# Patient Record
Sex: FEMALE | Race: WHITE | Employment: FULL TIME | URBAN - METROPOLITAN AREA
[De-identification: names, ages, dates, MRNs, and addresses within clinical notes are randomized per-mention and may not be internally consistent; named-entity substitution may affect disease eponyms.]

---

## 2017-02-13 ENCOUNTER — OFFICE VISIT (OUTPATIENT)
Dept: FAMILY MEDICINE | Facility: CLINIC | Age: 52
End: 2017-02-13
Payer: COMMERCIAL

## 2017-02-13 VITALS
RESPIRATION RATE: 16 BRPM | WEIGHT: 200.2 LBS | HEART RATE: 78 BPM | TEMPERATURE: 96.8 F | BODY MASS INDEX: 28.12 KG/M2 | DIASTOLIC BLOOD PRESSURE: 83 MMHG | OXYGEN SATURATION: 98 % | SYSTOLIC BLOOD PRESSURE: 123 MMHG

## 2017-02-13 DIAGNOSIS — B00.1 RECURRENT COLD SORES: ICD-10-CM

## 2017-02-13 DIAGNOSIS — H65.93 BILATERAL NON-SUPPURATIVE OTITIS MEDIA: Primary | ICD-10-CM

## 2017-02-13 PROCEDURE — 99213 OFFICE O/P EST LOW 20 MIN: CPT | Performed by: NURSE PRACTITIONER

## 2017-02-13 RX ORDER — ACYCLOVIR 50 MG/G
CREAM TOPICAL
Qty: 5 G | Refills: 1 | Status: SHIPPED | OUTPATIENT
Start: 2017-02-13 | End: 2017-02-17

## 2017-02-13 RX ORDER — VALACYCLOVIR HYDROCHLORIDE 1 G/1
1000 TABLET, FILM COATED ORAL DAILY
Qty: 20 TABLET | Refills: 6 | Status: SHIPPED | OUTPATIENT
Start: 2017-02-13 | End: 2018-03-01

## 2017-02-13 RX ORDER — AZITHROMYCIN 250 MG/1
TABLET, FILM COATED ORAL
Qty: 6 TABLET | Refills: 0 | Status: SHIPPED | OUTPATIENT
Start: 2017-02-13 | End: 2017-06-22

## 2017-02-13 NOTE — PATIENT INSTRUCTIONS
Increase rest and fluids. Tylenol and/or Ibuprofen for comfort. Cool mist vaporizer. If your symptoms worsen or do not resolve follow up with your primary care provider in 2 weeks and sooner if needed.        Indications for emergent return to emergency department discussed with patient, who verbalized good understanding and agreement.  Patient understands the limitations of today's evaluation.           Middle Ear Infection (Adult)  You have an infection of the middle ear (the space behind the eardrum). This is also called acute otitis media (AOM). Sometimes it is caused by the common cold. This is because congestion can block the internal passage (eustachian tube) that drains fluid from the middle ear. When the middle ear fills with fluid, bacteria can grow there and cause an infection. Oral antibiotics are used to treat this illness, not ear drops. Symptoms usually start to improve within 1 to 2 days of treatment.    Home care  The following are general care guidelines:    Finish all of the antibiotic medicine given, even though you may feel better after the first few days.    You may use acetaminophen or ibuprofen to control pain, unless something else was prescribed. [NOTE: If you have chronic liver or kidney disease or have ever had a stomach ulcer or GI bleeding, talk with your doctor before using these medicines.] Do not give aspirin to anyone under 18 years of age who has a fever. It may cause severe liver damage.  Follow-up care  Follow up with your doctor in 2 weeks if all symptoms have not gotten better, or if hearing doesn't go back to normal within 1 month.  When to seek medical care  Get prompt medical attention if any of the following occur:    Ear pain gets worse or does not improve after 3 days of treatment    Unusual drowsiness or confusion    Neck pain, stiff neck, or headache    Fluid or blood draining from the ear canal    Fever of 100.4 F (38 C) or higher after 3 days of antibiotics, or as  directed by your health care provider    Convulsion (seizure)    0887-4181 [employer]. [address] [place] [address], [place] [address]. All rights reserved. This information is not intended as a substitute for professional medical care. Always follow your healthcare professional's instructions.

## 2017-02-13 NOTE — NURSING NOTE
"Chief Complaint   Patient presents with     Sinus Problem     /83  Pulse 78  Temp 96.8  F (36  C) (Tympanic)  Resp 16  Wt 200 lb 3.2 oz (90.8 kg)  SpO2 98%  BMI 28.12 kg/m2 Estimated body mass index is 28.12 kg/(m^2) as calculated from the following:    Height as of 5/13/16: 5' 10.75\" (1.797 m).    Weight as of this encounter: 200 lb 3.2 oz (90.8 kg).  bp completed using cuff size: regular      Health Maintenance that is potentially due pending provider review:  Patient says she is not due for a mammogram         "

## 2017-02-13 NOTE — PROGRESS NOTES
SUBJECTIVE:                                                    Christina John is a 51 year old female who presents to clinic today for the following health issues:      Acute Illness   Acute illness concerns: sinus problem   Onset: 1 week      Fever: YES    Chills/Sweats: YES    Headache (location?): YES    Sinus Pressure:YES    Conjunctivitis:  no    Ear Pain: YES: right, left cant hear out of     Rhinorrhea: YES    Congestion: YES    Sore Throat: no     Cough: YES    Wheeze: YES- little bit     Decreased Appetite: YES    Nausea: no    Vomiting: no    Diarrhea:  no    Dysuria/Freq.: no    Fatigue/Achiness: no     Sick/Strep Exposure: YES- everyone in her house has been sick for 3 months. Her 2 kids have rsv      Therapies Tried and outcome: ibuprofen, tylenol, sudafed 12 hour, delsym, benedryl           Problem list and histories reviewed & adjusted, as indicated.  Additional history: as documented    Patient Active Problem List   Diagnosis     DCIS (ductal carcinoma in situ) of breast     GERD (gastroesophageal reflux disease)     PCOS (polycystic ovarian syndrome)     Seasonal allergic rhinitis     Allergic rhinitis due to animal dander     House dust mite allergy     Seasonal allergic conjunctivitis     HYPERLIPIDEMIA LDL GOAL <160     Mild major depression (H)     Diagnostic skin and sensitization tests     Desensitization to allergens     History of adenomatous polyp of colon     BRCA1 positive     Overweight (BMI 25.0-29.9)     Past Surgical History   Procedure Laterality Date     Hc biopsy of breast, open incisional  2008     sentinel lymph node     C appendectomy  1982     C/section, classical       Excis vaginal cyst/tumor       Endoscopy       10/2000 Xiao, repeat 10/2002, REPEAT 2/08 (REPEAT IN 2 YRS)     Pelvis laparoscopy,dx       x2     C removal gallbladder  6/2005     Hc colonoscopy thru stoma, diagnostic  12/04, 12/09     (REPEAT IN 5 YRS)     Hysterectomy, pap no longer indicated        Mastectomy bilateral, insert tissue expander bilateral, combined  8/2010       Social History   Substance Use Topics     Smoking status: Never Smoker     Smokeless tobacco: Never Used      Comment: Nonsmoking household     Alcohol use No     Family History   Problem Relation Age of Onset     Hypertension Mother      GASTROINTESTINAL DISEASE Mother      GAITAN'S ESOPHAGUS     Lipids Mother      Depression Mother      Other - See Comments Mother      Pagets Disease of the Vulva, Dx 06/2014     Allergies Father      HAYFEVER     HEART DISEASE Father      AFIB     Hypertension Father      Lipids Father      GASTROINTESTINAL DISEASE Other      GAITAN'S ESOPHAGUS     GASTROINTESTINAL DISEASE Other      GAITAN'S ESOPHAGUS     Breast Cancer Paternal Aunt      Breast Cancer Other      fathers 1st cousin         Current Outpatient Prescriptions   Medication Sig Dispense Refill     valACYclovir (VALTREX) 1000 mg tablet Take 1 tablet (1,000 mg) by mouth daily 20 tablet 6     acyclovir (ZOVIRAX) 5 % cream Apply topically 5 times daily for 4 days 5 g 1     azithromycin (ZITHROMAX Z-DARLIN) 250 MG tablet Take 2 tablets on day 1 and then take 1 tablet days 2-5 6 tablet 0     buPROPion (WELLBUTRIN XL) 300 MG 24 hr tablet Take 1 tablet (300 mg) by mouth every morning 90 tablet 1     escitalopram (LEXAPRO) 20 MG tablet Take 1 tablet (20 mg) by mouth daily 90 tablet 3     calcium carbonate-vitamin D (CALCIUM + D) 600-200 MG-UNIT TABS Take  by mouth 2 times daily.       Ascorbic Acid (VITAMIN C CR PO) Take  by mouth.       fexofenadine (ALLEGRA) 180 MG tablet Take 1 tablet by mouth daily. Needs to be seen for further refills 90 tablet 0     Acetaminophen (TYLENOL EXTRA STRENGTH PO) Take  by mouth. PRN         MULTIVITAMIN TABS   OR 1 TABLET DAILY       [DISCONTINUED] valACYclovir (VALTREX) 1000 mg tablet Take 1 tablet (1,000 mg) by mouth daily 20 tablet 6     Allergies   Allergen Reactions     Amoxicillin Swelling     Cefzil [Cefprozil]  Swelling     Adhesive Tape Rash     Biaxin [Clarithromycin] Hives     Erythromycin GI Disturbance     Latex Rash     Percocet [Acetaminophen] Hives     Sulfa Drugs Hives     Problem list, Medication list, Allergies, and Medical/Social/Surgical histories reviewed in [facility] and updated as appropriate.    ROS:  Constitutional, HEENT, cardiovascular, pulmonary, GI, , musculoskeletal, neuro, skin, endocrine and psych systems are negative, except as otherwise noted.    OBJECTIVE:                                                    /83  Pulse 78  Temp 96.8  F (36  C) (Tympanic)  Resp 16  Wt 200 lb 3.2 oz (90.8 kg)  SpO2 98%  BMI 28.12 kg/m2  Body mass index is 28.12 kg/(m^2).  GENERAL: healthy, alert and no distress, nontoxic in appearance  EYES: Eyes grossly normal to inspection, PERRL and conjunctivae and sclerae normal  HENT: ear canals and TM's intact bilaterally and red, left > right, nose and mouth without ulcers or lesions  NECK: no adenopathy, supple with full ROM  RESP: lungs clear to auscultation - no rales, rhonchi or wheezes  CV: regular rate and rhythm, normal S1 S2, no S3 or S4, no murmur, click or rub, no peripheral edema and   ABDOMEN: soft, nontender  MS: no gross musculoskeletal defects noted, no edema  No rash    Diagnostic Test Results:  No results found for this or any previous visit (from the past 24 hour(s)).     ASSESSMENT/PLAN:                                                    Requests refills as well for cold sore meds.  Problem List Items Addressed This Visit     None      Visit Diagnoses     Bilateral non-suppurative otitis media    -  Primary    Relevant Medications    valACYclovir (VALTREX) 1000 mg tablet    azithromycin (ZITHROMAX Z-DARLIN) 250 MG tablet    Recurrent cold sores        Relevant Medications    valACYclovir (VALTREX) 1000 mg tablet    acyclovir (ZOVIRAX) 5 % cream               Patient Instructions   Increase rest and fluids. Tylenol and/or Ibuprofen for comfort. Cool mist  vaporizer. If your symptoms worsen or do not resolve follow up with your primary care provider in 2 weeks and sooner if needed.        Indications for emergent return to emergency department discussed with patient, who verbalized good understanding and agreement.  Patient understands the limitations of today's evaluation.           Middle Ear Infection (Adult)  You have an infection of the middle ear (the space behind the eardrum). This is also called acute otitis media (AOM). Sometimes it is caused by the common cold. This is because congestion can block the internal passage (eustachian tube) that drains fluid from the middle ear. When the middle ear fills with fluid, bacteria can grow there and cause an infection. Oral antibiotics are used to treat this illness, not ear drops. Symptoms usually start to improve within 1 to 2 days of treatment.    Home care  The following are general care guidelines:    Finish all of the antibiotic medicine given, even though you may feel better after the first few days.    You may use acetaminophen or ibuprofen to control pain, unless something else was prescribed. [NOTE: If you have chronic liver or kidney disease or have ever had a stomach ulcer or GI bleeding, talk with your doctor before using these medicines.] Do not give aspirin to anyone under 18 years of age who has a fever. It may cause severe liver damage.  Follow-up care  Follow up with your doctor in 2 weeks if all symptoms have not gotten better, or if hearing doesn't go back to normal within 1 month.  When to seek medical care  Get prompt medical attention if any of the following occur:    Ear pain gets worse or does not improve after 3 days of treatment    Unusual drowsiness or confusion    Neck pain, stiff neck, or headache    Fluid or blood draining from the ear canal    Fever of 100.4 F (38 C) or higher after 3 days of antibiotics, or as directed by your health care provider    Convulsion (seizure)    3937-0972  [employer]. [address] [place] [address], [place] [address]. All rights reserved. This information is not intended as a substitute for professional medical care. Always follow your healthcare professional's instructions.            ELISA Ricci [facility]

## 2017-02-13 NOTE — MR AVS SNAPSHOT
After Visit Summary   2/13/2017    Christina John    MRN: [id number]           Patient Information     Date Of Birth          1965        Visit Information        Provider Department      2/13/2017 2:00 PM Ivett Rizzo APRN [facility]        Today's Diagnoses     Bilateral non-suppurative otitis media    -  1    Recurrent cold sores          Care Instructions    Increase rest and fluids. Tylenol and/or Ibuprofen for comfort. Cool mist vaporizer. If your symptoms worsen or do not resolve follow up with your primary care provider in 2 weeks and sooner if needed.        Indications for emergent return to emergency department discussed with patient, who verbalized good understanding and agreement.  Patient understands the limitations of today's evaluation.           Middle Ear Infection (Adult)  You have an infection of the middle ear (the space behind the eardrum). This is also called acute otitis media (AOM). Sometimes it is caused by the common cold. This is because congestion can block the internal passage (eustachian tube) that drains fluid from the middle ear. When the middle ear fills with fluid, bacteria can grow there and cause an infection. Oral antibiotics are used to treat this illness, not ear drops. Symptoms usually start to improve within 1 to 2 days of treatment.    Home care  The following are general care guidelines:    Finish all of the antibiotic medicine given, even though you may feel better after the first few days.    You may use acetaminophen or ibuprofen to control pain, unless something else was prescribed. [NOTE: If you have chronic liver or kidney disease or have ever had a stomach ulcer or GI bleeding, talk with your doctor before using these medicines.] Do not give aspirin to anyone under 18 years of age who has a fever. It may cause severe liver damage.  Follow-up care  Follow up with your doctor in 2 weeks if all symptoms have not  gotten better, or if hearing doesn't go back to normal within 1 month.  When to seek medical care  Get prompt medical attention if any of the following occur:    Ear pain gets worse or does not improve after 3 days of treatment    Unusual drowsiness or confusion    Neck pain, stiff neck, or headache    Fluid or blood draining from the ear canal    Fever of 100.4 F (38 C) or higher after 3 days of antibiotics, or as directed by your health care provider    Convulsion (seizure)    8079-2806 [employer]. [address] [place] [address]. All rights reserved. This information is not intended as a substitute for professional medical care. Always follow your healthcare professional's instructions.              Follow-ups after your visit        Follow-up notes from your care team     Return in about 2 weeks (around 2/27/2017).      Who to contact     If you have questions or need follow up information about today's clinic visit or your schedule please contact [facility] directly at [phone].  Normal or non-critical lab and imaging results will be communicated to you by [employer], letter or phone within 4 business days after the clinic has received the results. If you do not hear from us within 7 days, please contact the clinic through [employer] or phone. If you have a critical or abnormal lab result, we will notify you by phone as soon as possible.  Submit refill requests through [employer] or call your pharmacy and they will forward the refill request to us. Please allow 3 business days for your refill to be completed.          Additional Information About Your Visit        [employer] Information     [employer] gives you secure access to your electronic health record. If you see a primary care provider, you can also send messages to your care team and make appointments. If you have questions, please call your primary care clinic.  If you do not have a primary care provider, please call  [phone] and they will assist you.        Care EveryWhere ID     This is your Care EveryWhere ID. This could be used by other organizations to access your [facility] medical records  [id number]        Your Vitals Were     Pulse Temperature Respirations Pulse Oximetry BMI (Body Mass Index)       78 96.8  F (36  C) (Tympanic) 16 98% 28.12 kg/m2        Blood Pressure from Last 3 Encounters:   02/13/17 123/83   05/20/16 118/82   05/13/16 120/85    Weight from Last 3 Encounters:   02/13/17 200 lb 3.2 oz (90.8 kg)   05/20/16 204 lb 6.4 oz (92.7 kg)   05/13/16 203 lb (92.1 kg)              Today, you had the following     No orders found for display         Today's Medication Changes          These changes are accurate as of: 2/13/17  2:54 PM.  If you have any questions, ask your nurse or doctor.               Start taking these medicines.        Dose/Directions    acyclovir 5 % cream   Commonly known as:  ZOVIRAX   Used for:  Recurrent cold sores   Started by:  Ivett Rizzo APRN CNP        Apply topically 5 times daily for 4 days   Quantity:  5 g   Refills:  1       azithromycin 250 MG tablet   Commonly known as:  ZITHROMAX Z-DARLIN   Used for:  Bilateral non-suppurative otitis media   Started by:  Ivett Rizzo APRN CNP        Take 2 tablets on day 1 and then take 1 tablet days 2-5   Quantity:  6 tablet   Refills:  0            Where to get your medicines      These medications were sent to [facility] [place] [id number] [place] [id number]     Phone:  [phone]     acyclovir 5 % cream    azithromycin 250 MG tablet    valACYclovir 1000 mg tablet                Primary Care Provider Office Phone # Fax #    Lucía Aguillon [phone]       [facility] [address] [facility] [address]        Thank you!     Thank you for choosing [facility]  for your care. Our goal is always to provide you  with excellent care. Hearing back from our patients is one way we can continue to improve our services. Please take a few minutes to complete the written survey that you may receive in the mail after your visit with us. Thank you!             Your Updated Medication List - Protect others around you: Learn how to safely use, store and throw away your medicines at www.disposemymeds.org.          This list is accurate as of: 2/13/17  2:54 PM.  Always use your most recent med list.                   Brand Name Dispense Instructions for use    acyclovir 5 % cream    ZOVIRAX    5 g    Apply topically 5 times daily for 4 days       azithromycin 250 MG tablet    ZITHROMAX Z-DARLIN    6 tablet    Take 2 tablets on day 1 and then take 1 tablet days 2-5       buPROPion 300 MG 24 hr tablet    WELLBUTRIN XL    90 tablet    Take 1 tablet (300 mg) by mouth every morning       calcium + D 600-200 MG-UNIT Tabs   Generic drug:  calcium carbonate-vitamin D      Take  by mouth 2 times daily.       escitalopram 20 MG tablet    LEXAPRO    90 tablet    Take 1 tablet (20 mg) by mouth daily       fexofenadine 180 MG tablet    ALLEGRA    90 tablet    Take 1 tablet by mouth daily. Needs to be seen for further refills       MULTIVITAMIN TABS   OR      1 TABLET DAILY       TYLENOL EXTRA STRENGTH PO      Take  by mouth. PRN       valACYclovir 1000 mg tablet    VALTREX    20 tablet    Take 1 tablet (1,000 mg) by mouth daily       VITAMIN C CR PO      Take  by mouth.

## 2017-02-24 ENCOUNTER — OFFICE VISIT (OUTPATIENT)
Dept: FAMILY MEDICINE | Facility: CLINIC | Age: 52
End: 2017-02-24
Payer: COMMERCIAL

## 2017-02-24 VITALS
SYSTOLIC BLOOD PRESSURE: 128 MMHG | DIASTOLIC BLOOD PRESSURE: 85 MMHG | TEMPERATURE: 96.4 F | BODY MASS INDEX: 28.71 KG/M2 | WEIGHT: 204.4 LBS

## 2017-02-24 DIAGNOSIS — H66.92 RECURRENT OTITIS MEDIA OF LEFT EAR: Primary | ICD-10-CM

## 2017-02-24 PROCEDURE — 99213 OFFICE O/P EST LOW 20 MIN: CPT | Performed by: NURSE PRACTITIONER

## 2017-02-24 RX ORDER — AZITHROMYCIN 250 MG/1
TABLET, FILM COATED ORAL
Qty: 6 TABLET | Refills: 0 | Status: SHIPPED | OUTPATIENT
Start: 2017-02-24 | End: 2017-06-22

## 2017-02-24 NOTE — PROGRESS NOTES
SUBJECTIVE:                                                    Christina John is a 51 year old female who presents to clinic today for the following health issues:      Chief Complaint   Patient presents with     Ear Problem     recheck on ears both mainly left now both, was getting better but back now             Problem list and histories reviewed & adjusted, as indicated.  Additional history: states ever since she was in [place] at beginning of the month, she's had pain and pressure in her left ear.  Denies scuba diving or doing any swimming while in [place].  She was seen but not treated for this in [place]. Was given Z pack here on the 13th and states she felt somewhat better with that but now symptoms are back.  Denies any other specific URI complaint. Is otherwise feeling ok.  She states she's had issues with her ears on and off ever since she had chemo and then as a child she had a lot of ear infections.  Hearing is somewhat diminished in left ear. No dizziness.      Patient Active Problem List   Diagnosis     DCIS (ductal carcinoma in situ) of breast     GERD (gastroesophageal reflux disease)     PCOS (polycystic ovarian syndrome)     Seasonal allergic rhinitis     Allergic rhinitis due to animal dander     House dust mite allergy     Seasonal allergic conjunctivitis     HYPERLIPIDEMIA LDL GOAL <160     Mild major depression (H)     Diagnostic skin and sensitization tests     Desensitization to allergens     History of adenomatous polyp of colon     BRCA1 positive     Overweight (BMI 25.0-29.9)     Past Surgical History   Procedure Laterality Date     Hc biopsy of breast, open incisional  2008     sentinel lymph node     C appendectomy  1982     C/section, classical       Excis vaginal cyst/tumor       Endoscopy       10/2000 Xiao, repeat 10/2002, REPEAT 2/08 (REPEAT IN 2 YRS)     Pelvis laparoscopy,dx       x2     C removal gallbladder  6/2005     Hc colonoscopy thru stoma, diagnostic  12/04, 12/09      (REPEAT IN 5 YRS)     Hysterectomy, pap no longer indicated       Mastectomy bilateral, insert tissue expander bilateral, combined  8/2010       Social History   Substance Use Topics     Smoking status: Never Smoker     Smokeless tobacco: Never Used      Comment: Nonsmoking household     Alcohol use No     Family History   Problem Relation Age of Onset     Hypertension Mother      GASTROINTESTINAL DISEASE Mother      GAITAN'S ESOPHAGUS     Lipids Mother      Depression Mother      Other - See Comments Mother      Pagets Disease of the Vulva, Dx 06/2014     Allergies Father      HAYFEVER     HEART DISEASE Father      AFIB     Hypertension Father      Lipids Father      GASTROINTESTINAL DISEASE Other      GAITAN'S ESOPHAGUS     GASTROINTESTINAL DISEASE Other      GAITAN'S ESOPHAGUS     Breast Cancer Paternal Aunt      Breast Cancer Other      fathers 1st cousin         Current Outpatient Prescriptions   Medication Sig Dispense Refill     azithromycin (ZITHROMAX) 250 MG tablet Two tablets first day, then one tablet daily for four days. 6 tablet 0     valACYclovir (VALTREX) 1000 mg tablet Take 1 tablet (1,000 mg) by mouth daily 20 tablet 6     buPROPion (WELLBUTRIN XL) 300 MG 24 hr tablet Take 1 tablet (300 mg) by mouth every morning 90 tablet 1     escitalopram (LEXAPRO) 20 MG tablet Take 1 tablet (20 mg) by mouth daily 90 tablet 3     calcium carbonate-vitamin D (CALCIUM + D) 600-200 MG-UNIT TABS Take  by mouth 2 times daily.       Ascorbic Acid (VITAMIN C CR PO) Take  by mouth.       fexofenadine (ALLEGRA) 180 MG tablet Take 1 tablet by mouth daily. Needs to be seen for further refills 90 tablet 0     Acetaminophen (TYLENOL EXTRA STRENGTH PO) Take  by mouth. PRN         MULTIVITAMIN TABS   OR 1 TABLET DAILY       azithromycin (ZITHROMAX Z-DARLIN) 250 MG tablet Take 2 tablets on day 1 and then take 1 tablet days 2-5 (Patient not taking: Reported on 2/24/2017) 6 tablet 0     Allergies   Allergen Reactions      Amoxicillin Swelling     Cefzil [Cefprozil] Swelling     Adhesive Tape Rash     Biaxin [Clarithromycin] Hives     Erythromycin GI Disturbance     Latex Rash     Percocet [Acetaminophen] Hives     Sulfa Drugs Hives        ROS: 10 point ROS neg other than the symptoms noted above in the HPI.    OBJECTIVE:                                                    /85 (Cuff Size: Adult Regular)  Temp 96.4  F (35.8  C) (Tympanic)  Wt 204 lb 6.4 oz (92.7 kg)  BMI 28.71 kg/m2  Body mass index is 28.71 kg/(m^2).  GENERAL: healthy, alert and no distress  HENT: ear canals and TM's left is erythematic, right is pink/dull, pharynx without erythema  NECK: anterior cervical adenopathy, worse on left  RESP: lungs clear to auscultation - no rales, rhonchi or wheezes  CV: regular rate and rhythm, normal S1 S2, no S3 or S4, no murmur  MS: no gross musculoskeletal defects noted      Diagnostic Test Results:  none      ASSESSMENT/PLAN:                                                            1. Recurrent otitis media of left ear    - azithromycin (ZITHROMAX) 250 MG tablet; Two tablets first day, then one tablet daily for four days.  Dispense: 6 tablet; Refill: 0  - OTOLARYNGOLOGY REFERRAL  Discussed how to take the medication(s), expected outcomes, potential side effects.    See Patient Instructions  Patient Instructions   Repeat antibiotics.  Call to schedule with ENT.  Follow up if symptoms persist or worsen and as needed.        Thank you for choosing [facility].  You may be receiving a survey in the mail from [employer] regarding your visit today.  Please take a few minutes to complete and return the survey to let us know how we are doing.      Our Clinic hours are:  Mondays    7:20 am - 7 pm  Tues -  Fri  7:20 am - 5 pm    Clinic Phone: [phone]    The clinic lab opens at 7:30 am Mon - Fri and appointments are required.    [facility]. [phone]  Monday-Thursday 8 am - 7pm  Tues/Wed/Fri 8 am -  5:30 pm           ELISA Sanchez [facility]

## 2017-02-24 NOTE — MR AVS SNAPSHOT
After Visit Summary   2/24/2017    Christina John    MRN: [id number]           Patient Information     Date Of Birth          1965        Visit Information        Provider Department      2/24/2017 1:00 PM Alesia Alanis APRN [facility]        Today's Diagnoses     Recurrent otitis media of left ear    -  1      Care Instructions    Repeat antibiotics.  Call to schedule with ENT.  Follow up if symptoms persist or worsen and as needed.        Thank you for choosing [facility].  You may be receiving a survey in the mail from [employer] regarding your visit today.  Please take a few minutes to complete and return the survey to let us know how we are doing.      Our Clinic hours are:  Mondays    7:20 am - 7 pm  Tues -  Fri  7:20 am - 5 pm    Clinic Phone: [phone]    The clinic lab opens at 7:30 am Mon - Fri and appointments are required.    [facility]  Ph. [phone]  Monday-Thursday 8 am - 7pm  Tues/Wed/Fri 8 am - 5:30 pm             Follow-ups after your visit        Additional Services     OTOLARYNGOLOGY REFERRAL       Your provider has referred you to: [facility]: [facility] [phone]   [email or web address]    Please be aware that coverage of these services is subject to the terms and limitations of your health insurance plan.  Call member services at your health plan with any benefit or coverage questions.      Please bring the following with you to your appointment:    (1) Any X-Rays, CTs or MRIs which have been performed.  Contact the facility where they were done to arrange for  prior to your scheduled appointment.   (2) List of current medications  (3) This referral request   (4) Any documents/labs given to you for this referral                  Who to contact     If you have questions or need follow up information about today's clinic visit or your schedule please contact [place]  [facility] directly at [phone].  Normal or non-critical lab and imaging results will be communicated to you by MyChart, letter or phone within 4 business days after the clinic has received the results. If you do not hear from us within 7 days, please contact the clinic through [employer] or phone. If you have a critical or abnormal lab result, we will notify you by phone as soon as possible.  Submit refill requests through [employer] or call your pharmacy and they will forward the refill request to us. Please allow 3 business days for your refill to be completed.          Additional Information About Your Visit        [employer] Information     [employer] gives you secure access to your electronic health record. If you see a primary care provider, you can also send messages to your care team and make appointments. If you have questions, please call your primary care clinic.  If you do not have a primary care provider, please call [phone] and they will assist you.        Care EveryWhere ID     This is your Care EveryWhere ID. This could be used by other organizations to access your [place] medical records  [id number]        Your Vitals Were     Temperature BMI (Body Mass Index)                96.4  F (35.8  C) (Tympanic) 28.71 kg/m2           Blood Pressure from Last 3 Encounters:   02/24/17 128/85   02/13/17 123/83   05/20/16 118/82    Weight from Last 3 Encounters:   02/24/17 204 lb 6.4 oz (92.7 kg)   02/13/17 200 lb 3.2 oz (90.8 kg)   05/20/16 204 lb 6.4 oz (92.7 kg)              We Performed the Following     OTOLARYNGOLOGY REFERRAL          Today's Medication Changes          These changes are accurate as of: 2/24/17  1:05 PM.  If you have any questions, ask your nurse or doctor.               These medicines have changed or have updated prescriptions.        Dose/Directions    * azithromycin 250 MG tablet   Commonly known as:  ZITHROMAX Z-DARLIN   This may have changed:  Another medication with the same  name was added. Make sure you understand how and when to take each.   Used for:  Bilateral non-suppurative otitis media   Changed by:  Ivett Rizzo APRN CNP        Take 2 tablets on day 1 and then take 1 tablet days 2-5   Quantity:  6 tablet   Refills:  0       * azithromycin 250 MG tablet   Commonly known as:  ZITHROMAX   This may have changed:  You were already taking a medication with the same name, and this prescription was added. Make sure you understand how and when to take each.   Used for:  Recurrent otitis media of left ear   Changed by:  Alesia Alanis APRN CNP        Two tablets first day, then one tablet daily for four days.   Quantity:  6 tablet   Refills:  0       * Notice:  This list has 2 medication(s) that are the same as other medications prescribed for you. Read the directions carefully, and ask your doctor or other care provider to review them with you.         Where to get your medicines      These medications [place] [facility] [address] [facility]  [address] [facility] [address]     Phone:  [phone]     azithromycin 250 MG tablet                Primary Care Provider Office Phone # Fax #    Lucía Aguillon [phone]       [facility] [address] [address] [facility] [address]        Thank you!     Thank you for choosing [facility]  for your care. Our goal is always to provide you with excellent care. Hearing back from our patients is one way we can continue to improve our services. Please take a few minutes to complete the written survey that you may receive in the mail after your visit with us. Thank you!             Your Updated Medication List - Protect others around you: Learn how to safely use, store and throw away your medicines at www.disposemymeds.org.          This list is accurate as of: 2/24/17  1:05 PM.  Always use your most recent med list.                    Brand Name Dispense Instructions for use    * azithromycin 250 MG tablet    ZITHROMAX Z-DARLIN    6 tablet    Take 2 tablets on day 1 and then take 1 tablet days 2-5       * azithromycin 250 MG tablet    ZITHROMAX    6 tablet    Two tablets first day, then one tablet daily for four days.       buPROPion 300 MG 24 hr tablet    WELLBUTRIN XL    90 tablet    Take 1 tablet (300 mg) by mouth every morning       calcium + D 600-200 MG-UNIT Tabs   Generic drug:  calcium carbonate-vitamin D      Take  by mouth 2 times daily.       escitalopram 20 MG tablet    LEXAPRO    90 tablet    Take 1 tablet (20 mg) by mouth daily       fexofenadine 180 MG tablet    ALLEGRA    90 tablet    Take 1 tablet by mouth daily. Needs to be seen for further refills       MULTIVITAMIN TABS   OR      1 TABLET DAILY       TYLENOL EXTRA STRENGTH PO      Take  by mouth. PRN       valACYclovir 1000 mg tablet    VALTREX    20 tablet    Take 1 tablet (1,000 mg) by mouth daily       VITAMIN C CR PO      Take  by mouth.       * Notice:  This list has 2 medication(s) that are the same as other medications prescribed for you. Read the directions carefully, and ask your doctor or other care provider to review them with you.

## 2017-02-24 NOTE — PATIENT INSTRUCTIONS
Repeat antibiotics.  Call to schedule with ENT.  Follow up if symptoms persist or worsen and as needed.        Thank you for choosing [facility].  You may be receiving a survey in the mail from [employer] regarding your visit today.  Please take a few minutes to complete and return the survey to let us know how we are doing.      Our Clinic hours are:  Mondays    7:20 am - 7 pm  Tues -  Fri  7:20 am - 5 pm    Clinic Phone: [phone]    The clinic lab opens at 7:30 am Mon - Fri and appointments are required.    [facility]. [phone]  Monday-Thursday 8 am - 7pm  Tues/Wed/Fri 8 am - 5:30 pm

## 2017-02-24 NOTE — NURSING NOTE
"Chief Complaint   Patient presents with     Ear Problem     recheck on ears both mainly left now both, was getting better but back now       Initial /85 (Cuff Size: Adult Regular)  Temp 96.4  F (35.8  C) (Tympanic)  Wt 204 lb 6.4 oz (92.7 kg)  BMI 28.71 kg/m2 Estimated body mass index is 28.71 kg/(m^2) as calculated from the following:    Height as of 5/13/16: 5' 10.75\" (1.797 m).    Weight as of this encounter: 204 lb 6.4 oz (92.7 kg).  Medication Reconciliation: complete   Anna Card CMA      "

## 2017-03-16 ENCOUNTER — TELEPHONE (OUTPATIENT)
Dept: FAMILY MEDICINE | Facility: CLINIC | Age: 52
End: 2017-03-16

## 2017-03-16 ENCOUNTER — MYC MEDICAL ADVICE (OUTPATIENT)
Dept: FAMILY MEDICINE | Facility: CLINIC | Age: 52
End: 2017-03-16

## 2017-04-07 ENCOUNTER — TRANSFERRED RECORDS (OUTPATIENT)
Dept: HEALTH INFORMATION MANAGEMENT | Facility: CLINIC | Age: 52
End: 2017-04-07

## 2017-04-27 NOTE — TELEPHONE ENCOUNTER
Have her come in and see me to discuss. SHe is due for her annual physical next month.    Lucía Turner

## 2017-05-30 DIAGNOSIS — F32.0 MAJOR DEPRESSIVE DISORDER, SINGLE EPISODE, MILD (H): ICD-10-CM

## 2017-05-31 RX ORDER — BUPROPION HYDROCHLORIDE 300 MG/1
TABLET ORAL
Qty: 30 TABLET | Refills: 0 | Status: SHIPPED | OUTPATIENT
Start: 2017-05-31 | End: 2017-06-22

## 2017-05-31 NOTE — TELEPHONE ENCOUNTER
"PHQ-9 SCORE 5/13/2016 11/29/2016 5/31/2017   Total Score - - -   Total Score MyChart - - -   Total Score 13 14 15     Patient is due to be seen. Appointment has been made for 6/23/17. Patient declined PVL and will have them done at her appointment. She had bariatric surgery and is requesting the \"appropriate labs\"    Patient will need #30 tabs to make it to her appointment.  To provider to advise.  STEPHANIE SalN RN   "

## 2017-06-01 ASSESSMENT — PATIENT HEALTH QUESTIONNAIRE - PHQ9: SUM OF ALL RESPONSES TO PHQ QUESTIONS 1-9: 15

## 2017-06-22 ENCOUNTER — OFFICE VISIT (OUTPATIENT)
Dept: FAMILY MEDICINE | Facility: CLINIC | Age: 52
End: 2017-06-22
Payer: COMMERCIAL

## 2017-06-22 VITALS
WEIGHT: 209 LBS | BODY MASS INDEX: 29.26 KG/M2 | DIASTOLIC BLOOD PRESSURE: 84 MMHG | HEART RATE: 61 BPM | SYSTOLIC BLOOD PRESSURE: 125 MMHG | TEMPERATURE: 98.4 F | HEIGHT: 71 IN

## 2017-06-22 DIAGNOSIS — F32.0 MAJOR DEPRESSIVE DISORDER, SINGLE EPISODE, MILD (H): Primary | ICD-10-CM

## 2017-06-22 DIAGNOSIS — Z98.84 HISTORY OF GASTRIC BYPASS: ICD-10-CM

## 2017-06-22 LAB
ALBUMIN SERPL-MCNC: 3.7 G/DL (ref 3.4–5)
ALP SERPL-CCNC: 116 U/L (ref 40–150)
ALT SERPL W P-5'-P-CCNC: 31 U/L (ref 0–50)
ANION GAP SERPL CALCULATED.3IONS-SCNC: 7 MMOL/L (ref 3–14)
AST SERPL W P-5'-P-CCNC: 16 U/L (ref 0–45)
BILIRUB SERPL-MCNC: 0.6 MG/DL (ref 0.2–1.3)
BUN SERPL-MCNC: 19 MG/DL (ref 7–30)
CALCIUM SERPL-MCNC: 8.5 MG/DL (ref 8.5–10.1)
CHLORIDE SERPL-SCNC: 103 MMOL/L (ref 94–109)
CHOLEST SERPL-MCNC: 160 MG/DL
CO2 SERPL-SCNC: 29 MMOL/L (ref 20–32)
CREAT SERPL-MCNC: 1.07 MG/DL (ref 0.52–1.04)
DEPRECATED CALCIDIOL+CALCIFEROL SERPL-MC: 42 UG/L (ref 20–75)
ERYTHROCYTE [DISTWIDTH] IN BLOOD BY AUTOMATED COUNT: 13.8 % (ref 10–15)
FERRITIN SERPL-MCNC: 32 NG/ML (ref 8–252)
FOLATE SERPL-MCNC: 15.4 NG/ML
GFR SERPL CREATININE-BSD FRML MDRD: 54 ML/MIN/1.7M2
GLUCOSE SERPL-MCNC: 89 MG/DL (ref 70–99)
HBA1C MFR BLD: 5.7 % (ref 4.3–6)
HCT VFR BLD AUTO: 41.1 % (ref 35–47)
HDLC SERPL-MCNC: 56 MG/DL
HGB BLD-MCNC: 13.2 G/DL (ref 11.7–15.7)
IRON SATN MFR SERPL: 19 % (ref 15–46)
IRON SERPL-MCNC: 71 UG/DL (ref 35–180)
LDLC SERPL CALC-MCNC: 89 MG/DL
MCH RBC QN AUTO: 30.3 PG (ref 26.5–33)
MCHC RBC AUTO-ENTMCNC: 32.1 G/DL (ref 31.5–36.5)
MCV RBC AUTO: 94 FL (ref 78–100)
NONHDLC SERPL-MCNC: 104 MG/DL
PLATELET # BLD AUTO: 296 10E9/L (ref 150–450)
POTASSIUM SERPL-SCNC: 4.2 MMOL/L (ref 3.4–5.3)
PROT SERPL-MCNC: 6.9 G/DL (ref 6.8–8.8)
PTH-INTACT SERPL-MCNC: 105 PG/ML (ref 12–72)
RBC # BLD AUTO: 4.36 10E12/L (ref 3.8–5.2)
SODIUM SERPL-SCNC: 139 MMOL/L (ref 133–144)
TIBC SERPL-MCNC: 373 UG/DL (ref 240–430)
TRIGL SERPL-MCNC: 77 MG/DL
VIT B12 SERPL-MCNC: 621 PG/ML (ref 193–986)
WBC # BLD AUTO: 7.6 10E9/L (ref 4–11)

## 2017-06-22 PROCEDURE — 82746 ASSAY OF FOLIC ACID SERUM: CPT | Performed by: FAMILY MEDICINE

## 2017-06-22 PROCEDURE — 83550 IRON BINDING TEST: CPT | Performed by: FAMILY MEDICINE

## 2017-06-22 PROCEDURE — 83970 ASSAY OF PARATHORMONE: CPT | Performed by: FAMILY MEDICINE

## 2017-06-22 PROCEDURE — 82607 VITAMIN B-12: CPT | Performed by: FAMILY MEDICINE

## 2017-06-22 PROCEDURE — 99000 SPECIMEN HANDLING OFFICE-LAB: CPT | Performed by: FAMILY MEDICINE

## 2017-06-22 PROCEDURE — 83540 ASSAY OF IRON: CPT | Performed by: FAMILY MEDICINE

## 2017-06-22 PROCEDURE — 84590 ASSAY OF VITAMIN A: CPT | Mod: 90 | Performed by: FAMILY MEDICINE

## 2017-06-22 PROCEDURE — 99214 OFFICE O/P EST MOD 30 MIN: CPT | Performed by: FAMILY MEDICINE

## 2017-06-22 PROCEDURE — 84207 ASSAY OF VITAMIN B-6: CPT | Mod: 90 | Performed by: FAMILY MEDICINE

## 2017-06-22 PROCEDURE — 82728 ASSAY OF FERRITIN: CPT | Performed by: FAMILY MEDICINE

## 2017-06-22 PROCEDURE — 82306 VITAMIN D 25 HYDROXY: CPT | Performed by: FAMILY MEDICINE

## 2017-06-22 PROCEDURE — 83036 HEMOGLOBIN GLYCOSYLATED A1C: CPT | Performed by: FAMILY MEDICINE

## 2017-06-22 PROCEDURE — 84425 ASSAY OF VITAMIN B-1: CPT | Mod: 90 | Performed by: FAMILY MEDICINE

## 2017-06-22 PROCEDURE — 80061 LIPID PANEL: CPT | Performed by: FAMILY MEDICINE

## 2017-06-22 PROCEDURE — 82525 ASSAY OF COPPER: CPT | Mod: 90 | Performed by: FAMILY MEDICINE

## 2017-06-22 PROCEDURE — 80053 COMPREHEN METABOLIC PANEL: CPT | Performed by: FAMILY MEDICINE

## 2017-06-22 PROCEDURE — 36415 COLL VENOUS BLD VENIPUNCTURE: CPT | Performed by: FAMILY MEDICINE

## 2017-06-22 PROCEDURE — 85027 COMPLETE CBC AUTOMATED: CPT | Performed by: FAMILY MEDICINE

## 2017-06-22 PROCEDURE — 84630 ASSAY OF ZINC: CPT | Mod: 90 | Performed by: FAMILY MEDICINE

## 2017-06-22 RX ORDER — BUPROPION HYDROCHLORIDE 300 MG/1
300 TABLET ORAL EVERY MORNING
Qty: 90 TABLET | Refills: 3 | Status: SHIPPED | OUTPATIENT
Start: 2017-06-22 | End: 2018-07-21

## 2017-06-22 RX ORDER — ESCITALOPRAM OXALATE 20 MG/1
20 TABLET ORAL DAILY
Qty: 90 TABLET | Refills: 3 | Status: SHIPPED | OUTPATIENT
Start: 2017-06-22 | End: 2018-07-21

## 2017-06-22 NOTE — MR AVS SNAPSHOT
"              After Visit Summary   6/22/2017    Christina John    MRN: [id number]           Patient Information     Date Of Birth          1965        Visit Information        Provider Department      6/22/2017 7:20 AM Lucía Aguillon MD [facility]        Today's Diagnoses     Major depressive disorder, single episode, mild (H)    -  1    History of gastric bypass           Follow-ups after your visit        Who to contact     If you have questions or need follow up information about today's clinic visit or your schedule please contact [facility] directly at [phone].  Normal or non-critical lab and imaging results will be communicated to you by [email or web address], letter or phone within 4 business days after the clinic has received the results. If you do not hear from us within 7 days, please contact the clinic through [email or web address] or phone. If you have a critical or abnormal lab result, we will notify you by phone as soon as possible.  Submit refill requests through [email or web address] or call your pharmacy and they will forward the refill request to us. Please allow 3 business days for your refill to be completed.          Additional Information About Your Visit        MyChart Information     [email or web address] gives you secure access to your electronic health record. If you see a primary care provider, you can also send messages to your care team and make appointments. If you have questions, please call your primary care clinic.  If you do not have a primary care provider, please call [phone] and they will assist you.        Care EveryWhere ID     This is your Care EveryWhere ID. This could be used by other organizations to access your [place] medical records  [id number]        Your Vitals Were     Pulse Temperature Height Last Period BMI (Body Mass Index)       61 98.4  F (36.9  C) (Oral) 5' 10.75\" (1.797 m) 03/28/2010 29.36 kg/m2        Blood Pressure from Last 3 Encounters:   06/22/17 125/84 "   02/24/17 128/85   02/13/17 123/83    Weight from Last 3 Encounters:   06/22/17 209 lb (94.8 kg)   02/24/17 204 lb 6.4 oz (92.7 kg)   02/13/17 200 lb 3.2 oz (90.8 kg)              We Performed the Following     CBC with platelets     Comprehensive metabolic panel     Copper level     DEPRESSION ACTION PLAN (DAP)     Ferritin     Folate     Hemoglobin A1c     Iron and iron binding capacity     Lipid panel reflex to direct LDL     Parathyroid Hormone Intact     Vitamin A     Vitamin B1 whole blood     Vitamin B12     Vitamin B6     Vitamin D Deficiency     Zinc          Today's Medication Changes          These changes are accurate as of: 6/22/17 11:43 AM.  If you have any questions, ask your nurse or doctor.               These medicines have changed or have updated prescriptions.        Dose/Directions    buPROPion 300 MG 24 hr tablet   Commonly known as:  WELLBUTRIN XL   This may have changed:  See the new instructions.   Used for:  Major depressive disorder, single episode, mild (H)   Changed by:  Lucía Aguillon MD        Dose:  300 mg   Take 1 tablet (300 mg) by mouth every morning   Quantity:  90 tablet   Refills:  3            Where to get your medicines      These medications were sent to [facility] [address] [place] [address] [address] [facility]  [address] [facility] [address]     Phone:  [phone]     buPROPion 300 MG 24 hr tablet    escitalopram 20 MG tablet                Primary Care Provider Office Phone # Fax #    Lucía Aguillon [phone]       [facility] [address] [place] [address]        Equal Access to Services     JOSE KHAN AH: Hadii bony kim Sodai, waaxda luqadaha, qaybta kaalmada kourtneyyajunito, madisyn blandon. So [facility] [phone].    ATENCIÓN: Si habla español, tiene a fulton disposición servicios gratuitos de asistencia lingüística. Llame al [phone].    We comply with applicable federal civil rights  laws and [place] laws. We do not discriminate on the basis of race, color, national origin, age, disability sex, sexual orientation or gender identity.            Thank you!     Thank you for choosing [facility]  for your care. Our goal is always to provide you with excellent care. Hearing back from our patients is one way we can continue to improve our services. Please take a few minutes to complete the written survey that you may receive in the mail after your visit with us. Thank you!             Your Updated Medication List - Protect others around you: Learn how to safely use, store and throw away your medicines at www.disposemymeds.org.          This list is accurate as of: 6/22/17 11:43 AM.  Always use your most recent med list.                   Brand Name Dispense Instructions for use Diagnosis    buPROPion 300 MG 24 hr tablet    WELLBUTRIN XL    90 tablet    Take 1 tablet (300 mg) by mouth every morning    Major depressive disorder, single episode, mild (H)       calcium + D 600-200 MG-UNIT Tabs   Generic drug:  calcium carbonate-vitamin D      Take  by mouth 2 times daily.        escitalopram 20 MG tablet    LEXAPRO    90 tablet    Take 1 tablet (20 mg) by mouth daily    Major depressive disorder, single episode, mild (H)       MULTIVITAMIN TABS   OR      1 TABLET DAILY        TYLENOL EXTRA STRENGTH PO      Take  by mouth. PRN        valACYclovir 1000 mg tablet    VALTREX    20 tablet    Take 1 tablet (1,000 mg) by mouth daily    Recurrent cold sores       VITAMIN C CR PO      Take  by mouth.

## 2017-06-22 NOTE — PROGRESS NOTES
"  SUBJECTIVE:                                                    Christina John is a 51 year old female who presents to clinic today for the following health issues:          Depression and Anxiety Follow-Up    Status since last visit: No change    Other associated symptoms:None    Complicating factors:     Significant life event: No     Current substance abuse: None    PHQ-9 SCORE 5/13/2016 11/29/2016 5/31/2017   Total Score - - -   Total Score MyChart - - -   Total Score 13 14 15     DANA-7 SCORE 1/28/2015 5/13/2016   Total Score 16 -   Total Score - 8        PHQ-9  English      PHQ-9   Any Language     GAD7       Amount of exercise or physical activity: None    Problems taking medications regularly: No    Medication side effects: none    Diet: regular (no restrictions)      Pt with depression on lexapro 20 mg and wellbutrin 300 mg  She feels like it is stable  PHQ-9 score of 14  Does not want to increase wellbutrin as it makes her feel like a zombie  Is seeing therapist weekly and would just like to continue on current dose of medication    Pt with gastric bypass needing yearly labs    Problem list and histories reviewed & adjusted, as indicated.  Additional history: as documented    Labs reviewed in EPIC    Reviewed and updated as needed this visit by clinical staff  Tobacco  Allergies  Meds  Med Hx  Surg Hx  Fam Hx  Soc Hx      Reviewed and updated as needed this visit by Provider         ROS:  Constitutional, HEENT, cardiovascular, pulmonary, gi and gu systems are negative, except as otherwise noted.    OBJECTIVE:                                                    /84  Pulse 61  Temp 98.4  F (36.9  C) (Oral)  Ht 5' 10.75\" (1.797 m)  Wt 209 lb (94.8 kg)  LMP 03/28/2010  BMI 29.36 kg/m2  Body mass index is 29.36 kg/(m^2).  GENERAL: healthy, alert and no distress  RESP: lungs clear to auscultation - no rales, rhonchi or wheezes  CV: regular rate and rhythm, normal S1 S2, no S3 or S4, no murmur, " click or rub, no peripheral edema and peripheral pulses strong  MS: no gross musculoskeletal defects noted, no edema    Diagnostic Test Results:  none      ASSESSMENT/PLAN:                                                            1. Major depressive disorder, single episode, mild (H)  As above, stable and seeing therapist along with medication  - buPROPion (WELLBUTRIN XL) 300 MG 24 hr tablet; Take 1 tablet (300 mg) by mouth every morning  Dispense: 90 tablet; Refill: 3  - escitalopram (LEXAPRO) 20 MG tablet; Take 1 tablet (20 mg) by mouth daily  Dispense: 90 tablet; Refill: 3  - DEPRESSION ACTION PLAN (DAP)    2. History of gastric bypass    - Comprehensive metabolic panel  - CBC with platelets  - Lipid panel reflex to direct LDL  - Hemoglobin A1c  - Ferritin  - Folate  - Iron and iron binding capacity  - Vitamin D Deficiency  - Vitamin B12  - Vitamin B6  - Zinc  - Parathyroid Hormone Intact  - Vitamin B1 whole blood  - Copper level  - Vitamin A        Lucía Turner MD  [facility]

## 2017-06-22 NOTE — NURSING NOTE
"Chief Complaint   Patient presents with     Depression     Labs Only     annual labs for gastric by pass       Initial /84  Pulse 61  Temp 98.4  F (36.9  C) (Oral)  Ht 5' 10.75\" (1.797 m)  Wt 209 lb (94.8 kg)  LMP 03/28/2010  BMI 29.36 kg/m2 Estimated body mass index is 29.36 kg/(m^2) as calculated from the following:    Height as of this encounter: 5' 10.75\" (1.797 m).    Weight as of this encounter: 209 lb (94.8 kg).  Medication Reconciliation: complete     Marnie Raymundo MA      "

## 2017-06-23 ASSESSMENT — PATIENT HEALTH QUESTIONNAIRE - PHQ9: SUM OF ALL RESPONSES TO PHQ QUESTIONS 1-9: 14

## 2017-06-24 LAB
COPPER SERPL-MCNC: 131 UG/DL
ZINC SERPL-MCNC: 73 UG/ML

## 2017-06-25 LAB
ANNOTATION COMMENT IMP: NORMAL
RETINYL PALMITATE SERPL-MCNC: NORMAL UG/ML
VIT A SERPL-MCNC: 0.53 UG/ML
VIT B1 BLD-MCNC: 132 UG/DL
VIT B6 SERPL-MCNC: 65.5 NG/ML

## 2017-11-10 ENCOUNTER — TELEPHONE (OUTPATIENT)
Dept: FAMILY MEDICINE | Facility: CLINIC | Age: 52
End: 2017-11-10

## 2017-11-10 NOTE — TELEPHONE ENCOUNTER
PHQ-9 due now for patient ( 5-7 months from index date)  Index date:       6/22/17  Index PHQ9 :   14  FU start date:   11/22/17  FU End date :   1/22/18    RN- Contact patient for PHQ-9. Remission considered if follow up PHQ-9 less than 5.  If greater than 5 consider follow up appointment, e-visit for medication follow up and evaluation.

## 2017-11-28 NOTE — TELEPHONE ENCOUNTER
Left message for patient to call back with male at home #. Writers direct line given, Jean Paul [phone]. Jennifer Roland RN

## 2017-11-29 ENCOUNTER — TELEPHONE (OUTPATIENT)
Dept: FAMILY MEDICINE | Facility: CLINIC | Age: 52
End: 2017-11-29

## 2017-11-29 NOTE — TELEPHONE ENCOUNTER
Left message for patient to either 1. log into their [employer] account and read the message from 11/13/17, or 2. call writer, so she can read the message below to the patient. Writers direct line given, Jean Paul -[phone]. Jennifer Roland RN

## 2017-11-29 NOTE — TELEPHONE ENCOUNTER
Patient is returning a missed call from her care team, please call patient back on cell number listed above.

## 2017-11-30 ASSESSMENT — PATIENT HEALTH QUESTIONNAIRE - PHQ9: SUM OF ALL RESPONSES TO PHQ QUESTIONS 1-9: 8

## 2017-11-30 NOTE — TELEPHONE ENCOUNTER
PHQ-9 SCORE 5/31/2017 6/22/2017 11/30/2017   Total Score - - -   Total Score MyChart - - -   Total Score 15 14 8     Patient is happy with current dosing. She also got a new job which is good. She is still seeing a therapist weekly.   Patient has refills until 6/2018. Advised patient will need to be seen in June 2018.  Patient verbalized understanding  FYI only. Please close once read. Writer is sending this message due to PHQ9 being over 4.  Jennifer Roland, STEPHANIEN RN

## 2017-12-15 ENCOUNTER — OFFICE VISIT (OUTPATIENT)
Dept: FAMILY MEDICINE | Facility: CLINIC | Age: 52
End: 2017-12-15
Payer: COMMERCIAL

## 2017-12-15 ENCOUNTER — RADIANT APPOINTMENT (OUTPATIENT)
Dept: GENERAL RADIOLOGY | Facility: CLINIC | Age: 52
End: 2017-12-15
Attending: NURSE PRACTITIONER
Payer: COMMERCIAL

## 2017-12-15 VITALS
BODY MASS INDEX: 29.12 KG/M2 | SYSTOLIC BLOOD PRESSURE: 122 MMHG | HEIGHT: 71 IN | WEIGHT: 208 LBS | HEART RATE: 60 BPM | DIASTOLIC BLOOD PRESSURE: 70 MMHG | TEMPERATURE: 97.4 F

## 2017-12-15 DIAGNOSIS — M54.50 ACUTE MIDLINE LOW BACK PAIN WITHOUT SCIATICA: ICD-10-CM

## 2017-12-15 DIAGNOSIS — M54.50 ACUTE MIDLINE LOW BACK PAIN WITHOUT SCIATICA: Primary | ICD-10-CM

## 2017-12-15 PROCEDURE — 72100 X-RAY EXAM L-S SPINE 2/3 VWS: CPT

## 2017-12-15 PROCEDURE — 99213 OFFICE O/P EST LOW 20 MIN: CPT | Performed by: NURSE PRACTITIONER

## 2017-12-15 RX ORDER — PREDNISONE 20 MG/1
20 TABLET ORAL 2 TIMES DAILY
Qty: 10 TABLET | Refills: 0 | Status: SHIPPED | OUTPATIENT
Start: 2017-12-15 | End: 2017-12-20

## 2017-12-15 NOTE — MR AVS SNAPSHOT
After Visit Summary   12/15/2017    Christina John    MRN: [id number]           Patient Information     Date Of Birth          1965        Visit Information        Provider Department      12/15/2017 9:20 AM Jael Mg APRN [facility]        Today's Diagnoses     Acute midline low back pain without sciatica    -  1      Care Instructions    Prednisone sent to the pharmacy for symptoms  Follow up if symptoms do not improve or worsen.    Back Pain (Acute or Chronic)    Back pain is one of the most common problems. The good news is that most people feel better in 1 to 2 weeks, and most of the rest in 1 to 2 months. Most people can remain active.  People experience and describe pain differently; not everyone is the same.    The pain can be sharp, stabbing, shooting, aching, cramping or burning.    Movement, standing, bending, lifting, sitting, or walking may worsen pain.    It can be localized to one spot or area, or it can be more generalized.    It can spread or radiate upwards, to the front, or go down your arms or legs (sciatica).    It can cause muscle spasm.  Most of the time, mechanical problems with the muscles or spine cause the pain. Mechanical problems are usually caused by an injury to the muscles or ligaments. While illness can cause back pain, it is usually not caused by a serious illness. Mechanical problems include:     Physical activity such as sports, exercise, work, or normal activity    Overexertion, lifting, pushing, pulling incorrectly or too aggressively    Sudden twisting, bending, or stretching from an accident, or accidental movement    Poor posture    Stretching or moving wrong, without noticing pain at the time    Poor coordination, lack of regular exercise (check with your doctor about this)    Spinal disc disease or arthritis    Stress  Pain can also be related to pregnancy, or illness like appendicitis, bladder or kidney infections,  pelvic infections, and many other things.  Acute back pain usually gets better in 1 to 2 weeks. Back pain related to disk disease, arthritis in the spinal joints or spinal stenosis (narrowing of the spinal canal) can become chronic and last for months or years.  Unless you had a physical injury (for example, a car accident or fall) X-rays are usually not needed for the initial evaluation of back pain. If pain continues and does not respond to medical treatment, X-rays and other tests may be needed.  Home care  Try these home care recommendations:    When in bed, try to find a position of comfort. A firm mattress is best. Try lying flat on your back with pillows under your knees. You can also try lying on your side with your knees bent up towards your chest and a pillow between your knees.    At first, do not try to stretch out the sore spots. If there is a strain, it is not like the good soreness you get after exercising without an injury. In this case, stretching may make it worse.    Avoid prolong sitting, long car rides, or travel. This puts more stress on the lower back than standing or walking.    During the first 24 to 72 hours after an acute injury or flare up of chronic back pain, apply an ice pack to the painful area for 20 minutes and then remove it for 20 minutes. Do this over a period of 60 to 90 minutes or several times a day. This will reduce swelling and pain. Wrap the ice pack in a thin towel or plastic to protect your skin.    You can start with ice, then switch to heat. Heat (hot shower, hot bath, or heating pad) reduces pain and works well for muscle spasms. Heat can be applied to the painful area for 20 minutes then remove it for 20 minutes. Do this over a period of 60 to 90 minutes or several times a day. Do not sleep on a heating pad. It can lead to skin burns or tissue damage.    You can alternate ice and heat therapy. Talk with your doctor about the best treatment for your back  pain.    Therapeutic massage can help relax the back muscles without stretching them.    Be aware of safe lifting methods and do not lift anything without stretching first.  Medicines  Talk to your doctor before using medicine, especially if you have other medical problems or are taking other medicines.    You may use over-the-counter medicine as directed on the bottle to control pain, unless another pain medicine was prescribed. If you have chronic conditions like diabetes, liver or kidney disease, stomach ulcers, or gastrointestinal bleeding, or are taking blood thinners, talk to your doctor before taking any medicine.    Be careful if you are given a prescription medicines, narcotics, or medicine for muscle spasms. They can cause drowsiness, affect your coordination, reflexes, and judgement. Do not drive or operate heavy machinery.  Follow-up care  Follow up with your healthcare provider, or as advised.   A radiologist will review any X-rays that were taken. Your provide will notify you of any new findings that may affect your care.  Call 911  Call emergency services if any of the following occur:    Trouble breathing    Confusion    Very drowsy or trouble awakening    Fainting or loss of consciousness    Rapid or very slow heart rate    Loss of bowel or bladder control  When to seek medical advice  Call your healthcare provider right away if any of these occur:     Pain becomes worse or spreads to your legs    Weakness or numbness in one or both legs    Numbness in the groin or genital area  Date Last Reviewed: 7/1/2016 2000-2017 [employer]. [address] [place] [id number]. All rights reserved. This information is not intended as a substitute for professional medical care. Always follow your healthcare professional's instructions.                Follow-ups after your visit        Your next 10 appointments already scheduled     Dec 20, 2017  9:20 AM CST   Office Visit with Lucía  "MD Pal   [facility]    [address] [facility] [address] [phone]           Bring a current list of meds and any records pertaining to this visit. For Physicals, please bring immunization records and any forms needing to be filled out. Please arrive 10 minutes early to complete paperwork.              Who to contact     If you have questions or need follow up information about today's clinic visit or your schedule please contact [facility] directly at [phone].  Normal or non-critical lab and imaging results will be communicated to you by Wahand[employer]art, letter or phone within 4 business days after the clinic has received the results. If you do not hear from us within 7 days, please contact the clinic through [employer] or phone. If you have a critical or abnormal lab result, we will notify you by phone as soon as possible.  Submit refill requests through "[employer] or call your pharmacy and they will forward the refill request to us. Please allow 3 business days for your refill to be completed.          Additional Information About Your Visit        MyChart Information     "[employer] gives you secure access to your electronic health record. If you see a primary care provider, you can also send messages to your care team and make appointments. If you have questions, please call your primary care clinic.  If you do not have a primary care provider, please call [phone] and they will assist you.        Care EveryWhere ID     This is your Care EveryWhere ID. This could be used by other organizations to access your [facility] medical records  [id number]        Your Vitals Were     Pulse Temperature Height Last Period BMI (Body Mass Index)       60 97.4  F (36.3  C) (Tympanic) 5' 10.75\" (1.797 m) 03/28/2010 29.22 kg/m2        Blood Pressure from Last 3 Encounters:   12/15/17 122/70   06/22/17 125/84   02/24/17 128/85    Weight from Last 3 Encounters: "   12/15/17 208 lb (94.3 kg)   06/22/17 209 lb (94.8 kg)   02/24/17 204 lb 6.4 oz (92.7 kg)                 Today's Medication Changes          These changes are accurate as of: 12/15/17 10:01 AM.  If you have any questions, ask your nurse or doctor.               Start taking these medicines.        Dose/Directions    predniSONE 20 MG tablet   Commonly known as:  DELTASONE   Used for:  Acute midline low back pain without sciatica   Started by:  Jael Mg APRN CNP        Dose:  20 mg   Take 1 tablet (20 mg) by mouth 2 times daily   Quantity:  10 tablet   Refills:  0            Where to get your medicines      These medications were sent to [facility] [address] [address]     Phone:  [phone]     predniSONE 20 MG tablet                Primary Care Provider Office Phone # Fax #    Lucía Aguillon [phone] [address] [facility] [address]        Equal Access to Services     [facility]: Hadii aad ku hadasho Soomaali, waaxda luqadaha, qaybta kaalmada adeegyada, waxay pat hayannalise rowe . [facility] [phone].    ATENCIÓN: Si habla español, tiene a fulton disposición servicios gratuitos de asistencia lingüística. Llame al [phone].    We comply with applicable federal civil rights laws and [place] laws. We do not discriminate on the basis of race, color, national origin, age, disability, sex, sexual orientation, or gender identity.            Thank you!     Thank you for choosing [facility]  for your care. Our goal is always to provide you with excellent care. Hearing back from our patients is one way we can continue to improve our services. Please take a few minutes to complete the written survey that you may receive in the mail after your visit with us. Thank you!             Your Updated Medication List - Protect others around you: Learn how to safely  use, store and throw away your medicines at www.disposemymeds.org.          This list is accurate as of: 12/15/17 10:01 AM.  Always use your most recent med list.                   Brand Name Dispense Instructions for use Diagnosis    buPROPion 300 MG 24 hr tablet    WELLBUTRIN XL    90 tablet    Take 1 tablet (300 mg) by mouth every morning    Major depressive disorder, single episode, mild (H)       calcium + D 600-200 MG-UNIT Tabs   Generic drug:  calcium carbonate-vitamin D      Take  by mouth 2 times daily.        escitalopram 20 MG tablet    LEXAPRO    90 tablet    Take 1 tablet (20 mg) by mouth daily    Major depressive disorder, single episode, mild (H)       MULTIVITAMIN TABS   OR      1 TABLET DAILY        predniSONE 20 MG tablet    DELTASONE    10 tablet    Take 1 tablet (20 mg) by mouth 2 times daily    Acute midline low back pain without sciatica       TYLENOL EXTRA STRENGTH PO      Take  by mouth. PRN        valACYclovir 1000 mg tablet    VALTREX    20 tablet    Take 1 tablet (1,000 mg) by mouth daily    Recurrent cold sores       VITAMIN C CR PO      Take  by mouth.

## 2017-12-15 NOTE — PROGRESS NOTES
SUBJECTIVE:   Christina John is a 52 year old female who presents to clinic today for the following health issues:      Back Pain       Duration: Yesterday morning         Specific cause: bending     Description:   Location of pain: low back - bilateral   Character of pain: sharp and stabbing  Pain radiation:radiates into the right foot and radiates into the left foot - states feet were tingling yesterday   New numbness or weakness in legs, not attributed to pain:  no     Intensity: moderate    History:   Pain interferes with job: YES  History of back problems: no prior back problems  Any previous MRI or X-rays: None  Sees a specialist for back pain:  No  Therapies tried without relief: chiropractor, [place]     Alleviating factors:   Improved by: brace, Ketoralac(helps with moving)         Precipitating factors:  Worsened by: Bending, moving     Functional and Psychosocial Screen (Gita STarT Back):      Not performed today      Accompanying Signs & Symptoms:  Risk of Fracture:  None  Risk of Cauda Equina:  None  Risk of Infection:  None  Risk of Cancer:  None  Risk of Ankylosing Spondylitis:  Onset at age <35, male, AND morning back stiffness. no     Problem list and histories reviewed & adjusted, as indicated.  Additional history: as documented    Patient Active Problem List   Diagnosis     DCIS (ductal carcinoma in situ) of breast     GERD (gastroesophageal reflux disease)     PCOS (polycystic ovarian syndrome)     Seasonal allergic rhinitis     Allergic rhinitis due to animal dander     House dust mite allergy     Seasonal allergic conjunctivitis     HYPERLIPIDEMIA LDL GOAL <160     Mild major depression (H)     Diagnostic skin and sensitization tests     Desensitization to allergens     History of adenomatous polyp of colon     BRCA1 positive     Overweight (BMI 25.0-29.9)     History of gastric bypass     Past Surgical History:   Procedure Laterality Date     C APPENDECTOMY  1982     C/SECTION, CLASSICAL        ENDOSCOPY      10/2000 [place], repeat 10/2002, REPEAT 2/08 (REPEAT IN 2 YRS)     EXCIS VAGINAL CYST/TUMOR       GLAUCOMA SURGERY       HC BIOPSY OF BREAST, OPEN INCISIONAL  2008    sentinel lymph node     HC COLONOSCOPY THRU STOMA, DIAGNOSTIC  12/04, 12/09    (REPEAT IN 5 YRS)     HC REMOVAL GALLBLADDER  6/2005     HYSTERECTOMY, PAP NO LONGER INDICATED       MASTECTOMY BILATERAL, INSERT TISSUE EXPANDER BILATERAL, COMBINED  8/2010     MASTECTOMY, BILATERAL       PELVIS LAPAROSCOPY,DX      x2       Social History   Substance Use Topics     Smoking status: Never Smoker     Smokeless tobacco: Never Used      Comment: Nonsmoking household     Alcohol use No     Family History   Problem Relation Age of Onset     Hypertension Mother      GASTROINTESTINAL DISEASE Mother      GAITAN'S ESOPHAGUS     Lipids Mother      Depression Mother      Other - See Comments Mother      Pagets Disease of the Vulva, Dx 06/2014     Allergies Father      HAYFEVER     HEART DISEASE Father      AFIB     Hypertension Father      Lipids Father      GASTROINTESTINAL DISEASE Other      GAITAN'S ESOPHAGUS     GASTROINTESTINAL DISEASE Other      GAITAN'S ESOPHAGUS     Breast Cancer Paternal Aunt      Breast Cancer Other      fathers 1st cousin         Current Outpatient Prescriptions   Medication Sig Dispense Refill     buPROPion (WELLBUTRIN XL) 300 MG 24 hr tablet Take 1 tablet (300 mg) by mouth every morning 90 tablet 3     escitalopram (LEXAPRO) 20 MG tablet Take 1 tablet (20 mg) by mouth daily 90 tablet 3     valACYclovir (VALTREX) 1000 mg tablet Take 1 tablet (1,000 mg) by mouth daily 20 tablet 6     calcium carbonate-vitamin D (CALCIUM + D) 600-200 MG-UNIT TABS Take  by mouth 2 times daily.       Ascorbic Acid (VITAMIN C CR PO) Take  by mouth.       Acetaminophen (TYLENOL EXTRA STRENGTH PO) Take  by mouth. PRN         MULTIVITAMIN TABS   OR 1 TABLET DAILY       Allergies   Allergen Reactions     Amoxicillin Swelling     Cefzil [Cefprozil]  "Swelling     Adhesive Tape Rash     Biaxin [Clarithromycin] Hives     Erythromycin GI Disturbance     Latex Rash     Percocet [Acetaminophen] Hives     Sulfa Drugs Hives     Labs reviewed in EPIC      Reviewed and updated as needed this visit by clinical staff       Reviewed and updated as needed this visit by Provider       ROS:  Constitutional, HEENT, cardiovascular, pulmonary, gi and gu systems are negative, except as otherwise noted.      OBJECTIVE:   /70 (Cuff Size: Adult Large)  Pulse 60  Temp 97.4  F (36.3  C) (Tympanic)  Ht 5' 10.75\" (1.797 m)  Wt 208 lb (94.3 kg)  LMP 03/28/2010  BMI 29.22 kg/m2  Body mass index is 29.22 kg/(m^2).  GENERAL: healthy, alert and no distress  MS: tenderness to palpation midline lumbar and left SI, significant limited range of motion, negative straight leg raise  NEURO: Normal strength and tone, mentation intact and speech normal  PSYCH: mentation appears normal, affect normal/bright    Diagnostic Test Results:  Xray -   Francie Whitehead MD 12/15/2017            Narrative             LUMBAR SPINE TWO - THREE VIEWS  12/15/2017 10:01 AM     HISTORY: Acute midline low back pain without sciatica.    COMPARISON: None.             Impression             IMPRESSION: Lumbar vertebrae are normally aligned. No compression  deformity or acute fracture. Surgical clips in the right upper  quadrant.    FRANCIE WHITEHEAD MD             ASSESSMENT/PLAN:     1. Acute midline low back pain without sciatica  Prednisone sent to the pharmacy for symptoms, has tolerated in the past.  Advised against NSAID use with history of gastric bypass.  - XR Lumbar Spine 2/3 Views; Future  - predniSONE (DELTASONE) 20 MG tablet; Take 1 tablet (20 mg) by mouth 2 times daily  Dispense: 10 tablet; Refill: 0    Prednisone sent to the pharmacy for symptoms, has tolerated in the past.  Advised against NSAID use with history of gastric bypass.    Home care instructions were reviewed with the patient. " The risks, benefits and treatment options of prescribed medications or other treatments have been discussed with the patient. The patient verbalized their understanding and should call or follow up if no improvement or if they develop further problems.    Patient Instructions   Prednisone sent to the pharmacy for symptoms  Follow up if symptoms do not improve or worsen.    Back Pain (Acute or Chronic)    Back pain is one of the most common problems. The good news is that most people feel better in 1 to 2 weeks, and most of the rest in 1 to 2 months. Most people can remain active.  People experience and describe pain differently; not everyone is the same.    The pain can be sharp, stabbing, shooting, aching, cramping or burning.    Movement, standing, bending, lifting, sitting, or walking may worsen pain.    It can be localized to one spot or area, or it can be more generalized.    It can spread or radiate upwards, to the front, or go down your arms or legs (sciatica).    It can cause muscle spasm.  Most of the time, mechanical problems with the muscles or spine cause the pain. Mechanical problems are usually caused by an injury to the muscles or ligaments. While illness can cause back pain, it is usually not caused by a serious illness. Mechanical problems include:     Physical activity such as sports, exercise, work, or normal activity    Overexertion, lifting, pushing, pulling incorrectly or too aggressively    Sudden twisting, bending, or stretching from an accident, or accidental movement    Poor posture    Stretching or moving wrong, without noticing pain at the time    Poor coordination, lack of regular exercise (check with your doctor about this)    Spinal disc disease or arthritis    Stress  Pain can also be related to pregnancy, or illness like appendicitis, bladder or kidney infections, pelvic infections, and many other things.  Acute back pain usually gets better in 1 to 2 weeks. Back pain related to disk  disease, arthritis in the spinal joints or spinal stenosis (narrowing of the spinal canal) can become chronic and last for months or years.  Unless you had a physical injury (for example, a car accident or fall) X-rays are usually not needed for the initial evaluation of back pain. If pain continues and does not respond to medical treatment, X-rays and other tests may be needed.  Home care  Try these home care recommendations:    When in bed, try to find a position of comfort. A firm mattress is best. Try lying flat on your back with pillows under your knees. You can also try lying on your side with your knees bent up towards your chest and a pillow between your knees.    At first, do not try to stretch out the sore spots. If there is a strain, it is not like the good soreness you get after exercising without an injury. In this case, stretching may make it worse.    Avoid prolong sitting, long car rides, or travel. This puts more stress on the lower back than standing or walking.    During the first 24 to 72 hours after an acute injury or flare up of chronic back pain, apply an ice pack to the painful area for 20 minutes and then remove it for 20 minutes. Do this over a period of 60 to 90 minutes or several times a day. This will reduce swelling and pain. Wrap the ice pack in a thin towel or plastic to protect your skin.    You can start with ice, then switch to heat. Heat (hot shower, hot bath, or heating pad) reduces pain and works well for muscle spasms. Heat can be applied to the painful area for 20 minutes then remove it for 20 minutes. Do this over a period of 60 to 90 minutes or several times a day. Do not sleep on a heating pad. It can lead to skin burns or tissue damage.    You can alternate ice and heat therapy. Talk with your doctor about the best treatment for your back pain.    Therapeutic massage can help relax the back muscles without stretching them.    Be aware of safe lifting methods and do not  lift anything without stretching first.  Medicines  Talk to your doctor before using medicine, especially if you have other medical problems or are taking other medicines.    You may use over-the-counter medicine as directed on the bottle to control pain, unless another pain medicine was prescribed. If you have chronic conditions like diabetes, liver or kidney disease, stomach ulcers, or gastrointestinal bleeding, or are taking blood thinners, talk to your doctor before taking any medicine.    Be careful if you are given a prescription medicines, narcotics, or medicine for muscle spasms. They can cause drowsiness, affect your coordination, reflexes, and judgement. Do not drive or operate heavy machinery.  Follow-up care  Follow up with your healthcare provider, or as advised.   A radiologist will review any X-rays that were taken. Your provide will notify you of any new findings that may affect your care.  Call 911  Call emergency services if any of the following occur:    Trouble breathing    Confusion    Very drowsy or trouble awakening    Fainting or loss of consciousness    Rapid or very slow heart rate    Loss of bowel or bladder control  When to seek medical advice  Call your healthcare provider right away if any of these occur:     Pain becomes worse or spreads to your legs    Weakness or numbness in one or both legs    Numbness in the groin or genital area  Date Last Reviewed: 7/1/2016 2000-2017 [employer]. [address] [place] [address], [place] [address]. All rights reserved. This information is not intended as a substitute for professional medical care. Always follow your healthcare professional's instructions.            ELISA Matthews [facility]

## 2017-12-15 NOTE — PATIENT INSTRUCTIONS
Prednisone sent to the pharmacy for symptoms  Follow up if symptoms do not improve or worsen.    Back Pain (Acute or Chronic)    Back pain is one of the most common problems. The good news is that most people feel better in 1 to 2 weeks, and most of the rest in 1 to 2 months. Most people can remain active.  People experience and describe pain differently; not everyone is the same.    The pain can be sharp, stabbing, shooting, aching, cramping or burning.    Movement, standing, bending, lifting, sitting, or walking may worsen pain.    It can be localized to one spot or area, or it can be more generalized.    It can spread or radiate upwards, to the front, or go down your arms or legs (sciatica).    It can cause muscle spasm.  Most of the time, mechanical problems with the muscles or spine cause the pain. Mechanical problems are usually caused by an injury to the muscles or ligaments. While illness can cause back pain, it is usually not caused by a serious illness. Mechanical problems include:     Physical activity such as sports, exercise, work, or normal activity    Overexertion, lifting, pushing, pulling incorrectly or too aggressively    Sudden twisting, bending, or stretching from an accident, or accidental movement    Poor posture    Stretching or moving wrong, without noticing pain at the time    Poor coordination, lack of regular exercise (check with your doctor about this)    Spinal disc disease or arthritis    Stress  Pain can also be related to pregnancy, or illness like appendicitis, bladder or kidney infections, pelvic infections, and many other things.  Acute back pain usually gets better in 1 to 2 weeks. Back pain related to disk disease, arthritis in the spinal joints or spinal stenosis (narrowing of the spinal canal) can become chronic and last for months or years.  Unless you had a physical injury (for example, a car accident or fall) X-rays are usually not needed for the initial evaluation of back  pain. If pain continues and does not respond to medical treatment, X-rays and other tests may be needed.  Home care  Try these home care recommendations:    When in bed, try to find a position of comfort. A firm mattress is best. Try lying flat on your back with pillows under your knees. You can also try lying on your side with your knees bent up towards your chest and a pillow between your knees.    At first, do not try to stretch out the sore spots. If there is a strain, it is not like the good soreness you get after exercising without an injury. In this case, stretching may make it worse.    Avoid prolong sitting, long car rides, or travel. This puts more stress on the lower back than standing or walking.    During the first 24 to 72 hours after an acute injury or flare up of chronic back pain, apply an ice pack to the painful area for 20 minutes and then remove it for 20 minutes. Do this over a period of 60 to 90 minutes or several times a day. This will reduce swelling and pain. Wrap the ice pack in a thin towel or plastic to protect your skin.    You can start with ice, then switch to heat. Heat (hot shower, hot bath, or heating pad) reduces pain and works well for muscle spasms. Heat can be applied to the painful area for 20 minutes then remove it for 20 minutes. Do this over a period of 60 to 90 minutes or several times a day. Do not sleep on a heating pad. It can lead to skin burns or tissue damage.    You can alternate ice and heat therapy. Talk with your doctor about the best treatment for your back pain.    Therapeutic massage can help relax the back muscles without stretching them.    Be aware of safe lifting methods and do not lift anything without stretching first.  Medicines  Talk to your doctor before using medicine, especially if you have other medical problems or are taking other medicines.    You may use over-the-counter medicine as directed on the bottle to control pain, unless another pain  medicine was prescribed. If you have chronic conditions like diabetes, liver or kidney disease, stomach ulcers, or gastrointestinal bleeding, or are taking blood thinners, talk to your doctor before taking any medicine.    Be careful if you are given a prescription medicines, narcotics, or medicine for muscle spasms. They can cause drowsiness, affect your coordination, reflexes, and judgement. Do not drive or operate heavy machinery.  Follow-up care  Follow up with your healthcare provider, or as advised.   A radiologist will review any X-rays that were taken. Your provide will notify you of any new findings that may affect your care.  Call 911  Call emergency services if any of the following occur:    Trouble breathing    Confusion    Very drowsy or trouble awakening    Fainting or loss of consciousness    Rapid or very slow heart rate    Loss of bowel or bladder control  When to seek medical advice  Call your healthcare provider right away if any of these occur:     Pain becomes worse or spreads to your legs    Weakness or numbness in one or both legs    Numbness in the groin or genital area  Date Last Reviewed: 7/1/2016 2000-2017 [employer]. [address] [place] [address], [place] [address]. All rights reserved. This information is not intended as a substitute for professional medical care. Always follow your healthcare professional's instructions.

## 2017-12-15 NOTE — NURSING NOTE
"Chief Complaint   Patient presents with     Back Pain       Initial /70 (Cuff Size: Adult Large)  Pulse 60  Temp 97.4  F (36.3  C) (Tympanic)  Ht 5' 10.75\" (1.797 m)  Wt 208 lb (94.3 kg)  LMP 03/28/2010  BMI 29.22 kg/m2 Estimated body mass index is 29.22 kg/(m^2) as calculated from the following:    Height as of this encounter: 5' 10.75\" (1.797 m).    Weight as of this encounter: 208 lb (94.3 kg).  Medication Reconciliation: complete    Health Maintenance that is potentially due pending provider review:  NONE    n/a    Viv Batres, CMA        "

## 2017-12-20 ENCOUNTER — OFFICE VISIT (OUTPATIENT)
Dept: FAMILY MEDICINE | Facility: CLINIC | Age: 52
End: 2017-12-20
Payer: COMMERCIAL

## 2017-12-20 VITALS
DIASTOLIC BLOOD PRESSURE: 71 MMHG | WEIGHT: 211 LBS | BODY MASS INDEX: 29.64 KG/M2 | OXYGEN SATURATION: 100 % | TEMPERATURE: 97.3 F | SYSTOLIC BLOOD PRESSURE: 110 MMHG | HEART RATE: 63 BPM

## 2017-12-20 DIAGNOSIS — N63.0 BREAST LUMP OR MASS: Primary | ICD-10-CM

## 2017-12-20 PROCEDURE — 99213 OFFICE O/P EST LOW 20 MIN: CPT | Performed by: FAMILY MEDICINE

## 2017-12-20 NOTE — PROGRESS NOTES
SUBJECTIVE:   Christina John is a 52 year old female who presents to clinic today for the following health issues:      Bilateral breast masses-patient states she declines to do mammogram, patient states she has noticed changes in the past 6 months    Pt with h/o BRCA1 and has had bilateral mastectomy with implants.   She has noticed an intermittent cluster of masses in right lateral breast, intermittently tender for past few months  Also noted a ridge of tissue at 6:00 on left breast. This is nontender. Has had something similar in the past in same area and was scar tissue    She is concerned as a friend with previous h/o breast cancer had it recur at the 10 year divine and she is nearing her 10 year cancer free divine    Problem list and histories reviewed & adjusted, as indicated.  Additional history: as documented    Labs reviewed in EPIC    Reviewed and updated as needed this visit by clinical staff       Reviewed and updated as needed this visit by Provider         ROS:  Constitutional, HEENT, cardiovascular, pulmonary, gi and gu systems are negative, except as otherwise noted.      OBJECTIVE:   /71 (Cuff Size: Adult Regular)  Pulse 63  Temp 97.3  F (36.3  C) (Oral)  Wt 211 lb (95.7 kg)  LMP 03/28/2010  SpO2 100%  BMI 29.64 kg/m2  Body mass index is 29.64 kg/(m^2).  GENERAL: healthy, alert and no distress  BREAST: both breasts with surgical scars and implants. Palpable mass on right breast at about 10 which feels like lymphnode,normal size.  Left breast with non tender ridge of tissue about 7:00    Diagnostic Test Results:  none     ASSESSMENT/PLAN:     1. Breast lump or mass  FU with US and further recommendations per radiology  - US Breast Bilateral Complete 4 Quadrants; Future        Lucía Turner MD  [facility]

## 2017-12-20 NOTE — NURSING NOTE
"Chief Complaint   Patient presents with     Mass     bilateral breasts       Initial /71 (Cuff Size: Adult Regular)  Pulse 63  Temp 97.3  F (36.3  C) (Oral)  Wt 211 lb (95.7 kg)  LMP 03/28/2010  SpO2 100%  BMI 29.64 kg/m2 Estimated body mass index is 29.64 kg/(m^2) as calculated from the following:    Height as of 12/15/17: 5' 10.75\" (1.797 m).    Weight as of this encounter: 211 lb (95.7 kg).  Medication Reconciliation: complete    Luann Juarez LPN    "

## 2017-12-20 NOTE — MR AVS SNAPSHOT
After Visit Summary   12/20/2017    Christina John    MRN: [id number]           Patient Information     Date Of Birth          1965        Visit Information        Provider Department      12/20/2017 9:20 AM Lucía Aguillon MD [facility]        Today's Diagnoses     Breast lump or mass    -  1       Follow-ups after your visit        Future tests that were ordered for you today     Open Future Orders        Priority Expected Expires Ordered    US Breast Bilateral Complete 4 Quadrants Routine  12/20/2018 12/20/2017            Who to contact     If you have questions or need follow up information about today's clinic visit or your schedule please contact [facility] directly at [phone].  Normal or non-critical lab and imaging results will be communicated to you by MyChart, letter or phone within 4 business days after the clinic has received the results. If you do not hear from us within 7 days, please contact the clinic through Ecoviatehart or phone. If you have a critical or abnormal lab result, we will notify you by phone as soon as possible.  Submit refill requests through Sweepery or call your pharmacy and they will forward the refill request to us. Please allow 3 business days for your refill to be completed.          Additional Information About Your Visit        MyChart Information     Sweepery gives you secure access to your electronic health record. If you see a primary care provider, you can also send messages to your care team and make appointments. If you have questions, please call your primary care clinic.  If you do not have a primary care provider, please call [phone] and they will assist you.        Care EveryWhere ID     This is your Care EveryWhere ID. This could be used by other organizations to access your [facility] medical records  [id number]        Your Vitals Were     Pulse Temperature Last Period Pulse Oximetry BMI (Body Mass Index)       63  97.3  F (36.3  C) (Oral) 03/28/2010 100% 29.64 kg/m2        Blood Pressure from Last 3 Encounters:   12/20/17 110/71   12/15/17 122/70   06/22/17 125/84    Weight from Last 3 Encounters:   12/20/17 211 lb (95.7 kg)   12/15/17 208 lb (94.3 kg)   06/22/17 209 lb (94.8 kg)               Primary Care Provider Office Phone # Fax #    Lucía Aguillon [phone] [address] [facility] [address]        Equal Access to Services     [facility] campusTRISHA : Hadii bony gutierrez hadtammy Sodai, waaxda ludeedeeadaha, qaybta kaalmada conrad, madisyn blandon. So [facility] [phone].    ATENCIÓN: Si habla español, tiene a fulton disposición servicios gratuitos de asistencia lingüística. [facility] [phone].    We comply with applicable federal civil rights laws and [place] laws. We do not discriminate on the basis of race, color, national origin, age, disability, sex, sexual orientation, or gender identity.            Thank you!     Thank you for choosing [facility]  for your care. Our goal is always to provide you with excellent care. Hearing back from our patients is one way we can continue to improve our services. Please take a few minutes to complete the written survey that you may receive in the mail after your visit with us. Thank you!             Your Updated Medication List - Protect others around you: Learn how to safely use, store and throw away your medicines at www.disposemymeds.org.          This list is accurate as of: 12/20/17  1:07 PM.  Always use your most recent med list.                   Brand Name Dispense Instructions for use Diagnosis    buPROPion 300 MG 24 hr tablet    WELLBUTRIN XL    90 tablet    Take 1 tablet (300 mg) by mouth every morning    Major depressive disorder, single episode, mild (H)       calcium + D 600-200 MG-UNIT Tabs   Generic drug:  calcium carbonate-vitamin D      Take  by mouth 2 times daily.        escitalopram 20 MG tablet    LEXAPRO     90 tablet    Take 1 tablet (20 mg) by mouth daily    Major depressive disorder, single episode, mild (H)       MULTIVITAMIN TABS   OR      1 TABLET DAILY        TYLENOL EXTRA STRENGTH PO      Take  by mouth. PRN        valACYclovir 1000 mg tablet    VALTREX    20 tablet    Take 1 tablet (1,000 mg) by mouth daily    Recurrent cold sores       VITAMIN C CR PO      Take  by mouth.

## 2017-12-21 ENCOUNTER — TELEPHONE (OUTPATIENT)
Dept: FAMILY MEDICINE | Facility: CLINIC | Age: 52
End: 2017-12-21

## 2017-12-21 NOTE — TELEPHONE ENCOUNTER
I faxed the 12/20/17 office visit notes to [facility] @ [phone].  Rica Medrano,

## 2017-12-22 ENCOUNTER — TELEPHONE (OUTPATIENT)
Dept: FAMILY MEDICINE | Facility: CLINIC | Age: 52
End: 2017-12-22

## 2017-12-22 ENCOUNTER — TRANSFERRED RECORDS (OUTPATIENT)
Dept: HEALTH INFORMATION MANAGEMENT | Facility: CLINIC | Age: 52
End: 2017-12-22

## 2017-12-22 DIAGNOSIS — N63.0 LUMP OR MASS IN BREAST: Primary | ICD-10-CM

## 2017-12-22 NOTE — TELEPHONE ENCOUNTER
Patient has bilateral lumps of breast area.  Diagnostic ultrasound inconclusive.  Recommend breast MRI.  Requesting order for MRI breast with/without contrast be faxed to [phone].  She is scheduled for 12/27.

## 2017-12-27 ENCOUNTER — TRANSFERRED RECORDS (OUTPATIENT)
Dept: HEALTH INFORMATION MANAGEMENT | Facility: CLINIC | Age: 52
End: 2017-12-27

## 2018-03-01 DIAGNOSIS — B00.1 RECURRENT COLD SORES: ICD-10-CM

## 2018-03-01 RX ORDER — VALACYCLOVIR HYDROCHLORIDE 1 G/1
TABLET, FILM COATED ORAL
Qty: 20 TABLET | Refills: 6 | Status: SHIPPED | OUTPATIENT
Start: 2018-03-01 | End: 2019-05-08

## 2018-03-01 NOTE — TELEPHONE ENCOUNTER
"Requested Prescriptions   Pending Prescriptions Disp Refills     valACYclovir (VALTREX) 1000 mg tablet [Pharmacy Med Name: VALACYCLOVIR HCL 1GM TABS] 20 tablet 6     Sig: TAKE ONE TABLET BY MOUTH EVERY DAY    Antivirals for Herpes Protocol Failed    3/1/2018 12:19 PM       Failed - Normal serum creatinine on file in past 12 months    Recent Labs   Lab Test  06/22/17   0759   CR  1.07*            Passed - Patient is age 12 or older       Passed - Recent or future visit with authorizing provider's specialty    Patient had office visit in the last year or has a visit in the next 30 days with authorizing provider.  See \"Patient Info\" tab in inbasket, or \"Choose Columns\" in Meds & Orders section of the refill encounter.             Last Written Prescription Date:  2/13/17  Last Fill Quantity: 20,  # refills: 0   Last office visit: 12/20/2017 with prescribing provider:     Future Office Visit:      " negative detailed exam

## 2018-06-12 ENCOUNTER — OFFICE VISIT (OUTPATIENT)
Dept: FAMILY MEDICINE | Facility: CLINIC | Age: 53
End: 2018-06-12
Payer: COMMERCIAL

## 2018-06-12 VITALS
DIASTOLIC BLOOD PRESSURE: 86 MMHG | WEIGHT: 198.2 LBS | HEART RATE: 63 BPM | RESPIRATION RATE: 16 BRPM | TEMPERATURE: 97.1 F | BODY MASS INDEX: 27.75 KG/M2 | HEIGHT: 71 IN | SYSTOLIC BLOOD PRESSURE: 122 MMHG

## 2018-06-12 DIAGNOSIS — R07.0 THROAT PAIN: Primary | ICD-10-CM

## 2018-06-12 LAB
DEPRECATED S PYO AG THROAT QL EIA: NORMAL
SPECIMEN SOURCE: NORMAL

## 2018-06-12 PROCEDURE — 87880 STREP A ASSAY W/OPTIC: CPT | Performed by: FAMILY MEDICINE

## 2018-06-12 PROCEDURE — 87081 CULTURE SCREEN ONLY: CPT | Performed by: FAMILY MEDICINE

## 2018-06-12 PROCEDURE — 99213 OFFICE O/P EST LOW 20 MIN: CPT | Performed by: FAMILY MEDICINE

## 2018-06-12 ASSESSMENT — PAIN SCALES - GENERAL: PAINLEVEL: SEVERE PAIN (6)

## 2018-06-12 NOTE — NURSING NOTE
Chief Complaint   Patient presents with     Pharyngitis     started yesterday sore throat and it seems to be getting worse.      Nini ESPARZA CMA

## 2018-06-12 NOTE — LETTER
June 13, 2018      Christina John  [address] [place] [address]        Dear Christina,       The results of your recent throat culture were negative.  If you have any further questions or concerns please contact the clinic.      Sincerely,        Robert Spence MD

## 2018-06-12 NOTE — PATIENT INSTRUCTIONS
Gargle with warm tap water every two hours    Use some over the counter throat lozenges for pain relief.    Ibuprofen every 6 hours for pain will help.    Call if not better in 4-5 days.     If a culture was done we will call you if it is positive for strep

## 2018-06-12 NOTE — PROGRESS NOTES
SUBJECTIVE:   Christina John is a 52 year old female who presents to clinic today for the following health issues:      ENT Symptoms             Symptoms: cc Present Absent Comment   Fever/Chills   x    Fatigue  x     Muscle Aches   x    Eye Irritation   x    Sneezing   x    Nasal Dennis/Drg   x    Sinus Pressure/Pain   x    Loss of smell   x    Dental pain   x    Sore Throat x      Swollen Glands   x    Ear Pain/Fullness  x     Cough   x    Wheeze   x    Chest Pain   x    Shortness of breath   x    Rash   x    Other         Symptom duration:  Started yesterday 6/11/18   Symptom severity:  Moderate   Treatments tried:  Tylenol, naproxen, Ibuprofen   Contacts:  None             Problem list and histories reviewed & adjusted, as indicated.  Additional history: as documented    Patient Active Problem List   Diagnosis     DCIS (ductal carcinoma in situ) of breast     GERD (gastroesophageal reflux disease)     PCOS (polycystic ovarian syndrome)     Seasonal allergic rhinitis     Allergic rhinitis due to animal dander     House dust mite allergy     Seasonal allergic conjunctivitis     HYPERLIPIDEMIA LDL GOAL <160     Mild major depression (H)     Diagnostic skin and sensitization tests     Desensitization to allergens     History of adenomatous polyp of colon     BRCA1 positive     Overweight (BMI 25.0-29.9)     History of gastric bypass     Past Surgical History:   Procedure Laterality Date     C APPENDECTOMY  1982     C/SECTION, CLASSICAL       ENDOSCOPY      10/2000 Xiao, repeat 10/2002, REPEAT 2/08 (REPEAT IN 2 YRS)     EXCIS VAGINAL CYST/TUMOR       GLAUCOMA SURGERY       HC BIOPSY OF BREAST, OPEN INCISIONAL  2008    sentinel lymph node     HC COLONOSCOPY THRU STOMA, DIAGNOSTIC  12/04, 12/09    (REPEAT IN 5 YRS)     HC REMOVAL GALLBLADDER  6/2005     HYSTERECTOMY, PAP NO LONGER INDICATED       MASTECTOMY BILATERAL, INSERT TISSUE EXPANDER BILATERAL, COMBINED  8/2010     MASTECTOMY, BILATERAL       PELVIS  "LAPAROSCOPY,DX      x2       Social History   Substance Use Topics     Smoking status: Never Smoker     Smokeless tobacco: Never Used      Comment: Nonsmoking household     Alcohol use No     Family History   Problem Relation Age of Onset     Hypertension Mother      GASTROINTESTINAL DISEASE Mother      GAITAN'S ESOPHAGUS     Lipids Mother      Depression Mother      Other - See Comments Mother      Pagets Disease of the Vulva, Dx 06/2014     Allergies Father      HAYFEVER     HEART DISEASE Father      AFIB     Hypertension Father      Lipids Father      GASTROINTESTINAL DISEASE Other      GAITAN'S ESOPHAGUS     GASTROINTESTINAL DISEASE Other      GAITAN'S ESOPHAGUS     Breast Cancer Paternal Aunt      Breast Cancer Other      fathers 1st cousin         Current Outpatient Prescriptions   Medication Sig Dispense Refill     Acetaminophen (TYLENOL EXTRA STRENGTH PO) Take  by mouth. PRN         Ascorbic Acid (VITAMIN C CR PO) Take  by mouth.       buPROPion (WELLBUTRIN XL) 300 MG 24 hr tablet Take 1 tablet (300 mg) by mouth every morning 90 tablet 3     calcium carbonate-vitamin D (CALCIUM + D) 600-200 MG-UNIT TABS Take  by mouth 2 times daily.       escitalopram (LEXAPRO) 20 MG tablet Take 1 tablet (20 mg) by mouth daily 90 tablet 3     MULTIVITAMIN TABS   OR 1 TABLET DAILY       valACYclovir (VALTREX) 1000 mg tablet TAKE ONE TABLET BY MOUTH EVERY DAY 20 tablet 6     Allergies   Allergen Reactions     Amoxicillin Swelling     Cefzil [Cefprozil] Swelling     Flu Virus Vaccine      \"throat felt funny\"     Adhesive Tape Rash     Biaxin [Clarithromycin] Hives     Erythromycin GI Disturbance     Latex Rash     Percocet [Acetaminophen] Hives     Sulfa Drugs Hives       Reviewed and updated as needed this visit by clinical staff  Tobacco  Allergies  Meds  Med Hx  Surg Hx  Fam Hx  Soc Hx      Reviewed and updated as needed this visit by Provider         ROS:  CONSTITUTIONAL: NEGATIVE for fever, chills, change in " "weight  ENT/MOUTH: NEGATIVE for ear, mouth and throat problems  RESP: NEGATIVE for significant cough or SOB  CV: NEGATIVE for chest pain, palpitations or peripheral edema    OBJECTIVE:     /86  Pulse 63  Temp 97.1  F (36.2  C) (Tympanic)  Resp 16  Ht 5' 10.75\" (1.797 m)  Wt 198 lb 3.2 oz (89.9 kg)  LMP 03/28/2010  BMI 27.84 kg/m2  Body mass index is 27.84 kg/(m^2).  GENERAL: healthy, alert and no distress  NECK: no adenopathy, no asymmetry, masses, or scars and thyroid normal to palpation  RESP: lungs clear to auscultation - no rales, rhonchi or wheezes  CV: regular rate and rhythm, normal S1 S2, no S3 or S4, no murmur, click or rub, no peripheral edema and peripheral pulses strong  ABDOMEN: soft, nontender, no hepatosplenomegaly, no masses and bowel sounds normal  MS: no gross musculoskeletal defects noted, no edema        ASSESSMENT/PLAN:             1. Throat pain  NEGATIVE   - Strep, Rapid Screen  - Beta strep group A culture    ASSESSMENT/PLAN:      ICD-10-CM    1. Throat pain R07.0 Strep, Rapid Screen     Beta strep group A culture       Patient Instructions   Gargle with warm tap water every two hours    Use some over the counter throat lozenges for pain relief.    Ibuprofen every 6 hours for pain will help.    Call if not better in 4-5 days.     If a culture was done we will call you if it is positive for strep          Robert Spence MD  [facility]    "

## 2018-06-13 LAB
BACTERIA SPEC CULT: NORMAL
SPECIMEN SOURCE: NORMAL

## 2018-07-21 DIAGNOSIS — F32.0 MAJOR DEPRESSIVE DISORDER, SINGLE EPISODE, MILD (H): ICD-10-CM

## 2018-07-21 NOTE — LETTER
July 24, 2018    Christina John  [address] [facility] [address]    Dear Christina,       We recently received a refill request for Bupropion and escitalopram.  We have refilled this for a one time 30 day supply only because you are due for a:    Depression office visit      Please call at your earliest convenience so that there will not be a delay with your future refills.          Thank you,   Your [facility]  [phone]

## 2018-07-23 RX ORDER — BUPROPION HYDROCHLORIDE 300 MG/1
TABLET ORAL
Qty: 30 TABLET | Refills: 0 | Status: SHIPPED | OUTPATIENT
Start: 2018-07-23 | End: 2018-08-24

## 2018-07-23 RX ORDER — ESCITALOPRAM OXALATE 20 MG/1
TABLET ORAL
Qty: 30 TABLET | Refills: 0 | Status: SHIPPED | OUTPATIENT
Start: 2018-07-23 | End: 2018-08-24

## 2018-07-23 NOTE — TELEPHONE ENCOUNTER
Bupropion and escitalopram refill requests  Last OV Dr. Lucía Loweod: 12/2017  PHQ9 last done 11/30/2017; score: 8    :;  30 day refill given.  Needs to be seen in clinic for follow up on depression before next refill.    Health Maintenance Due   Topic Date Due     HIV SCREEN (SYSTEM ASSIGNED)  12/02/1984     PHQ-9 Q6 MONTHS  05/10/2018     DEPRESSION ACTION PLAN Q1 YR  06/22/2018

## 2018-07-27 ENCOUNTER — OFFICE VISIT (OUTPATIENT)
Dept: FAMILY MEDICINE | Facility: CLINIC | Age: 53
End: 2018-07-27
Payer: COMMERCIAL

## 2018-07-27 VITALS
WEIGHT: 190 LBS | DIASTOLIC BLOOD PRESSURE: 68 MMHG | BODY MASS INDEX: 26.69 KG/M2 | SYSTOLIC BLOOD PRESSURE: 120 MMHG | HEART RATE: 64 BPM | RESPIRATION RATE: 20 BRPM | TEMPERATURE: 97.1 F

## 2018-07-27 DIAGNOSIS — M62.838 NECK MUSCLE SPASM: ICD-10-CM

## 2018-07-27 DIAGNOSIS — G44.209 TENSION HEADACHE: Primary | ICD-10-CM

## 2018-07-27 PROCEDURE — 99214 OFFICE O/P EST MOD 30 MIN: CPT | Performed by: PHYSICIAN ASSISTANT

## 2018-07-27 RX ORDER — METHOCARBAMOL 750 MG/1
750 TABLET, FILM COATED ORAL 4 TIMES DAILY PRN
Qty: 60 TABLET | Refills: 0 | Status: SHIPPED | OUTPATIENT
Start: 2018-07-27 | End: 2019-02-06

## 2018-07-27 ASSESSMENT — ENCOUNTER SYMPTOMS
BRUISES/BLEEDS EASILY: 0
PHOTOPHOBIA: 0
DIZZINESS: 0
LIGHT-HEADEDNESS: 0
NECK STIFFNESS: 0
PALPITATIONS: 0
NUMBNESS: 0
SHORTNESS OF BREATH: 0
MYALGIAS: 0
COUGH: 0
DIARRHEA: 0
SLEEP DISTURBANCE: 0
VOMITING: 0
AGITATION: 0
RESPIRATORY NEGATIVE: 1
ARTHRALGIAS: 0
FEVER: 0
EYE PAIN: 0
SORE THROAT: 0
PSYCHIATRIC NEGATIVE: 1
TREMORS: 0
MUSCULOSKELETAL NEGATIVE: 1
HEMATOLOGIC/LYMPHATIC NEGATIVE: 1
CONFUSION: 0
FACIAL ASYMMETRY: 0
EYES NEGATIVE: 1
CARDIOVASCULAR NEGATIVE: 1
NAUSEA: 0
ALLERGIC/IMMUNOLOGIC NEGATIVE: 1
BACK PAIN: 0
CHILLS: 0
WEAKNESS: 0
RHINORRHEA: 0
HEADACHES: 1
NECK PAIN: 0
WOUND: 0
SEIZURES: 0
ENDOCRINE NEGATIVE: 1
JOINT SWELLING: 0

## 2018-07-27 ASSESSMENT — PAIN SCALES - GENERAL: PAINLEVEL: MILD PAIN (3)

## 2018-07-27 NOTE — MR AVS SNAPSHOT
After Visit Summary   7/27/2018    Christina John    MRN: [id number]           Patient Information     Date Of Birth          1965        Visit Information        Provider Department      7/27/2018 11:00 AM Addy Judd PA-C [facility]        Today's Diagnoses     Tension headache    -  1    Neck muscle spasm           Follow-ups after your visit        Your next 10 appointments already scheduled     Aug 02, 2018 11:15 AM CDT   [facility] Visit - [facility]    [address] [facility] [id number] [phone]            Aug 10, 2018 10:20 AM CDT   Long Office Call with Lucía [facility]illon [facility]   [facility]    [address] [facility] [address] [phone]              Who to contact     If you have questions or need follow up information about today's clinic visit or your schedule please contact [facility] directly at [phone].  Normal or non-critical lab and imaging results will be communicated to you by Matrix-Biohart, letter or phone within 4 business days after the clinic has received the results. If you do not hear from us within 7 days, please contact the clinic through Fundlyt or phone. If you have a critical or abnormal lab result, we will notify you by phone as soon as possible.  Submit refill requests through TerraLUX or call your pharmacy and they will forward the refill request to us. Please allow 3 business days for your refill to be completed.          Additional Information About Your Visit        Matrix-Biohart Information     TerraLUX gives you secure access to your electronic health record. If you see a primary care provider, you can also send messages to your care team and make appointments. If you have questions, please call your primary care clinic.  If you do not have a primary care provider, please call  [phone] and they will assist you.        Care EveryWhere ID     This is your Care EveryWhere ID. This could be used by other organizations to access your [facility] medical records  [id number]        Your Vitals Were     Pulse Temperature Respirations Last Period BMI (Body Mass Index)       64 97.1  F (36.2  C) (Tympanic) 20 03/28/2010 26.69 kg/m2        Blood Pressure from Last 3 Encounters:   07/27/18 120/68   06/12/18 122/86   12/20/17 110/71    Weight from Last 3 Encounters:   07/27/18 190 lb (86.2 kg)   06/12/18 198 lb 3.2 oz (89.9 kg)   12/20/17 211 lb (95.7 kg)              Today, you had the following     No orders found for display         Today's Medication Changes          These changes are accurate as of 7/27/18 11:36 AM.  If you have any questions, ask your nurse or doctor.               Start taking these medicines.        Dose/Directions    methocarbamol 750 MG tablet   Commonly known as:  ROBAXIN   Used for:  Tension headache, Neck muscle spasm   Started by:  Addy Judd PA-C        Dose:  750 mg   Take 1 tablet (750 mg) by mouth 4 times daily as needed for muscle spasms   Quantity:  60 tablet   Refills:  0            Where to get your medicines      These medications were sent to [facility] [address] [address]     Phone:  [phone]     methocarbamol 750 MG tablet                Primary Care Provider Office Phone # Fax #    Lucía Aguillon [phone] [address] [address] [facility] [address]        Equal Access to Services     DILLON KHAN AH: Hadii bony Ramirez, waaxda luqadaha, qaybta kaalmamadisyn aragon. So [facility] [phone].    ATENCIÓN: Si habla español, tiene a fulton disposición servicios gratuitos de asistencia lingüística. Llame al [phone].    We comply with applicable federal civil rights laws and [place] laws. We do not  discriminate on the basis of race, color, national origin, age, disability, sex, sexual orientation, or gender identity.            Thank you!     Thank you for choosing [facility]  for your care. Our goal is always to provide you with excellent care. Hearing back from our patients is one way we can continue to improve our services. Please take a few minutes to complete the written survey that you may receive in the mail after your visit with us. Thank you!             Your Updated Medication List - Protect others around you: Learn how to safely use, store and throw away your medicines at www.disposemymeds.org.          This list is accurate as of 7/27/18 11:36 AM.  Always use your most recent med list.                   Brand Name Dispense Instructions for use Diagnosis    buPROPion 300 MG 24 hr tablet    WELLBUTRIN XL    30 tablet    TAKE ONE TABLET (300 MG)BY MOUTH ONCE DAILY IN THE MORNING    Major depressive disorder, single episode, mild (H)       calcium + D 600-200 MG-UNIT Tabs   Generic drug:  calcium carbonate-vitamin D      Take  by mouth 2 times daily.        escitalopram 20 MG tablet    LEXAPRO    30 tablet    TAKE ONE TABLET (20 MG) BY MOUTH ONCE DAILY    Major depressive disorder, single episode, mild (H)       methocarbamol 750 MG tablet    ROBAXIN    60 tablet    Take 1 tablet (750 mg) by mouth 4 times daily as needed for muscle spasms    Tension headache, Neck muscle spasm       MULTIVITAMIN TABS   OR      1 TABLET DAILY        TYLENOL EXTRA STRENGTH PO      Take  by mouth. PRN        valACYclovir 1000 mg tablet    VALTREX    20 tablet    TAKE ONE TABLET BY MOUTH EVERY DAY    Recurrent cold sores       VITAMIN C CR PO      Take  by mouth.

## 2018-07-27 NOTE — NURSING NOTE
"Chief Complaint   Patient presents with     Headache     Started on 7/18. Daily.  Back of head. Varies on how they feel. some dizziness. Tried Tylenol      Nausea       Initial /68 (BP Location: Right arm, Cuff Size: Adult Regular)  Pulse 64  Temp 97.1  F (36.2  C) (Tympanic)  Resp 20  Wt 190 lb (86.2 kg)  LMP 03/28/2010  BMI 26.69 kg/m2 Estimated body mass index is 26.69 kg/(m^2) as calculated from the following:    Height as of 6/12/18: 5' 10.75\" (1.797 m).    Weight as of this encounter: 190 lb (86.2 kg).      Health Maintenance that is potentially due pending provider review:  NONE    n/a    Is there anyone who you would like to be able to receive your results? Not Applicable  If yes have patient fill out TRE Bailey M.A.      "

## 2018-07-27 NOTE — PROGRESS NOTES
Chief Complaint:    Chief Complaint   Patient presents with     Headache     Started on 7/18. Daily.  Back of head. Varies on how they feel. some dizziness. Tried Tylenol      Nausea       HPI: Christina John is an 52 year old female who presents for evaluation and treatment of headaches.  Patient states that these started 9 days ago.  The headaches are every day.  The headaches come and go.  The pain is squeezing and at times sharp at the base of the skull.  Nothing makes the headaches better or worse.  OTC medication has not helped much.  Ice and heat do work to some degree.  She has also had neck pain and tightness.  The headaches worsen with worsening neck pain.  She has had several episodes of dizziness and nausea, but that seems to have resolved.  She is going to a chiropractor and this has been helping.  She denies worse headache of her life.  She denies any fall or head and neck injury.  She has had these types of headaches in the past but not for this long.        ROS:      Review of Systems   Constitutional: Negative for chills and fever.   HENT: Negative for congestion, ear pain, rhinorrhea and sore throat.    Eyes: Negative.  Negative for photophobia, pain and visual disturbance.   Respiratory: Negative.  Negative for cough and shortness of breath.    Cardiovascular: Negative.  Negative for chest pain and palpitations.   Gastrointestinal: Negative for diarrhea, nausea and vomiting.   Endocrine: Negative.  Negative for cold intolerance and heat intolerance.   Genitourinary: Negative.    Musculoskeletal: Negative.  Negative for arthralgias, back pain, joint swelling, myalgias, neck pain and neck stiffness.   Skin: Negative.  Negative for rash and wound.   Allergic/Immunologic: Negative.  Negative for immunocompromised state.   Neurological: Positive for headaches. Negative for dizziness, tremors, seizures, facial asymmetry, weakness, light-headedness and numbness.   Hematological: Negative.  Does not  bruise/bleed easily.   Psychiatric/Behavioral: Negative.  Negative for agitation, confusion and sleep disturbance.        Family History   Family History   Problem Relation Age of Onset     Hypertension Mother      GASTROINTESTINAL DISEASE Mother      GAITAN'S ESOPHAGUS     Lipids Mother      Depression Mother      Other - See Comments Mother      Pagets Disease of the Vulva, Dx 06/2014     Allergies Father      HAYFEVER     HEART DISEASE Father      AFIB     Hypertension Father      Lipids Father      GASTROINTESTINAL DISEASE Other      GAITAN'S ESOPHAGUS     GASTROINTESTINAL DISEASE Other      GAITAN'S ESOPHAGUS     Breast Cancer Paternal Aunt      Breast Cancer Other      fathers 1st cousin       Social History  Social History     Social History     Marital status:      Spouse name: N/A     Number of children: N/A     Years of education: N/A     Occupational History     Not on file.     Social History Main Topics     Smoking status: Never Smoker     Smokeless tobacco: Never Used      Comment: Nonsmoking household     Alcohol use No     Drug use: No     Sexual activity: No     Other Topics Concern      Service No     Blood Transfusions No     Caffeine Concern No     Occupational Exposure No     Hobby Hazards No     Sleep Concern No     Stress Concern No     Weight Concern Yes     Special Diet No     Back Care No     Exercise No     Bike Helmet No     Seat Belt Yes     Self-Exams Yes     Social History Narrative        Surgical History:  Past Surgical History:   Procedure Laterality Date     C APPENDECTOMY  1982     C/SECTION, CLASSICAL       ENDOSCOPY      10/2000 Xiao, repeat 10/2002, REPEAT 2/08 (REPEAT IN 2 YRS)     EXCIS VAGINAL CYST/TUMOR       GLAUCOMA SURGERY       HC BIOPSY OF BREAST, OPEN INCISIONAL  2008    sentinel lymph node     HC COLONOSCOPY THRU STOMA, DIAGNOSTIC  12/04, 12/09    (REPEAT IN 5 YRS)     HC REMOVAL GALLBLADDER  6/2005     HYSTERECTOMY, PAP NO LONGER INDICATED        "MASTECTOMY BILATERAL, INSERT TISSUE EXPANDER BILATERAL, COMBINED  8/2010     MASTECTOMY, BILATERAL       PELVIS LAPAROSCOPY,DX      x2        Problem List:  Patient Active Problem List   Diagnosis     DCIS (ductal carcinoma in situ) of breast     GERD (gastroesophageal reflux disease)     PCOS (polycystic ovarian syndrome)     Seasonal allergic rhinitis     Allergic rhinitis due to animal dander     House dust mite allergy     Seasonal allergic conjunctivitis     HYPERLIPIDEMIA LDL GOAL <160     Mild major depression (H)     Diagnostic skin and sensitization tests     Desensitization to allergens     History of adenomatous polyp of colon     BRCA1 positive     Overweight (BMI 25.0-29.9)     History of gastric bypass        Allergies:  Allergies   Allergen Reactions     Amoxicillin Swelling     Cefzil [Cefprozil] Swelling     Flu Virus Vaccine      \"throat felt funny\"     Adhesive Tape Rash     Biaxin [Clarithromycin] Hives     Erythromycin GI Disturbance     Latex Rash     Percocet [Acetaminophen] Hives     Sulfa Drugs Hives        Current Meds:    Current Outpatient Prescriptions:      Acetaminophen (TYLENOL EXTRA STRENGTH PO), Take  by mouth. PRN , Disp: , Rfl:      Ascorbic Acid (VITAMIN C CR PO), Take  by mouth., Disp: , Rfl:      buPROPion (WELLBUTRIN XL) 300 MG 24 hr tablet, TAKE ONE TABLET (300 MG)BY MOUTH ONCE DAILY IN THE MORNING, Disp: 30 tablet, Rfl: 0     calcium carbonate-vitamin D (CALCIUM + D) 600-200 MG-UNIT TABS, Take  by mouth 2 times daily., Disp: , Rfl:      escitalopram (LEXAPRO) 20 MG tablet, TAKE ONE TABLET (20 MG) BY MOUTH ONCE DAILY, Disp: 30 tablet, Rfl: 0     methocarbamol (ROBAXIN) 750 MG tablet, Take 1 tablet (750 mg) by mouth 4 times daily as needed for muscle spasms, Disp: 60 tablet, Rfl: 0     MULTIVITAMIN TABS   OR, 1 TABLET DAILY, Disp: , Rfl:      valACYclovir (VALTREX) 1000 mg tablet, TAKE ONE TABLET BY MOUTH EVERY DAY, Disp: 20 tablet, Rfl: 6     PHYSICAL EXAM:     Vital signs " noted and reviewed by Addy Judd  /68 (BP Location: Right arm, Cuff Size: Adult Regular)  Pulse 64  Temp 97.1  F (36.2  C) (Tympanic)  Resp 20  Wt 190 lb (86.2 kg)  LMP 03/28/2010  BMI 26.69 kg/m2     PEFR:    Physical Exam   Constitutional: She is oriented to person, place, and time. She appears well-developed and well-nourished. No distress.   HENT:   Head: Normocephalic and atraumatic.   Right Ear: Tympanic membrane and external ear normal.   Left Ear: Tympanic membrane and external ear normal.   Mouth/Throat: Oropharynx is clear and moist.   Eyes: EOM are normal. Pupils are equal, round, and reactive to light.   Neck: Normal range of motion. Neck supple.   Cardiovascular: Normal rate, regular rhythm, normal heart sounds and intact distal pulses.  Exam reveals no gallop and no friction rub.    No murmur heard.  Pulmonary/Chest: Effort normal and breath sounds normal. No respiratory distress.   Abdominal: Soft. Bowel sounds are normal. She exhibits no distension and no mass. There is no tenderness. There is no guarding.   Musculoskeletal:        Cervical back: She exhibits tenderness and spasm. She exhibits normal range of motion, no bony tenderness, no swelling and no edema.   L side of upper trap, and paraspinal muscles very tight to palpation with some discomfort also.   Lymphadenopathy:     She has no cervical adenopathy.   Neurological: She is alert and oriented to person, place, and time. She has normal reflexes. She displays normal reflexes. No cranial nerve deficit. She exhibits normal muscle tone. Coordination normal.   Skin: Skin is warm and dry. She is not diaphoretic.   Psychiatric: She has a normal mood and affect. Her behavior is normal. Judgment and thought content normal.   Nursing note and vitals reviewed.       Labs:       Medical Decision Making:    Differential Diagnosis:  Headache:  Tension headache, Subarachnoid hemorrhage, Subdural hemorrhage, Intracerebral hemorrhage,  Intracranial tumor      ASSESSMENT:     1. Tension headache    2. Neck muscle spasm           PLAN:     Patient presents for 9 days of headaches.  She has associated neck muscle spasms as well.  Her Neurological exam was benign today.  L side of the trap muscle and paraspinal muscles very tight.  Rx for Robaxin today.  Patient instructed to continue with chiropractic.  Massage may help.  Ice, and or heat to the area.  Gentle neck stretches.  Worrisome symptoms discussed with instructions to go to the ED.  Patient has an appointment with her PCP for next week and will follow up then if symptoms have not resolved.  Patient verbalized understanding and agreed with this plan.     Addy Judd  7/27/2018, 11:05 AM

## 2018-08-02 ENCOUNTER — HOSPITAL ENCOUNTER (OUTPATIENT)
Dept: MRI IMAGING | Facility: CLINIC | Age: 53
Discharge: HOME | End: 2018-08-02
Attending: FAMILY MEDICINE | Admitting: FAMILY MEDICINE
Payer: COMMERCIAL

## 2018-08-02 ENCOUNTER — OFFICE VISIT (OUTPATIENT)
Dept: FAMILY MEDICINE | Facility: CLINIC | Age: 53
End: 2018-08-02
Payer: COMMERCIAL

## 2018-08-02 VITALS
SYSTOLIC BLOOD PRESSURE: 117 MMHG | DIASTOLIC BLOOD PRESSURE: 82 MMHG | TEMPERATURE: 97.6 F | RESPIRATION RATE: 20 BRPM | HEART RATE: 66 BPM | BODY MASS INDEX: 26.13 KG/M2 | WEIGHT: 186 LBS | OXYGEN SATURATION: 98 %

## 2018-08-02 DIAGNOSIS — C50.911 MALIGNANT NEOPLASM OF RIGHT FEMALE BREAST, UNSPECIFIED ESTROGEN RECEPTOR STATUS, UNSPECIFIED SITE OF BREAST (H): ICD-10-CM

## 2018-08-02 DIAGNOSIS — Z15.01 BRCA1 POSITIVE: ICD-10-CM

## 2018-08-02 DIAGNOSIS — Z15.09 BRCA1 POSITIVE: ICD-10-CM

## 2018-08-02 DIAGNOSIS — R51.9 NEW ONSET HEADACHE: Primary | ICD-10-CM

## 2018-08-02 DIAGNOSIS — R51.9 NEW ONSET HEADACHE: ICD-10-CM

## 2018-08-02 PROCEDURE — A9585 GADOBUTROL INJECTION: HCPCS | Performed by: RADIOLOGY

## 2018-08-02 PROCEDURE — 70553 MRI BRAIN STEM W/O & W/DYE: CPT

## 2018-08-02 PROCEDURE — 99214 OFFICE O/P EST MOD 30 MIN: CPT | Performed by: FAMILY MEDICINE

## 2018-08-02 PROCEDURE — 25000128 H RX IP 250 OP 636: Performed by: RADIOLOGY

## 2018-08-02 RX ORDER — GADOBUTROL 604.72 MG/ML
8 INJECTION INTRAVENOUS ONCE
Status: COMPLETED | OUTPATIENT
Start: 2018-08-02 | End: 2018-08-02

## 2018-08-02 RX ADMIN — GADOBUTROL 8 ML: 604.72 INJECTION INTRAVENOUS at 19:34

## 2018-08-02 ASSESSMENT — PAIN SCALES - GENERAL: PAINLEVEL: MILD PAIN (3)

## 2018-08-02 NOTE — PROGRESS NOTES
SUBJECTIVE:  52 year old.The patient has a history of headache.  This started 2 weeks ago. No trauma.  Location posterior  quality tight Associated symptoms are blurred vision.  Brought on by unknown .  Better with nothing. ROS sensitive to sound and light nausea no vomit. Has history of breast cancer      Reviewed health maintenance  Patient Active Problem List   Diagnosis     DCIS (ductal carcinoma in situ) of breast     GERD (gastroesophageal reflux disease)     PCOS (polycystic ovarian syndrome)     Seasonal allergic rhinitis     Allergic rhinitis due to animal dander     House dust mite allergy     Seasonal allergic conjunctivitis     HYPERLIPIDEMIA LDL GOAL <160     Mild major depression (H)     Diagnostic skin and sensitization tests     Desensitization to allergens     History of adenomatous polyp of colon     BRCA1 positive     Overweight (BMI 25.0-29.9)     History of gastric bypass     Past Medical History:   Diagnosis Date     Allergic rhinitis due to animal dander did IT x 4 yrs. 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 ended 9/06 per self     Diagnostic skin and sensitization tests 4/03 skin tests pos. for: cat/dog/DM/T/G/RW     Elevated cholesterol      Endometriosis      GERD (gastroesophageal reflux disease)      House dust mite allergy      Hypertension goal BP (blood pressure) < 140/90      Infertility     ON CLOMID     Mild major depression (H)      PCOS (polycystic ovarian syndrome)      Pelvic relaxation      Seasonal allergic conjunctivitis      Seasonal allergic rhinitis     4/03 skin tests pos. for: cat/dog/DM/T/G/RW--per Dr. Talavera.     Ulcers 1992    DX BY ENDOSCOPY       OBJECTIVE:  no apparent distress  /82  Pulse 66  Temp 97.6  F (36.4  C) (Oral)  Resp 20  Wt 186 lb (84.4 kg)   LMP 03/28/2010  SpO2 98%  BMI 26.13 kg/m2  Posterior neck with increased tension  LUNGS:  CTA B/L, no wheezing or crackles.   Cardiovascular: negative, PMI normal. No lifts, heaves, or thrills. RRR. No murmurs, clicks gallops or rub   Gastrointestinal: Abdomen soft, non-tender. BS normal. No masses, organomegaly       ICD-10-CM    1. New onset headache R51 MR Brain w/o & w Contrast   2. BRCA1 positive Z15.01     Z15.02     PLAN: await MRI  If normal and chiropractor does not help consider imitex

## 2018-08-02 NOTE — NURSING NOTE
"Chief Complaint   Patient presents with     Headache     x 2 weeks, was seen Friday.     Health Maintenance     order pended, DAP, PHQ-9       Initial /82  Pulse 66  Temp 97.6  F (36.4  C) (Oral)  Resp 20  Wt 186 lb (84.4 kg)  LMP 03/28/2010  SpO2 98%  BMI 26.13 kg/m2 Estimated body mass index is 26.13 kg/(m^2) as calculated from the following:    Height as of 6/12/18: 5' 10.75\" (1.797 m).    Weight as of this encounter: 186 lb (84.4 kg).  Medication Reconciliation: complete  Christina Dela Cruz, KACEY  "

## 2018-08-02 NOTE — MR AVS SNAPSHOT
After Visit Summary   8/2/2018    Christina John    MRN: [id number]           Patient Information     Date Of Birth          1965        Visit Information        Provider Department      8/2/2018 11:15 AM Eliud Mercedes MD [facility]        Today's Diagnoses     New onset headache    -  1    BRCA1 positive        Malignant neoplasm of right female breast, unspecified estrogen receptor status, unspecified site of breast (H)           Follow-ups after your visit        Your next 10 appointments already scheduled     Aug 02, 2018  7:00 PM CDT   MR BRAIN W/O & W CONTRAST with [address] [facility] ([facility])    [address] [facility] [address]   [phone]           Take your medicines as usual, unless your doctor tells you not to. Bring a list of your current medicines to your exam (including vitamins, minerals and over-the-counter drugs).  You may or may not receive intravenous (IV) contrast for this exam pending the discretion of the Radiologist.  You do not need to do anything special to prepare.  The MRI machine uses a strong magnet. Please wear clothes without metal (snaps, zippers). A sweatsuit works well, or we may give you a hospital gown.  Please remove any body piercings and hair extensions before you arrive. You will also remove watches, jewelry, hairpins, wallets, dentures, partial dental plates and hearing aids. You may wear contact lenses, and you may be able to wear your rings. We have a safe place to keep your personal items, but it is safer to leave them at home.  **IMPORTANT** THE INSTRUCTIONS BELOW ARE ONLY FOR THOSE PATIENTS WHO HAVE BEEN PRESCRIBED SEDATION OR GENERAL ANESTHESIA DURING THEIR MRI PROCEDURE:  IF YOUR DOCTOR PRESCRIBED ORAL SEDATION (take medicine to help you relax during your exam):   You must get the medicine from your doctor (oral medication) before you arrive. Bring the medicine to the exam. Do not  take it at home. You ll be told when to take it upon arriving for your exam.   Arrive one hour early. Bring someone who can take you home after the test. Your medicine will make you sleepy. After the exam, you may not drive, take a bus or take a taxi by yourself.  IF YOUR DOCTOR PRESCRIBED IV SEDATION:   Arrive one hour early. Bring someone who can take you home after the test. Your medicine will make you sleepy. After the exam, you may not drive, take a bus or take a taxi by yourself.   No eating 6 hours before your exam. You may have clear liquids up until 4 hours before your exam. (Clear liquids include water, clear tea, black coffee and fruit juice without pulp.)  IF YOUR DOCTOR PRESCRIBED ANESTHESIA (be asleep for your exam):   Arrive 1 1/2 hours early. Bring someone who can take you home after the test. You may not drive, take a bus or take a taxi by yourself.   No eating 8 hours before your exam. You may have clear liquids up until 4 hours before your exam. (Clear liquids include water, clear tea, black coffee and fruit juice without pulp.)   You will spend four to five hours in the recovery room.  Please call the Imaging Department at your exam site with any questions.            Aug 10, 2018 10:20 AM CDT   [facility] Call with Lucía [facility]illon [facility]   [facility]    [address] [address] [phone]              Future tests that were ordered for you today     Open Future Orders        Priority Expected Expires Ordered    MR Brain w/o & w Contrast Routine  8/2/2019 8/2/2018            Who to contact     If you have questions or need follow up information about today's clinic visit or your schedule please contact [facility] directly at [phone].  Normal or non-critical lab and imaging results will be communicated to you by MyChart, letter or phone within 4 business days after the clinic has received the results. If you do not hear  from us within 7 days, please contact the clinic through [employer] or phone. If you have a critical or abnormal lab result, we will notify you by phone as soon as possible.  Submit refill requests through [employer] or call your pharmacy and they will forward the refill request to us. Please allow 3 business days for your refill to be completed.          Additional Information About Your Visit        [employer] Information     [employer] gives you secure access to your electronic health record. If you see a primary care provider, you can also send messages to your care team and make appointments. If you have questions, please call your primary care clinic.  If you do not have a primary care provider, please call [phone] and they will assist you.        Care EveryWhere ID     This is your Care EveryWhere ID. This could be used by other organizations to access your [place] medical records  [id number]        Your Vitals Were     Pulse Temperature Respirations Last Period Pulse Oximetry BMI (Body Mass Index)    66 97.6  F (36.4  C) (Oral) 20 03/28/2010 98% 26.13 kg/m2       Blood Pressure from Last 3 Encounters:   08/02/18 117/82   07/27/18 120/68   06/12/18 122/86    Weight from Last 3 Encounters:   08/02/18 186 lb (84.4 kg)   07/27/18 190 lb (86.2 kg)   06/12/18 198 lb 3.2 oz (89.9 kg)               Primary Care Provider Office Phone # Fax #    Lucía Aguillon [phone] [address] [facility] [address]        Equal Access to Services     [facility] BILL : Hadii aad ku hadasho Soomaali, waaxda luqadaha, qaybta kaalmada adeegyada, madisyn rowe . So [facility] [phone].    ATENCIÓN: Si habla español, tiene a fulton disposición servicios gratuitos de asistencia lingüística. Llame al [phone].    We comply with applicable federal civil rights laws and [place] laws. We do not discriminate on the basis of race, color, national origin, age, disability, sex, sexual  orientation, or gender identity.            Thank you!     Thank you for choosing [facility] ([facility]  for your [facility]. Our goal is always to provide you with excellent care. Hearing back from our patients is one way we can continue to improve our services. Please take a few minutes to complete the written survey that you may receive in the mail after your visit with us. Thank you!             Your Updated Medication List - Protect others around you: Learn how to safely use, store and throw away your medicines at www.disposemymeds.org.          This list is accurate as of 8/2/18 12:51 PM.  Always use your most recent med list.                   Brand Name Dispense Instructions for use Diagnosis    buPROPion 300 MG 24 hr tablet    WELLBUTRIN XL    30 tablet    TAKE ONE TABLET (300 MG)BY MOUTH ONCE DAILY IN THE MORNING    Major depressive disorder, single episode, mild (H)       calcium + D 600-200 MG-UNIT Tabs   Generic drug:  calcium carbonate-vitamin D      Take  by mouth 2 times daily.        escitalopram 20 MG tablet    LEXAPRO    30 tablet    TAKE ONE TABLET (20 MG) BY MOUTH ONCE DAILY    Major depressive disorder, single episode, mild (H)       methocarbamol 750 MG tablet    ROBAXIN    60 tablet    Take 1 tablet (750 mg) by mouth 4 times daily as needed for muscle spasms    Tension headache, Neck muscle spasm       MULTIVITAMIN TABS   OR      1 TABLET DAILY        TYLENOL EXTRA STRENGTH PO      Take  by mouth. PRN        valACYclovir 1000 mg tablet    VALTREX    20 tablet    TAKE ONE TABLET BY MOUTH EVERY DAY    Recurrent cold sores       VITAMIN C CR PO      Take  by mouth.

## 2018-08-03 ASSESSMENT — PATIENT HEALTH QUESTIONNAIRE - PHQ9: SUM OF ALL RESPONSES TO PHQ QUESTIONS 1-9: 14

## 2018-08-06 DIAGNOSIS — G43.809 OTHER MIGRAINE WITHOUT STATUS MIGRAINOSUS, NOT INTRACTABLE: Primary | ICD-10-CM

## 2018-08-06 RX ORDER — SUMATRIPTAN 25 MG/1
25-50 TABLET, FILM COATED ORAL
Qty: 9 TABLET | Refills: 1 | Status: SHIPPED | OUTPATIENT
Start: 2018-08-06 | End: 2019-02-06

## 2018-08-21 NOTE — PROGRESS NOTES
SUBJECTIVE:   Christina John is a 52 year old female who presents to clinic today for the following health issues:        Depression and Anxiety Follow-Up    Status since last visit: Worsened     Other associated symptoms:sleep more, lack of interest, unable to focus    Complicating factors:     Significant life event: No     Current substance abuse: None    PHQ-9 6/22/2017 11/30/2017 8/2/2018   Total Score 14 8 14   Q9: Suicide Ideation Not at all Not at all Not at all     DANA-7 SCORE 1/28/2015 5/13/2016   Total Score 16 -   Total Score - 8       PHQ-9  English  PHQ-9   Any Language  DANA-7  Suicide Assessment Five-step Evaluation and Treatment (SAFE-T)    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 Does not feel like meds are working  She has also been dealing with headache for over a month which may be contributing to her symptoms  Has had mRI. Shows fluid in mastoids. Pain is located over posterior lower head  Is seeing chiropractor to help with tension headaches  When headache first stared she had a lot of dizziness and trouble with balance  Will treat with abx in case infections and pt to follow-up with ENT for further evaluation of her headache/dizziness/balance issues and abnormal MRI of mastoids    Will address her depression when the headaches have resolved as not sure how much that is contributing.         Problem list and histories reviewed & adjusted, as indicated.  Additional history: as documented    Labs reviewed in EPIC    Reviewed and updated as needed this visit by clinical staff       Reviewed and updated as needed this visit by Provider         ROS:  Constitutional, HEENT, cardiovascular, pulmonary, gi and gu systems are negative, except as otherwise noted.    OBJECTIVE:     /86  Pulse 57  Temp 98.7  F (37.1  C) (Oral)  " Resp 20  Ht 5' 10.75\" (1.797 m)  Wt 191 lb (86.6 kg)  LMP 03/28/2010  SpO2 100%  BMI 26.83 kg/m2  Body mass index is 26.83 kg/(m^2).  GENERAL: healthy, alert and no distress  NECK: no adenopathy, no asymmetry, masses, or scars and thyroid normal to palpation  RESP: lungs clear to auscultation - no rales, rhonchi or wheezes  CV: regular rate and rhythm, normal S1 S2, no S3 or S4, no murmur, click or rub, no peripheral edema and peripheral pulses strong  MS: no gross musculoskeletal defects noted, no edema    Diagnostic Test Results:  none     ASSESSMENT/PLAN:     1. Major depressive disorder, single episode, mild (H)  As above  - buPROPion (WELLBUTRIN XL) 300 MG 24 hr tablet; TAKE ONE TABLET (300 MG)BY MOUTH ONCE DAILY IN THE MORNING  Dispense: 90 tablet; Refill: 3  - escitalopram (LEXAPRO) 20 MG tablet; TAKE ONE TABLET (20 MG) BY MOUTH ONCE DAILY  Dispense: 90 tablet; Refill: 3    2. Chronic nonintractable headache, unspecified headache type  As above  - levofloxacin (LEVAQUIN) 500 MG tablet; Take 1 tablet (500 mg) by mouth daily  Dispense: 10 tablet; Refill: 0  - OTOLARYNGOLOGY REFERRAL    3. Parathyroid abnormality (H)  yearly recheck  - Parathyroid Hormone Intact    4. Disorder of mastoid, bilateral  As above  - OTOLARYNGOLOGY REFERRAL    5. History of gastric bypass  Yearly recheck  - Comprehensive metabolic panel  - CBC with platelets  - Iron and iron binding capacity        Lucía Turner MD  [facility]    "

## 2018-08-24 ENCOUNTER — OFFICE VISIT (OUTPATIENT)
Dept: FAMILY MEDICINE | Facility: CLINIC | Age: 53
End: 2018-08-24
Payer: COMMERCIAL

## 2018-08-24 VITALS
BODY MASS INDEX: 26.74 KG/M2 | HEIGHT: 71 IN | SYSTOLIC BLOOD PRESSURE: 122 MMHG | RESPIRATION RATE: 20 BRPM | OXYGEN SATURATION: 100 % | TEMPERATURE: 98.7 F | WEIGHT: 191 LBS | DIASTOLIC BLOOD PRESSURE: 86 MMHG | HEART RATE: 57 BPM

## 2018-08-24 DIAGNOSIS — G89.29 CHRONIC NONINTRACTABLE HEADACHE, UNSPECIFIED HEADACHE TYPE: ICD-10-CM

## 2018-08-24 DIAGNOSIS — F32.0 MAJOR DEPRESSIVE DISORDER, SINGLE EPISODE, MILD (H): Primary | ICD-10-CM

## 2018-08-24 DIAGNOSIS — Z98.84 HISTORY OF GASTRIC BYPASS: ICD-10-CM

## 2018-08-24 DIAGNOSIS — H74.93: ICD-10-CM

## 2018-08-24 DIAGNOSIS — E21.5 PARATHYROID ABNORMALITY (H): ICD-10-CM

## 2018-08-24 DIAGNOSIS — R51.9 CHRONIC NONINTRACTABLE HEADACHE, UNSPECIFIED HEADACHE TYPE: ICD-10-CM

## 2018-08-24 LAB
ALBUMIN SERPL-MCNC: 3.8 G/DL (ref 3.4–5)
ALP SERPL-CCNC: 131 U/L (ref 40–150)
ALT SERPL W P-5'-P-CCNC: 35 U/L (ref 0–50)
ANION GAP SERPL CALCULATED.3IONS-SCNC: 7 MMOL/L (ref 3–14)
AST SERPL W P-5'-P-CCNC: 24 U/L (ref 0–45)
BILIRUB SERPL-MCNC: 1 MG/DL (ref 0.2–1.3)
BUN SERPL-MCNC: 19 MG/DL (ref 7–30)
CALCIUM SERPL-MCNC: 9.4 MG/DL (ref 8.5–10.1)
CHLORIDE SERPL-SCNC: 102 MMOL/L (ref 94–109)
CO2 SERPL-SCNC: 28 MMOL/L (ref 20–32)
CREAT SERPL-MCNC: 0.94 MG/DL (ref 0.52–1.04)
ERYTHROCYTE [DISTWIDTH] IN BLOOD BY AUTOMATED COUNT: 13.9 % (ref 10–15)
GFR SERPL CREATININE-BSD FRML MDRD: 62 ML/MIN/1.7M2
GLUCOSE SERPL-MCNC: 94 MG/DL (ref 70–99)
HCT VFR BLD AUTO: 41.3 % (ref 35–47)
HGB BLD-MCNC: 13.7 G/DL (ref 11.7–15.7)
IRON SATN MFR SERPL: 35 % (ref 15–46)
IRON SERPL-MCNC: 143 UG/DL (ref 35–180)
MCH RBC QN AUTO: 30.6 PG (ref 26.5–33)
MCHC RBC AUTO-ENTMCNC: 33.2 G/DL (ref 31.5–36.5)
MCV RBC AUTO: 92 FL (ref 78–100)
PLATELET # BLD AUTO: 253 10E9/L (ref 150–450)
POTASSIUM SERPL-SCNC: 4.5 MMOL/L (ref 3.4–5.3)
PROT SERPL-MCNC: 7.3 G/DL (ref 6.8–8.8)
PTH-INTACT SERPL-MCNC: 72 PG/ML (ref 18–80)
RBC # BLD AUTO: 4.48 10E12/L (ref 3.8–5.2)
SODIUM SERPL-SCNC: 137 MMOL/L (ref 133–144)
TIBC SERPL-MCNC: 412 UG/DL (ref 240–430)
WBC # BLD AUTO: 7.9 10E9/L (ref 4–11)

## 2018-08-24 PROCEDURE — 80053 COMPREHEN METABOLIC PANEL: CPT | Performed by: FAMILY MEDICINE

## 2018-08-24 PROCEDURE — 83540 ASSAY OF IRON: CPT | Performed by: FAMILY MEDICINE

## 2018-08-24 PROCEDURE — 85027 COMPLETE CBC AUTOMATED: CPT | Performed by: FAMILY MEDICINE

## 2018-08-24 PROCEDURE — 36415 COLL VENOUS BLD VENIPUNCTURE: CPT | Performed by: FAMILY MEDICINE

## 2018-08-24 PROCEDURE — 83550 IRON BINDING TEST: CPT | Performed by: FAMILY MEDICINE

## 2018-08-24 PROCEDURE — 99214 OFFICE O/P EST MOD 30 MIN: CPT | Performed by: FAMILY MEDICINE

## 2018-08-24 PROCEDURE — 83970 ASSAY OF PARATHORMONE: CPT | Performed by: FAMILY MEDICINE

## 2018-08-24 RX ORDER — ESCITALOPRAM OXALATE 20 MG/1
TABLET ORAL
Qty: 90 TABLET | Refills: 3 | Status: SHIPPED | OUTPATIENT
Start: 2018-08-24 | End: 2019-09-30

## 2018-08-24 RX ORDER — BUPROPION HYDROCHLORIDE 300 MG/1
TABLET ORAL
Qty: 90 TABLET | Refills: 3 | Status: SHIPPED | OUTPATIENT
Start: 2018-08-24 | End: 2019-09-06

## 2018-08-24 RX ORDER — LEVOFLOXACIN 500 MG/1
500 TABLET, FILM COATED ORAL DAILY
Qty: 10 TABLET | Refills: 0 | Status: SHIPPED | OUTPATIENT
Start: 2018-08-24 | End: 2019-02-06

## 2018-08-24 NOTE — MR AVS SNAPSHOT
After Visit Summary   8/24/2018    Christina John    MRN: [id number]           Patient Information     Date Of Birth          1965        Visit Information        Provider Department      8/24/2018 2:20 PM Lucía Aguillon MD [facility]        Today's Diagnoses     Major depressive disorder, single episode, mild (H)    -  1    Chronic nonintractable headache, unspecified headache type        Parathyroid abnormality (H)        Disorder of mastoid, bilateral        History of gastric bypass           Follow-ups after your visit        Additional Services     OTOLARYNGOLOGY REFERRAL       Your provider has referred you to: [facility]: [facility] [phone]  [email or web address]    Please be aware that coverage of these services is subject to the terms and limitations of your health insurance plan.  Call member services at your health plan with any benefit or coverage questions.      Please bring the following with you to your appointment:    (1) Any X-Rays, CTs or MRIs which have been performed.  Contact the facility where they were done to arrange for  prior to your scheduled appointment.   (2) List of current medications  (3) This referral request   (4) Any documents/labs given to you for this referral                  Who to contact     If you have questions or need follow up information about today's clinic visit or your schedule please contact [facility] directly at [phone].  Normal or non-critical lab and imaging results will be communicated to you by MyChart, letter or phone within 4 business days after the clinic has received the results. If you do not hear from us within 7 days, please contact the clinic through MyChart or phone. If you have a critical or abnormal lab result, we will notify you by phone as soon as possible.  Submit refill requests through [employer] or call your pharmacy and they will forward the  "refill request to us. Please allow 3 business days for your refill to be completed.          Additional Information About Your Visit        Insprohart Information     [employer] gives you secure access to your electronic health record. If you see a primary care provider, you can also send messages to your care team and make appointments. If you have questions, please call your primary care clinic.  If you do not have a primary care provider, please call [phone] and they will assist you.        Care EveryWhere ID     This is your Care EveryWhere ID. This could be used by other organizations to access your [facility] medical records  [id number]        Your Vitals Were     Pulse Temperature Respirations Height Last Period Pulse Oximetry    57 98.7  F (37.1  C) (Oral) 20 5' 10.75\" (1.797 m) 03/28/2010 100%    BMI (Body Mass Index)                   26.83 kg/m2            Blood Pressure from Last 3 Encounters:   08/24/18 122/86   08/02/18 117/82   07/27/18 120/68    Weight from Last 3 Encounters:   08/24/18 191 lb (86.6 kg)   08/02/18 186 lb (84.4 kg)   07/27/18 190 lb (86.2 kg)              We Performed the Following     CBC with platelets     Comprehensive metabolic panel     Iron and iron binding capacity     OTOLARYNGOLOGY REFERRAL     Parathyroid Hormone Intact          Today's Medication Changes          These changes are accurate as of 8/24/18  3:08 PM.  If you have any questions, ask your nurse or doctor.               Start taking these medicines.        Dose/Directions    levofloxacin 500 MG tablet   Commonly known as:  LEVAQUIN   Used for:  Chronic nonintractable headache, unspecified headache type   Started by:  Lucía Aguillon MD        Dose:  500 mg   Take 1 tablet (500 mg) by mouth daily   Quantity:  10 tablet   Refills:  0            Where to get your medicines      These medications were sent to [facility] [address] [place], [place] - [address] [id number] [facility] [address]     " Phone:  [phone]     buPROPion 300 MG 24 hr tablet    escitalopram 20 MG tablet    levofloxacin 500 MG tablet                Primary Care Provider Office Phone # Fax #    Lucía Aguillon [phone] [address] [facility] [address]        Equal Access to Services     [facility] Hospital[facility] : Hadii aad ku hadasho Soomaali, waaxda luqadaha, qaybta kaalmada adeegyada, madisyn stewart emileen adelondon almamahi rowe . So [facility] [phone].    ATENCIÓN: Si habla español, tiene a fulton disposición servicios gratuitos de asistencia lingüística. [facility] [phone].    We comply with applicable federal civil rights laws and [place] laws. We do not discriminate on the basis of race, color, national origin, age, disability, sex, sexual orientation, or gender identity.            Thank you!     Thank you for choosing [facility]  for your care. Our goal is always to provide you with excellent care. Hearing back from our patients is one way we can continue to improve our services. Please take a few minutes to complete the written survey that you may receive in the mail after your visit with us. Thank you!             Your Updated Medication List - Protect others around you: Learn how to safely use, store and throw away your medicines at www.disposemymeds.org.          This list is accurate as of 8/24/18  3:08 PM.  Always use your most recent med list.                   Brand Name Dispense Instructions for use Diagnosis    buPROPion 300 MG 24 hr tablet    WELLBUTRIN XL    90 tablet    TAKE ONE TABLET (300 MG)BY MOUTH ONCE DAILY IN THE MORNING    Major depressive disorder, single episode, mild (H)       calcium + D 600-200 MG-UNIT Tabs   Generic drug:  calcium carbonate-vitamin D      Take  by mouth 2 times daily.        escitalopram 20 MG tablet    LEXAPRO    90 tablet    TAKE ONE TABLET (20 MG) BY MOUTH ONCE DAILY    Major depressive disorder, single episode, mild (H)       levofloxacin 500 MG  tablet    LEVAQUIN    10 tablet    Take 1 tablet (500 mg) by mouth daily    Chronic nonintractable headache, unspecified headache type       methocarbamol 750 MG tablet    ROBAXIN    60 tablet    Take 1 tablet (750 mg) by mouth 4 times daily as needed for muscle spasms    Tension headache, Neck muscle spasm       MULTIVITAMIN TABS   OR      1 TABLET DAILY        SUMAtriptan 25 MG tablet    IMITREX    9 tablet    Take 1-2 tablets (25-50 mg) by mouth at onset of headache for migraine May repeat in 2 hours. Max 8 tablets/24 hours.    Other migraine without status migrainosus, not intractable       TYLENOL EXTRA STRENGTH PO      Take  by mouth. PRN        valACYclovir 1000 mg tablet    VALTREX    20 tablet    TAKE ONE TABLET BY MOUTH EVERY DAY    Recurrent cold sores       VITAMIN C CR PO      Take  by mouth.

## 2018-08-25 ASSESSMENT — PATIENT HEALTH QUESTIONNAIRE - PHQ9: SUM OF ALL RESPONSES TO PHQ QUESTIONS 1-9: 17

## 2018-08-28 ENCOUNTER — OFFICE VISIT (OUTPATIENT)
Dept: AUDIOLOGY | Facility: CLINIC | Age: 53
End: 2018-08-28
Payer: COMMERCIAL

## 2018-08-28 ENCOUNTER — OFFICE VISIT (OUTPATIENT)
Dept: OTOLARYNGOLOGY | Facility: CLINIC | Age: 53
End: 2018-08-28
Payer: COMMERCIAL

## 2018-08-28 VITALS
HEART RATE: 71 BPM | DIASTOLIC BLOOD PRESSURE: 87 MMHG | SYSTOLIC BLOOD PRESSURE: 119 MMHG | RESPIRATION RATE: 16 BRPM | BODY MASS INDEX: 27.41 KG/M2 | HEIGHT: 71 IN | OXYGEN SATURATION: 100 % | TEMPERATURE: 97.8 F | WEIGHT: 195.8 LBS

## 2018-08-28 DIAGNOSIS — H69.93 DISORDER OF BOTH EUSTACHIAN TUBES: Primary | ICD-10-CM

## 2018-08-28 DIAGNOSIS — R42 DIZZINESS: ICD-10-CM

## 2018-08-28 DIAGNOSIS — H74.93: Primary | ICD-10-CM

## 2018-08-28 PROCEDURE — 99207 ZZC NO CHARGE LOS: CPT | Performed by: AUDIOLOGIST

## 2018-08-28 PROCEDURE — 99243 OFF/OP CNSLTJ NEW/EST LOW 30: CPT | Performed by: OTOLARYNGOLOGY

## 2018-08-28 PROCEDURE — 92567 TYMPANOMETRY: CPT | Performed by: AUDIOLOGIST

## 2018-08-28 PROCEDURE — 92557 COMPREHENSIVE HEARING TEST: CPT | Performed by: AUDIOLOGIST

## 2018-08-28 ASSESSMENT — PAIN SCALES - GENERAL: PAINLEVEL: NO PAIN (0)

## 2018-08-28 NOTE — PROGRESS NOTES
AUDIOLOGY REPORT: HEARING EXAM    SUBJECTIVE:  Christina John is a 52 year old female referred to audiology from ENT by Dr. Del Toro for a hearing examination. Patient was referred to ENT for fluid in her mastoids, and reports aural fullness in both ears.    OBJECTIVE:    Otoscopy:   RIGHT: clear ear canal   LEFT:  clear ear canal    Tympanometry:   RIGHT:  normal eardrum mobility   LEFT:   normal eardrum mobility    Thresholds:   Pure Tone Thresholds assessed using standard techniques with good  reliability from 250-8000 Hz bilaterally using circumaural headphones.   RIGHT:  normal hearing sensitivity for all frequencies tested   LEFT:   normal hearing sensitivity for all frequencies tested    Speech Reception Threshold:   RIGHT:  10 dB HL   LEFT:    20 dB HL    Word Recognition Score:    RIGHT:  100% at 50 dB HL using NU-6 recorded word list   LEFT:    100% at 60 dB HL using NU-6 recorded word list    ASSESSMENT:  Disorder of both eustachian tubes    Discussed results with the patient.     PLAN:  Patient was returned to ENT for follow up.     Chet Maxwell.  Licensed Audiologist, [place] #[id number]  [facility]  8/28/2018

## 2018-08-28 NOTE — PATIENT INSTRUCTIONS
General Scheduling Information  To schedule your CT/MRI scan, please contact [place] [facility] at [phone]   [address] [place] [address]    To schedule your Surgery, please contact our Specialty Schedulers at [phone]    ENT Clinic Locations Clinic Hours Telephone Number     [facility]  [address] [place] [address]   Tuesday:       8:00am -- 4:00pm    Wednesday:  8:00am - 4:00pm   To schedule an appointment with   Dr. Del Toro,   please contact our   Specialty Scheduling Department at:     [phone]       [facility]  [address] [place] [address]   Friday:          8:00am - 4:00pm         Urgent Care Locations Clinic Hours Telephone Numbers     [facility]  [address] [place] [address]     Monday-Friday:     11:00pm - 9:00pm    Saturday-Sunday:  9:00am - 5:00pm   [phone]     [facility]  [address] [place] [address]     Monday-Friday:      5:00pm - 9:00pm     Saturday-Sunday:  9:00am - 5:00pm   [phone]

## 2018-08-28 NOTE — LETTER
8/28/2018         RE: Christina John  [address] [facility] [address]        Dear Colleague,    Thank you for referring your patient, Christina John, to the [facility]. Please see a copy of my visit note below.    I am seeing this patient in consultation for headache and mastoid effusions at the request of the provider Dr. Lucía Aguillon.      Chief Complaint - headache and mastoid effusions    History of Present Illness - Christina John is a 52 year old female who presents to me today with headache. She underwent an MRI on 8/24/2018 that showed bilateral mastoid effusions. Her headache is in base of skull, but on the sides, near the mastoids. Her headache was severe, constant. Has had phonophobia with it. She does report dizziness with it. Feels motion. She has had dizziness and balance issues for many years, and even saw Dr. Turcios for this in 2011. CT temporal bones and head were normal then. She lifts heaving things. Tried a muscle relaxant, and this didn't help. She was given levaquin for possibility this was an infection. She has taken 5 doses. She hasn't had headache like this before. About 2 months ago she was sick with sore throat. Strep was negative. In went away in 72 hours.     Occasionally feels ears are plugged, but that is long-standing. Some hearing loss, she feels more at work. However, is in a noisy environment. There is a history of chronic ear disease as a child, but not so much as an adult. She has a history of breast cancer and underwent chemotherapy for this many years ago.       Past Medical History -   Patient Active Problem List   Diagnosis     DCIS (ductal carcinoma in situ) of breast     GERD (gastroesophageal reflux disease)     PCOS (polycystic ovarian syndrome)     Seasonal allergic rhinitis     Allergic rhinitis due to animal dander     House dust mite allergy     Seasonal allergic conjunctivitis     HYPERLIPIDEMIA LDL GOAL <160     Mild major  "depression (H)     Diagnostic skin and sensitization tests     Desensitization to allergens     History of adenomatous polyp of colon     BRCA1 positive     Overweight (BMI 25.0-29.9)     History of gastric bypass     Malignant neoplasm of right female breast, unspecified estrogen receptor status, unspecified site of breast (H)       Current Medications -   Current Outpatient Prescriptions:      Acetaminophen (TYLENOL EXTRA STRENGTH PO), Take  by mouth. PRN , Disp: , Rfl:      Ascorbic Acid (VITAMIN C CR PO), Take  by mouth., Disp: , Rfl:      buPROPion (WELLBUTRIN XL) 300 MG 24 hr tablet, TAKE ONE TABLET (300 MG)BY MOUTH ONCE DAILY IN THE MORNING, Disp: 90 tablet, Rfl: 3     calcium carbonate-vitamin D (CALCIUM + D) 600-200 MG-UNIT TABS, Take  by mouth 2 times daily., Disp: , Rfl:      escitalopram (LEXAPRO) 20 MG tablet, TAKE ONE TABLET (20 MG) BY MOUTH ONCE DAILY, Disp: 90 tablet, Rfl: 3     levofloxacin (LEVAQUIN) 500 MG tablet, Take 1 tablet (500 mg) by mouth daily, Disp: 10 tablet, Rfl: 0     methocarbamol (ROBAXIN) 750 MG tablet, Take 1 tablet (750 mg) by mouth 4 times daily as needed for muscle spasms, Disp: 60 tablet, Rfl: 0     MULTIVITAMIN TABS   OR, 1 TABLET DAILY, Disp: , Rfl:      SUMAtriptan (IMITREX) 25 MG tablet, Take 1-2 tablets (25-50 mg) by mouth at onset of headache for migraine May repeat in 2 hours. Max 8 tablets/24 hours., Disp: 9 tablet, Rfl: 1     valACYclovir (VALTREX) 1000 mg tablet, TAKE ONE TABLET BY MOUTH EVERY DAY, Disp: 20 tablet, Rfl: 6    Allergies -   Allergies   Allergen Reactions     Amoxicillin Swelling     Cefzil [Cefprozil] Swelling     Flu Virus Vaccine      \"throat felt funny\"     Adhesive Tape Rash     Biaxin [Clarithromycin] Hives     Erythromycin GI Disturbance     Latex Rash     Percocet [Acetaminophen] Hives     Sulfa Drugs Hives       Social History -   Social History     Social History     Marital status:      Spouse name: N/A     Number of children: N/A     " Years of education: N/A     Social History Main Topics     Smoking status: Never Smoker     Smokeless tobacco: Never Used      Comment: Nonsmoking household     Alcohol use No     Drug use: No     Sexual activity: No     Other Topics Concern      Service No     Blood Transfusions No     Caffeine Concern No     Occupational Exposure No     Hobby Hazards No     Sleep Concern No     Stress Concern No     Weight Concern Yes     Special Diet No     Back Care No     Exercise No     Bike Helmet No     Seat Belt Yes     Self-Exams Yes     Social History Narrative       Family History -   Family History   Problem Relation Age of Onset     Hypertension Mother      GASTROINTESTINAL DISEASE Mother      GAITAN'S ESOPHAGUS     Lipids Mother      Depression Mother      Other - See Comments Mother      Pagets Disease of the Vulva, Dx 06/2014     Osteoperosis Mother      Obesity Mother      Allergies Father      HAYFEVER     HEART DISEASE Father      AFIB     Hypertension Father      Lipids Father      Hyperlipidemia Father      GASTROINTESTINAL DISEASE Other      GAITAN'S ESOPHAGUS     Breast Cancer Other      Colon Cancer Other      Other Cancer Maternal Grandmother      Other Cancer Maternal Grandfather      Colon Cancer Paternal Grandfather      GASTROINTESTINAL DISEASE Other      GAITAN'S ESOPHAGUS     Breast Cancer Other      Breast Cancer Paternal Aunt      Breast Cancer Other      fathers 1st cousin     Hyperlipidemia Brother        Review of Systems - As per HPI and PMHx, + depression, otherwise 7 system review of the head and neck negative.    Physical Exam  /87 (Cuff Size: Adult Regular)  LMP 03/28/2010  SpO2 100%  General - The patient is in no distress.  Alert and oriented to person and place, answers questions and cooperates with examination appropriately.   Voice and Breathing - The patient was breathing comfortably without the use of accessory muscles. There was no wheezing, stridor, or stertor.   The patients voice was clear and strong.  Ears - The auricles are normal. The tympanic membranes are normal in appearance, bony landmarks are intact.  No retraction, perforation, or masses.  No fluid or purulence was seen in the external canal or the middle ear. No evidence of infection of the middle ear or external canal, cerumen was normal in appearance. Mastoid bone normal. No erythema or tenderness.   Eyes - Extraocular movements intact.  Sclera were not icteric or injected.  Mouth - Examination of the oral cavity showed pink, healthy mucosa. No lesions or ulcerations noted.  The tongue was mobile and midline.  Throat - The walls of the oropharynx were smooth, symmetric, and had no lesions or ulcerations. The uvula was midline on elevation.    Neck - Palpation of the occipital, submental, submandibular, internal jugular chain, and supraclavicular nodes did not demonstrate any abnormal lymph nodes or masses. Parotid glands had no masses. Palpation of the thyroid was soft and smooth, with no nodules or goiter appreciated.  The trachea was mobile and midline.  Neurological - Cranial nerves 2 through 12 were grossly intact. House-Brackmann grade 1 out of 6 bilaterally.       Audiologic Studies - An audiogram and tympanogram were performed today as part of the evaluation and personally reviewed. The tympanogram shows a normal Type A curve, with normal canal volume and middle ear pressure.  There is no sign of eustachian tube dysfunction or middle ear effusion.  The audiogram showed boarderline to normal hearing. No conductive loss.     Assessment and Plan - Christina John is a 52 year old female who presents to me today with headache, pain over the sides and base of head. She has mastoid effusions, but no ear fluid or infection.  Her hearing has not changed during this either.  She noted no skin changes erythema or edema of the mastoid.  Therefore think it is unlikely she had a true mastoiditis.  It is possible this  started with a viral illness that caused mastoid effusions and may be a mild encephalitis causing the severe headache and dizziness.  The other possibility is vestibular migraine.  She has improved on Levaquin but she may have also improved just with time if this is over a viral etiology.  I recommend she continue the Levaquin in my chart me or call me next week to give me an update on her progress.  If this has not completely resolved we could consider vestibular migraine treatment, or possibly more Levaquin and/or prednisone.    Flaco Del Toro MD  Otolaryngology  [facility]        Again, thank you for allowing me to participate in the care of your patient.        Sincerely,        Flaco Del Toro MD

## 2018-08-28 NOTE — PROGRESS NOTES
I am seeing this patient in consultation for headache and mastoid effusions at the request of the provider Dr. Lucía Aguillon.      Chief Complaint - headache and mastoid effusions    History of Present Illness - Christina John is a 52 year old female who presents to me today with headache. She underwent an MRI on 8/24/2018 that showed bilateral mastoid effusions. Her headache is in base of skull, but on the sides, near the mastoids. Her headache was severe, constant. Has had phonophobia with it. She does report dizziness with it. Feels motion. She has had dizziness and balance issues for many years, and even saw Dr. Turcios for this in 2011. CT temporal bones and head were normal then. She lifts heaving things. Tried a muscle relaxant, and this didn't help. She was given levaquin for possibility this was an infection. She has taken 5 doses. She hasn't had headache like this before. About 2 months ago she was sick with sore throat. Strep was negative. In went away in 72 hours.     Occasionally feels ears are plugged, but that is long-standing. Some hearing loss, she feels more at work. However, is in a noisy environment. There is a history of chronic ear disease as a child, but not so much as an adult. She has a history of breast cancer and underwent chemotherapy for this many years ago.       Past Medical History -   Patient Active Problem List   Diagnosis     DCIS (ductal carcinoma in situ) of breast     GERD (gastroesophageal reflux disease)     PCOS (polycystic ovarian syndrome)     Seasonal allergic rhinitis     Allergic rhinitis due to animal dander     House dust mite allergy     Seasonal allergic conjunctivitis     HYPERLIPIDEMIA LDL GOAL <160     Mild major depression (H)     Diagnostic skin and sensitization tests     Desensitization to allergens     History of adenomatous polyp of colon     BRCA1 positive     Overweight (BMI 25.0-29.9)     History of gastric bypass     Malignant neoplasm of right female  "breast, unspecified estrogen receptor status, unspecified site of breast (H)       Current Medications -   Current Outpatient Prescriptions:      Acetaminophen (TYLENOL EXTRA STRENGTH PO), Take  by mouth. PRN , Disp: , Rfl:      Ascorbic Acid (VITAMIN C CR PO), Take  by mouth., Disp: , Rfl:      buPROPion (WELLBUTRIN XL) 300 MG 24 hr tablet, TAKE ONE TABLET (300 MG)BY MOUTH ONCE DAILY IN THE MORNING, Disp: 90 tablet, Rfl: 3     calcium carbonate-vitamin D (CALCIUM + D) 600-200 MG-UNIT TABS, Take  by mouth 2 times daily., Disp: , Rfl:      escitalopram (LEXAPRO) 20 MG tablet, TAKE ONE TABLET (20 MG) BY MOUTH ONCE DAILY, Disp: 90 tablet, Rfl: 3     levofloxacin (LEVAQUIN) 500 MG tablet, Take 1 tablet (500 mg) by mouth daily, Disp: 10 tablet, Rfl: 0     methocarbamol (ROBAXIN) 750 MG tablet, Take 1 tablet (750 mg) by mouth 4 times daily as needed for muscle spasms, Disp: 60 tablet, Rfl: 0     MULTIVITAMIN TABS   OR, 1 TABLET DAILY, Disp: , Rfl:      SUMAtriptan (IMITREX) 25 MG tablet, Take 1-2 tablets (25-50 mg) by mouth at onset of headache for migraine May repeat in 2 hours. Max 8 tablets/24 hours., Disp: 9 tablet, Rfl: 1     valACYclovir (VALTREX) 1000 mg tablet, TAKE ONE TABLET BY MOUTH EVERY DAY, Disp: 20 tablet, Rfl: 6    Allergies -   Allergies   Allergen Reactions     Amoxicillin Swelling     Cefzil [Cefprozil] Swelling     Flu Virus Vaccine      \"throat felt funny\"     Adhesive Tape Rash     Biaxin [Clarithromycin] Hives     Erythromycin GI Disturbance     Latex Rash     Percocet [Acetaminophen] Hives     Sulfa Drugs Hives       Social History -   Social History     Social History     Marital status:      Spouse name: N/A     Number of children: N/A     Years of education: N/A     Social History Main Topics     Smoking status: Never Smoker     Smokeless tobacco: Never Used      Comment: Nonsmoking household     Alcohol use No     Drug use: No     Sexual activity: No     Other Topics Concern      " Service No     Blood Transfusions No     Caffeine Concern No     Occupational Exposure No     Hobby Hazards No     Sleep Concern No     Stress Concern No     Weight Concern Yes     Special Diet No     Back Care No     Exercise No     Bike Helmet No     Seat Belt Yes     Self-Exams Yes     Social History Narrative       Family History -   Family History   Problem Relation Age of Onset     Hypertension Mother      GASTROINTESTINAL DISEASE Mother      GAITAN'S ESOPHAGUS     Lipids Mother      Depression Mother      Other - See Comments Mother      Pagets Disease of the Vulva, Dx 06/2014     Osteoperosis Mother      Obesity Mother      Allergies Father      HAYFEVER     HEART DISEASE Father      AFIB     Hypertension Father      Lipids Father      Hyperlipidemia Father      GASTROINTESTINAL DISEASE Other      GAITAN'S ESOPHAGUS     Breast Cancer Other      Colon Cancer Other      Other Cancer Maternal Grandmother      Other Cancer Maternal Grandfather      Colon Cancer Paternal Grandfather      GASTROINTESTINAL DISEASE Other      GAITAN'S ESOPHAGUS     Breast Cancer Other      Breast Cancer Paternal Aunt      Breast Cancer Other      fathers 1st cousin     Hyperlipidemia Brother        Review of Systems - As per HPI and PMHx, + depression, otherwise 7 system review of the head and neck negative.    Physical Exam  /87 (Cuff Size: Adult Regular)  LMP 03/28/2010  SpO2 100%  General - The patient is in no distress.  Alert and oriented to person and place, answers questions and cooperates with examination appropriately.   Voice and Breathing - The patient was breathing comfortably without the use of accessory muscles. There was no wheezing, stridor, or stertor.  The patients voice was clear and strong.  Ears - The auricles are normal. The tympanic membranes are normal in appearance, bony landmarks are intact.  No retraction, perforation, or masses.  No fluid or purulence was seen in the external canal or the  middle ear. No evidence of infection of the middle ear or external canal, cerumen was normal in appearance. Mastoid bone normal. No erythema or tenderness.   Eyes - Extraocular movements intact.  Sclera were not icteric or injected.  Mouth - Examination of the oral cavity showed pink, healthy mucosa. No lesions or ulcerations noted.  The tongue was mobile and midline.  Throat - The walls of the oropharynx were smooth, symmetric, and had no lesions or ulcerations. The uvula was midline on elevation.    Neck - Palpation of the occipital, submental, submandibular, internal jugular chain, and supraclavicular nodes did not demonstrate any abnormal lymph nodes or masses. Parotid glands had no masses. Palpation of the thyroid was soft and smooth, with no nodules or goiter appreciated.  The trachea was mobile and midline.  Neurological - Cranial nerves 2 through 12 were grossly intact. House-Brackmann grade 1 out of 6 bilaterally.       Audiologic Studies - An audiogram and tympanogram were performed today as part of the evaluation and personally reviewed. The tympanogram shows a normal Type A curve, with normal canal volume and middle ear pressure.  There is no sign of eustachian tube dysfunction or middle ear effusion.  The audiogram showed boarderline to normal hearing. No conductive loss.     Assessment and Plan - Christina John is a 52 year old female who presents to me today with headache, pain over the sides and base of head. She has mastoid effusions, but no ear fluid or infection.  Her hearing has not changed during this either.  She noted no skin changes erythema or edema of the mastoid.  Therefore think it is unlikely she had a true mastoiditis.  It is possible this started with a viral illness that caused mastoid effusions and may be a mild encephalitis causing the severe headache and dizziness.  The other possibility is vestibular migraine.  She has improved on Levaquin but she may have also improved just with  time if this is over a viral etiology.  I recommend she continue the Levaquin in my chart me or call me next week to give me an update on her progress.  If this has not completely resolved we could consider vestibular migraine treatment, or possibly more Levaquin and/or prednisone.    Flaco Del Toro MD  Otolaryngology  [facility]

## 2018-08-28 NOTE — MR AVS SNAPSHOT
After Visit Summary   8/28/2018    Christina John    MRN: [id number]           Patient Information     Date Of Birth          1965        Visit Information        Provider Department      8/28/2018 10:45 AM Genaro Shukla [facility]        Today's Diagnoses     Disorder of both eustachian tubes    -  1       Follow-ups after your visit        Who to contact     If you have questions or need follow up information about today's clinic visit or your schedule please contact [facility] directly at [phone].  Normal or non-critical lab and imaging results will be communicated to you by AuthorBeehart, letter or phone within 4 business days after the clinic has received the results. If you do not hear from us within 7 days, please contact the clinic through NanoRackst or phone. If you have a critical or abnormal lab result, we will notify you by phone as soon as possible.  Submit refill requests through Talicious or call your pharmacy and they will forward the refill request to us. Please allow 3 business days for your refill to be completed.          Additional Information About Your Visit        MyChart Information     Talicious gives you secure access to your electronic health record. If you see a primary care provider, you can also send messages to your care team and make appointments. If you have questions, please call your primary care clinic.  If you do not have a primary care provider, please call [phone] and they will assist you.        Care EveryWhere ID     This is your Care EveryWhere ID. This could be used by other organizations to access your [place] medical records  [id number]        Your Vitals Were     Last Period                   03/28/2010            Blood Pressure from Last 3 Encounters:   08/28/18 119/87   08/24/18 122/86   08/02/18 117/82    Weight from Last 3 Encounters:   08/28/18 195 lb 12.8 oz (88.8 kg)   08/24/18 191 lb (86.6 kg)   08/02/18 186  lb (84.4 kg)              We Performed the Following     AUDIOGRAM/TYMPANOGRAM - INTERFACE     COMPREHENSIVE HEARING TEST     TYMPANOMETRY        Primary Care Provider Office Phone # Fax #    Lucía Aguillon [phone] [address] [address] [facility] [address]        Equal Access to Services     [facility] : Hadii aad ku hadasho Soomaali, waaxda luqadaha, qaybta kaalmada adeegyada, waxay idiin hayaan adeeg khkeri laromeln barber. So [facility] [phone].    ATENCIÓN: Si habla español, tiene a fulton disposición servicios gratuitos de asistencia lingüística. Llame al [phone].    We comply with applicable federal civil rights laws and [place] laws. We do not discriminate on the basis of race, color, national origin, age, disability, sex, sexual orientation, or gender identity.            Thank you!     Thank you for choosing [facility]  for your care. Our goal is always to provide you with excellent care. Hearing back from our patients is one way we can continue to improve our services. Please take a few minutes to complete the written survey that you may receive in the mail after your visit with us. Thank you!             Your Updated Medication List - Protect others around you: Learn how to safely use, store and throw away your medicines at www.disposemymeds.org.          This list is accurate as of 8/28/18 11:42 AM.  Always use your most recent med list.                   Brand Name Dispense Instructions for use Diagnosis    buPROPion 300 MG 24 hr tablet    WELLBUTRIN XL    90 tablet    TAKE ONE TABLET (300 MG)BY MOUTH ONCE DAILY IN THE MORNING    Major depressive disorder, single episode, mild (H)       calcium + D 600-200 MG-UNIT Tabs   Generic drug:  calcium carbonate-vitamin D      Take  by mouth 2 times daily.        escitalopram 20 MG tablet    LEXAPRO    90 tablet    TAKE ONE TABLET (20 MG) BY MOUTH ONCE DAILY    Major depressive disorder, single episode, mild (H)        levofloxacin 500 MG tablet    LEVAQUIN    10 tablet    Take 1 tablet (500 mg) by mouth daily    Chronic nonintractable headache, unspecified headache type       methocarbamol 750 MG tablet    ROBAXIN    60 tablet    Take 1 tablet (750 mg) by mouth 4 times daily as needed for muscle spasms    Tension headache, Neck muscle spasm       MULTIVITAMIN TABS   OR      1 TABLET DAILY        SUMAtriptan 25 MG tablet    IMITREX    9 tablet    Take 1-2 tablets (25-50 mg) by mouth at onset of headache for migraine May repeat in 2 hours. Max 8 tablets/24 hours.    Other migraine without status migrainosus, not intractable       TYLENOL EXTRA STRENGTH PO      Take  by mouth. PRN        valACYclovir 1000 mg tablet    VALTREX    20 tablet    TAKE ONE TABLET BY MOUTH EVERY DAY    Recurrent cold sores       VITAMIN C CR PO      Take  by mouth.

## 2018-08-28 NOTE — MR AVS SNAPSHOT
After Visit Summary   8/28/2018    Christina John    MRN: [id number]           Patient Information     Date Of Birth          1965        Visit Information        Provider Department      8/28/2018 11:15 AM Flaco Del Toro MD [facility]        Today's Diagnoses     Disorder of mastoid, bilateral    -  1    Dizziness          Care Instructions    General Scheduling Information  To schedule your CT/MRI scan, please contact [place] [facility] at [phone] [address] [place] [address]    To schedule your Surgery, please contact our Specialty Schedulers at [phone]    ENT Clinic Locations Clinic Hours Telephone Number     [facility]  [address]  [place] [address]   Tuesday:       8:00am -- 4:00pm    Wednesday:  8:00am - 4:00pm   To schedule an appointment with   Dr. Del Toro,   please contact our   Specialty Scheduling Department at:     [phone]       [facility]  [address] [place] [address]   Friday:          8:00am - 4:00pm         Urgent Care Locations Clinic Hours Telephone Numbers     [facility]  [address] [place] [address]     Monday-Friday:     11:00pm - 9:00pm    Saturday-Sunday:  9:00am - 5:00pm   [phone]     [facility]  [address] [place] [address]     Monday-Friday:      5:00pm - 9:00pm     Saturday-Sunday:  9:00am - 5:00pm   [phone]                   Follow-ups after your visit        Additional Services     AUDIOLOGY ADULT REFERRAL       Your provider has referred you to: [facility]: [facility] [phone]   [email or web address]    Treatment:  Evaluation & Treatment  Specialty Testing:  Audiogram w/Tymps and Reflexes    Please be aware that coverage of these services is subject to the terms and limitations of your health insurance plan.  Call member services at your health plan with any benefit or coverage questions.   "    Please bring the following to your appointment:  >>   Any x-rays, CTs or MRIs which have been performed.  Contact the facility where they were done to arrange for  prior to your scheduled appointment.   >>   List of current medications  >>   This referral request   >>   Any documents/labs given to you for this referral                  Who to contact     If you have questions or need follow up information about today's clinic visit or your schedule please contact [facility] directly at [phone].  Normal or non-critical lab and imaging results will be communicated to you by MyChart, letter or phone within 4 business days after the clinic has received the results. If you do not hear from us within 7 days, please contact the clinic through Kakoonahart or phone. If you have a critical or abnormal lab result, we will notify you by phone as soon as possible.  Submit refill requests through Eyeonplay or call your pharmacy and they will forward the refill request to us. Please allow 3 business days for your refill to be completed.          Additional Information About Your Visit        Eyeonplay Information     Eyeonplay gives you secure access to your electronic health record. If you see a primary care provider, you can also send messages to your care team and make appointments. If you have questions, please call your primary care clinic.  If you do not have a primary care provider, please call [phone] and they will assist you.        Care EveryWhere ID     This is your Care EveryWhere ID. This could be used by other organizations to access your [place] medical records  [id number]        Your Vitals Were     Pulse Temperature Respirations Height Last Period Pulse Oximetry    71 97.8  F (36.6  C) (Oral) 16 1.797 m (5' 10.75\") 03/28/2010 100%    BMI (Body Mass Index)                   27.5 kg/m2            Blood Pressure from Last 3 Encounters:   08/28/18 119/87   08/24/18 122/86   08/02/18 117/82 "    Weight from Last 3 Encounters:   08/28/18 88.8 kg (195 lb 12.8 oz)   08/24/18 86.6 kg (191 lb)   08/02/18 84.4 kg (186 lb)              We Performed the Following     AUDIOLOGY ADULT REFERRAL        Primary Care Provider Office Phone # Fax #    Lucía Aguillon [phone] [address] [facility] [address]        Equal Access to Services     [facility] : Hadii aad ku hadasho Soomaali, waaxda luqadaha, qaybta kaalmada adeegyada, waxay idiin hayaan adeeg kharamahi lanannette . So [facility] [phone].    ATENCIÓN: Si valentinala senait, tiene a fulton disposición servicios gratuitos de asistencia lingüística. [facility] [phone].    We comply with applicable federal civil rights laws and [place] laws. We do not discriminate on the basis of race, color, national origin, age, disability, sex, sexual orientation, or gender identity.            Thank you!     Thank you for choosing [facility]  for your care. Our goal is always to provide you with excellent care. Hearing back from our patients is one way we can continue to improve our services. Please take a few minutes to complete the written survey that you may receive in the mail after your visit with us. Thank you!             Your Updated Medication List - Protect others around you: Learn how to safely use, store and throw away your medicines at www.disposemymeds.org.          This list is accurate as of 8/28/18  2:18 PM.  Always use your most recent med list.                   Brand Name Dispense Instructions for use Diagnosis    buPROPion 300 MG 24 hr tablet    WELLBUTRIN XL    90 tablet    TAKE ONE TABLET (300 MG)BY MOUTH ONCE DAILY IN THE MORNING    Major depressive disorder, single episode, mild (H)       calcium + D 600-200 MG-UNIT Tabs   Generic drug:  calcium carbonate-vitamin D      Take  by mouth 2 times daily.        escitalopram 20 MG tablet    LEXAPRO    90 tablet    TAKE ONE TABLET (20 MG) BY MOUTH ONCE DAILY    Major  depressive disorder, single episode, mild (H)       levofloxacin 500 MG tablet    LEVAQUIN    10 tablet    Take 1 tablet (500 mg) by mouth daily    Chronic nonintractable headache, unspecified headache type       methocarbamol 750 MG tablet    ROBAXIN    60 tablet    Take 1 tablet (750 mg) by mouth 4 times daily as needed for muscle spasms    Tension headache, Neck muscle spasm       MULTIVITAMIN TABS   OR      1 TABLET DAILY        SUMAtriptan 25 MG tablet    IMITREX    9 tablet    Take 1-2 tablets (25-50 mg) by mouth at onset of headache for migraine May repeat in 2 hours. Max 8 tablets/24 hours.    Other migraine without status migrainosus, not intractable       TYLENOL EXTRA STRENGTH PO      Take  by mouth. PRN        valACYclovir 1000 mg tablet    VALTREX    20 tablet    TAKE ONE TABLET BY MOUTH EVERY DAY    Recurrent cold sores       VITAMIN C CR PO      Take  by mouth.

## 2018-09-13 ENCOUNTER — MYC MEDICAL ADVICE (OUTPATIENT)
Dept: OTOLARYNGOLOGY | Facility: CLINIC | Age: 53
End: 2018-09-13

## 2018-09-13 DIAGNOSIS — H70.90 MASTOIDITIS, UNSPECIFIED LATERALITY: Primary | ICD-10-CM

## 2018-09-13 RX ORDER — METHYLPREDNISOLONE 4 MG
TABLET, DOSE PACK ORAL
Qty: 21 TABLET | Refills: 0 | Status: SHIPPED | OUTPATIENT
Start: 2018-09-13 | End: 2019-02-06

## 2018-09-13 RX ORDER — LEVOFLOXACIN 500 MG/1
500 TABLET, FILM COATED ORAL DAILY
Qty: 10 TABLET | Refills: 0 | Status: SHIPPED | OUTPATIENT
Start: 2018-09-13 | End: 2019-02-06

## 2018-10-21 ENCOUNTER — OFFICE VISIT (OUTPATIENT)
Dept: URGENT CARE | Facility: URGENT CARE | Age: 53
End: 2018-10-21
Payer: COMMERCIAL

## 2018-10-21 VITALS
RESPIRATION RATE: 16 BRPM | BODY MASS INDEX: 27.39 KG/M2 | SYSTOLIC BLOOD PRESSURE: 118 MMHG | WEIGHT: 195 LBS | HEART RATE: 75 BPM | OXYGEN SATURATION: 98 % | DIASTOLIC BLOOD PRESSURE: 74 MMHG | TEMPERATURE: 97.8 F

## 2018-10-21 DIAGNOSIS — J01.90 ACUTE SINUSITIS WITH SYMPTOMS > 10 DAYS: Primary | ICD-10-CM

## 2018-10-21 PROCEDURE — 99213 OFFICE O/P EST LOW 20 MIN: CPT | Performed by: PHYSICIAN ASSISTANT

## 2018-10-21 RX ORDER — DOXYCYCLINE 100 MG/1
100 CAPSULE ORAL 2 TIMES DAILY
Qty: 20 CAPSULE | Refills: 0 | Status: SHIPPED | OUTPATIENT
Start: 2018-10-21 | End: 2019-02-06

## 2018-10-21 ASSESSMENT — ENCOUNTER SYMPTOMS
FEVER: 0
COUGH: 1
EYE REDNESS: 0
PALPITATIONS: 0
EYE PAIN: 0
VOICE CHANGE: 1
ARTHRALGIAS: 0
TROUBLE SWALLOWING: 0
MYALGIAS: 0
SORE THROAT: 0
NAUSEA: 0
ABDOMINAL PAIN: 0
SINUS PAIN: 1
RHINORRHEA: 1
SINUS PRESSURE: 1
CHEST TIGHTNESS: 0
VOMITING: 0
CHILLS: 0
FATIGUE: 0
UNEXPECTED WEIGHT CHANGE: 0
BACK PAIN: 0
DIARRHEA: 0
SHORTNESS OF BREATH: 0
WHEEZING: 0

## 2018-10-21 NOTE — NURSING NOTE
"Chief Complaint   Patient presents with     Pharyngitis     Started Thursday with scratchy throat, friday on fire with sweats, Saturday very tlittle voice no pain, slight cough, Sunday No voice, cough, mastoid area painful     Otalgia     Started today. right ear.  taking Ibuprofen and halls cough drops.        Initial /74 (BP Location: Right arm, Cuff Size: Adult Large)  Pulse 75  Temp 97.8  F (36.6  C) (Tympanic)  Resp 16  Wt 195 lb (88.5 kg)  LMP 03/28/2010  SpO2 98%  BMI 27.39 kg/m2 Estimated body mass index is 27.39 kg/(m^2) as calculated from the following:    Height as of 8/28/18: 5' 10.75\" (1.797 m).    Weight as of this encounter: 195 lb (88.5 kg).    Patient presents to the clinic using No DME    Health Maintenance that is potentially due pending provider review:  NONE    n/a    Is there anyone who you would like to be able to receive your results? Not Applicable  If yes have patient fill out TRE  Rangel Bailey M.A.        "

## 2018-10-21 NOTE — MR AVS SNAPSHOT
After Visit Summary   10/21/2018    Christina John    MRN: [id number]           Patient Information     Date Of Birth          1965        Visit Information        Provider Department      10/21/2018 2:30 PM Teresa Rivera PA-C [facility]        Today's Diagnoses     Acute sinusitis with symptoms > 10 days    -  1       Follow-ups after your visit        Follow-up notes from your care team     Return if symptoms worsen or fail to improve.      Who to contact     If you have questions or need follow up information about today's clinic visit or your schedule please contact [facility] directly at [phone].  Normal or non-critical lab and imaging results will be communicated to you by MyChart, letter or phone within 4 business days after the clinic has received the results. If you do not hear from us within 7 days, please contact the clinic through [employer] or phone. If you have a critical or abnormal lab result, we will notify you by phone as soon as possible.  Submit refill requests through [employer] or call your pharmacy and they will forward the refill request to us. Please allow 3 business days for your refill to be completed.          Additional Information About Your Visit        MyChart Information     [employer] gives you secure access to your electronic health record. If you see a primary care provider, you can also send messages to your care team and make appointments. If you have questions, please call your primary care clinic.  If you do not have a primary care provider, please call [phone] and they will assist you.        Care EveryWhere ID     This is your Care EveryWhere ID. This could be used by other organizations to access your [facility] medical records  [id number]        Your Vitals Were     Pulse Temperature Respirations Last Period Pulse Oximetry BMI (Body Mass Index)    75 97.8  F (36.6  C) (Tympanic) 16  03/28/2010 98% 27.39 kg/m2       Blood Pressure from Last 3 Encounters:   10/21/18 118/74   08/28/18 119/87   08/24/18 122/86    Weight from Last 3 Encounters:   10/21/18 195 lb (88.5 kg)   08/28/18 195 lb 12.8 oz (88.8 kg)   08/24/18 191 lb (86.6 kg)              Today, you had the following     No orders found for display         Today's Medication Changes          These changes are accurate as of 10/21/18  3:51 PM.  If you have any questions, ask your nurse or doctor.               Start taking these medicines.        Dose/Directions    doxycycline monohydrate 100 MG capsule   Used for:  Acute sinusitis with symptoms > 10 days   Started by:  Teresa Rivera PA-C        Dose:  100 mg   Take 1 capsule (100 mg) by mouth 2 times daily for 10 days   Quantity:  20 capsule   Refills:  0            Where to get your medicines      These medications were sent to [facility] [address] [place] [address] [address] [facility]  [address] [facility] [address]     Phone:  [phone]     doxycycline monohydrate 100 MG capsule                Primary Care Provider Office Phone # Fax #    Lucía Aguillon [phone] [address] [address] [facility] [address]        Equal Access to Services     JOSE KHAN AH: Hadii bony ku hadasho Soomaali, waaxda luqadaha, qaybta kaalmada adeegyada, madisyn stewart hayannalise blandon. So [facility] [phone].    ATENCIÓN: Si habla español, tiene a fulton disposición servicios gratuitos de asistencia lingüística. Emekaame al [phone].    We comply with applicable federal civil rights laws and [place] laws. We do not discriminate on the basis of race, color, national origin, age, disability, sex, sexual orientation, or gender identity.            Thank you!     Thank you for choosing [facility]  for your care. Our goal is always to provide you with excellent care. Hearing back from our patients is one way we can continue to improve  our services. Please take a few minutes to complete the written survey that you may receive in the mail after your visit with us. Thank you!             Your Updated Medication List - Protect others around you: Learn how to safely use, store and throw away your medicines at www.disposemymeds.org.          This list is accurate as of 10/21/18  3:51 PM.  Always use your most recent med list.                   Brand Name Dispense Instructions for use Diagnosis    buPROPion 300 MG 24 hr tablet    WELLBUTRIN XL    90 tablet    TAKE ONE TABLET (300 MG)BY MOUTH ONCE DAILY IN THE MORNING    Major depressive disorder, single episode, mild (H)       calcium + D 600-200 MG-UNIT Tabs   Generic drug:  calcium carbonate-vitamin D      Take  by mouth 2 times daily.        doxycycline monohydrate 100 MG capsule     20 capsule    Take 1 capsule (100 mg) by mouth 2 times daily for 10 days    Acute sinusitis with symptoms > 10 days       escitalopram 20 MG tablet    LEXAPRO    90 tablet    TAKE ONE TABLET (20 MG) BY MOUTH ONCE DAILY    Major depressive disorder, single episode, mild (H)       * levofloxacin 500 MG tablet    LEVAQUIN    10 tablet    Take 1 tablet (500 mg) by mouth daily    Chronic nonintractable headache, unspecified headache type       * levofloxacin 500 MG tablet    LEVAQUIN    10 tablet    Take 1 tablet (500 mg) by mouth daily    Mastoiditis, unspecified laterality       methocarbamol 750 MG tablet    ROBAXIN    60 tablet    Take 1 tablet (750 mg) by mouth 4 times daily as needed for muscle spasms    Tension headache, Neck muscle spasm       methylPREDNISolone 4 MG tablet    MEDROL DOSEPAK    21 tablet    Follow package instructions    Mastoiditis, unspecified laterality       MULTIVITAMIN TABS   OR      1 TABLET DAILY        SUMAtriptan 25 MG tablet    IMITREX    9 tablet    Take 1-2 tablets (25-50 mg) by mouth at onset of headache for migraine May repeat in 2 hours. Max 8 tablets/24 hours.    Other migraine  without status migrainosus, not intractable       TYLENOL EXTRA STRENGTH PO      Take  by mouth. PRN        valACYclovir 1000 mg tablet    VALTREX    20 tablet    TAKE ONE TABLET BY MOUTH EVERY DAY    Recurrent cold sores       VITAMIN C CR PO      Take  by mouth.        * Notice:  This list has 2 medication(s) that are the same as other medications prescribed for you. Read the directions carefully, and ask your doctor or other care provider to review them with you.

## 2018-10-21 NOTE — PROGRESS NOTES
SUBJECTIVE:   Christina John is a 52 year old female presenting with a chief complaint of   Chief Complaint   Patient presents with     Pharyngitis     Started Thursday with scratchy throat, friday on fire with sweats, Saturday very tlittle voice no pain, slight cough, Sunday No voice, cough, mastoid area painful     Otalgia     Started today. right ear.  taking Ibuprofen and halls cough drops.      Sinus Problem     3-4 weeks ongoinng, has allergies, getting worse, face and teeth starting to hurt       She is an established patient of [facility].    URI Adult    Onset of symptoms was 3-4 weeks for sinus pressure/pain and 4 days for other URI symptoms   Course of illness is same.    Severity moderate  Current and Associated symptoms: sinus pain/pressure, cough - non-productive, sore throat and hoarse voice  Treatment measures tried include Tylenol/Ibuprofen.  Predisposing factors include None.      Review of Systems   Constitutional: Negative for chills, fatigue, fever and unexpected weight change.   HENT: Positive for congestion, rhinorrhea, sinus pain, sinus pressure and voice change. Negative for ear pain, postnasal drip, sore throat and trouble swallowing.    Eyes: Negative for pain, redness and visual disturbance.   Respiratory: Positive for cough. Negative for chest tightness, shortness of breath and wheezing.    Cardiovascular: Negative for chest pain and palpitations.   Gastrointestinal: Negative for abdominal pain, diarrhea, nausea and vomiting.   Musculoskeletal: Negative for arthralgias, back pain and myalgias.   Skin: Negative for rash.       Past Medical History:   Diagnosis Date     Allergic rhinitis due to animal dander did IT x 4 yrs. 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 ended 9/06 per self      Diagnostic skin and sensitization tests 4/03 skin tests pos. for: cat/dog/DM/T/G/RW     Elevated cholesterol      Endometriosis      GERD (gastroesophageal reflux disease)      House dust mite allergy      Hypertension goal BP (blood pressure) < 140/90      Infertility     ON CLOMID     Mild major depression (H)      PCOS (polycystic ovarian syndrome)      Pelvic relaxation      Seasonal allergic conjunctivitis      Seasonal allergic rhinitis     4/03 skin tests pos. for: cat/dog/DM/T/G/RW--per Dr. Talavera.     Ulcers 1992    DX BY ENDOSCOPY     Family History   Problem Relation Age of Onset     Hypertension Mother      GASTROINTESTINAL DISEASE Mother      GAITAN'S ESOPHAGUS     Lipids Mother      Depression Mother      Other - See Comments Mother      Pagets Disease of the Vulva, Dx 06/2014     Osteoporosis Mother      Obesity Mother      Allergies Father      HAYFEVER     HEART DISEASE Father      AFIB     Hypertension Father      Lipids Father      Hyperlipidemia Father      GASTROINTESTINAL DISEASE Other      GAITAN'S ESOPHAGUS     Breast Cancer Other      Colon Cancer Other      Other Cancer Maternal Grandmother      Other Cancer Maternal Grandfather      Colon Cancer Paternal Grandfather      GASTROINTESTINAL DISEASE Other      GAITAN'S ESOPHAGUS     Breast Cancer Other      Breast Cancer Paternal Aunt      Breast Cancer Other      fathers 1st cousin     Hyperlipidemia Brother      Current Outpatient Prescriptions   Medication Sig Dispense Refill     Acetaminophen (TYLENOL EXTRA STRENGTH PO) Take  by mouth. PRN         Ascorbic Acid (VITAMIN C CR PO) Take  by mouth.       buPROPion (WELLBUTRIN XL) 300 MG 24 hr tablet TAKE ONE TABLET (300 MG)BY MOUTH ONCE DAILY IN THE MORNING 90 tablet 3     calcium carbonate-vitamin D (CALCIUM + D) 600-200 MG-UNIT TABS Take  by mouth 2 times daily.       doxycycline monohydrate 100 MG capsule Take 1 capsule (100 mg) by mouth 2 times daily for 10 days 20 capsule 0      escitalopram (LEXAPRO) 20 MG tablet TAKE ONE TABLET (20 MG) BY MOUTH ONCE DAILY 90 tablet 3     methocarbamol (ROBAXIN) 750 MG tablet Take 1 tablet (750 mg) by mouth 4 times daily as needed for muscle spasms 60 tablet 0     MULTIVITAMIN TABS   OR 1 TABLET DAILY       SUMAtriptan (IMITREX) 25 MG tablet Take 1-2 tablets (25-50 mg) by mouth at onset of headache for migraine May repeat in 2 hours. Max 8 tablets/24 hours. 9 tablet 1     valACYclovir (VALTREX) 1000 mg tablet TAKE ONE TABLET BY MOUTH EVERY DAY 20 tablet 6     levofloxacin (LEVAQUIN) 500 MG tablet Take 1 tablet (500 mg) by mouth daily (Patient not taking: Reported on 10/21/2018) 10 tablet 0     levofloxacin (LEVAQUIN) 500 MG tablet Take 1 tablet (500 mg) by mouth daily (Patient not taking: Reported on 10/21/2018) 10 tablet 0     methylPREDNISolone (MEDROL DOSEPAK) 4 MG tablet Follow package instructions (Patient not taking: Reported on 10/21/2018) 21 tablet 0     Social History   Substance Use Topics     Smoking status: Never Smoker     Smokeless tobacco: Never Used      Comment: Nonsmoking household     Alcohol use No       OBJECTIVE  /74 (BP Location: Right arm, Cuff Size: Adult Large)  Pulse 75  Temp 97.8  F (36.6  C) (Tympanic)  Resp 16  Wt 195 lb (88.5 kg)  LMP 03/28/2010  SpO2 98%  BMI 27.39 kg/m2    Physical Exam   Constitutional: She appears well-developed and well-nourished.   HENT:   Head: Normocephalic.   Right Ear: Tympanic membrane and ear canal normal.   Left Ear: Tympanic membrane and ear canal normal.   Nose: Mucosal edema and rhinorrhea present.   Mouth/Throat: Posterior oropharyngeal erythema present. No oropharyngeal exudate.   Eyes: Conjunctivae are normal. Pupils are equal, round, and reactive to light.   Cardiovascular: Normal rate and normal heart sounds.    Pulmonary/Chest: Effort normal and breath sounds normal.   Skin: Skin is warm and dry. No rash noted.   Psychiatric: She has a normal mood and affect. Her behavior is  normal.       Labs:  No results found for this or any previous visit (from the past 24 hour(s)).    X-Ray was not done.    ASSESSMENT:      ICD-10-CM    1. Acute sinusitis with symptoms > 10 days J01.90 doxycycline monohydrate 100 MG capsule        Medical Decision Making:    Differential Diagnosis:  URI Adult/Peds:  Allergic rhinitis, Bronchitis-viral, Laryngitis, Sinusitis, Viral syndrome and Viral upper respiratory illness    Serious Comorbid Conditions:  Adult:  None    PLAN:    URI Adult:  Tylenol, Ibuprofen, Fluids, Rest, OTC cough suppressant/expectorant, OTC decongestant/antihistamine and RX sinusitis  Doxycycline 100 mg bid x 10 days    Followup:    If not improving or if condition worsens, follow up with your Primary Care Provider    There are no Patient Instructions on file for this visit.

## 2019-01-09 ENCOUNTER — TELEPHONE (OUTPATIENT)
Dept: FAMILY MEDICINE | Facility: CLINIC | Age: 54
End: 2019-01-09

## 2019-01-09 NOTE — TELEPHONE ENCOUNTER
PHQ-9 due now for patient ( 4-8 months from index date)  Index date:       8/24/18  Index PHQ9 :   17  FU start date:   12/24/18  FU End date :   4/24/19    RN- Contact patient for PHQ-9. Remission considered if follow up PHQ-9 less than 5.  If greater than 5 consider follow up appointment, e-visit for medication follow up and evaluation.

## 2019-01-10 NOTE — TELEPHONE ENCOUNTER
[employer] message sent to patient with PHQ-9.   See message for details.    STEPHANIE BlueN RN

## 2019-02-06 ENCOUNTER — OFFICE VISIT (OUTPATIENT)
Dept: FAMILY MEDICINE | Facility: CLINIC | Age: 54
End: 2019-02-06
Payer: COMMERCIAL

## 2019-02-06 VITALS
HEIGHT: 71 IN | WEIGHT: 193 LBS | HEART RATE: 86 BPM | DIASTOLIC BLOOD PRESSURE: 64 MMHG | TEMPERATURE: 98.1 F | OXYGEN SATURATION: 99 % | SYSTOLIC BLOOD PRESSURE: 112 MMHG | RESPIRATION RATE: 18 BRPM | BODY MASS INDEX: 27.02 KG/M2

## 2019-02-06 DIAGNOSIS — J01.01 ACUTE RECURRENT MAXILLARY SINUSITIS: Primary | ICD-10-CM

## 2019-02-06 DIAGNOSIS — R05.9 COUGH: ICD-10-CM

## 2019-02-06 PROCEDURE — 99214 OFFICE O/P EST MOD 30 MIN: CPT | Performed by: NURSE PRACTITIONER

## 2019-02-06 RX ORDER — BENZONATATE 200 MG/1
200 CAPSULE ORAL 3 TIMES DAILY PRN
Qty: 30 CAPSULE | Refills: 0 | Status: SHIPPED | OUTPATIENT
Start: 2019-02-06 | End: 2020-04-26

## 2019-02-06 RX ORDER — DOXYCYCLINE 100 MG/1
100 TABLET ORAL DAILY
Qty: 10 TABLET | Refills: 0 | Status: SHIPPED | OUTPATIENT
Start: 2019-02-06 | End: 2019-02-06

## 2019-02-06 RX ORDER — DOXYCYCLINE 100 MG/1
100 TABLET ORAL 2 TIMES DAILY
Qty: 20 TABLET | Refills: 0 | Status: SHIPPED | OUTPATIENT
Start: 2019-02-06 | End: 2019-05-08

## 2019-02-06 ASSESSMENT — MIFFLIN-ST. JEOR: SCORE: 1573.63

## 2019-02-06 NOTE — PROGRESS NOTES
SUBJECTIVE:   Chritsina John is a 53 year old female who presents to clinic today for the following health issues:      ENT Symptoms             Symptoms: cc Present Absent Comment   Fever/Chills  ?     Fatigue  x     Muscle Aches  x     Eye Irritation  x     Sneezing   x    Nasal Dennis/Drg  x     Sinus Pressure/Pain x x     Loss of smell  x     Dental pain  x     Sore Throat   x Not sore but swollen   Swollen Glands  x     Ear Pain/Fullness  x  rt   Cough  x     Wheeze   x    Chest Pain  x     Shortness of breath   x    Rash   x    Other         Symptom duration:  48 hours   Symptom severity:  Mod to severe   Treatments tried:  Sudafed, Ibuprofen and Tylenol    Contacts:               Problem list and histories reviewed & adjusted, as indicated.  Additional history: as documented    Patient Active Problem List   Diagnosis     DCIS (ductal carcinoma in situ) of breast     GERD (gastroesophageal reflux disease)     PCOS (polycystic ovarian syndrome)     Seasonal allergic rhinitis     Allergic rhinitis due to animal dander     House dust mite allergy     Seasonal allergic conjunctivitis     HYPERLIPIDEMIA LDL GOAL <160     Mild major depression (H)     Diagnostic skin and sensitization tests     Desensitization to allergens     History of adenomatous polyp of colon     BRCA1 positive     Overweight (BMI 25.0-29.9)     History of gastric bypass     Malignant neoplasm of right female breast, unspecified estrogen receptor status, unspecified site of breast (H)     Past Surgical History:   Procedure Laterality Date     C APPENDECTOMY  1982     C/SECTION, CLASSICAL       COSMETIC SURGERY  10/13, 09/15     ENDOSCOPY      10/2000 Xiao, repeat 10/2002, REPEAT 2/08 (REPEAT IN 2 YRS)     ENT SURGERY  1973     EXCIS VAGINAL CYST/TUMOR       GI SURGERY  04/11     GLAUCOMA SURGERY       HC BIOPSY OF BREAST, OPEN INCISIONAL  2008    sentinel lymph node     HC COLONOSCOPY THRU STOMA, DIAGNOSTIC  12/04, 12/09    (REPEAT  IN 5 YRS)     HC REMOVAL GALLBLADDER  6/2005     HYSTERECTOMY, PAP NO LONGER INDICATED       MASTECTOMY BILATERAL, INSERT TISSUE EXPANDER BILATERAL, COMBINED  8/2010     MASTECTOMY, BILATERAL       PELVIS LAPAROSCOPY,DX      x2       Social History     Tobacco Use     Smoking status: Never Smoker     Smokeless tobacco: Never Used     Tobacco comment: Nonsmoking household   Substance Use Topics     Alcohol use: No     Family History   Problem Relation Age of Onset     Hypertension Mother      Gastrointestinal Disease Mother         GAITAN'S ESOPHAGUS     Lipids Mother      Depression Mother      Other - See Comments Mother         Pagets Disease of the Vulva, Dx 06/2014     Osteoporosis Mother      Obesity Mother      Allergies Father         HAYFEVER     Heart Disease Father         AFIB     Hypertension Father      Lipids Father      Hyperlipidemia Father      Gastrointestinal Disease Other         GAITAN'S ESOPHAGUS     Breast Cancer Other      Colon Cancer Other      Other Cancer Maternal Grandmother      Other Cancer Maternal Grandfather      Colon Cancer Paternal Grandfather      Gastrointestinal Disease Other         GAITAN'S ESOPHAGUS     Breast Cancer Other      Breast Cancer Paternal Aunt      Breast Cancer Other         fathers 1st cousin     Hyperlipidemia Brother          Current Outpatient Medications   Medication Sig Dispense Refill     benzonatate (TESSALON) 200 MG capsule Take 1 capsule (200 mg) by mouth 3 times daily as needed for cough 30 capsule 0     doxycycline monohydrate (ADOXA) 100 MG tablet Take 1 tablet (100 mg) by mouth 2 times daily for 10 days 20 tablet 0     Acetaminophen (TYLENOL EXTRA STRENGTH PO) Take  by mouth. PRN         Ascorbic Acid (VITAMIN C CR PO) Take  by mouth.       buPROPion (WELLBUTRIN XL) 300 MG 24 hr tablet TAKE ONE TABLET (300 MG)BY MOUTH ONCE DAILY IN THE MORNING 90 tablet 3     calcium carbonate-vitamin D (CALCIUM + D) 600-200 MG-UNIT TABS Take  by mouth 2 times  "daily.       escitalopram (LEXAPRO) 20 MG tablet TAKE ONE TABLET (20 MG) BY MOUTH ONCE DAILY 90 tablet 3     MULTIVITAMIN TABS   OR 1 TABLET DAILY       valACYclovir (VALTREX) 1000 mg tablet TAKE ONE TABLET BY MOUTH EVERY DAY 20 tablet 6     Allergies   Allergen Reactions     Amoxicillin Swelling     Cefzil [Cefprozil] Swelling     Flu Virus Vaccine      \"throat felt funny\"     Adhesive Tape Rash     Biaxin [Clarithromycin] Hives     Erythromycin GI Disturbance     Latex Rash     Percocet [Acetaminophen] Hives     Sulfa Drugs Hives     Labs reviewed in EPIC    Reviewed and updated as needed this visit by clinical staff  Tobacco  Allergies  Meds  Problems  Med Hx  Surg Hx  Fam Hx       Reviewed and updated as needed this visit by Provider  Tobacco  Allergies  Meds  Problems  Med Hx  Surg Hx  Fam Hx         ROS:  Constitutional, HEENT, cardiovascular, pulmonary, GI, , musculoskeletal, neuro, skin, endocrine and psych systems are negative, except as otherwise noted.    OBJECTIVE:     /64   Pulse 86   Temp 98.1  F (36.7  C) (Tympanic)   Resp 18   Ht 1.791 m (5' 10.5\")   Wt 87.5 kg (193 lb)   LMP 03/28/2010   SpO2 99%   BMI 27.30 kg/m    Body mass index is 27.3 kg/m .   GENERAL: healthy, alert and no distress, nontoxic in appearance  EYES: Eyes grossly normal to inspection, PERRL and conjunctivae and sclerae normal  HENT: ear canals and TM's normal, nose and mouth without ulcers or lesions  NECK: no adenopathy, supple with full ROM  RESP: lungs clear to auscultation - no rales, rhonchi or wheezes  CV: regular rate and rhythm, normal S1 S2, no S3 or S4, no murmur, click or rub, no peripheral edema   ABDOMEN: soft, nontender, no hepatosplenomegaly, no masses and bowel sounds normal  MS: no gross musculoskeletal defects noted, no edema  No rash    Diagnostic Test Results:  No results found for this or any previous visit (from the past 24 hour(s)).    ASSESSMENT/PLAN:     Problem List Items " Addressed This Visit     None      Visit Diagnoses     Acute recurrent maxillary sinusitis    -  Primary    Relevant Medications    benzonatate (TESSALON) 200 MG capsule    doxycycline monohydrate (ADOXA) 100 MG tablet    Cough        Relevant Medications    benzonatate (TESSALON) 200 MG capsule               Patient Instructions   Increase rest and fluids. Tylenol and/or Ibuprofen for comfort. Cool mist vaporizer. If your symptoms worsen or do not resolve follow up with your primary care provider in 1 week and sooner if needed.      Mucinex 600 mg 12 hour formula for ear, head and chest congestion.  It can also thin post nasal drip which can cause a cough and sore throat.    Indications for emergent return to emergency department discussed with patient, who verbalized good understanding and agreement.  Patient understands the limitations of today's evaluation.           Patient Education     Sinusitis (Antibiotic Treatment)    The sinuses are air-filled spaces within the bones of the face. They connect to the inside of the nose. Sinusitis is an inflammation of the tissue that lines the sinuses. Sinusitis can occur during a cold. It can also happen due to allergies to pollens and other particles in the air. Sinusitis can cause symptoms of sinus congestion and a feeling of fullness. A sinus infection causes fever, headache, and facial pain. There is often green or yellow fluid draining from the nose or into the back of the throat (post-nasal drip). You have been given antibiotics to treat this condition.  Home care    Take the full course of antibiotics as instructed. Do not stop taking them, even when you feel better.    Drink plenty of water, hot tea, and other liquids. This may help thin nasal mucus. It also may help your sinuses drain fluids.    Heat may help soothe painful areas of your face. Use a towel soaked in hot water. Or,  the shower and direct the warm spray onto your face. Using a vaporizer  along with a menthol rub at night may also help soothe symptoms.     An expectorant with guaifenesin may help thin nasal mucus and help your sinuses drain fluids.    You can use an over-the-counter decongestant, unless a similar medicine was prescribed to you. Nasal sprays work the fastest. Use one that contains phenylephrine or oxymetazoline. First blow your nose gently. Then use the spray. Do not use these medicines more often than directed on the label. If you do, your symptoms may get worse. You may also take pills that contain pseudoephedrine. Don t use products that combine multiple medicines. This is because side effects may be increased. Read labels. You can also ask the pharmacist for help. (People with high blood pressure should not use decongestants. They can raise blood pressure.)    Over-the-counter antihistamines may help if allergies contributed to your sinusitis.      Do not use nasal rinses or irrigation during an acute sinus infection, unless your healthcare provider tells you to. Rinsing may spread the infection to other areas in your sinuses.    Use acetaminophen or ibuprofen to control pain, unless another pain medicine was prescribed to you. If you have chronic liver or kidney disease or ever had a stomach ulcer, talk with your healthcare provider before using these medicines. (Aspirin should never be taken by anyone under age 18 who is ill with a fever. It may cause severe liver damage.)    Don't smoke. This can make symptoms worse.  Follow-up care  Follow up with your healthcare provider or our staff if you are better in 1 week.  When to seek medical advice  Call your healthcare provider if any of these occur:    Facial pain or headache that gets worse    Stiff neck    Unusual drowsiness or confusion    Swelling of your forehead or eyelids    Vision problems, such as blurred or double vision    Fever of 100.4 F (38 C) or higher, or as directed by your healthcare  provider    Seizure    Breathing problems    Symptoms don't go away in 10 days  Prevention  Here are steps you can take to help prevent an infection:    Keep good hand washing habits.    Don t have close contact with people who have sore throats, colds, or other upper respiratory infections.    Don t smoke, and stay away from secondhand smoke.    Stay up to date with of your vaccines.  Date Last Reviewed: 11/1/2017 2000-2018 [employer]. [address] [place] [address], [place] [address]. All rights reserved. This information is not intended as a substitute for professional medical care. Always follow your healthcare professional's instructions.               ELISA Ricci [facility]

## 2019-02-06 NOTE — PATIENT INSTRUCTIONS
Increase rest and fluids. Tylenol and/or Ibuprofen for comfort. Cool mist vaporizer. If your symptoms worsen or do not resolve follow up with your primary care provider in 1 week and sooner if needed.      Mucinex 600 mg 12 hour formula for ear, head and chest congestion.  It can also thin post nasal drip which can cause a cough and sore throat.    Indications for emergent return to emergency department discussed with patient, who verbalized good understanding and agreement.  Patient understands the limitations of today's evaluation.           Patient Education     Sinusitis (Antibiotic Treatment)    The sinuses are air-filled spaces within the bones of the face. They connect to the inside of the nose. Sinusitis is an inflammation of the tissue that lines the sinuses. Sinusitis can occur during a cold. It can also happen due to allergies to pollens and other particles in the air. Sinusitis can cause symptoms of sinus congestion and a feeling of fullness. A sinus infection causes fever, headache, and facial pain. There is often green or yellow fluid draining from the nose or into the back of the throat (post-nasal drip). You have been given antibiotics to treat this condition.  Home care    Take the full course of antibiotics as instructed. Do not stop taking them, even when you feel better.    Drink plenty of water, hot tea, and other liquids. This may help thin nasal mucus. It also may help your sinuses drain fluids.    Heat may help soothe painful areas of your face. Use a towel soaked in hot water. Or,  the shower and direct the warm spray onto your face. Using a vaporizer along with a menthol rub at night may also help soothe symptoms.     An expectorant with guaifenesin may help thin nasal mucus and help your sinuses drain fluids.    You can use an over-the-counter decongestant, unless a similar medicine was prescribed to you. Nasal sprays work the fastest. Use one that contains phenylephrine or  oxymetazoline. First blow your nose gently. Then use the spray. Do not use these medicines more often than directed on the label. If you do, your symptoms may get worse. You may also take pills that contain pseudoephedrine. Don t use products that combine multiple medicines. This is because side effects may be increased. Read labels. You can also ask the pharmacist for help. (People with high blood pressure should not use decongestants. They can raise blood pressure.)    Over-the-counter antihistamines may help if allergies contributed to your sinusitis.      Do not use nasal rinses or irrigation during an acute sinus infection, unless your healthcare provider tells you to. Rinsing may spread the infection to other areas in your sinuses.    Use acetaminophen or ibuprofen to control pain, unless another pain medicine was prescribed to you. If you have chronic liver or kidney disease or ever had a stomach ulcer, talk with your healthcare provider before using these medicines. (Aspirin should never be taken by anyone under age 18 who is ill with a fever. It may cause severe liver damage.)    Don't smoke. This can make symptoms worse.  Follow-up care  Follow up with your healthcare provider or our staff if you are better in 1 week.  When to seek medical advice  Call your healthcare provider if any of these occur:    Facial pain or headache that gets worse    Stiff neck    Unusual drowsiness or confusion    Swelling of your forehead or eyelids    Vision problems, such as blurred or double vision    Fever of 100.4 F (38 C) or higher, or as directed by your healthcare provider    Seizure    Breathing problems    Symptoms don't go away in 10 days  Prevention  Here are steps you can take to help prevent an infection:    Keep good hand washing habits.    Don t have close contact with people who have sore throats, colds, or other upper respiratory infections.    Don t smoke, and stay away from secondhand smoke.    Stay up to  date with of your vaccines.  Date Last Reviewed: 11/1/2017 2000-2018 [email or web address] [address] [place] [address], [place] [address]. All rights reserved. This information is not intended as a substitute for professional medical care. Always follow your healthcare professional's instructions.

## 2019-02-06 NOTE — NURSING NOTE
"Chief Complaint   Patient presents with     Sinus Problem       Initial /64   Pulse 86   Temp 98.1  F (36.7  C) (Tympanic)   Resp 18   Ht 1.791 m (5' 10.5\")   Wt 87.5 kg (193 lb)   LMP 03/28/2010   SpO2 99%   BMI 27.30 kg/m   Estimated body mass index is 27.3 kg/m  as calculated from the following:    Height as of this encounter: 1.791 m (5' 10.5\").    Weight as of this encounter: 87.5 kg (193 lb).    Patient presents to the clinic using     Health Maintenance that is potentially due pending provider review:          Is there anyone who you would like to be able to receive your results?   If yes have patient fill out TRE    "

## 2019-05-08 ENCOUNTER — OFFICE VISIT (OUTPATIENT)
Dept: FAMILY MEDICINE | Facility: CLINIC | Age: 54
End: 2019-05-08
Payer: COMMERCIAL

## 2019-05-08 VITALS
HEIGHT: 71 IN | SYSTOLIC BLOOD PRESSURE: 110 MMHG | RESPIRATION RATE: 16 BRPM | TEMPERATURE: 98.4 F | DIASTOLIC BLOOD PRESSURE: 68 MMHG | BODY MASS INDEX: 27.02 KG/M2 | HEART RATE: 64 BPM | WEIGHT: 193 LBS | OXYGEN SATURATION: 98 %

## 2019-05-08 DIAGNOSIS — B00.1 RECURRENT COLD SORES: ICD-10-CM

## 2019-05-08 DIAGNOSIS — J01.90 ACUTE SINUSITIS WITH SYMPTOMS > 10 DAYS: Primary | ICD-10-CM

## 2019-05-08 PROCEDURE — 99213 OFFICE O/P EST LOW 20 MIN: CPT | Performed by: NURSE PRACTITIONER

## 2019-05-08 RX ORDER — VALACYCLOVIR HYDROCHLORIDE 1 G/1
2000 TABLET, FILM COATED ORAL 2 TIMES DAILY
Qty: 4 TABLET | Refills: 0 | Status: SHIPPED | OUTPATIENT
Start: 2019-05-08 | End: 2019-11-04

## 2019-05-08 RX ORDER — CLINDAMYCIN HCL 150 MG
150 CAPSULE ORAL 3 TIMES DAILY
Qty: 30 CAPSULE | Refills: 0 | Status: SHIPPED | OUTPATIENT
Start: 2019-05-08 | End: 2019-09-29

## 2019-05-08 ASSESSMENT — MIFFLIN-ST. JEOR: SCORE: 1573.63

## 2019-05-08 NOTE — NURSING NOTE
"Chief Complaint   Patient presents with     Sinus Problem       Initial /68   Pulse 64   Temp 98.4  F (36.9  C) (Tympanic)   Resp 16   Ht 1.791 m (5' 10.5\")   Wt 87.5 kg (193 lb)   LMP 03/28/2010   SpO2 98%   BMI 27.30 kg/m   Estimated body mass index is 27.3 kg/m  as calculated from the following:    Height as of this encounter: 1.791 m (5' 10.5\").    Weight as of this encounter: 87.5 kg (193 lb).    Patient presents to the clinic using No DME    Health Maintenance that is potentially due pending provider review:          Is there anyone who you would like to be able to receive your results?   If yes have patient fill out TRE    "

## 2019-05-08 NOTE — PATIENT INSTRUCTIONS
1. Recurrent cold sores  Chronic, stable  - valACYclovir (VALTREX) 1000 mg tablet; Take 2 tablets (2,000 mg) by mouth 2 times daily for 1 day  Dispense: 4 tablet; Refill: 0    2. Acute sinusitis with symptoms > 10 days  Acute, stable  - clindamycin (CLEOCIN) 150 MG capsule; Take 1 capsule (150 mg) by mouth 3 times daily  Dispense: 30 capsule; Refill: 0      Recheck with Lucía Aguillon for your depression

## 2019-06-19 ENCOUNTER — TRANSFERRED RECORDS (OUTPATIENT)
Dept: HEALTH INFORMATION MANAGEMENT | Facility: CLINIC | Age: 54
End: 2019-06-19

## 2019-09-06 DIAGNOSIS — F32.0 MAJOR DEPRESSIVE DISORDER, SINGLE EPISODE, MILD (H): ICD-10-CM

## 2019-09-06 NOTE — LETTER
September 10, 2019    Christina John  [id number] [facility] [id number]    Dear Christina,       We recently received a refill request for escitalopram (LEXAPRO) 10 MG tablet and buPROPion (WELLBUTRIN XL) 300 MG 24 hr tablet.  We have refilled this for a one time 30 day supply only because you are due for a:    depression office visit      Please call at your earliest convenience so that there will not be a delay with your future refills.          Thank you,   Your [facility] Team/torsten  [phone]

## 2019-09-10 RX ORDER — BUPROPION HYDROCHLORIDE 300 MG/1
TABLET ORAL
Qty: 30 TABLET | Refills: 0 | Status: SHIPPED | OUTPATIENT
Start: 2019-09-10 | End: 2020-04-26 | Stop reason: DRUGHIGH

## 2019-09-10 RX ORDER — ESCITALOPRAM OXALATE 10 MG/1
10 TABLET ORAL DAILY
Qty: 30 TABLET | Refills: 0 | Status: SHIPPED | OUTPATIENT
Start: 2019-09-10 | End: 2019-09-30

## 2019-09-10 NOTE — TELEPHONE ENCOUNTER
A 30-day supply only is refilled as patient is overdue for appointment    TC, patient due for:  OV with PCP for depression medication refills   Health Maintenance Due   Topic Date Due     HIV SCREENING  12/02/1981     ZOSTER IMMUNIZATION (1 of 2) 12/02/2016     PREVENTIVE CARE VISIT  05/13/2017     PHQ-9  02/24/2019     COLONOSCOPY  06/18/2019     Nedra Ellsworth BSN, RN

## 2019-09-30 ENCOUNTER — OFFICE VISIT (OUTPATIENT)
Dept: FAMILY MEDICINE | Facility: CLINIC | Age: 54
End: 2019-09-30
Payer: COMMERCIAL

## 2019-09-30 VITALS
HEIGHT: 71 IN | BODY MASS INDEX: 28.28 KG/M2 | HEART RATE: 69 BPM | TEMPERATURE: 98.5 F | DIASTOLIC BLOOD PRESSURE: 72 MMHG | SYSTOLIC BLOOD PRESSURE: 107 MMHG | WEIGHT: 202 LBS

## 2019-09-30 DIAGNOSIS — F32.0 MAJOR DEPRESSIVE DISORDER, SINGLE EPISODE, MILD (H): Primary | ICD-10-CM

## 2019-09-30 DIAGNOSIS — R63.1 INCREASED THIRST: ICD-10-CM

## 2019-09-30 DIAGNOSIS — G47.00 INSOMNIA, UNSPECIFIED TYPE: ICD-10-CM

## 2019-09-30 DIAGNOSIS — Z86.0101 HISTORY OF ADENOMATOUS POLYP OF COLON: ICD-10-CM

## 2019-09-30 DIAGNOSIS — C50.911 MALIGNANT NEOPLASM OF RIGHT FEMALE BREAST, UNSPECIFIED ESTROGEN RECEPTOR STATUS, UNSPECIFIED SITE OF BREAST (H): ICD-10-CM

## 2019-09-30 LAB
ALBUMIN SERPL-MCNC: 3.8 G/DL (ref 3.4–5)
ALP SERPL-CCNC: 110 U/L (ref 40–150)
ALT SERPL W P-5'-P-CCNC: 44 U/L (ref 0–50)
ANION GAP SERPL CALCULATED.3IONS-SCNC: 5 MMOL/L (ref 3–14)
AST SERPL W P-5'-P-CCNC: 26 U/L (ref 0–45)
BILIRUB SERPL-MCNC: 0.4 MG/DL (ref 0.2–1.3)
BUN SERPL-MCNC: 24 MG/DL (ref 7–30)
CALCIUM SERPL-MCNC: 9.2 MG/DL (ref 8.5–10.1)
CHLORIDE SERPL-SCNC: 104 MMOL/L (ref 94–109)
CO2 SERPL-SCNC: 30 MMOL/L (ref 20–32)
CREAT SERPL-MCNC: 1.06 MG/DL (ref 0.52–1.04)
GFR SERPL CREATININE-BSD FRML MDRD: 60 ML/MIN/{1.73_M2}
GLUCOSE SERPL-MCNC: 90 MG/DL (ref 70–99)
HBA1C MFR BLD: 5.8 % (ref 0–5.6)
POTASSIUM SERPL-SCNC: 4 MMOL/L (ref 3.4–5.3)
PROT SERPL-MCNC: 7.2 G/DL (ref 6.8–8.8)
SODIUM SERPL-SCNC: 139 MMOL/L (ref 133–144)

## 2019-09-30 PROCEDURE — 80053 COMPREHEN METABOLIC PANEL: CPT | Performed by: FAMILY MEDICINE

## 2019-09-30 PROCEDURE — 99214 OFFICE O/P EST MOD 30 MIN: CPT | Performed by: FAMILY MEDICINE

## 2019-09-30 PROCEDURE — 83036 HEMOGLOBIN GLYCOSYLATED A1C: CPT | Performed by: FAMILY MEDICINE

## 2019-09-30 PROCEDURE — 36415 COLL VENOUS BLD VENIPUNCTURE: CPT | Performed by: FAMILY MEDICINE

## 2019-09-30 RX ORDER — ESCITALOPRAM OXALATE 10 MG/1
10 TABLET ORAL DAILY
Qty: 30 TABLET | Refills: 0 | Status: CANCELLED | OUTPATIENT
Start: 2019-09-30

## 2019-09-30 RX ORDER — TRAZODONE HYDROCHLORIDE 50 MG/1
50 TABLET, FILM COATED ORAL AT BEDTIME
Qty: 90 TABLET | Refills: 1 | Status: SHIPPED | OUTPATIENT
Start: 2019-09-30 | End: 2022-04-14

## 2019-09-30 RX ORDER — BUPROPION HYDROCHLORIDE 300 MG/1
TABLET ORAL
Qty: 30 TABLET | Refills: 0 | Status: CANCELLED | OUTPATIENT
Start: 2019-09-30

## 2019-09-30 RX ORDER — BUPROPION HYDROCHLORIDE 150 MG/1
450 TABLET ORAL EVERY MORNING
Qty: 270 TABLET | Refills: 1 | Status: SHIPPED | OUTPATIENT
Start: 2019-09-30 | End: 2020-09-14

## 2019-09-30 RX ORDER — ESCITALOPRAM OXALATE 20 MG/1
TABLET ORAL
Qty: 90 TABLET | Refills: 3 | Status: SHIPPED | OUTPATIENT
Start: 2019-09-30 | End: 2020-09-14

## 2019-09-30 ASSESSMENT — MIFFLIN-ST. JEOR: SCORE: 1614.46

## 2019-09-30 ASSESSMENT — PATIENT HEALTH QUESTIONNAIRE - PHQ9: SUM OF ALL RESPONSES TO PHQ QUESTIONS 1-9: 21

## 2019-09-30 NOTE — PROGRESS NOTES
Subjective     Christina John is a 53 year old female who presents to clinic today for the following health issues:    HPI   Depression and Anxiety Follow-Up    How are you doing with your depression since your last visit? Worsened     How are you doing with your anxiety since your last visit?  Varies     Are you having other symptoms that might be associated with depression or anxiety? Yes:  sleeping more often , tired, over eating     Have you had a significant life event? OTHER: daughter just got      Do you have any concerns with your use of alcohol or other drugs? No    Social History     Tobacco Use     Smoking status: Never Smoker     Smokeless tobacco: Never Used     Tobacco comment: Nonsmoking household   Substance Use Topics     Alcohol use: No     Drug use: No     PHQ 11/30/2017 8/2/2018 8/24/2018   PHQ-9 Total Score 8 14 17   Q9: Thoughts of better off dead/self-harm past 2 weeks Not at all Not at all Not at all     DANA-7 SCORE 1/28/2015 5/13/2016   Total Score 16 -   Total Score - 8     No flowsheet data found.  No flowsheet data found.      Suicide Assessment Five-step Evaluation and Treatment (SAFE-T)    Pt with depression. Does not feel like it is as well controlled as in the past  Already maxed out on  Lexapro. Can increase wellbutrin to 450  She has periodically taken up to 600 mg of wellbutrin in a day. recommmended 450 max  If this does not help could consider changing lexapro to SNRI    Pt with insomnia. Will add trazodone starting at 50 mg. May increase to max or 150 mg as needed    Pt with increased thirst for past 2 months. Would like to be screened for diabetes.     Pt with h/o colon polyp. Has had recent colonoscopy. Will [facility] get [facility]    Pt with h/o breast cancer. Is s/p bilateral mastectomy          Reviewed and updated as needed this visit by Provider         Review of Systems   ROS COMP: Constitutional, HEENT, cardiovascular, pulmonary, gi and gu systems are  "negative, except as otherwise noted.      Objective    /72   Pulse 69   Temp 98.5  F (36.9  C) (Oral)   Ht 1.791 m (5' 10.5\")   Wt 91.6 kg (202 lb)   LMP 03/28/2010   BMI 28.57 kg/m    Body mass index is 28.57 kg/m .  Physical Exam   GENERAL: healthy, alert and no distress  RESP: lungs clear to auscultation - no rales, rhonchi or wheezes  CV: regular rate and rhythm, normal S1 S2, no S3 or S4, no murmur, click or rub, no peripheral edema and peripheral pulses strong  ABDOMEN: soft, nontender, no hepatosplenomegaly, no masses and bowel sounds normal  MS: no gross musculoskeletal defects noted, no edema    Diagnostic Test Results:  Labs reviewed in Epic        Assessment & Plan     1. Major depressive disorder, single episode, mild (H)  Increase wellbutrin to 450 mg,follow-up in one month  - escitalopram (LEXAPRO) 20 MG tablet; TAKE ONE TABLET (20 MG) BY MOUTH ONCE DAILY  Dispense: 90 tablet; Refill: 3  - buPROPion (WELLBUTRIN XL) 150 MG 24 hr tablet; Take 3 tablets (450 mg) by mouth every morning  Dispense: 270 tablet; Refill: 1    2. Insomnia, unspecified type  Trial of traxodone  - traZODone (DESYREL) 50 MG tablet; Take 1 tablet (50 mg) by mouth At Bedtime May increase to max or 150 mg as needed for insomnia  Dispense: 90 tablet; Refill: 1    3. Increased thirst  Check labs  - Comprehensive metabolic panel  - Hemoglobin A1c    4. History of adenomatous polyp of colon  Will get info from [facility]  *    6. Malignant neoplasm of right female breast, unspecified estrogen receptor status, unspecified site of breast (H)  S/p bilateral mastectomy       BMI:   Estimated body mass index is 28.57 kg/m  as calculated from the following:    Height as of this encounter: 1.791 m (5' 10.5\").    Weight as of this encounter: 91.6 kg (202 lb).   Weight management plan: Discussed healthy diet and exercise guidelines            No follow-ups on file.    Lucía Turner MD  [facility]        "

## 2019-11-03 ENCOUNTER — HEALTH MAINTENANCE LETTER (OUTPATIENT)
Age: 54
End: 2019-11-03

## 2019-11-04 DIAGNOSIS — B00.1 RECURRENT COLD SORES: ICD-10-CM

## 2019-11-04 RX ORDER — VALACYCLOVIR HYDROCHLORIDE 1 G/1
2000 TABLET, FILM COATED ORAL 2 TIMES DAILY
Qty: 4 TABLET | Refills: 0 | Status: SHIPPED | OUTPATIENT
Start: 2019-11-04 | End: 2020-04-24

## 2019-11-04 RX ORDER — VALACYCLOVIR HYDROCHLORIDE 1 G/1
TABLET, FILM COATED ORAL
Qty: 4 TABLET | Refills: 0 | OUTPATIENT
Start: 2019-11-04

## 2019-11-04 RX ORDER — VALACYCLOVIR HYDROCHLORIDE 1 G/1
2000 TABLET, FILM COATED ORAL 2 TIMES DAILY
Qty: 4 TABLET | Refills: 0 | Status: CANCELLED | OUTPATIENT
Start: 2019-11-04

## 2019-11-04 NOTE — TELEPHONE ENCOUNTER
"Requested Prescriptions   Pending Prescriptions Disp Refills     valACYclovir (VALTREX) 1000 mg tablet [Pharmacy Med Name: VALACYCLOVIR HCL 1GM TABS] 4 tablet 0     Sig: TAKE 2 TABLETS BY MOUTH TWICE DAILY FOR 1 DAY       Antivirals for Herpes Protocol Failed - 11/4/2019  3:46 AM        Failed - Normal serum creatinine on file in past 12 months     Recent Labs   Lab Test 09/30/19  1504   CR 1.06*             Passed - Patient is age 12 or older        Passed - Recent (12 mo) or future (30 days) visit within the authorizing provider's specialty     Patient has had an office visit with the authorizing provider or a provider within the authorizing providers department within the previous 12 mos or has a future within next 30 days. See \"Patient Info\" tab in inbasket, or \"Choose Columns\" in Meds & Orders section of the refill encounter.              Passed - Medication is active on med list        Last Written Prescription Date:  5/8/19  Last Fill Quantity: 4,  # refills: 0   Last office visit: 9/30/2019 with prescribing provider:     Future Office Visit:      "

## 2020-01-04 ENCOUNTER — OFFICE VISIT (OUTPATIENT)
Dept: URGENT CARE | Facility: URGENT CARE | Age: 55
End: 2020-01-04
Payer: COMMERCIAL

## 2020-01-04 VITALS
HEART RATE: 65 BPM | SYSTOLIC BLOOD PRESSURE: 107 MMHG | BODY MASS INDEX: 28.4 KG/M2 | TEMPERATURE: 96.9 F | OXYGEN SATURATION: 99 % | DIASTOLIC BLOOD PRESSURE: 72 MMHG | WEIGHT: 200.8 LBS

## 2020-01-04 DIAGNOSIS — B00.1 HERPES LABIALIS: Primary | ICD-10-CM

## 2020-01-04 PROCEDURE — 99213 OFFICE O/P EST LOW 20 MIN: CPT | Performed by: PHYSICIAN ASSISTANT

## 2020-01-04 RX ORDER — VALACYCLOVIR HYDROCHLORIDE 1 G/1
2000 TABLET, FILM COATED ORAL 2 TIMES DAILY
Qty: 4 TABLET | Refills: 0 | Status: SHIPPED | OUTPATIENT
Start: 2020-01-04 | End: 2020-04-26

## 2020-01-04 NOTE — PATIENT INSTRUCTIONS
Patient Education     Understanding Cold Sores  Cold sores, which are also called fever blisters, are small blisters or sores on the lip or sometimes inside the mouth. Many people get them from time to time. Cold sores usually are not serious, and they usually heal in a few days, sometimes longer. They are caused by 2 related viruses, herpes simplex type 1 and 2. These viruses spread very easily. Many people have one or both of these viruses in their body. More than 4 in every 5 people are infected with herpes simplex type 1 and this is the most common cause of cold sores. Once you have the virus that causes cold sores, it stays in your body for the rest of your life. But it can be inactive for long periods.  What causes a cold sore?  Cold sores are usually caused by herpes simplex virus type 1. Less often, they are caused by herpes simplex virus type 2. Herpes simplex virus type 2 is the more common cause of genital sores. The herpes viruses can enter the body through a break in the skin such as a scrape. Or they may enter through mucous membranes such as the lips or mouth. Some ways to get the viruses include:    Kissing someone who has a cold sore    Sharing a drinking glass, eating utensils, or lip balm with someone who has a cold sore    Having sex with someone who has a cold sore  A  baby can also get the infection at birth.  If you have a herpes virus, you can to pass it along even when you don t have a sore.  Cold sores flare up occasionally. Things that can cause an outbreak include:    Sun exposure    Fever    Stress or exhaustion    Menstruation    Skin irritation    Another unrelated Illness such as pneumonia, urinary infection, or cancer  What are the symptoms of a cold sore?  Symptoms can include:    A blister-like sore or cluster of sores. These often occur at the edge of the lips but may appear inside the mouth.    Skin redness around the sores.    Pain or itching in the area of the  outbreak. Often the pain or itching develops 12 to 24 hours before the sore become visible.    Flu-like symptoms, including swollen glands, headache, body ache, or fever. These typically occur only at the time of the first infection.  Cold sores may also occur on fingers. They may rarely infect the eyes, a serious possible complication.  Some people have symptoms a day or two before an outbreak. They may feel tenderness, burning, itching, or tingling before a cold sore appears. Cold sores tend to come back in the same area that they first appeared.  How are cold sores treated?  Treatment for cold sores focuses on relieving and shortening symptoms. For people with frequent outbreaks, treatment works to decrease how often and how symptomatic future episodes will be.  Treatments may include:    Prescription or over-the-counter pain medicines. These can help with discomfort, especially if sores are inside the mouth.    Antiviral medicines. These may be pills that are taken by mouth or a cream to apply to sores. They may help shorten an outbreak and reduce the severity of symptoms.  They may be used to help prevent future outbreaks if you have bothersome recurrent infections.    Self-care such as extra rest and drinking more fluids. These may help relieve the flu-like symptoms of a first outbreak.  How are cold sores diagnosed?  Your healthcare provider makes the diagnosis mainly by looking at the sores and doing a clinical exam.  This may be confirmed by swab tests or blood tests.  How can I prevent cold sores?  You can help reduce the spread of the herpes viruses that cause cold sores. This can help both you and others avoid getting cold sores. Follow these tips:    Do not kiss others if you have a cold sore. Also avoid kissing someone with a cold sore.    Do not share eating utensils, lip balm, razors, or towels with someone who has a cold sore.    Wash your hands after touching the area of a cold sore. The herpes  virus can be carried from your face to your hands when you touch the area of a cold sore. When this happens, wash your hands thoroughly, for at least 20 seconds. When you can t wash with soap and water, use an alcohol-based hand .    Disinfect things you touch often, such as phones and keyboards.      If you feel a cold sore coming on, do the same things you would do when a cold sore is present to avoid spreading the virus.    Use condoms to help prevent passing on the viruses through sex.  What are the possible complications of a cold sore?  Cold sores usually go away by themselves within a few days, occasionally 1-2 weeks or longer. For most people cold sores are not serious. The viruses that cause cold sores can cause more serious illness, though. People who have a weak immune system may get more serious infections from herpes viruses. These include people being treated for cancer or who have HIV disease. Babies may also become very ill from a herpes infection.   When should I call my healthcare provider?  Call your healthcare provider right away if you have any of these:    Fever of 100.4 F (38 C) or higher, or as directed    Pain that gets worse    You cannot eat or drink because of painful sores    Symptoms don t get better within 5 to 7 days    Blisters spread beyond the mouth or lip to areas on the chest, arms, face (especially the eyes), or legs  Date Last Reviewed: 3/28/2016    [id number] [employer]. [address] [place] [address], [place] [address]. All rights reserved. This information is not intended as a substitute for professional medical care. Always follow your healthcare professional's instructions.

## 2020-01-04 NOTE — PROGRESS NOTES
SUBJECTIVE:   Christina John is a 54 year old female presenting with a chief complaint of   Chief Complaint   Patient presents with     Mouth Lesions     would like valtrex Rx for cold sores, ran out of her medication, otc meds not working        She is an established patient of [facility].    URI Adult    Onset of symptoms was 2 day(s) ago.  Course of illness is same.    Severity moderate  Current and Associated symptoms: blisters on lips  Treatment measures tried include otc  .  Predisposing factors include None.        Review of Systems   HENT: Positive for mouth sores.    All other systems reviewed and are negative.      Past Medical History:   Diagnosis Date     Allergic rhinitis due to animal dander did IT x 4 yrs. 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 ended 9/06 per self     Diagnostic skin and sensitization tests 4/03 skin tests pos. for: cat/dog/DM/T/G/RW     Elevated cholesterol      Endometriosis      GERD (gastroesophageal reflux disease)      House dust mite allergy      Hypertension goal BP (blood pressure) < 140/90      Infertility     ON CLOMID     Mild major depression (H)      PCOS (polycystic ovarian syndrome)      Pelvic relaxation      Seasonal allergic conjunctivitis      Seasonal allergic rhinitis     4/03 skin tests pos. for: cat/dog/DM/T/G/RW--per Dr. Talavera.     Ulcers 1992    DX BY ENDOSCOPY     Family History   Problem Relation Age of Onset     Hypertension Mother      Gastrointestinal Disease Mother         GAITAN'S ESOPHAGUS     Lipids Mother      Depression Mother      Other - See Comments Mother         Pagets Disease of the Vulva, Dx 06/2014     Osteoporosis Mother      Obesity Mother      Allergies Father         HAYFEVER     Heart Disease Father         AFIB     Hypertension Father       Lipids Father      Hyperlipidemia Father      Gastrointestinal Disease Other         GAITAN'S ESOPHAGUS     Breast Cancer Other      Colon Cancer Other      Other Cancer Maternal Grandmother      Other Cancer Maternal Grandfather      Colon Cancer Paternal Grandfather      Gastrointestinal Disease Other         GAITAN'S ESOPHAGUS     Breast Cancer Other      Breast Cancer Paternal Aunt      Breast Cancer Other         fathers 1st cousin     Hyperlipidemia Brother      Current Outpatient Medications   Medication Sig Dispense Refill     Acetaminophen (TYLENOL EXTRA STRENGTH PO) Take  by mouth. PRN         Ascorbic Acid (VITAMIN C CR PO) Take  by mouth.       calcium carbonate-vitamin D (CALCIUM + D) 600-200 MG-UNIT TABS Take  by mouth 2 times daily.       escitalopram (LEXAPRO) 20 MG tablet TAKE ONE TABLET (20 MG) BY MOUTH ONCE DAILY 90 tablet 3     MULTIVITAMIN TABS   OR 1 TABLET DAILY       valACYclovir (VALTREX) 1000 mg tablet Take 2 tablets (2,000 mg) by mouth 2 times daily for 1 day 4 tablet 0     benzonatate (TESSALON) 200 MG capsule Take 1 capsule (200 mg) by mouth 3 times daily as needed for cough (Patient not taking: Reported on 9/30/2019) 30 capsule 0     buPROPion (WELLBUTRIN XL) 150 MG 24 hr tablet Take 3 tablets (450 mg) by mouth every morning (Patient not taking: Reported on 1/4/2020) 270 tablet 1     buPROPion (WELLBUTRIN XL) 300 MG 24 hr tablet TAKE 1 TABLET BY MOUTH ONCE DAILY IN THE MORNING (Patient not taking: Reported on 1/4/2020) 30 tablet 0     traZODone (DESYREL) 50 MG tablet Take 1 tablet (50 mg) by mouth At Bedtime May increase to max or 150 mg as needed for insomnia (Patient not taking: Reported on 1/4/2020) 90 tablet 1     valACYclovir (VALTREX) 1000 mg tablet Take 2 tablets (2,000 mg) by mouth 2 times daily (Patient not taking: Reported on 1/4/2020) 4 tablet 0     Social History     Tobacco Use     Smoking status: Never Smoker     Smokeless tobacco: Never Used     Tobacco comment:  Nonsmoking household   Substance Use Topics     Alcohol use: No       OBJECTIVE  /72   Pulse 65   Temp 96.9  F (36.1  C) (Tympanic)   Wt 91.1 kg (200 lb 12.8 oz)   LMP 03/28/2010   SpO2 99%   BMI 28.40 kg/m      Physical Exam  Vitals signs and nursing note reviewed.   Constitutional:       Appearance: Normal appearance. She is normal weight.   HENT:      Head: Normocephalic and atraumatic.      Right Ear: Tympanic membrane, ear canal and external ear normal.      Left Ear: Tympanic membrane, ear canal and external ear normal.      Nose: Nose normal.      Mouth/Throat:      Mouth: Mucous membranes are moist.      Pharynx: Oropharynx is clear.      Comments: Lips with groups of vesicles, mostly upper  Eyes:      Extraocular Movements: Extraocular movements intact.      Conjunctiva/sclera: Conjunctivae normal.   Neck:      Musculoskeletal: Normal range of motion.   Cardiovascular:      Rate and Rhythm: Normal rate and regular rhythm.      Pulses: Normal pulses.      Heart sounds: Normal heart sounds.   Pulmonary:      Effort: Pulmonary effort is normal.      Breath sounds: Normal breath sounds.   Skin:     General: Skin is warm and dry.      Capillary Refill: Capillary refill takes less than 2 seconds.      Findings: No rash.   Neurological:      General: No focal deficit present.      Mental Status: She is alert and oriented to person, place, and time.   Psychiatric:         Mood and Affect: Mood normal.         Behavior: Behavior normal.         Labs:  No results found for this or any previous visit (from the past 24 hour(s)).    X-Ray was not done.    ASSESSMENT:      ICD-10-CM    1. Herpes labialis B00.1 valACYclovir (VALTREX) 1000 mg tablet        Medical Decision Making:    Differential Diagnosis:  URI Adult/Peds:  Herpes labialis    Serious Comorbid Conditions:  Adult:  Malignancy    PLAN:    URI Adult:  valtrex    Followup:    If not improving or if condition worsens, follow up with your Primary  Care Provider    Patient Instructions     Patient Education     Understanding Cold Sores  Cold sores, which are also called fever blisters, are small blisters or sores on the lip or sometimes inside the mouth. Many people get them from time to time. Cold sores usually are not serious, and they usually heal in a few days, sometimes longer. They are caused by 2 related viruses, herpes simplex type 1 and 2. These viruses spread very easily. Many people have one or both of these viruses in their body. More than 4 in every 5 people are infected with herpes simplex type 1 and this is the most common cause of cold sores. Once you have the virus that causes cold sores, it stays in your body for the rest of your life. But it can be inactive for long periods.  What causes a cold sore?  Cold sores are usually caused by herpes simplex virus type 1. Less often, they are caused by herpes simplex virus type 2. Herpes simplex virus type 2 is the more common cause of genital sores. The herpes viruses can enter the body through a break in the skin such as a scrape. Or they may enter through mucous membranes such as the lips or mouth. Some ways to get the viruses include:    Kissing someone who has a cold sore    Sharing a drinking glass, eating utensils, or lip balm with someone who has a cold sore    Having sex with someone who has a cold sore  A  baby can also get the infection at birth.  If you have a herpes virus, you can to pass it along even when you don t have a sore.  Cold sores flare up occasionally. Things that can cause an outbreak include:    Sun exposure    Fever    Stress or exhaustion    Menstruation    Skin irritation    Another unrelated Illness such as pneumonia, urinary infection, or cancer  What are the symptoms of a cold sore?  Symptoms can include:    A blister-like sore or cluster of sores. These often occur at the edge of the lips but may appear inside the mouth.    Skin redness around the  sores.    Pain or itching in the area of the outbreak. Often the pain or itching develops 12 to 24 hours before the sore become visible.    Flu-like symptoms, including swollen glands, headache, body ache, or fever. These typically occur only at the time of the first infection.  Cold sores may also occur on fingers. They may rarely infect the eyes, a serious possible complication.  Some people have symptoms a day or two before an outbreak. They may feel tenderness, burning, itching, or tingling before a cold sore appears. Cold sores tend to come back in the same area that they first appeared.  How are cold sores treated?  Treatment for cold sores focuses on relieving and shortening symptoms. For people with frequent outbreaks, treatment works to decrease how often and how symptomatic future episodes will be.  Treatments may include:    Prescription or over-the-counter pain medicines. These can help with discomfort, especially if sores are inside the mouth.    Antiviral medicines. These may be pills that are taken by mouth or a cream to apply to sores. They may help shorten an outbreak and reduce the severity of symptoms.  They may be used to help prevent future outbreaks if you have bothersome recurrent infections.    Self-care such as extra rest and drinking more fluids. These may help relieve the flu-like symptoms of a first outbreak.  How are cold sores diagnosed?  Your healthcare provider makes the diagnosis mainly by looking at the sores and doing a clinical exam.  This may be confirmed by swab tests or blood tests.  How can I prevent cold sores?  You can help reduce the spread of the herpes viruses that cause cold sores. This can help both you and others avoid getting cold sores. Follow these tips:    Do not kiss others if you have a cold sore. Also avoid kissing someone with a cold sore.    Do not share eating utensils, lip balm, razors, or towels with someone who has a cold sore.    Wash your hands after  touching the area of a cold sore. The herpes virus can be carried from your face to your hands when you touch the area of a cold sore. When this happens, wash your hands thoroughly, for at least 20 seconds. When you can t wash with soap and water, use an alcohol-based hand .    Disinfect things you touch often, such as phones and keyboards.      If you feel a cold sore coming on, do the same things you would do when a cold sore is present to avoid spreading the virus.    Use condoms to help prevent passing on the viruses through sex.  What are the possible complications of a cold sore?  Cold sores usually go away by themselves within a few days, occasionally 1-2 weeks or longer. For most people cold sores are not serious. The viruses that cause cold sores can cause more serious illness, though. People who have a weak immune system may get more serious infections from herpes viruses. These include people being treated for cancer or who have HIV disease. Babies may also become very ill from a herpes infection.   When should I call my healthcare provider?  Call your healthcare provider right away if you have any of these:    Fever of 100.4 F (38 C) or higher, or as directed    Pain that gets worse    You cannot eat or drink because of painful sores    Symptoms don t get better within 5 to 7 days    Blisters spread beyond the mouth or lip to areas on the chest, arms, face (especially the eyes), or legs  Date Last Reviewed: 3/28/2016    [id number] [employer]. [address] [place] [address], [place] [address]. All rights reserved. This information is not intended as a substitute for professional medical care. Always follow your healthcare professional's instructions.

## 2020-04-24 ENCOUNTER — MYC REFILL (OUTPATIENT)
Dept: FAMILY MEDICINE | Facility: CLINIC | Age: 55
End: 2020-04-24

## 2020-04-24 DIAGNOSIS — B00.1 RECURRENT COLD SORES: ICD-10-CM

## 2020-04-24 RX ORDER — VALACYCLOVIR HYDROCHLORIDE 1 G/1
2000 TABLET, FILM COATED ORAL 2 TIMES DAILY
Qty: 4 TABLET | Refills: 4 | Status: SHIPPED | OUTPATIENT
Start: 2020-04-24 | End: 2020-09-14

## 2020-04-26 ENCOUNTER — HOSPITAL ENCOUNTER (EMERGENCY)
Facility: CLINIC | Age: 55
Discharge: HOME | End: 2020-04-26
Attending: FAMILY MEDICINE | Admitting: FAMILY MEDICINE
Payer: COMMERCIAL

## 2020-04-26 ENCOUNTER — APPOINTMENT (OUTPATIENT)
Dept: GENERAL RADIOLOGY | Facility: CLINIC | Age: 55
End: 2020-04-26
Attending: FAMILY MEDICINE
Payer: COMMERCIAL

## 2020-04-26 VITALS
HEART RATE: 64 BPM | BODY MASS INDEX: 28.29 KG/M2 | WEIGHT: 200 LBS | DIASTOLIC BLOOD PRESSURE: 99 MMHG | RESPIRATION RATE: 12 BRPM | OXYGEN SATURATION: 98 % | SYSTOLIC BLOOD PRESSURE: 115 MMHG

## 2020-04-26 DIAGNOSIS — R42 DIZZINESS: ICD-10-CM

## 2020-04-26 DIAGNOSIS — R07.89 ATYPICAL CHEST PAIN: ICD-10-CM

## 2020-04-26 LAB
ANION GAP SERPL CALCULATED.3IONS-SCNC: 8 MMOL/L (ref 3–14)
BASOPHILS # BLD AUTO: 0.1 10E9/L (ref 0–0.2)
BASOPHILS NFR BLD AUTO: 1.4 %
BUN SERPL-MCNC: 26 MG/DL (ref 7–30)
CALCIUM SERPL-MCNC: 8.6 MG/DL (ref 8.5–10.1)
CHLORIDE SERPL-SCNC: 113 MMOL/L (ref 94–109)
CO2 SERPL-SCNC: 22 MMOL/L (ref 20–32)
CREAT SERPL-MCNC: 1.12 MG/DL (ref 0.52–1.04)
D DIMER PPP FEU-MCNC: 0.3 UG/ML FEU (ref 0–0.5)
DIFFERENTIAL METHOD BLD: ABNORMAL
EOSINOPHIL # BLD AUTO: 0.9 10E9/L (ref 0–0.7)
EOSINOPHIL NFR BLD AUTO: 10.8 %
ERYTHROCYTE [DISTWIDTH] IN BLOOD BY AUTOMATED COUNT: 14.4 % (ref 10–15)
GFR SERPL CREATININE-BSD FRML MDRD: 56 ML/MIN/{1.73_M2}
GLUCOSE BLDC GLUCOMTR-MCNC: 125 MG/DL (ref 70–99)
GLUCOSE SERPL-MCNC: 59 MG/DL (ref 70–99)
HCT VFR BLD AUTO: 44.3 % (ref 35–47)
HGB BLD-MCNC: 14.4 G/DL (ref 11.7–15.7)
IMM GRANULOCYTES # BLD: 0 10E9/L (ref 0–0.4)
IMM GRANULOCYTES NFR BLD: 0.1 %
LYMPHOCYTES # BLD AUTO: 3.1 10E9/L (ref 0.8–5.3)
LYMPHOCYTES NFR BLD AUTO: 38.4 %
MCH RBC QN AUTO: 30.4 PG (ref 26.5–33)
MCHC RBC AUTO-ENTMCNC: 32.5 G/DL (ref 31.5–36.5)
MCV RBC AUTO: 94 FL (ref 78–100)
MONOCYTES # BLD AUTO: 0.7 10E9/L (ref 0–1.3)
MONOCYTES NFR BLD AUTO: 8.9 %
NEUTROPHILS # BLD AUTO: 3.3 10E9/L (ref 1.6–8.3)
NEUTROPHILS NFR BLD AUTO: 40.4 %
NRBC # BLD AUTO: 0 10*3/UL
NRBC BLD AUTO-RTO: 0 /100
PLATELET # BLD AUTO: 297 10E9/L (ref 150–450)
POTASSIUM SERPL-SCNC: 4.1 MMOL/L (ref 3.4–5.3)
RBC # BLD AUTO: 4.73 10E12/L (ref 3.8–5.2)
SODIUM SERPL-SCNC: 143 MMOL/L (ref 133–144)
TROPONIN I SERPL-MCNC: <0.015 UG/L (ref 0–0.04)
TROPONIN I SERPL-MCNC: <0.015 UG/L (ref 0–0.04)
WBC # BLD AUTO: 8.1 10E9/L (ref 4–11)

## 2020-04-26 PROCEDURE — 80048 BASIC METABOLIC PNL TOTAL CA: CPT | Performed by: FAMILY MEDICINE

## 2020-04-26 PROCEDURE — 93010 ELECTROCARDIOGRAM REPORT: CPT | Mod: Z6 | Performed by: FAMILY MEDICINE

## 2020-04-26 PROCEDURE — 84484 ASSAY OF TROPONIN QUANT: CPT | Performed by: FAMILY MEDICINE

## 2020-04-26 PROCEDURE — 71046 X-RAY EXAM CHEST 2 VIEWS: CPT

## 2020-04-26 PROCEDURE — 85025 COMPLETE CBC W/AUTO DIFF WBC: CPT | Performed by: FAMILY MEDICINE

## 2020-04-26 PROCEDURE — 00000146 ZZHCL STATISTIC GLUCOSE BY METER IP

## 2020-04-26 PROCEDURE — 25800030 ZZH RX IP 258 OP 636: Performed by: FAMILY MEDICINE

## 2020-04-26 PROCEDURE — 99285 EMERGENCY DEPT VISIT HI MDM: CPT | Mod: 25 | Performed by: FAMILY MEDICINE

## 2020-04-26 PROCEDURE — 93005 ELECTROCARDIOGRAM TRACING: CPT | Performed by: FAMILY MEDICINE

## 2020-04-26 PROCEDURE — 85379 FIBRIN DEGRADATION QUANT: CPT | Performed by: FAMILY MEDICINE

## 2020-04-26 PROCEDURE — 96360 HYDRATION IV INFUSION INIT: CPT | Performed by: FAMILY MEDICINE

## 2020-04-26 PROCEDURE — 93005 ELECTROCARDIOGRAM TRACING: CPT | Mod: 76 | Performed by: FAMILY MEDICINE

## 2020-04-26 RX ADMIN — SODIUM CHLORIDE, POTASSIUM CHLORIDE, SODIUM LACTATE AND CALCIUM CHLORIDE 1000 ML: 600; 310; 30; 20 INJECTION, SOLUTION INTRAVENOUS at 10:19

## 2020-04-26 NOTE — DISCHARGE INSTRUCTIONS
Push fluids, rest.  Make sure at all times you are well-hydrated and well-nourished.  Return to the emergency department if worse or changes.

## 2020-04-26 NOTE — ED NOTES
"Onset of dizziness today while bending over moving empty bags from a car when stood up, along with felt like heart was racing, started having chest pressure \"like an elephant\" on chest along with SOA, states also pain between shoulder blades but woke up with this pain today. States has episodes of similar dizziness along with heart racing over the last weeks but today was worse than has had in past. No hx of heart disease but states both parents have arrhythmias. States HR at home was 110 per a pulse ox. Feeling better at rest at this time either than getting a headache which has happened when has these episodes  "

## 2020-04-26 NOTE — ED NOTES
"Call light answered, C/O not feeling well, had recurrence of feeling of \"elephant on chest\" and light headed. BG checked and was 125, EKG ordered, MD notified. Was just up to BR ambulatory prior to feeling this way  "

## 2020-04-26 NOTE — ED PROVIDER NOTES
"  History     Chief Complaint   Patient presents with     Chest Pain     HPI  Christina John is a 54 year old female, past medical history is significant for malignant neoplasm of the right breast, gastric bypass, obesity, depression, hyperlipidemia, seasonal allergic rhinitis, GERD, polycystic ovarian syndrome, presents to the emergency department with concerns of chest pain.  History is obtained from the patient who states that this morning around 830 when she was transferring some reusable bags from her vehicle to her daughters she became slightly dizzy/lightheaded and then, feeling unwell, began to feel a sensation of chest tightness/an elephant standing on my chest.  Her daughter saw her and commented that she did not appear well.  She sat down but the sensation continued, slightly improved but still present at the time of ER presentation.  No associated nausea or vomiting however she did feel some shortness of breath.  She notes that over the last month she has had about 5 episodes like this this is the most severe and why she came in.  She also notes that dizziness is not an unusual thing for her since she has been told she has had some damage to her inner ear related to chemotherapy in 2008/2009.  She has had evaluation by ENT for this.  She has been eating and drinking normally recently does not believe herself to be dehydrated.  No unusual exertion and as she notes herself the exertion described this morning was really quite minimal lifting reusable bags.  Nothing heavy.  She is status post gastric bypass and reports no heartburn or reflux.  No recent URI type symptoms, no cough runny nose sore throat fever chills or sweats.    Allergies:  Allergies   Allergen Reactions     Amoxicillin Swelling     Cefzil [Cefprozil] Swelling     Flu Virus Vaccine      \"throat felt funny\"     Adhesive Tape Rash     Biaxin [Clarithromycin] Hives     Erythromycin GI Disturbance     Latex Rash     Percocet [Acetaminophen] " Hives     Sulfa Drugs Hives       Problem List:    Patient Active Problem List    Diagnosis Date Noted     Malignant neoplasm of right female breast, unspecified estrogen receptor status, unspecified site of breast (H) 08/02/2018     Priority: Medium     History of gastric bypass 06/22/2017     Priority: Medium     BRCA1 positive 05/13/2016     Priority: Medium     Overweight (BMI 25.0-29.9) 05/13/2016     Priority: Medium     History of adenomatous polyp of colon 04/16/2014     Priority: Medium     Diagnostic skin and sensitization tests      Priority: Medium     Desensitization to allergens      Priority: Medium     did IT x 4 yrs. ended 9/06 per self       Mild major depression (H)      Priority: Medium     HYPERLIPIDEMIA LDL GOAL <160 10/31/2010     Priority: Medium     Seasonal allergic rhinitis      Priority: Medium     Allergic rhinitis due to animal dander      Priority: Medium     House dust mite allergy      Priority: Medium     Seasonal allergic conjunctivitis      Priority: Medium     DCIS (ductal carcinoma in situ) of breast      Priority: Medium     RT BREAST - STAGE 2A - GRADE III  Left breast ER+  BRCA1 +       GERD (gastroesophageal reflux disease)      Priority: Medium     PCOS (polycystic ovarian syndrome)      Priority: Medium        Past Medical History:    Past Medical History:   Diagnosis Date     Allergic rhinitis due to animal dander did IT x 4 yrs. ended 9/06 per self     BRCA1 positive      DCIS (ductal carcinoma in situ) of breast 8/08 and 8/10     Depressive disorder      Desensitization to allergens      Diagnostic skin and sensitization tests 4/03 skin tests pos. for: cat/dog/DM/T/G/RW     Elevated cholesterol      Endometriosis      GERD (gastroesophageal reflux disease)      House dust mite allergy      Hypertension goal BP (blood pressure) < 140/90      Infertility      Mild major depression (H)      PCOS (polycystic ovarian syndrome)      Pelvic relaxation      Seasonal allergic  conjunctivitis      Seasonal allergic rhinitis      Ulcers 1992       Past Surgical History:    Past Surgical History:   Procedure Laterality Date     C APPENDECTOMY  1982     C/SECTION, CLASSICAL       COSMETIC SURGERY  10/13, 09/15     ENDOSCOPY      10/2000 [place], repeat 10/2002, REPEAT 2/08 (REPEAT IN 2 YRS)     ENT SURGERY  1973     EXCIS VAGINAL CYST/TUMOR       GI SURGERY  04/11     GLAUCOMA SURGERY       HC BIOPSY OF BREAST, OPEN INCISIONAL  2008    sentinel lymph node     HC COLONOSCOPY THRU STOMA, DIAGNOSTIC  12/04, 12/09    (REPEAT IN 5 YRS)     HC REMOVAL GALLBLADDER  6/2005     HYSTERECTOMY, PAP NO LONGER INDICATED       MASTECTOMY BILATERAL, INSERT TISSUE EXPANDER BILATERAL, COMBINED  8/2010     MASTECTOMY, BILATERAL       PELVIS LAPAROSCOPY,DX      x2       Family History:    Family History   Problem Relation Age of Onset     Hypertension Mother      Gastrointestinal Disease Mother         GAITAN'S ESOPHAGUS     Lipids Mother      Depression Mother      Other - See Comments Mother         Pagets Disease of the Vulva, Dx 06/2014     Osteoporosis Mother      Obesity Mother      Allergies Father         HAYFEVER     Heart Disease Father         AFIB     Hypertension Father      Lipids Father      Hyperlipidemia Father      Gastrointestinal Disease Other         GAITAN'S ESOPHAGUS     Breast Cancer Other      Colon Cancer Other      Other Cancer Maternal Grandmother      Other Cancer Maternal Grandfather      Colon Cancer Paternal Grandfather      Gastrointestinal Disease Other         GAITAN'S ESOPHAGUS     Breast Cancer Other      Breast Cancer Paternal Aunt      Breast Cancer Other         fathers 1st cousin     Hyperlipidemia Brother        Social History:  Marital Status:   [2]  Social History     Tobacco Use     Smoking status: Never Smoker     Smokeless tobacco: Never Used     Tobacco comment: Nonsmoking household   Substance Use Topics     Alcohol use: No     Drug use: No         Medications:    acetaminophen (TYLENOL) 500 MG tablet  buPROPion (WELLBUTRIN XL) 150 MG 24 hr tablet  calcium carbonate-vitamin D (CALCIUM + D) 600-200 MG-UNIT TABS  escitalopram (LEXAPRO) 20 MG tablet  MULTIVITAMIN TABS   OR  traZODone (DESYREL) 50 MG tablet  valACYclovir (VALTREX) 1000 mg tablet          Review of Systems   All other systems reviewed and are negative.      Physical Exam   BP: (!) 137/95  Pulse: 87  Heart Rate: 84  Resp: 18  Weight: 90.7 kg (200 lb)  SpO2: 98 %      Physical Exam  Vitals signs and nursing note reviewed.   Constitutional:       Appearance: She is well-developed and normal weight.   HENT:      Head: Normocephalic and atraumatic.   Eyes:      Extraocular Movements: Extraocular movements intact.      Pupils: Pupils are equal, round, and reactive to light.   Neck:      Musculoskeletal: Normal range of motion and neck supple.   Cardiovascular:      Rate and Rhythm: Normal rate and regular rhythm.      Heart sounds: Normal heart sounds.   Pulmonary:      Effort: Pulmonary effort is normal.      Breath sounds: Normal breath sounds.   Abdominal:      General: Bowel sounds are normal.      Palpations: Abdomen is soft.   Musculoskeletal: Normal range of motion.   Skin:     General: Skin is warm and dry.      Capillary Refill: Capillary refill takes less than 2 seconds.   Neurological:      General: No focal deficit present.      Mental Status: She is alert.   Psychiatric:         Mood and Affect: Mood normal.         Behavior: Behavior normal.         ED Course        Procedures               EKG Interpretation:      Interpreted by Sg Lewis MD  Time reviewed: Time obtained 929 time interpreted 937.  70 bpm sinus rhythm, normal axis, normal intervals.  No acute ST-T wave changes.  Impression normal EKG.    1:21 PM  Lab diagnostics resulted and reviewed I note that the patient is hypoglycemic.  She was given juice and a sandwich.  She had almost returned to normal.  She got up  to use the restroom and upon returning back to her room notified her nurse that she was experience the same kind of chest tightness/heaviness and dizziness that she had before.  Repeat EKG is reviewed as above not demonstrating any significant changes either from the first EKG or for ischemia or infarct acutely.  Check blood sugar which is mildly elevated.  Her symptoms resolved relatively quickly.  I am planning on doing a repeat cardiac troponin at this point and I discussed with the patient why.      Critical Care time:  none               Results for orders placed or performed during the hospital encounter of 04/26/20 (from the past 24 hour(s))   CBC with platelets differential   Result Value Ref Range    WBC 8.1 4.0 - 11.0 10e9/L    RBC Count 4.73 3.8 - 5.2 10e12/L    Hemoglobin 14.4 11.7 - 15.7 g/dL    Hematocrit 44.3 35.0 - 47.0 %    MCV 94 78 - 100 fl    MCH 30.4 26.5 - 33.0 pg    MCHC 32.5 31.5 - 36.5 g/dL    RDW 14.4 10.0 - 15.0 %    Platelet Count 297 150 - 450 10e9/L    Diff Method Automated Method     % Neutrophils 40.4 %    % Lymphocytes 38.4 %    % Monocytes 8.9 %    % Eosinophils 10.8 %    % Basophils 1.4 %    % Immature Granulocytes 0.1 %    Nucleated RBCs 0 0 /100    Absolute Neutrophil 3.3 1.6 - 8.3 10e9/L    Absolute Lymphocytes 3.1 0.8 - 5.3 10e9/L    Absolute Monocytes 0.7 0.0 - 1.3 10e9/L    Absolute Eosinophils 0.9 (H) 0.0 - 0.7 10e9/L    Absolute Basophils 0.1 0.0 - 0.2 10e9/L    Abs Immature Granulocytes 0.0 0 - 0.4 10e9/L    Absolute Nucleated RBC 0.0    Basic metabolic panel   Result Value Ref Range    Sodium 143 133 - 144 mmol/L    Potassium 4.1 3.4 - 5.3 mmol/L    Chloride 113 (H) 94 - 109 mmol/L    Carbon Dioxide 22 20 - 32 mmol/L    Anion Gap 8 3 - 14 mmol/L    Glucose 59 (L) 70 - 99 mg/dL    Urea Nitrogen 26 7 - 30 mg/dL    Creatinine 1.12 (H) 0.52 - 1.04 mg/dL    GFR Estimate 56 (L) >60 mL/min/[1.73_m2]    GFR Estimate If Black 65 >60 mL/min/[1.73_m2]    Calcium 8.6 8.5 - 10.1 mg/dL    Troponin I   Result Value Ref Range    Troponin I ES <0.015 0.000 - 0.045 ug/L   D dimer quantitative   Result Value Ref Range    D Dimer 0.3 0.0 - 0.50 ug/ml FEU   Chest XR,  PA & LAT    Narrative    XR CHEST 2 VW   4/26/2020 11:01 AM     HISTORY: Chest pain, shortness of air    COMPARISON: None.      Impression    IMPRESSION: No acute cardiopulmonary disease.    ELVIA BARRIOS MD   Glucose by meter   Result Value Ref Range    Glucose 125 (H) 70 - 99 mg/dL   Troponin I   Result Value Ref Range    Troponin I ES <0.015 0.000 - 0.045 ug/L   2:34 PM  Reviewed results of second cardiac troponin and discussed second EKG as well as the complete work-up with the patient once more.  She is currently asymptomatic.  We reviewed her differential diagnostic considerations with her presentation including ACS, pleuritis, pericarditis, atypical pneumonia, pneumothorax, thoracic dissection, pulmonary embolism and how we have excluded these by way of work-up today.  She feels comfortable with plan for disposition to home.  I do not suspect at this point is serious underlying etiology for her entrance complaint.  I suspect that this relates to a combination of some dehydration as well as stress of which the patient has considerable.      Medications   lactated ringers BOLUS 1,000 mL (0 mLs Intravenous Stopped 4/26/20 1107)       Assessments & Plan (with Medical Decision Making)   Assessments and plan with medical decision making at the time stamp above.  Disposition is to home.    Disclaimer: This note consists of symbols derived from keyboarding, dictation and/or voice recognition software. As a result, there may be errors in the script that have gone undetected. Please consider this when interpreting information found in this chart.      I have reviewed the nursing notes.    I have reviewed the findings, diagnosis, plan and need for follow up with the patient.       New Prescriptions    No medications on file       Final  diagnoses:   Atypical chest pain   Dizziness       4/26/2020   [facility] EMERGENCY DEPARTMENT     Sg Lewis MD  04/26/20 5832

## 2020-09-14 ENCOUNTER — OFFICE VISIT (OUTPATIENT)
Dept: FAMILY MEDICINE | Facility: CLINIC | Age: 55
End: 2020-09-14
Payer: COMMERCIAL

## 2020-09-14 VITALS
HEART RATE: 59 BPM | BODY MASS INDEX: 29.68 KG/M2 | HEIGHT: 71 IN | WEIGHT: 212 LBS | SYSTOLIC BLOOD PRESSURE: 111 MMHG | DIASTOLIC BLOOD PRESSURE: 75 MMHG | TEMPERATURE: 98.2 F

## 2020-09-14 DIAGNOSIS — F32.0 MAJOR DEPRESSIVE DISORDER, SINGLE EPISODE, MILD (H): Primary | ICD-10-CM

## 2020-09-14 DIAGNOSIS — G62.9 PERIPHERAL POLYNEUROPATHY: ICD-10-CM

## 2020-09-14 DIAGNOSIS — M72.2 PLANTAR FASCIITIS: ICD-10-CM

## 2020-09-14 DIAGNOSIS — B00.1 RECURRENT COLD SORES: ICD-10-CM

## 2020-09-14 LAB
ALBUMIN SERPL-MCNC: 3.8 G/DL (ref 3.4–5)
ALP SERPL-CCNC: 103 U/L (ref 40–150)
ALT SERPL W P-5'-P-CCNC: 31 U/L (ref 0–50)
ANION GAP SERPL CALCULATED.3IONS-SCNC: 6 MMOL/L (ref 3–14)
AST SERPL W P-5'-P-CCNC: 22 U/L (ref 0–45)
BILIRUB SERPL-MCNC: 0.4 MG/DL (ref 0.2–1.3)
BUN SERPL-MCNC: 24 MG/DL (ref 7–30)
CALCIUM SERPL-MCNC: 8.9 MG/DL (ref 8.5–10.1)
CHLORIDE SERPL-SCNC: 110 MMOL/L (ref 94–109)
CO2 SERPL-SCNC: 26 MMOL/L (ref 20–32)
CREAT SERPL-MCNC: 0.93 MG/DL (ref 0.52–1.04)
ERYTHROCYTE [DISTWIDTH] IN BLOOD BY AUTOMATED COUNT: 14.1 % (ref 10–15)
FOLATE SERPL-MCNC: 14.8 NG/ML
GFR SERPL CREATININE-BSD FRML MDRD: 70 ML/MIN/{1.73_M2}
GLUCOSE SERPL-MCNC: 88 MG/DL (ref 70–99)
HCT VFR BLD AUTO: 38.5 % (ref 35–47)
HGB BLD-MCNC: 12.3 G/DL (ref 11.7–15.7)
MCH RBC QN AUTO: 30 PG (ref 26.5–33)
MCHC RBC AUTO-ENTMCNC: 31.9 G/DL (ref 31.5–36.5)
MCV RBC AUTO: 94 FL (ref 78–100)
PLATELET # BLD AUTO: 277 10E9/L (ref 150–450)
POTASSIUM SERPL-SCNC: 4.4 MMOL/L (ref 3.4–5.3)
PROT SERPL-MCNC: 7.1 G/DL (ref 6.8–8.8)
RBC # BLD AUTO: 4.1 10E12/L (ref 3.8–5.2)
SODIUM SERPL-SCNC: 142 MMOL/L (ref 133–144)
TSH SERPL DL<=0.005 MIU/L-ACNC: 0.96 MU/L (ref 0.4–4)
VIT B12 SERPL-MCNC: 497 PG/ML (ref 193–986)
WBC # BLD AUTO: 8.1 10E9/L (ref 4–11)

## 2020-09-14 PROCEDURE — 84443 ASSAY THYROID STIM HORMONE: CPT | Performed by: FAMILY MEDICINE

## 2020-09-14 PROCEDURE — 85027 COMPLETE CBC AUTOMATED: CPT | Performed by: FAMILY MEDICINE

## 2020-09-14 PROCEDURE — 82607 VITAMIN B-12: CPT | Performed by: FAMILY MEDICINE

## 2020-09-14 PROCEDURE — 82746 ASSAY OF FOLIC ACID SERUM: CPT | Performed by: FAMILY MEDICINE

## 2020-09-14 PROCEDURE — 80053 COMPREHEN METABOLIC PANEL: CPT | Performed by: FAMILY MEDICINE

## 2020-09-14 PROCEDURE — 99214 OFFICE O/P EST MOD 30 MIN: CPT | Performed by: FAMILY MEDICINE

## 2020-09-14 PROCEDURE — 36415 COLL VENOUS BLD VENIPUNCTURE: CPT | Performed by: FAMILY MEDICINE

## 2020-09-14 RX ORDER — VALACYCLOVIR HYDROCHLORIDE 1 G/1
2000 TABLET, FILM COATED ORAL 2 TIMES DAILY
Qty: 20 TABLET | Refills: 4 | Status: SHIPPED | OUTPATIENT
Start: 2020-09-14 | End: 2020-09-14

## 2020-09-14 RX ORDER — VALACYCLOVIR HYDROCHLORIDE 1 G/1
1000 TABLET, FILM COATED ORAL DAILY
Qty: 90 TABLET | Refills: 3 | Status: SHIPPED | OUTPATIENT
Start: 2020-09-14 | End: 2021-09-09

## 2020-09-14 RX ORDER — ESCITALOPRAM OXALATE 20 MG/1
TABLET ORAL
Qty: 90 TABLET | Refills: 3 | Status: SHIPPED | OUTPATIENT
Start: 2020-09-14 | End: 2021-09-09

## 2020-09-14 ASSESSMENT — PATIENT HEALTH QUESTIONNAIRE - PHQ9
SUM OF ALL RESPONSES TO PHQ QUESTIONS 1-9: 13
5. POOR APPETITE OR OVEREATING: SEVERAL DAYS

## 2020-09-14 ASSESSMENT — ANXIETY QUESTIONNAIRES
5. BEING SO RESTLESS THAT IT IS HARD TO SIT STILL: SEVERAL DAYS
1. FEELING NERVOUS, ANXIOUS, OR ON EDGE: SEVERAL DAYS
2. NOT BEING ABLE TO STOP OR CONTROL WORRYING: NEARLY EVERY DAY
3. WORRYING TOO MUCH ABOUT DIFFERENT THINGS: MORE THAN HALF THE DAYS
6. BECOMING EASILY ANNOYED OR IRRITABLE: SEVERAL DAYS
7. FEELING AFRAID AS IF SOMETHING AWFUL MIGHT HAPPEN: NOT AT ALL
GAD7 TOTAL SCORE: 9

## 2020-09-14 ASSESSMENT — MIFFLIN-ST. JEOR: SCORE: 1654.82

## 2020-09-14 NOTE — PROGRESS NOTES
Subjective     Christina John is a 54 year old female who presents to clinic today for the following health issues:    HPI       Depression and Anxiety Follow-Up    How are you doing with your depression since your last visit? Improved     How are you doing with your anxiety since your last visit?  No change    Are you having other symptoms that might be associated with depression or anxiety? Yes:  eating more, sleeping more, fatigue     Have you had a significant life event? OTHER: dealing with covid and working more     Do you have any concerns with your use of alcohol or other drugs? No    Social History     Tobacco Use     Smoking status: Never Smoker     Smokeless tobacco: Never Used     Tobacco comment: Nonsmoking household   Substance Use Topics     Alcohol use: No     Drug use: No     PHQ 8/2/2018 8/24/2018 9/30/2019   PHQ-9 Total Score 14 17 21   Q9: Thoughts of better off dead/self-harm past 2 weeks Not at all Not at all Not at all     DANA-7 SCORE 1/28/2015 5/13/2016   Total Score 16 -   Total Score - 8     Last PHQ-9 9/14/2020   1.  Little interest or pleasure in doing things 2   2.  Feeling down, depressed, or hopeless 1   3.  Trouble falling or staying asleep, or sleeping too much 3   4.  Feeling tired or having little energy 2   5.  Poor appetite or overeating 3   6.  Feeling bad about yourself 1   7.  Trouble concentrating 1   8.  Moving slowly or restless 0   Q9: Thoughts of better off dead/self-harm past 2 weeks 0   PHQ-9 Total Score 13   Difficulty at work, home, or with people Somewhat difficult     DANA-7  9/14/2020   1. Feeling nervous, anxious, or on edge 1   2. Not being able to stop or control worrying 3   3. Worrying too much about different things 2   4. Trouble relaxing 1   5. Being so restless that it is hard to sit still 1   6. Becoming easily annoyed or irritable 1   7. Feeling afraid, as if something awful might happen 0   DANA-7 Total Score 9   If you checked any problems, how  "difficult have they made it for you to do your work, take care of things at home, or get along with other people? -       Suicide Assessment Five-step Evaluation and Treatment (SAFE-T)      How many servings of fruits and vegetables do you eat daily?      On average, how many sweetened beverages do you drink each day (Examples: soda, juice, sweet tea, etc.  Do NOT count diet or artificially sweetened beverages)?       How many days per week do you exercise enough to make your heart beat faster?     How many minutes a day do you exercise enough to make your heart beat faster?     How many days per week do you miss taking your medication?     Pt with recurrent cold sores. Valtrex does not seem to help as it used to . Gets vesicular lesion on lip and sounds like tongue as well.   Will try daily valtrex to clarify if these are all truly cold sores or more of a reaction on her lips to an irritant  If daily valtrex not helpful, would recommend follow-up with ent    Pt with fingertips with decreased sensation.    Notes clumsiness and dropping things  Right more than left.   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   Will check labs but then refer to neuro as suspect she needs EMG for further evaluation as unusual for peripheral neuropathy to start on fingers    Pt with depression well controlled on lexapro, she needs refill of this medication.    Pt with known plantar fasciitis. She is doing what she needs to do including custom made orthotics, still painful in right foot  Would like second opinion from another podiatrist. Referral given. Pt to call her insurance to see who is covered    Review of Systems   Constitutional, HEENT, cardiovascular, pulmonary, gi and gu systems are negative, except as otherwise noted.      Objective    /75   Pulse 59   Temp 98.2  F (36.8  C) (Oral)   Ht 1.791 m (5' 10.5\")   Wt 96.2 kg (212 lb)   LMP 03/28/2010   BMI 29.99 kg/m  "   Body mass index is 29.99 kg/m .     Refill cold sore medicaiton  Feet pain, more on left.    Tingling in fingers  Mouth burns with eating or drinking    Physical Exam   GENERAL: healthy, alert and no distress  NECK: no adenopathy, no asymmetry, masses, or scars and thyroid normal to palpation  RESP: lungs clear to auscultation - no rales, rhonchi or wheezes  CV: regular rate and rhythm, normal S1 S2, no S3 or S4, no murmur, click or rub, no peripheral edema and peripheral pulses strong  ABDOMEN: soft, nontender, no hepatosplenomegaly, no masses and bowel sounds normal  MS: no gross musculoskeletal defects noted, no edema, negative phalens and tinels test on right hand    Results for orders placed or performed in visit on 09/14/20 (from the past 24 hour(s))   Comprehensive metabolic panel   Result Value Ref Range    Sodium 142 133 - 144 mmol/L    Potassium 4.4 3.4 - 5.3 mmol/L    Chloride 110 (H) 94 - 109 mmol/L    Carbon Dioxide 26 20 - 32 mmol/L    Anion Gap 6 3 - 14 mmol/L    Glucose 88 70 - 99 mg/dL    Urea Nitrogen 24 7 - 30 mg/dL    Creatinine 0.93 0.52 - 1.04 mg/dL    GFR Estimate 70 >60 mL/min/[1.73_m2]    GFR Estimate If Black 81 >60 mL/min/[1.73_m2]    Calcium 8.9 8.5 - 10.1 mg/dL    Bilirubin Total 0.4 0.2 - 1.3 mg/dL    Albumin 3.8 3.4 - 5.0 g/dL    Protein Total 7.1 6.8 - 8.8 g/dL    Alkaline Phosphatase 103 40 - 150 U/L    ALT 31 0 - 50 U/L    AST 22 0 - 45 U/L   TSH with free T4 reflex   Result Value Ref Range    TSH 0.96 0.40 - 4.00 mU/L   CBC with platelets   Result Value Ref Range    WBC 8.1 4.0 - 11.0 10e9/L    RBC Count 4.10 3.8 - 5.2 10e12/L    Hemoglobin 12.3 11.7 - 15.7 g/dL    Hematocrit 38.5 35.0 - 47.0 %    MCV 94 78 - 100 fl    MCH 30.0 26.5 - 33.0 pg    MCHC 31.9 31.5 - 36.5 g/dL    RDW 14.1 10.0 - 15.0 %    Platelet Count 277 150 - 450 10e9/L   Folate   Result Value Ref Range    Folate 14.8 >5.4 ng/mL   Vitamin B12   Result Value Ref Range    Vitamin B12 497 193 - 986 pg/mL        "    Assessment & Plan     Major depressive disorder, single episode, mild (H)  Stable on lexapro, will refill  - escitalopram (LEXAPRO) 20 MG tablet; TAKE ONE TABLET (20 MG) BY MOUTH ONCE DAILY    Recurrent cold sores  Trial of valtrex, if keeps recurring may not be cold sores and pt to follow-up with ENT  - valACYclovir (VALTREX) 1000 mg tablet; Take 1 tablet (1,000 mg) by mouth daily    Plantar fasciitis  Second opinion from podiatry  - [facility]  Referral; Future    Peripheral polyneuropathy  Refer to neuro as has normal labs  - Comprehensive metabolic panel  - TSH with free T4 reflex  - CBC with platelets  - Folate  - Vitamin B12  - NEUROLOGY ADULT REFERRAL     BMI:   Estimated body mass index is 29.99 kg/m  as calculated from the following:    Height as of this encounter: 1.791 m (5' 10.5\").    Weight as of this encounter: 96.2 kg (212 lb).   Weight management plan: Discussed healthy diet and exercise guidelines            No follow-ups on file.    Lucía Galvan MD  [facility]    "

## 2020-09-15 ASSESSMENT — ANXIETY QUESTIONNAIRES: GAD7 TOTAL SCORE: 9

## 2020-09-16 ENCOUNTER — OFFICE VISIT (OUTPATIENT)
Dept: PODIATRY | Facility: CLINIC | Age: 55
End: 2020-09-16
Payer: COMMERCIAL

## 2020-09-16 VITALS
HEART RATE: 73 BPM | BODY MASS INDEX: 28 KG/M2 | HEIGHT: 71 IN | WEIGHT: 200 LBS | DIASTOLIC BLOOD PRESSURE: 77 MMHG | SYSTOLIC BLOOD PRESSURE: 119 MMHG

## 2020-09-16 DIAGNOSIS — M72.2 PLANTAR FASCIITIS: ICD-10-CM

## 2020-09-16 PROCEDURE — 99203 OFFICE O/P NEW LOW 30 MIN: CPT | Performed by: PODIATRIST

## 2020-09-16 RX ORDER — MELOXICAM 7.5 MG/1
7.5 TABLET ORAL DAILY
Qty: 30 TABLET | Refills: 1 | Status: SHIPPED | OUTPATIENT
Start: 2020-09-16 | End: 2021-01-04

## 2020-09-16 ASSESSMENT — MIFFLIN-ST. JEOR: SCORE: 1604.35

## 2020-09-16 NOTE — NURSING NOTE
"Chief Complaint   Patient presents with     Consult     BL plantafasciitis, mainly right foot, X2 months       Initial /77   Pulse 73   Ht 1.797 m (5' 10.75\")   Wt 90.7 kg (200 lb)   LMP 03/28/2010   BMI 28.09 kg/m   Estimated body mass index is 28.09 kg/m  as calculated from the following:    Height as of this encounter: 1.797 m (5' 10.75\").    Weight as of this encounter: 90.7 kg (200 lb).  Medications and allergies reviewed.      Hedy WATTS MA    "

## 2020-09-16 NOTE — LETTER
9/16/2020         RE: Christina John  [address] [facility] [address]        Dear Colleague,    Thank you for referring your patient, Christina John, to the [facility]. Please see a copy of my visit note below.    PATIENT HISTORY:  Christina John is a 54 year old female who presents to clinic for a painful right and left foot .  The patient describes the pain as sharp stabbing.  The patient relates the pain level is moderate.  The patient relates pain is located on the bottom of the right and left foot right greater than left.  The patient relates the pain has been present for the past several weeks.  The patient relates pain with ambulation.  The patient has tried different shoes and inserts with little relief.      REVIEW OF SYSTEMS:  Constitutional, HEENT, cardiovascular, pulmonary, GI, , musculoskeletal, neuro, skin, endocrine and psych systems are negative, except as otherwise noted.     PAST MEDICAL HISTORY:   Past Medical History:   Diagnosis Date     Allergic rhinitis due to animal dander did IT x 4 yrs. 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 ended 9/06 per self     Diagnostic skin and sensitization tests 4/03 skin tests pos. for: cat/dog/DM/T/G/RW     Elevated cholesterol      Endometriosis      GERD (gastroesophageal reflux disease)      House dust mite allergy      Hypertension goal BP (blood pressure) < 140/90      Infertility     ON CLOMID     Mild major depression (H)      PCOS (polycystic ovarian syndrome)      Pelvic relaxation      Seasonal allergic conjunctivitis      Seasonal allergic rhinitis     4/03 skin tests pos. for: cat/dog/DM/T/G/RW--per Dr. Talavera.     Ulcers 1992    DX BY ENDOSCOPY        PAST SURGICAL HISTORY:   Past Surgical History:   Procedure  "Laterality Date     C APPENDECTOMY  1982     C/SECTION, CLASSICAL       COSMETIC SURGERY  10/13, 09/15     ENDOSCOPY      10/2000 [place], repeat 10/2002, REPEAT 2/08 (REPEAT IN 2 YRS)     ENT SURGERY  1973     EXCIS VAGINAL CYST/TUMOR       GI SURGERY  04/11     GLAUCOMA SURGERY       HC BIOPSY OF BREAST, OPEN INCISIONAL  2008    sentinel lymph node     HC COLONOSCOPY THRU STOMA, DIAGNOSTIC  12/04, 12/09    (REPEAT IN 5 YRS)     HC REMOVAL GALLBLADDER  6/2005     HYSTERECTOMY, PAP NO LONGER INDICATED       MASTECTOMY BILATERAL, INSERT TISSUE EXPANDER BILATERAL, COMBINED  8/2010     MASTECTOMY, BILATERAL       PELVIS LAPAROSCOPY,DX      x2        MEDICATIONS:   Current Outpatient Medications:      acetaminophen (TYLENOL) 500 MG tablet, Take 2 tablets by mouth daily as needed , Disp: , Rfl:      calcium carbonate-vitamin D (CALCIUM + D) 600-200 MG-UNIT TABS, Take 1 tablet by mouth every evening , Disp: , Rfl:      escitalopram (LEXAPRO) 20 MG tablet, TAKE ONE TABLET (20 MG) BY MOUTH ONCE DAILY, Disp: 90 tablet, Rfl: 3     MULTIVITAMIN TABS   OR, Take 1 tablet by mouth daily , Disp: , Rfl:      traZODone (DESYREL) 50 MG tablet, Take 1 tablet (50 mg) by mouth At Bedtime May increase to max or 150 mg as needed for insomnia (Patient taking differently: Take 25 mg by mouth nightly as needed May increase to max or 150 mg as needed for insomnia), Disp: 90 tablet, Rfl: 1     valACYclovir (VALTREX) 1000 mg tablet, Take 1 tablet (1,000 mg) by mouth daily, Disp: 90 tablet, Rfl: 3     ALLERGIES:    Allergies   Allergen Reactions     Amoxicillin Swelling     Cefzil [Cefprozil] Swelling     Flu Virus Vaccine      \"throat felt funny\"     Adhesive Tape Rash     Biaxin [Clarithromycin] Hives     Erythromycin GI Disturbance     Latex Rash     Percocet [Acetaminophen] Hives     Sulfa Drugs Hives        SOCIAL HISTORY:   Social History     Socioeconomic History     Marital status:      Spouse name: Not on file     Number of " children: Not on file     Years of education: Not on file     Highest education level: Not on file   Occupational History     Not on file   Social Needs     Financial resource strain: Not on file     Food insecurity     Worry: Not on file     Inability: Not on file     Transportation needs     Medical: Not on file     Non-medical: Not on file   Tobacco Use     Smoking status: Never Smoker     Smokeless tobacco: Never Used     Tobacco comment: Nonsmoking household   Substance and Sexual Activity     Alcohol use: No     Drug use: No     Sexual activity: Never     Partners: Male   Lifestyle     Physical activity     Days per week: Not on file     Minutes per session: Not on file     Stress: Not on file   Relationships     Social connections     Talks on phone: Not on file     Gets together: Not on file     Attends Mandaeism service: Not on file     Active member of club or organization: Not on file     Attends meetings of clubs or organizations: Not on file     Relationship status: Not on file     Intimate partner violence     Fear of current or ex partner: Not on file     Emotionally abused: Not on file     Physically abused: Not on file     Forced sexual activity: Not on file   Other Topics Concern     Parent/sibling w/ CABG, MI or angioplasty before 65F 55M? Not Asked      Service No     Blood Transfusions No     Caffeine Concern No     Occupational Exposure No     Hobby Hazards No     Sleep Concern No     Stress Concern No     Weight Concern Yes     Special Diet No     Back Care No     Exercise No     Bike Helmet No     Seat Belt Yes     Self-Exams Yes   Social History Narrative     Not on file        FAMILY HISTORY:   Family History   Problem Relation Age of Onset     Hypertension Mother      Gastrointestinal Disease Mother         GAITAN'S ESOPHAGUS     Lipids Mother      Depression Mother      Other - See Comments Mother         Pagets Disease of the Vulva, Dx 06/2014     Osteoporosis Mother       "Obesity Mother      Allergies Father         HAYFEVER     Heart Disease Father         AFIB     Hypertension Father      Lipids Father      Hyperlipidemia Father      Gastrointestinal Disease Other         GAITAN'S ESOPHAGUS     Breast Cancer Other      Colon Cancer Other      Other Cancer Maternal Grandmother      Other Cancer Maternal Grandfather      Colon Cancer Paternal Grandfather      Gastrointestinal Disease Other         GAITAN'S ESOPHAGUS     Breast Cancer Other      Breast Cancer Paternal Aunt      Breast Cancer Other         fathers 1st cousin     Hyperlipidemia Brother         EXAM:Vitals: /77   Pulse 73   Ht 1.797 m (5' 10.75\")   Wt 90.7 kg (200 lb)   LMP 03/28/2010   BMI 28.09 kg/m    BMI= Body mass index is 28.09 kg/m .    Weight management plan: Patient was referred to their PCP to discuss a diet and exercise plan.    General appearance: Patient is alert and fully cooperative with history & exam.  No sign of distress is noted during the visit.     Psychiatric: Affect is pleasant & appropriate.  Patient appears motivated to improve health.     Respiratory: Breathing is regular & unlabored while sitting.     HEENT: Hearing is intact to spoken word.  Speech is clear.  No gross evidence of visual impairment that would impact ambulation.     Dermatologic: Skin is intact to right and left lower extremities without significant lesions, rash or abrasion.  No paronychia or evidence of soft tissue infection is noted.     Vascular: DP & PT pulses are intact & regular on the right and left.  No significant edema or varicosities noted.  CFT and skin temperature is normal to the right and left lower extremities.     Neurologic: Lower extremity sensation is intact to light touch.  No evidence of weakness or contracture in the right and left lower extremities.  No evidence of neuropathy.     Musculoskeletal: Patient is ambulatory without assistive device or brace.  No gross ankle deformity noted.  No " foot or ankle joint effusion is noted.  Noted pain on palpation on plantar aspect of the right left heel at the insertion point of the plantar fascia.  No surrounding erythema noted.  One tight gastroc complex bilaterally.       ASSESSMENT / PLAN:     ICD-10-CM    1. Plantar fasciitis  M72.2 Orthopedic & Spine  Referral       I have explained to Christina  about the conditions.  We discussed the nature of the condition as well as the treatment plan and expected length of recovery.  At this time, the patient was instructed on icing, stretching, tissue massage and support. To reduce the amount of current inflammation, the patient was prescribed Mobic 7.5 mg to be taken daily with food and instructed to stop taking if any stomach irritation or swelling in extremities are noted.  The patient will return in four weeks for reevaluation if the symptoms do not resolve.      Christina verbalized agreement with and understanding of the rational for the diagnosis and treatment plan.  All questions were answered to best of my ability and the patient's satisfaction. The patient was advised to contact the clinic with any questions that may arise after the clinic visit.      Disclaimer: This note consists of symbols derived from keyboarding, dictation and/or voice recognition software. As a result, there may be errors in the script that have gone undetected. Please consider this when interpreting information found in this chart.       STEFANIE [email or web address]., F.A.C.F.A.S.        Again, thank you for allowing me to participate in the care of your patient.        Sincerely,        Reilly Liao DPM

## 2020-09-16 NOTE — PROGRESS NOTES
PATIENT HISTORY:  Christina John is a 54 year old female who presents to clinic for a painful right and left foot .  The patient describes the pain as sharp stabbing.  The patient relates the pain level is moderate.  The patient relates pain is located on the bottom of the right and left foot right greater than left.  The patient relates the pain has been present for the past several weeks.  The patient relates pain with ambulation.  The patient has tried different shoes and inserts with little relief.      REVIEW OF SYSTEMS:  Constitutional, HEENT, cardiovascular, pulmonary, GI, , musculoskeletal, neuro, skin, endocrine and psych systems are negative, except as otherwise noted.     PAST MEDICAL HISTORY:   Past Medical History:   Diagnosis Date     Allergic rhinitis due to animal dander did IT x 4 yrs. 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 ended 9/06 per self     Diagnostic skin and sensitization tests 4/03 skin tests pos. for: cat/dog/DM/T/G/RW     Elevated cholesterol      Endometriosis      GERD (gastroesophageal reflux disease)      House dust mite allergy      Hypertension goal BP (blood pressure) < 140/90      Infertility     ON CLOMID     Mild major depression (H)      PCOS (polycystic ovarian syndrome)      Pelvic relaxation      Seasonal allergic conjunctivitis      Seasonal allergic rhinitis     4/03 skin tests pos. for: cat/dog/DM/T/G/RW--per Dr. Talavera.     Ulcers 1992    DX BY ENDOSCOPY        PAST SURGICAL HISTORY:   Past Surgical History:   Procedure Laterality Date     C APPENDECTOMY  1982     C/SECTION, CLASSICAL       COSMETIC SURGERY  10/13, 09/15     ENDOSCOPY      10/2000 [place], repeat 10/2002, REPEAT 2/08 (REPEAT IN 2 YRS)     ENT SURGERY  1973     EXCIS VAGINAL CYST/TUMOR       GI SURGERY  " 04/11     GLAUCOMA SURGERY       HC BIOPSY OF BREAST, OPEN INCISIONAL  2008    sentinel lymph node     HC COLONOSCOPY THRU STOMA, DIAGNOSTIC  12/04, 12/09    (REPEAT IN 5 YRS)     HC REMOVAL GALLBLADDER  6/2005     HYSTERECTOMY, PAP NO LONGER INDICATED       MASTECTOMY BILATERAL, INSERT TISSUE EXPANDER BILATERAL, COMBINED  8/2010     MASTECTOMY, BILATERAL       PELVIS LAPAROSCOPY,DX      x2        MEDICATIONS:   Current Outpatient Medications:      acetaminophen (TYLENOL) 500 MG tablet, Take 2 tablets by mouth daily as needed , Disp: , Rfl:      calcium carbonate-vitamin D (CALCIUM + D) 600-200 MG-UNIT TABS, Take 1 tablet by mouth every evening , Disp: , Rfl:      escitalopram (LEXAPRO) 20 MG tablet, TAKE ONE TABLET (20 MG) BY MOUTH ONCE DAILY, Disp: 90 tablet, Rfl: 3     MULTIVITAMIN TABS   OR, Take 1 tablet by mouth daily , Disp: , Rfl:      traZODone (DESYREL) 50 MG tablet, Take 1 tablet (50 mg) by mouth At Bedtime May increase to max or 150 mg as needed for insomnia (Patient taking differently: Take 25 mg by mouth nightly as needed May increase to max or 150 mg as needed for insomnia), Disp: 90 tablet, Rfl: 1     valACYclovir (VALTREX) 1000 mg tablet, Take 1 tablet (1,000 mg) by mouth daily, Disp: 90 tablet, Rfl: 3     ALLERGIES:    Allergies   Allergen Reactions     Amoxicillin Swelling     Cefzil [Cefprozil] Swelling     Flu Virus Vaccine      \"throat felt funny\"     Adhesive Tape Rash     Biaxin [Clarithromycin] Hives     Erythromycin GI Disturbance     Latex Rash     Percocet [Acetaminophen] Hives     Sulfa Drugs Hives        SOCIAL HISTORY:   Social History     Socioeconomic History     Marital status:      Spouse name: Not on file     Number of children: Not on file     Years of education: Not on file     Highest education level: Not on file   Occupational History     Not on file   Social Needs     Financial resource strain: Not on file     Food insecurity     Worry: Not on file     Inability: Not " on file     Transportation needs     Medical: Not on file     Non-medical: Not on file   Tobacco Use     Smoking status: Never Smoker     Smokeless tobacco: Never Used     Tobacco comment: Nonsmoking household   Substance and Sexual Activity     Alcohol use: No     Drug use: No     Sexual activity: Never     Partners: Male   Lifestyle     Physical activity     Days per week: Not on file     Minutes per session: Not on file     Stress: Not on file   Relationships     Social connections     Talks on phone: Not on file     Gets together: Not on file     Attends Baptism service: Not on file     Active member of club or organization: Not on file     Attends meetings of clubs or organizations: Not on file     Relationship status: Not on file     Intimate partner violence     Fear of current or ex partner: Not on file     Emotionally abused: Not on file     Physically abused: Not on file     Forced sexual activity: Not on file   Other Topics Concern     Parent/sibling w/ CABG, MI or angioplasty before 65F 55M? Not Asked      Service No     Blood Transfusions No     Caffeine Concern No     Occupational Exposure No     Hobby Hazards No     Sleep Concern No     Stress Concern No     Weight Concern Yes     Special Diet No     Back Care No     Exercise No     Bike Helmet No     Seat Belt Yes     Self-Exams Yes   Social History Narrative     Not on file        FAMILY HISTORY:   Family History   Problem Relation Age of Onset     Hypertension Mother      Gastrointestinal Disease Mother         GAITAN'S ESOPHAGUS     Lipids Mother      Depression Mother      Other - See Comments Mother         Pagets Disease of the Vulva, Dx 06/2014     Osteoporosis Mother      Obesity Mother      Allergies Father         HAYFEVER     Heart Disease Father         AFIB     Hypertension Father      Lipids Father      Hyperlipidemia Father      Gastrointestinal Disease Other         GAITAN'S ESOPHAGUS     Breast Cancer Other      Colon  "Cancer Other      Other Cancer Maternal Grandmother      Other Cancer Maternal Grandfather      Colon Cancer Paternal Grandfather      Gastrointestinal Disease Other         GAITAN'S ESOPHAGUS     Breast Cancer Other      Breast Cancer Paternal Aunt      Breast Cancer Other         fathers 1st cousin     Hyperlipidemia Brother         EXAM:Vitals: /77   Pulse 73   Ht 1.797 m (5' 10.75\")   Wt 90.7 kg (200 lb)   LMP 03/28/2010   BMI 28.09 kg/m    BMI= Body mass index is 28.09 kg/m .    Weight management plan: Patient was referred to their PCP to discuss a diet and exercise plan.    General appearance: Patient is alert and fully cooperative with history & exam.  No sign of distress is noted during the visit.     Psychiatric: Affect is pleasant & appropriate.  Patient appears motivated to improve health.     Respiratory: Breathing is regular & unlabored while sitting.     HEENT: Hearing is intact to spoken word.  Speech is clear.  No gross evidence of visual impairment that would impact ambulation.     Dermatologic: Skin is intact to right and left lower extremities without significant lesions, rash or abrasion.  No paronychia or evidence of soft tissue infection is noted.     Vascular: DP & PT pulses are intact & regular on the right and left.  No significant edema or varicosities noted.  CFT and skin temperature is normal to the right and left lower extremities.     Neurologic: Lower extremity sensation is intact to light touch.  No evidence of weakness or contracture in the right and left lower extremities.  No evidence of neuropathy.     Musculoskeletal: Patient is ambulatory without assistive device or brace.  No gross ankle deformity noted.  No foot or ankle joint effusion is noted.  Noted pain on palpation on plantar aspect of the right left heel at the insertion point of the plantar fascia.  No surrounding erythema noted.  One tight gastroc complex bilaterally.       ASSESSMENT / PLAN:     ICD-10-CM  "   1. Plantar fasciitis  M72.2 Orthopedic & Spine  Referral       I have explained to Christina  about the conditions.  We discussed the nature of the condition as well as the treatment plan and expected length of recovery.  At this time, the patient was instructed on icing, stretching, tissue massage and support. To reduce the amount of current inflammation, the patient was prescribed Mobic 7.5 mg to be taken daily with food and instructed to stop taking if any stomach irritation or swelling in extremities are noted.  The patient will return in four weeks for reevaluation if the symptoms do not resolve.      Christina verbalized agreement with and understanding of the rational for the diagnosis and treatment plan.  All questions were answered to best of my ability and the patient's satisfaction. The patient was advised to contact the clinic with any questions that may arise after the clinic visit.      Disclaimer: This note consists of symbols derived from keyboarding, dictation and/or voice recognition software. As a result, there may be errors in the script that have gone undetected. Please consider this when interpreting information found in this chart.       GONZALES Liao D.P.M., [email or web address].C.[email or web address].S.

## 2020-09-23 ENCOUNTER — TRANSFERRED RECORDS (OUTPATIENT)
Dept: HEALTH INFORMATION MANAGEMENT | Facility: CLINIC | Age: 55
End: 2020-09-23

## 2020-09-25 ENCOUNTER — TRANSFERRED RECORDS (OUTPATIENT)
Dept: HEALTH INFORMATION MANAGEMENT | Facility: CLINIC | Age: 55
End: 2020-09-25

## 2020-10-06 ENCOUNTER — OFFICE VISIT (OUTPATIENT)
Dept: FAMILY MEDICINE | Facility: CLINIC | Age: 55
End: 2020-10-06
Payer: COMMERCIAL

## 2020-10-06 VITALS
SYSTOLIC BLOOD PRESSURE: 110 MMHG | WEIGHT: 211.8 LBS | TEMPERATURE: 97.5 F | RESPIRATION RATE: 18 BRPM | BODY MASS INDEX: 29.75 KG/M2 | DIASTOLIC BLOOD PRESSURE: 78 MMHG | HEART RATE: 60 BPM

## 2020-10-06 DIAGNOSIS — S76.312A STRAIN OF LEFT HAMSTRING MUSCLE, INITIAL ENCOUNTER: Primary | ICD-10-CM

## 2020-10-06 PROCEDURE — 99213 OFFICE O/P EST LOW 20 MIN: CPT | Performed by: PHYSICIAN ASSISTANT

## 2020-10-06 RX ORDER — TRAMADOL HYDROCHLORIDE 50 MG/1
50 TABLET ORAL EVERY 6 HOURS PRN
Qty: 10 TABLET | Refills: 0 | Status: SHIPPED | OUTPATIENT
Start: 2020-10-06 | End: 2020-10-09

## 2020-10-06 RX ORDER — GABAPENTIN 100 MG/1
100 CAPSULE ORAL DAILY
COMMUNITY
Start: 2020-10-01 | End: 2022-11-01

## 2020-10-06 NOTE — PATIENT INSTRUCTIONS
Continue Meloxicam. Start Tizanidine for spasm. Use Tramadol sparingly. If your pain persists at this level, then we should get an MRI.     Patient Education     Self-Care for Strains and Sprains  Most minor strains and sprains can be treated with self-care. Recovering from a strain or sprain may take 6 to 8 weeks. Your self-care goal is to reduce pain and immobilize the injury to speed healing.     A sprain injures ligaments (tissue that connects bones to bones).      A strain injures muscles or tendons (tissue that connects muscles to bones).   Support the injured area  Wrapping the injured area provides support for short, necessary activities. Be careful not to wrap the area too tightly. This could cut off the blood supply.    Support a wrist, elbow, or shoulder with a sling.    Wrap an ankle or knee with an elastic bandage.    Tape a finger or toe to the one next to it.  Use cold and heat  Cold reduces swelling. Both cold and heat reduce pain. Heat should not be used in the initial treatment of the injury. When using cold or heat, always place a thin towel between the pack and your skin.    Apply ice or a cold pack 10 to 15 minutes every hour you re awake for the first 2 days.    After the swelling goes down, use cold or heat to control pain. Don t use heat late in the day, since it can cause swelling when you re not active.  Rest and elevate  Rest and elevation help your injury heal faster.    Raise the injured area above your heart level.    Keep the injured area from moving.    Limit the use of the joint or limb.  Use medicine    Aspirin reduces pain and swelling. (Note: Don t give aspirin to a child 18 or younger unless prescribed by the doctor.)    Non-steroidal anti-inflammatory medicines, such as ibuprofen, may reduce pain and swelling, as well. Ask your healthcare provider for advice.  When to call your healthcare provider  Call your healthcare provider if:    The injured joint won t move, or bones make  a grating sound when they move    You can t put weight on the injured area, even after 24 hours    The injured body part is cold, blue, tingling, or numb    The joint or limb appears bent or crooked.    Pain increases or doesn t improve in 4 days    When pressing along the injured area, you notice a spot that is especially painful  Date Last Reviewed: 5/1/2018 2000-2019 [employer]. [address] [place] [address], [place] [address]. All rights reserved. This information is not intended as a substitute for professional medical care. Always follow your healthcare professional's instructions.

## 2020-10-06 NOTE — PROGRESS NOTES
Subjective     Christina John is a 54 year old female who presents to clinic today for the following health issues:    HPI   Musculoskeletal problem/pain  Onset/Duration: 6 days   Description  Location: leg - left  Joint Swelling: no  Redness: no  Pain: YES - Goes from the gluteal muscles down the hamstring and stops at the knee.   Warmth: no  Intensity:  moderate, severe  Progression of Symptoms:  worsening  Accompanying signs and symptoms:   Fevers: no  Numbness/tingling/weakness: no  History  Trauma to the area: no  Recent illness:  no  Previous similar problem: no  Previous evaluation:  no  Precipitating or alleviating factors:  Aggravating factors include: standing and walking  Therapies tried and outcome: acetaminophen, NSAID - aleve and chiropractor  No specific injury that she can remember. Does a lot of lifting at home and at work.     Review of Systems   Constitutional, msk, neuro, skin, cardiovascular, pulmonary, gi and gu systems are negative, except as otherwise noted.      Objective    /78 (BP Location: Right arm, Patient Position: Sitting, Cuff Size: Adult Large)   Pulse 60   Temp 97.5  F (36.4  C) (Tympanic)   Resp 18   Wt 96.1 kg (211 lb 12.8 oz)   LMP 03/28/2010   BMI 29.75 kg/m    Body mass index is 29.75 kg/m .  Physical Exam   Constitutional: healthy, alert, and no distress  Head: Normocephalic. Atraumatic  Eyes: No conjunctival injection, sclera anicteric  Respiratory: No resp distress.  Musculoskeletal: extremities normal- no gross deformities noted, and normal muscle tone. Tenderness over the L inferior glutes and over the L hamstring. Tenderness iwith resisted knee flexion.   Neurologic: Gait antalgic favoring the R. CN 2-12 grossly intact  Psychiatric: mentation appears normal and affect normal/bright       Assessment & Plan   Strain of left hamstring muscle, initial encounter  Patient presents with 6 day hx of atraumatic R posterior leg pain.    History and exam are not  concerning for possible fracture.  Differential also includes strain, sprain, avulsion injury.  Given the location of the patients pain, suspect a hamstring strain. She does have tenderness at the insertion area of the hamstring on the pubis. If this continues to be severe, I do recommend an MRI to ensure that she has not avulsed the hamstring from this insertion site.   At this time recommend conservative therapies including rest, heat/ice, NSAIDs, and muscle relaxers. 10 tabs of Tramadol also given due to her pain today. Use sparingly.  reviewed and reassuring.   As pain recedes, begin normal activities slowly as tolerated. Consider Physical Therapy if symptoms not better with symptomatic care.   Return to clinic in 4 weeks if symptoms not significantly improved, or sooner for any new changing or worsening symptoms.  - tiZANidine (ZANAFLEX) 4 MG tablet; Take 1 tablet (4 mg) by mouth 3 times daily as needed for muscle spasms  - traMADol (ULTRAM) 50 MG tablet; Take 1 tablet (50 mg) by mouth every 6 hours as needed for severe pain    Return in about 4 weeks (around 11/3/2020), or if symptoms worsen or fail to improve, for In-Clinic Visit.    Micah Bah PA-C  [facility]

## 2020-10-07 ENCOUNTER — MYC MEDICAL ADVICE (OUTPATIENT)
Dept: FAMILY MEDICINE | Facility: CLINIC | Age: 55
End: 2020-10-07

## 2020-10-07 NOTE — TELEPHONE ENCOUNTER
Micah's response was my charted back to the patient told to let us know if she has questions.    BASILIA Wong

## 2020-10-07 NOTE — TELEPHONE ENCOUNTER
Did he patient try the Tramadol. This is an opiate. She can take 1-2 tabs at a time. I would recommend this if she hasnt tried it. If this is just not effective at all, then we can switch it up, but she needs to try 2 tabs of Tramadol at a time first.     Micah Bah PA-C

## 2020-10-08 ENCOUNTER — OFFICE VISIT (OUTPATIENT)
Dept: FAMILY MEDICINE | Facility: CLINIC | Age: 55
End: 2020-10-08
Payer: COMMERCIAL

## 2020-10-08 ENCOUNTER — MYC MEDICAL ADVICE (OUTPATIENT)
Dept: FAMILY MEDICINE | Facility: CLINIC | Age: 55
End: 2020-10-08

## 2020-10-08 VITALS
HEART RATE: 72 BPM | OXYGEN SATURATION: 95 % | RESPIRATION RATE: 20 BRPM | TEMPERATURE: 97.3 F | DIASTOLIC BLOOD PRESSURE: 80 MMHG | SYSTOLIC BLOOD PRESSURE: 118 MMHG | BODY MASS INDEX: 29.36 KG/M2 | WEIGHT: 209 LBS

## 2020-10-08 DIAGNOSIS — M62.838 MUSCLE SPASM: ICD-10-CM

## 2020-10-08 DIAGNOSIS — M54.30 SCIATIC LEG PAIN: Primary | ICD-10-CM

## 2020-10-08 PROCEDURE — 99214 OFFICE O/P EST MOD 30 MIN: CPT | Performed by: PHYSICIAN ASSISTANT

## 2020-10-08 RX ORDER — HYDROCODONE BITARTRATE AND ACETAMINOPHEN 7.5; 325 MG/15ML; MG/15ML
2.5 SOLUTION ORAL 4 TIMES DAILY PRN
Qty: 118 ML | Refills: 0 | Status: CANCELLED | OUTPATIENT
Start: 2020-10-08

## 2020-10-08 RX ORDER — METHYLPREDNISOLONE 4 MG
TABLET, DOSE PACK ORAL
Qty: 21 TABLET | Refills: 0 | Status: SHIPPED | OUTPATIENT
Start: 2020-10-08 | End: 2021-01-04

## 2020-10-08 RX ORDER — DIAZEPAM 2 MG
2 TABLET ORAL EVERY 12 HOURS PRN
Qty: 6 TABLET | Refills: 0 | Status: SHIPPED | OUTPATIENT
Start: 2020-10-08 | End: 2021-01-04

## 2020-10-08 NOTE — PROGRESS NOTES
Subjective     Christina John is a 54 year old female who presents to clinic today for the following health issues:    HPI         Musculoskeletal problem/pain  Onset/Duration: 1 week  Description  Location: buttock/hamstring- left  Joint Swelling: YES  Redness: no  Pain: YES  Warmth: no  Intensity:  severe  Progression of Symptoms:  worsening  Accompanying signs and symptoms:   Fevers: no  Numbness/tingling/weakness: YES  History  Trauma to the area: no  Recent illness:  no  Previous similar problem: YES  Previous evaluation:  YES  Precipitating or alleviating factors:  Aggravating factors include: walking, climbing stairs, lifting, exercise and overuse  Therapies tried and outcome:  Ultram and tizanidine    Lifts all day at work and has 3 grandkids at home.  No recalled injury.  Just one day felt she couldn't lift, came home part way through work.  Tried informal massage.  Daughter thought leg looked swollen.  Tried chiropractor on Mon.  Has now been off work x 3 days- not improving, feels is worsening.    Chronic back pain since college.  Minimal care sought for this.  No change in pain.    L hip pain - lateral to knee.    Plantar fascitis.  On meloxicam.  Peripheral neuropathy, presumptively.  History breast cancer treatment.  On gabapentin, just started.  No history DVT.    Tramadol 100 mg from Rob yesterday barely made pain tolerable.  Has had best luck in past, with surgeries, from liquid vicodin.  Cannot work with this much pain, but states work note will not help.    Review of Systems   Constitutional, HEENT, cardiovascular, pulmonary, GI, , musculoskeletal, neuro, skin, endocrine and psych systems are negative, except as otherwise noted.      Objective    /80 (BP Location: Right arm)   Pulse 72   Temp 97.3  F (36.3  C) (Tympanic)   Resp 20   Wt 94.8 kg (209 lb)   LMP 03/28/2010   SpO2 95%   BMI 29.36 kg/m    Body mass index is 29.36 kg/m .  Physical Exam   GENERAL: healthy, alert and no  distress  PSYCH: mentation appears normal, affect normal/bright  MS: no tenderness over spine or sacroiliac joint, no spasms in back, some symptoms provocation at sciatic triger  Hip with normal int and ext rotation, normal hip flexion, abduction, adduction, juanita  No tenderness of thigh but hamstring with spasm, very painful at insertion  No edema  Legs do not measure any difference in circumference x 3 measures        Assessment & Plan     Christina was seen today for pain.    Diagnoses and all orders for this visit:    Sciatic leg pain  -     Orthopedic & Spine  Referral; Future  -     methylPREDNISolone (MEDROL DOSEPAK) 4 MG tablet therapy pack; Follow Package Directions    Muscle spasm  -     diazepam (VALIUM) 2 MG tablet; Take 1 tablet (2 mg) by mouth every 12 hours as needed for muscle spasms Do not overlap with hydrocodone.          30 min spent with patient, over half in discussion of options for treatment  Favor Physical Therapy or sports med over MRI, given palpable spasm  Patient Instructions   Set up sports med opinion  Try valium  Update me tomorrow morning      No follow-ups on file.    Puja Castillo PA-C  [facility]

## 2020-10-08 NOTE — NURSING NOTE
"Chief Complaint   Patient presents with     Pain     left buttock pain        Initial /80 (BP Location: Right arm)   Pulse 72   Temp 97.3  F (36.3  C) (Tympanic)   Resp 20   Wt 94.8 kg (209 lb)   LMP 03/28/2010   SpO2 95%   BMI 29.36 kg/m   Estimated body mass index is 29.36 kg/m  as calculated from the following:    Height as of 9/16/20: 1.797 m (5' 10.75\").    Weight as of this encounter: 94.8 kg (209 lb).    Patient presents to the clinic using No DME    Health Maintenance that is potentially due pending provider review:  NONE    n/a    Is there anyone who you would like to be able to receive your results? No  If yes have patient fill out TRE    "

## 2020-10-15 ENCOUNTER — ANCILLARY PROCEDURE (OUTPATIENT)
Dept: GENERAL RADIOLOGY | Facility: CLINIC | Age: 55
End: 2020-10-15
Attending: PEDIATRICS
Payer: COMMERCIAL

## 2020-10-15 ENCOUNTER — OFFICE VISIT (OUTPATIENT)
Dept: ORTHOPEDICS | Facility: CLINIC | Age: 55
End: 2020-10-15
Attending: PHYSICIAN ASSISTANT
Payer: COMMERCIAL

## 2020-10-15 VITALS
WEIGHT: 209 LBS | BODY MASS INDEX: 29.26 KG/M2 | HEIGHT: 71 IN | SYSTOLIC BLOOD PRESSURE: 130 MMHG | DIASTOLIC BLOOD PRESSURE: 84 MMHG

## 2020-10-15 DIAGNOSIS — M79.18 LEFT BUTTOCK PAIN: Primary | ICD-10-CM

## 2020-10-15 DIAGNOSIS — S76.312A STRAIN OF LEFT HAMSTRING, INITIAL ENCOUNTER: ICD-10-CM

## 2020-10-15 DIAGNOSIS — M25.552 LEFT HIP PAIN: ICD-10-CM

## 2020-10-15 PROCEDURE — 99244 OFF/OP CNSLTJ NEW/EST MOD 40: CPT | Performed by: PEDIATRICS

## 2020-10-15 PROCEDURE — 73502 X-RAY EXAM HIP UNI 2-3 VIEWS: CPT | Mod: LT | Performed by: RADIOLOGY

## 2020-10-15 RX ORDER — CYCLOBENZAPRINE HCL 5 MG
5 TABLET ORAL 2 TIMES DAILY PRN
Qty: 15 TABLET | Refills: 0 | Status: SHIPPED | OUTPATIENT
Start: 2020-10-15 | End: 2020-10-26

## 2020-10-15 ASSESSMENT — MIFFLIN-ST. JEOR: SCORE: 1645.18

## 2020-10-15 NOTE — PROGRESS NOTES
Sports Medicine Clinic Visit    PCP: Lucía Galvan Dora is a 54 year old female who is seen  in consultation at the request of  Puja Castillo PA-C presenting with left posterior hip and thigh pain.    Injury: She reports one week of left hip and thigh pain with no injury. Her pain increase with walking, lifting, standing and sitting. Has tried multiple medications without relief, though has still been working.    Location of Pain: left posterior hip and thigh  Duration of Pain: 1 day(s)  Rating of Pain at worst: 8/10  Rating of Pain Currently: 5/10  Symptoms are better with: nothing  Symptoms are worse with: standing and walking, sitting  Additional Features:   Positive: weakness   Negative: swelling, bruising, popping, grinding, catching, locking, instability, paresthesias and numbness  Other evaluation and/or treatments so far consists of: Other medications: trazodone, tizanidine, valium, and medrol  Prior History of related problems: neuropathy in feet and hands    Social History: [employer]     Review of Systems  Skin: no bruising, no swelling  Musculoskeletal: as above  Neurologic: no numbness, paresthesias  Remainder of review of systems is negative including constitutional, CV, pulmonary, GI, except as noted in HPI or medical history.    Patient's current problem list, past medical and surgical history, and family history were reviewed.    Patient Active Problem List   Diagnosis     DCIS (ductal carcinoma in situ) of breast     GERD (gastroesophageal reflux disease)     PCOS (polycystic ovarian syndrome)     Seasonal allergic rhinitis     Allergic rhinitis due to animal dander     House dust mite allergy     Seasonal allergic conjunctivitis     HYPERLIPIDEMIA LDL GOAL <160     Mild major depression (H)     Diagnostic skin and sensitization tests     Desensitization to allergens     History of adenomatous polyp of colon     BRCA1 positive     Overweight (BMI 25.0-29.9)     History  of gastric bypass     Malignant neoplasm of right female breast, unspecified estrogen receptor status, unspecified site of breast (H)     Past Medical History:   Diagnosis Date     Allergic rhinitis due to animal dander did IT x 4 yrs. 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 ended 9/06 per self     Diagnostic skin and sensitization tests 4/03 skin tests pos. for: cat/dog/DM/T/G/RW     Elevated cholesterol      Endometriosis      GERD (gastroesophageal reflux disease)      House dust mite allergy      Hypertension goal BP (blood pressure) < 140/90      Infertility     ON CLOMID     Mild major depression (H)      PCOS (polycystic ovarian syndrome)      Pelvic relaxation      Seasonal allergic conjunctivitis      Seasonal allergic rhinitis     4/03 skin tests pos. for: cat/dog/DM/T/G/RW--per Dr. Talavera.     Ulcers 1992    DX BY ENDOSCOPY     Past Surgical History:   Procedure Laterality Date     C APPENDECTOMY  1982     C/SECTION, CLASSICAL       COSMETIC SURGERY  10/13, 09/15     ENDOSCOPY      10/2000 Xiao, repeat 10/2002, REPEAT 2/08 (REPEAT IN 2 YRS)     ENT SURGERY  1973     EXCIS VAGINAL CYST/TUMOR       GI SURGERY  04/11     GLAUCOMA SURGERY       HC BIOPSY OF BREAST, OPEN INCISIONAL  2008    sentinel lymph node     HC COLONOSCOPY THRU STOMA, DIAGNOSTIC  12/04, 12/09    (REPEAT IN 5 YRS)     HC REMOVAL GALLBLADDER  6/2005     HYSTERECTOMY, PAP NO LONGER INDICATED       MASTECTOMY BILATERAL, INSERT TISSUE EXPANDER BILATERAL, COMBINED  8/2010     MASTECTOMY, BILATERAL       PELVIS LAPAROSCOPY,DX      x2     Family History   Problem Relation Age of Onset     Hypertension Mother      Gastrointestinal Disease Mother         GAITAN'S ESOPHAGUS     Lipids Mother      Depression Mother      Other - See Comments Mother  "        Pagets Disease of the Vulva, Dx 06/2014     Osteoporosis Mother      Obesity Mother      Allergies Father         HAYFEVER     Heart Disease Father         AFIB     Hypertension Father      Lipids Father      Hyperlipidemia Father      Gastrointestinal Disease Other         GAITAN'S ESOPHAGUS     Breast Cancer Other      Colon Cancer Other      Other Cancer Maternal Grandmother      Other Cancer Maternal Grandfather      Colon Cancer Paternal Grandfather      Gastrointestinal Disease Other         GAITAN'S ESOPHAGUS     Breast Cancer Other      Breast Cancer Paternal Aunt      Breast Cancer Other         fathers 1st cousin     Hyperlipidemia Brother        Objective  /84   Ht 1.797 m (5' 10.75\")   Wt 94.8 kg (209 lb)   LMP 03/28/2010   BMI 29.36 kg/m      GENERAL APPEARANCE: healthy, alert and no distress   GAIT: antalgic  SKIN: no suspicious lesions or rashes  HEENT: Sclera clear, anicteric  CV: good peripheral pulses  RESP: Breathing not labored  NEURO: Normal strength and tone, mentation intact and speech normal  PSYCH:  mentation appears normal and affect normal/bright    Low back exam:  Inspection:     no visible deformity in the low back       normal skin       normal vascular       normal lymphatic    Posture:      normal    Tender:     midline       paraspinal muscles    Non Tender:     remainder of lumbar spine    ROM:      full flexion       full extension  - no pain with lumbar motions    Strength:     hip flexion 5/5 bilateral       knee extension 5/5 bilateral       ankle dorsiflexion 5/5 bilateral       ankle plantarflexion 5/5 bilateral    Reflexes:     patellar (L3, L4) symmetric normal       achilles tendons (S1) symmetric normal    Sensation:    grossly intact throughout lower extremities    Special tests:      straight leg raise left negative        straight leg raise right negative    Bilateral hip exam    Inspection:      no edema or ecchymosis in hip area    Tender:      " Buttock, mild ischial tuberosity    Non Tender:      remainder of the hip area bilateral    ROM:     Full active and passive ROM  bilateral    Strength:      flexion 5/5 bilateral       extension 4/5 left with pain       abduction 5/5 bilateral       adduction 5/5 bilateral    Sensation:      grossly intact in hip and thigh    Special Tests:      neg (-) LILLY left       neg (-) FADIR left    Radiology  I ordered, visualized and reviewed these images with the patient  Xr Pelvis And Hip Left 1 View  Result Date: 10/15/2020  XR PELVIS AND HIP LEFT 1 VIEW  10/15/2020 1:30 PM HISTORY: Left hip pain COMPARISON: None   IMPRESSION: No acute fracture or malalignment. Minimal bilateral hip and sacroiliac joint degenerative changes. Mild to moderate degenerative changes of the lower lumbar spine. FRANCIE EGAN MD    Assessment:  1. Left buttock pain    2. Strain of left hamstring, initial encounter      We discussed these other possible diagnosis: Hamstring Strain, less likely radicular/sciatica  Discussed supportive care, rest, crutches if needed, physical therapy.  Discussed medication management - patient has tried many medications, can trial another muscle relaxer, should not take with the other medications she's been prescribed and monitor for side effects with gabapentin and lexapro    - If not improvement, would recommend MRI    Plan:  - Today's Plan of Care:  [facility]hab[phone]  Consider crutches - PWB  Prescription Medications as directed - Cyclobenzaprine (do not take with tramadol, valium, tizanidine - stop these other medications), monitor for side effects with gabapentin and lexapro    -We also discussed other future treatment options:  MRI (hip v. low back)    Follow Up: 3 weeks    Concerning signs and symptoms were reviewed.  The patient expressed understanding of this management plan and all questions were answered at this time.    Thanks for the opportunity to  participate in the care of this patient, I will keep you updated on their progress.    CC: Puja Bear MD CAQ  Primary Care Sports Medicine  [facility]

## 2020-10-15 NOTE — RESULT ENCOUNTER NOTE
These results were discussed during office visit.    Patricia Bear MD, CAQ  Primary Care Sports Medicine  [facility]

## 2020-10-15 NOTE — PATIENT INSTRUCTIONS
We discussed these other possible diagnosis: Hamstring Strain, less likely radicular/sciatica    Plan:  - Today's Plan of Care:  [phone]  Consider crutches - PWB  Prescription Medications as directed - Cyclobenzaprine (do not take with tramadol, valium, tizanidine - stop these other medications), monitor for side effects with gabapentin and lexapro    -We also discussed other future treatment options:  MRI (hip v. low back)    Follow Up: 3 weeks    If you have any further questions for your physician or physician s care team you can call [phone] and use option 3 to leave a voice message. Calls received during business hours will be returned same day.

## 2020-10-15 NOTE — LETTER
10/15/2020         RE: Christina John  [address] [facility] [address]        Dear Colleague,    Thank you for referring your patient, Christina John, to the [facility]. Please see a copy of my visit note below.    Sports Medicine Clinic Visit    PCP: Lucía Galvan    Christina John is a 54 year old female who is seen  in consultation at the request of  Puja Castillo PA-C presenting with left posterior hip and thigh pain.    Injury: She reports one week of left hip and thigh pain with no injury. Her pain increase with walking, lifting, standing and sitting. Has tried multiple medications without relief, though has still been working.    Location of Pain: left posterior hip and thigh  Duration of Pain: 1 day(s)  Rating of Pain at worst: 8/10  Rating of Pain Currently: 5/10  Symptoms are better with: nothing  Symptoms are worse with: standing and walking, sitting  Additional Features:   Positive: weakness   Negative: swelling, bruising, popping, grinding, catching, locking, instability, paresthesias and numbness  Other evaluation and/or treatments so far consists of: Other medications: trazodone, tizanidine, valium, and medrol  Prior History of related problems: neuropathy in feet and hands    Social History: [employer]     Review of Systems  Skin: no bruising, no swelling  Musculoskeletal: as above  Neurologic: no numbness, paresthesias  Remainder of review of systems is negative including constitutional, CV, pulmonary, GI, except as noted in HPI or medical history.    Patient's current problem list, past medical and surgical history, and family history were reviewed.    Patient Active Problem List   Diagnosis     DCIS (ductal carcinoma in situ) of breast     GERD (gastroesophageal reflux disease)     PCOS (polycystic ovarian syndrome)     Seasonal allergic rhinitis     Allergic rhinitis due to animal dander     House dust mite allergy      Seasonal allergic conjunctivitis     HYPERLIPIDEMIA LDL GOAL <160     Mild major depression (H)     Diagnostic skin and sensitization tests     Desensitization to allergens     History of adenomatous polyp of colon     BRCA1 positive     Overweight (BMI 25.0-29.9)     History of gastric bypass     Malignant neoplasm of right female breast, unspecified estrogen receptor status, unspecified site of breast (H)     Past Medical History:   Diagnosis Date     Allergic rhinitis due to animal dander did IT x 4 yrs. 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 ended 9/06 per self     Diagnostic skin and sensitization tests 4/03 skin tests pos. for: cat/dog/DM/T/G/RW     Elevated cholesterol      Endometriosis      GERD (gastroesophageal reflux disease)      House dust mite allergy      Hypertension goal BP (blood pressure) < 140/90      Infertility     ON CLOMID     Mild major depression (H)      PCOS (polycystic ovarian syndrome)      Pelvic relaxation      Seasonal allergic conjunctivitis      Seasonal allergic rhinitis     4/03 skin tests pos. for: cat/dog/DM/T/G/RW--per Dr. Talavera.     Ulcers 1992    DX BY ENDOSCOPY     Past Surgical History:   Procedure Laterality Date     C APPENDECTOMY  1982     C/SECTION, CLASSICAL       COSMETIC SURGERY  10/13, 09/15     ENDOSCOPY      10/2000 X[facility], repeat 10/2002, REPEAT 2/08 (REPEAT IN 2 YRS)     ENT SURGERY  1973     EXCIS VAGINAL CYST/TUMOR       GI SURGERY  04/11     GLAUCOMA SURGERY       HC BIOPSY OF BREAST, OPEN INCISIONAL  2008    sentinel lymph node     HC COLONOSCOPY THRU STOMA, DIAGNOSTIC  12/04, 12/09    (REPEAT IN 5 YRS)     HC REMOVAL GALLBLADDER  6/2005     HYSTERECTOMY, PAP NO LONGER INDICATED       MASTECTOMY BILATERAL, INSERT TISSUE EXPANDER BILATERAL, COMBINED   "8/2010     MASTECTOMY, BILATERAL       PELVIS LAPAROSCOPY,DX      x2     Family History   Problem Relation Age of Onset     Hypertension Mother      Gastrointestinal Disease Mother         GAITAN'S ESOPHAGUS     Lipids Mother      Depression Mother      Other - See Comments Mother         Pagets Disease of the Vulva, Dx 06/2014     Osteoporosis Mother      Obesity Mother      Allergies Father         HAYFEVER     Heart Disease Father         AFIB     Hypertension Father      Lipids Father      Hyperlipidemia Father      Gastrointestinal Disease Other         GAITAN'S ESOPHAGUS     Breast Cancer Other      Colon Cancer Other      Other Cancer Maternal Grandmother      Other Cancer Maternal Grandfather      Colon Cancer Paternal Grandfather      Gastrointestinal Disease Other         GAITAN'S ESOPHAGUS     Breast Cancer Other      Breast Cancer Paternal Aunt      Breast Cancer Other         fathers 1st cousin     Hyperlipidemia Brother        Objective  /84   Ht 1.797 m (5' 10.75\")   Wt 94.8 kg (209 lb)   LMP 03/28/2010   BMI 29.36 kg/m      GENERAL APPEARANCE: healthy, alert and no distress   GAIT: antalgic  SKIN: no suspicious lesions or rashes  HEENT: Sclera clear, anicteric  CV: good peripheral pulses  RESP: Breathing not labored  NEURO: Normal strength and tone, mentation intact and speech normal  PSYCH:  mentation appears normal and affect normal/bright    Low back exam:  Inspection:     no visible deformity in the low back       normal skin       normal vascular       normal lymphatic    Posture:      normal    Tender:     midline       paraspinal muscles    Non Tender:     remainder of lumbar spine    ROM:      full flexion       full extension  - no pain with lumbar motions    Strength:     hip flexion 5/5 bilateral       knee extension 5/5 bilateral       ankle dorsiflexion 5/5 bilateral       ankle plantarflexion 5/5 bilateral    Reflexes:     patellar (L3, L4) symmetric normal       achilles " tendons (S1) symmetric normal    Sensation:    grossly intact throughout lower extremities    Special tests:      straight leg raise left negative        straight leg raise right negative    Bilateral hip exam    Inspection:      no edema or ecchymosis in hip area    Tender:      Buttock, mild ischial tuberosity    Non Tender:      remainder of the hip area bilateral    ROM:     Full active and passive ROM  bilateral    Strength:      flexion 5/5 bilateral       extension 4/5 left with pain       abduction 5/5 bilateral       adduction 5/5 bilateral    Sensation:      grossly intact in hip and thigh    Special Tests:      neg (-) LILLY left       neg (-) FADIR left    Radiology  I ordered, visualized and reviewed these images with the patient  Xr Pelvis And Hip Left 1 View  Result Date: 10/15/2020  XR PELVIS AND HIP LEFT 1 VIEW  10/15/2020 1:30 PM HISTORY: Left hip pain COMPARISON: None   IMPRESSION: No acute fracture or malalignment. Minimal bilateral hip and sacroiliac joint degenerative changes. Mild to moderate degenerative changes of the lower lumbar spine. FRANCIE EGAN MD    Assessment:  1. Left buttock pain    2. Strain of left hamstring, initial encounter      We discussed these other possible diagnosis: Hamstring Strain, less likely radicular/sciatica  Discussed supportive care, rest, crutches if needed, physical therapy.  Discussed medication management - patient has tried many medications, can trial another muscle relaxer, should not take with the other medications she's been prescribed and monitor for side effects with gabapentin and lexapro    - If not improvement, would recommend MRI    Plan:  - Today's Plan of Care:  Rehab: Physical Therapy: [facility] - [phone]  Consider crutches - PWB  Prescription Medications as directed - Cyclobenzaprine (do not take with tramadol, valium, tizanidine - stop these other medications), monitor for side effects with gabapentin and lexapro    -We  also discussed other future treatment options:  MRI (hip v. low back)    Follow Up: 3 weeks    Concerning signs and symptoms were reviewed.  The patient expressed understanding of this management plan and all questions were answered at this time.    Thanks for the opportunity to participate in the care of this patient, I will keep you updated on their progress.    CC: Puja Bear MD [facility]  Primary Care Sports Medicine  [facility]      Again, thank you for allowing me to participate in the care of your patient.        Sincerely,        Patricia Bear MD

## 2020-10-19 ENCOUNTER — HOSPITAL ENCOUNTER (OUTPATIENT)
Dept: PHYSICAL THERAPY | Facility: CLINIC | Age: 55
Setting detail: THERAPIES SERIES
End: 2020-10-19
Attending: PEDIATRICS
Payer: COMMERCIAL

## 2020-10-19 DIAGNOSIS — M79.18 LEFT BUTTOCK PAIN: ICD-10-CM

## 2020-10-19 DIAGNOSIS — S76.312A STRAIN OF LEFT HAMSTRING, INITIAL ENCOUNTER: ICD-10-CM

## 2020-10-19 PROCEDURE — 97110 THERAPEUTIC EXERCISES: CPT | Mod: GP | Performed by: PHYSICAL THERAPIST

## 2020-10-19 PROCEDURE — 97161 PT EVAL LOW COMPLEX 20 MIN: CPT | Mod: GP | Performed by: PHYSICAL THERAPIST

## 2020-10-22 ENCOUNTER — HOSPITAL ENCOUNTER (OUTPATIENT)
Dept: PHYSICAL THERAPY | Facility: CLINIC | Age: 55
Setting detail: THERAPIES SERIES
End: 2020-10-22
Attending: PEDIATRICS
Payer: COMMERCIAL

## 2020-10-22 PROCEDURE — 97110 THERAPEUTIC EXERCISES: CPT | Mod: GP | Performed by: PHYSICAL THERAPIST

## 2020-10-22 PROCEDURE — 97140 MANUAL THERAPY 1/> REGIONS: CPT | Mod: GP | Performed by: PHYSICAL THERAPIST

## 2020-10-25 ENCOUNTER — MYC MEDICAL ADVICE (OUTPATIENT)
Dept: ORTHOPEDICS | Facility: CLINIC | Age: 55
End: 2020-10-25

## 2020-10-25 DIAGNOSIS — M79.18 LEFT BUTTOCK PAIN: ICD-10-CM

## 2020-10-25 DIAGNOSIS — S76.312A STRAIN OF LEFT HAMSTRING, INITIAL ENCOUNTER: ICD-10-CM

## 2020-10-26 RX ORDER — CYCLOBENZAPRINE HCL 5 MG
5 TABLET ORAL 2 TIMES DAILY PRN
Qty: 15 TABLET | Refills: 0 | Status: SHIPPED | OUTPATIENT
Start: 2020-10-26 | End: 2021-01-04

## 2020-10-26 NOTE — TELEPHONE ENCOUNTER
Cyclobenzaprine re-ordered.  Glad symptoms are improving.  Follow up 11/5 as scheduled, early if needed for worsening symptoms.    Patricia eBar MD

## 2020-10-28 ENCOUNTER — HOSPITAL ENCOUNTER (OUTPATIENT)
Dept: PHYSICAL THERAPY | Facility: CLINIC | Age: 55
Setting detail: THERAPIES SERIES
End: 2020-10-28
Attending: PEDIATRICS
Payer: COMMERCIAL

## 2020-10-28 PROCEDURE — 97110 THERAPEUTIC EXERCISES: CPT | Mod: GP | Performed by: PHYSICAL THERAPIST

## 2020-10-28 PROCEDURE — 97140 MANUAL THERAPY 1/> REGIONS: CPT | Mod: GP | Performed by: PHYSICAL THERAPIST

## 2020-11-02 ENCOUNTER — HOSPITAL ENCOUNTER (OUTPATIENT)
Dept: PHYSICAL THERAPY | Facility: CLINIC | Age: 55
Setting detail: THERAPIES SERIES
End: 2020-11-02
Attending: PEDIATRICS
Payer: COMMERCIAL

## 2020-11-02 PROCEDURE — 97110 THERAPEUTIC EXERCISES: CPT | Mod: GP | Performed by: PHYSICAL THERAPIST

## 2020-11-02 PROCEDURE — 97140 MANUAL THERAPY 1/> REGIONS: CPT | Mod: GP | Performed by: PHYSICAL THERAPIST

## 2020-11-05 ENCOUNTER — HOSPITAL ENCOUNTER (OUTPATIENT)
Dept: PHYSICAL THERAPY | Facility: CLINIC | Age: 55
Setting detail: THERAPIES SERIES
End: 2020-11-05
Attending: PEDIATRICS
Payer: COMMERCIAL

## 2020-11-05 ENCOUNTER — OFFICE VISIT (OUTPATIENT)
Dept: ORTHOPEDICS | Facility: CLINIC | Age: 55
End: 2020-11-05
Payer: COMMERCIAL

## 2020-11-05 VITALS
DIASTOLIC BLOOD PRESSURE: 83 MMHG | SYSTOLIC BLOOD PRESSURE: 127 MMHG | HEIGHT: 71 IN | WEIGHT: 209 LBS | BODY MASS INDEX: 29.26 KG/M2

## 2020-11-05 DIAGNOSIS — M79.605 LEFT LEG PAIN: ICD-10-CM

## 2020-11-05 DIAGNOSIS — M79.18 LEFT BUTTOCK PAIN: Primary | ICD-10-CM

## 2020-11-05 PROCEDURE — 97535 SELF CARE MNGMENT TRAINING: CPT | Mod: GP | Performed by: PHYSICAL THERAPIST

## 2020-11-05 PROCEDURE — 99214 OFFICE O/P EST MOD 30 MIN: CPT | Performed by: PEDIATRICS

## 2020-11-05 PROCEDURE — 97110 THERAPEUTIC EXERCISES: CPT | Mod: GP | Performed by: PHYSICAL THERAPIST

## 2020-11-05 ASSESSMENT — MIFFLIN-ST. JEOR: SCORE: 1645.18

## 2020-11-05 NOTE — PATIENT INSTRUCTIONS
Plan:  - Today's Plan of Care:  MRI of the Lumbar Spine - Call [phone] to schedule MRI  Continue Physical Therapy    -We also discussed other future treatment options:  Consideration of epidural steroid injection    Follow Up: In clinic with Dr. Bear after MRI (wait at least 1-2 days)    If you have any further questions for your physician or physician s care team you can call [phone] and use option 3 to leave a voice message. Calls received during business hours will be returned same day.

## 2020-11-05 NOTE — PROGRESS NOTES
Sports Medicine Clinic Visit - Interim History November 5, 2020    PCP: Lucía Galvan Dora is a 54 year old female who is seen in f/u up for    Left buttock pain.  Since last visit on 10/15/20 patient has completed 4 session of physical therapy. She reports a improvement in her pain slightly. She states she is not wrapping the whole upper leg, but she still has pain with crossing her legs, getting in and out of car. Her pain is buttock and now into her lower leg as well.    Symptoms are better with: rest and PT and Gabapentin, zanaflex, flexeril  Symptoms are worse with: standing, walking, sitting  Additional Features:   Positive: weakness   Negative: swelling, bruising, popping, grinding, catching, locking, instability, paresthesias and numbness    Social History: [employer]    Review of Systems  Skin: no bruising, no swelling  Musculoskeletal: as above  Neurologic: yes numbness, paresthesias  Remainder of review of systems is negative including constitutional, CV, pulmonary, GI, except as noted in HPI or medical history.    Patient's current problem list, past medical and surgical history, and family history were reviewed.    Patient Active Problem List   Diagnosis     DCIS (ductal carcinoma in situ) of breast     GERD (gastroesophageal reflux disease)     PCOS (polycystic ovarian syndrome)     Seasonal allergic rhinitis     Allergic rhinitis due to animal dander     House dust mite allergy     Seasonal allergic conjunctivitis     HYPERLIPIDEMIA LDL GOAL <160     Mild major depression (H)     Diagnostic skin and sensitization tests     Desensitization to allergens     History of adenomatous polyp of colon     BRCA1 positive     Overweight (BMI 25.0-29.9)     History of gastric bypass     Malignant neoplasm of right female breast, unspecified estrogen receptor status, unspecified site of breast (H)     Past Medical History:   Diagnosis Date     Allergic rhinitis due to animal dander did IT x 4  yrs. 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 ended 9/06 per self     Diagnostic skin and sensitization tests 4/03 skin tests pos. for: cat/dog/DM/T/G/RW     Elevated cholesterol      Endometriosis      GERD (gastroesophageal reflux disease)      House dust mite allergy      Hypertension goal BP (blood pressure) < 140/90      Infertility     ON CLOMID     Mild major depression (H)      PCOS (polycystic ovarian syndrome)      Pelvic relaxation      Seasonal allergic conjunctivitis      Seasonal allergic rhinitis     4/03 skin tests pos. for: cat/dog/DM/T/G/RW--per Dr. Talavera.     Ulcers 1992    DX BY ENDOSCOPY     Past Surgical History:   Procedure Laterality Date     C APPENDECTOMY  1982     C/SECTION, CLASSICAL       COSMETIC SURGERY  10/13, 09/15     ENDOSCOPY      10/2000 [facility], repeat 10/2002, REPEAT 2/08 (REPEAT IN 2 YRS)     ENT SURGERY  1973     EXCIS VAGINAL CYST/TUMOR       GI SURGERY  04/11     GLAUCOMA SURGERY       HC BIOPSY OF BREAST, OPEN INCISIONAL  2008    sentinel lymph node     HC COLONOSCOPY THRU STOMA, DIAGNOSTIC  12/04, 12/09    (REPEAT IN 5 YRS)     HC REMOVAL GALLBLADDER  6/2005     HYSTERECTOMY, PAP NO LONGER INDICATED       MASTECTOMY BILATERAL, INSERT TISSUE EXPANDER BILATERAL, COMBINED  8/2010     MASTECTOMY, BILATERAL       PELVIS LAPAROSCOPY,DX      x2     Family History   Problem Relation Age of Onset     Hypertension Mother      Gastrointestinal Disease Mother         GAITAN'S ESOPHAGUS     Lipids Mother      Depression Mother      Other - See Comments Mother         Pagets Disease of the Vulva, Dx 06/2014     Osteoporosis Mother      Obesity Mother      Allergies Father         HAYFEVER     Heart Disease Father         AFIB     Hypertension Father      Lipids Father       "Hyperlipidemia Father      Gastrointestinal Disease Other         GAITAN'S ESOPHAGUS     Breast Cancer Other      Colon Cancer Other      Other Cancer Maternal Grandmother      Other Cancer Maternal Grandfather      Colon Cancer Paternal Grandfather      Gastrointestinal Disease Other         GAITAN'S ESOPHAGUS     Breast Cancer Other      Breast Cancer Paternal Aunt      Breast Cancer Other         fathers 1st cousin     Hyperlipidemia Brother        Objective  /83   Ht 1.797 m (5' 10.75\")   Wt 94.8 kg (209 lb)   LMP 03/28/2010   BMI 29.36 kg/m      GENERAL APPEARANCE: healthy, alert and no distress   GAIT: antalgic slightly  SKIN: no suspicious lesions or rashes  HEENT: Sclera clear, anicteric  CV: good peripheral pulses  RESP: Breathing not labored  NEURO: Normal strength and tone, mentation intact and speech normal  PSYCH:  mentation appears normal and affect normal/bright     Low back exam:  Inspection:     no visible deformity in the low back       normal skin       normal vascular       normal lymphatic     Posture:      normal     Tender:     midline       paraspinal muscles  - mild buttock tenderness     Non Tender:     remainder of lumbar spine     ROM:      full flexion - some pain into leg       full extension     Strength:     hip flexion 5/5 bilateral       knee extension 5/5 bilateral       ankle dorsiflexion 5/5 bilateral       ankle plantarflexion 5/5 bilateral     Reflexes:     patellar (L3, L4) symmetric normal       achilles tendons (S1) symmetric normal     Sensation:    grossly intact throughout lower extremities     Special tests:      straight leg raise left positive       straight leg raise right negative    Radiology  I visualized and reviewed these images with the patient  Xr Pelvis And Hip Left 1 View  Result Date: 10/15/2020  XR PELVIS AND HIP LEFT 1 VIEW  10/15/2020 1:30 PM HISTORY: Left hip pain COMPARISON: None   IMPRESSION: No acute fracture or malalignment. Minimal " bilateral hip and sacroiliac joint degenerative changes. Mild to moderate degenerative changes of the lower lumbar spine. FRANCIE EGAN MD    LUMBAR SPINE TWO - THREE VIEWS  12/15/2017 10:01 AM   HISTORY: Acute midline low back pain without sciatica.  COMPARISON: None.                                                           IMPRESSION: Lumbar vertebrae are normally aligned. No compression  deformity or acute fracture. Surgical clips in the right upper  quadrant.    Assessment:  1. Left buttock pain    2. Left leg pain      Today's exam more concerning for lumbar radicular pain than Hamstring.  Given lack of improvement with PT, will obtain MRI to evaluate.    Plan:  - Today's Plan of Care:  MRI of the Lumbar Spine - Call [phone] to schedule MRI  Continue Physical Therapy    -We also discussed other future treatment options:  Consideration of epidural steroid injection    Follow Up: In clinic with Dr. Bear after MRI (wait at least 1-2 days)    Concerning signs and symptoms were reviewed.  The patient expressed understanding of this management plan and all questions were answered at this time.    Patricia Bear MD [facility]  Primary Care Sports Medicine  [facility]

## 2020-11-05 NOTE — LETTER
11/5/2020         RE: Christina John  [address] [facility] [address]        Dear Colleague,    Thank you for referring your patient, Christina John, to the [facility]. Please see a copy of my visit note below.      Sports Medicine Clinic Visit - Interim History November 5, 2020    PCP: Lucía Galvan    Christina John is a 54 year old female who is seen in f/u up for    Left buttock pain.  Since last visit on 10/15/20 patient has completed 4 session of physical therapy. She reports a improvement in her pain slightly. She states she is not wrapping the whole upper leg, but she still has pain with crossing her legs, getting in and out of car. Her pain is buttock and now into her lower leg as well.    Symptoms are better with: rest and PT and Gabapentin, zanaflex, flexeril  Symptoms are worse with: standing, walking, sitting  Additional Features:   Positive: weakness   Negative: swelling, bruising, popping, grinding, catching, locking, instability, paresthesias and numbness    Social History: [employer]    Review of Systems  Skin: no bruising, no swelling  Musculoskeletal: as above  Neurologic: yes numbness, paresthesias  Remainder of review of systems is negative including constitutional, CV, pulmonary, GI, except as noted in HPI or medical history.    Patient's current problem list, past medical and surgical history, and family history were reviewed.    Patient Active Problem List   Diagnosis     DCIS (ductal carcinoma in situ) of breast     GERD (gastroesophageal reflux disease)     PCOS (polycystic ovarian syndrome)     Seasonal allergic rhinitis     Allergic rhinitis due to animal dander     House dust mite allergy     Seasonal allergic conjunctivitis     HYPERLIPIDEMIA LDL GOAL <160     Mild major depression (H)     Diagnostic skin and sensitization tests     Desensitization to allergens     History of adenomatous polyp of colon     BRCA1 positive      Overweight (BMI 25.0-29.9)     History of gastric bypass     Malignant neoplasm of right female breast, unspecified estrogen receptor status, unspecified site of breast (H)     Past Medical History:   Diagnosis Date     Allergic rhinitis due to animal dander did IT x 4 yrs. 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 ended 9/06 per self     Diagnostic skin and sensitization tests 4/03 skin tests pos. for: cat/dog/DM/T/G/RW     Elevated cholesterol      Endometriosis      GERD (gastroesophageal reflux disease)      House dust mite allergy      Hypertension goal BP (blood pressure) < 140/90      Infertility     ON CLOMID     Mild major depression (H)      PCOS (polycystic ovarian syndrome)      Pelvic relaxation      Seasonal allergic conjunctivitis      Seasonal allergic rhinitis     4/03 skin tests pos. for: cat/dog/DM/T/G/RW--per Dr. Talavera.     Ulcers 1992    DX BY ENDOSCOPY     Past Surgical History:   Procedure Laterality Date     C APPENDECTOMY  1982     C/SECTION, CLASSICAL       COSMETIC SURGERY  10/13, 09/15     ENDOSCOPY      [facility], repeat 10/2002, REPEAT 2/08 (REPEAT IN 2 YRS)     ENT SURGERY  1973     EXCIS VAGINAL CYST/TUMOR       GI SURGERY  04/11     GLAUCOMA SURGERY       HC BIOPSY OF BREAST, OPEN INCISIONAL  2008    sentinel lymph node     HC COLONOSCOPY THRU STOMA, DIAGNOSTIC  12/04, 12/09    (REPEAT IN 5 YRS)     HC REMOVAL GALLBLADDER  6/2005     HYSTERECTOMY, PAP NO LONGER INDICATED       MASTECTOMY BILATERAL, INSERT TISSUE EXPANDER BILATERAL, COMBINED  8/2010     MASTECTOMY, BILATERAL       PELVIS LAPAROSCOPY,DX      x2     Family History   Problem Relation Age of Onset     Hypertension Mother      Gastrointestinal Disease Mother         GAITAN'S ESOPHAGUS     Lipids Mother       "Depression Mother      Other - See Comments Mother         Pagets Disease of the Vulva, Dx 06/2014     Osteoporosis Mother      Obesity Mother      Allergies Father         HAYFEVER     Heart Disease Father         AFIB     Hypertension Father      Lipids Father      Hyperlipidemia Father      Gastrointestinal Disease Other         GAITAN'S ESOPHAGUS     Breast Cancer Other      Colon Cancer Other      Other Cancer Maternal Grandmother      Other Cancer Maternal Grandfather      Colon Cancer Paternal Grandfather      Gastrointestinal Disease Other         GAITAN'S ESOPHAGUS     Breast Cancer Other      Breast Cancer Paternal Aunt      Breast Cancer Other         fathers 1st cousin     Hyperlipidemia Brother        Objective  /83   Ht 1.797 m (5' 10.75\")   Wt 94.8 kg (209 lb)   LMP 03/28/2010   BMI 29.36 kg/m      GENERAL APPEARANCE: healthy, alert and no distress   GAIT: antalgic slightly  SKIN: no suspicious lesions or rashes  HEENT: Sclera clear, anicteric  CV: good peripheral pulses  RESP: Breathing not labored  NEURO: Normal strength and tone, mentation intact and speech normal  PSYCH:  mentation appears normal and affect normal/bright     Low back exam:  Inspection:     no visible deformity in the low back       normal skin       normal vascular       normal lymphatic     Posture:      normal     Tender:     midline       paraspinal muscles  - mild buttock tenderness     Non Tender:     remainder of lumbar spine     ROM:      full flexion - some pain into leg       full extension     Strength:     hip flexion 5/5 bilateral       knee extension 5/5 bilateral       ankle dorsiflexion 5/5 bilateral       ankle plantarflexion 5/5 bilateral     Reflexes:     patellar (L3, L4) symmetric normal       achilles tendons (S1) symmetric normal     Sensation:    grossly intact throughout lower extremities     Special tests:      straight leg raise left positive       straight leg raise right " negative    Radiology  I visualized and reviewed these images with the patient  Xr Pelvis And Hip Left 1 View  Result Date: 10/15/2020  XR PELVIS AND HIP LEFT 1 VIEW  10/15/2020 1:30 PM HISTORY: Left hip pain COMPARISON: None   IMPRESSION: No acute fracture or malalignment. Minimal bilateral hip and sacroiliac joint degenerative changes. Mild to moderate degenerative changes of the lower lumbar spine. FRANCIE EGAN MD    LUMBAR SPINE TWO - THREE VIEWS  12/15/2017 10:01 AM   HISTORY: Acute midline low back pain without sciatica.  COMPARISON: None.                                                           IMPRESSION: Lumbar vertebrae are normally aligned. No compression  deformity or acute fracture. Surgical clips in the right upper  quadrant.    Assessment:  1. Left buttock pain    2. Left leg pain      Today's exam more concerning for lumbar radicular pain than Hamstring.  Given lack of improvement with PT, will obtain MRI to evaluate.    Plan:  - Today's Plan of Care:  MRI of the Lumbar Spine - Call [phone] to schedule MRI  Continue Physical Therapy    -We also discussed other future treatment options:  Consideration of epidural steroid injection    Follow Up: In clinic with Dr. Bear after MRI (wait at least 1-2 days)    Concerning signs and symptoms were reviewed.  The patient expressed understanding of this management plan and all questions were answered at this time.    Patricia Bear MD [facility]  Primary Care Sports Medicine  [facility]      Again, thank you for allowing me to participate in the care of your patient.        Sincerely,        Patricia Bear MD

## 2020-11-06 ENCOUNTER — HOSPITAL ENCOUNTER (OUTPATIENT)
Dept: MRI IMAGING | Facility: CLINIC | Age: 55
Discharge: HOME | End: 2020-11-06
Attending: PEDIATRICS | Admitting: PEDIATRICS
Payer: COMMERCIAL

## 2020-11-06 DIAGNOSIS — M79.605 LEFT LEG PAIN: ICD-10-CM

## 2020-11-06 PROCEDURE — 72148 MRI LUMBAR SPINE W/O DYE: CPT

## 2020-11-12 ENCOUNTER — OFFICE VISIT (OUTPATIENT)
Dept: ORTHOPEDICS | Facility: CLINIC | Age: 55
End: 2020-11-12
Payer: COMMERCIAL

## 2020-11-12 VITALS
SYSTOLIC BLOOD PRESSURE: 111 MMHG | HEIGHT: 71 IN | DIASTOLIC BLOOD PRESSURE: 79 MMHG | BODY MASS INDEX: 29.4 KG/M2 | WEIGHT: 210 LBS

## 2020-11-12 DIAGNOSIS — M79.605 LEFT LEG PAIN: ICD-10-CM

## 2020-11-12 DIAGNOSIS — M79.18 LEFT BUTTOCK PAIN: Primary | ICD-10-CM

## 2020-11-12 DIAGNOSIS — M51.26 LUMBAR DISC HERNIATION: ICD-10-CM

## 2020-11-12 PROCEDURE — 99214 OFFICE O/P EST MOD 30 MIN: CPT | Performed by: PEDIATRICS

## 2020-11-12 ASSESSMENT — MIFFLIN-ST. JEOR: SCORE: 1649.71

## 2020-11-12 NOTE — LETTER
11/12/2020         RE: Christina John  [address] [facility] [id number]        Dear Colleague,    Thank you for referring your patient, Christina John, to the [facility]. Please see a copy of my visit note below.    Sports Medicine Clinic Visit - Interim History November 12, 2020    PCP: Lucía Galvan    Christina John is a 54 year old female who is seen in f/u up for    Left buttock pain  Left leg pain  Lumbar disc herniation. Since last visit on 11/5/20 patient has has completed and MRI of the lumbar spine. She reports she is sore from work. She reports an increase in pain with walking, bending over, standing, and lifting.    Symptoms are better with: Tylenol and Ibuprofen  Symptoms are worse with: standing, stairs, lifting, walking, bending  Additional Features:   Positive: paresthesias, numbness and weakness   Negative: swelling, bruising, popping, grinding, catching, locking and instability    Social History: [employer]    Review of Systems  Skin: no bruising, no swelling  Musculoskeletal: as above  Neurologic: no numbness, paresthesias  Remainder of review of systems is negative including constitutional, CV, pulmonary, GI, except as noted in HPI or medical history.    Patient's current problem list, past medical and surgical history, and family history were reviewed.    Patient Active Problem List   Diagnosis     DCIS (ductal carcinoma in situ) of breast     GERD (gastroesophageal reflux disease)     PCOS (polycystic ovarian syndrome)     Seasonal allergic rhinitis     Allergic rhinitis due to animal dander     House dust mite allergy     Seasonal allergic conjunctivitis     HYPERLIPIDEMIA LDL GOAL <160     Mild major depression (H)     Diagnostic skin and sensitization tests     Desensitization to allergens     History of adenomatous polyp of colon     BRCA1 positive     Overweight (BMI 25.0-29.9)     History of gastric bypass     Malignant  neoplasm of right female breast, unspecified estrogen receptor status, unspecified site of breast (H)     Past Medical History:   Diagnosis Date     Allergic rhinitis due to animal dander did IT x 4 yrs. 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 ended 9/06 per self     Diagnostic skin and sensitization tests 4/03 skin tests pos. for: cat/dog/DM/T/G/RW     Elevated cholesterol      Endometriosis      GERD (gastroesophageal reflux disease)      House dust mite allergy      Hypertension goal BP (blood pressure) < 140/90      Infertility     ON CLOMID     Mild major depression (H)      PCOS (polycystic ovarian syndrome)      Pelvic relaxation      Seasonal allergic conjunctivitis      Seasonal allergic rhinitis     4/03 skin tests pos. for: cat/dog/DM/T/G/RW--per Dr. Talavera.     Ulcers 1992    DX BY ENDOSCOPY     Past Surgical History:   Procedure Laterality Date     C APPENDECTOMY  1982     C/SECTION, CLASSICAL       COSMETIC SURGERY  10/13, 09/15     ENDOSCOPY      10/2000 Xiao, repeat 10/2002, REPEAT 2/08 (REPEAT IN 2 YRS)     ENT SURGERY  1973     EXCIS VAGINAL CYST/TUMOR       GI SURGERY  04/11     GLAUCOMA SURGERY       HC BIOPSY OF BREAST, OPEN INCISIONAL  2008    sentinel lymph node     HC COLONOSCOPY THRU STOMA, DIAGNOSTIC  12/04, 12/09    (REPEAT IN 5 YRS)     HC REMOVAL GALLBLADDER  6/2005     HYSTERECTOMY, PAP NO LONGER INDICATED       MASTECTOMY BILATERAL, INSERT TISSUE EXPANDER BILATERAL, COMBINED  8/2010     MASTECTOMY, BILATERAL       PELVIS LAPAROSCOPY,DX      x2     Family History   Problem Relation Age of Onset     Hypertension Mother      Gastrointestinal Disease Mother         GAITAN'S ESOPHAGUS     Lipids Mother      Depression Mother      Other - See Comments Mother         Pagets Disease of the  "Vulva, Dx 06/2014     Osteoporosis Mother      Obesity Mother      Allergies Father         HAYFEVER     Heart Disease Father         AFIB     Hypertension Father      Lipids Father      Hyperlipidemia Father      Gastrointestinal Disease Other         GAITAN'S ESOPHAGUS     Breast Cancer Other      Colon Cancer Other      Other Cancer Maternal Grandmother      Other Cancer Maternal Grandfather      Colon Cancer Paternal Grandfather      Gastrointestinal Disease Other         GAITAN'S ESOPHAGUS     Breast Cancer Other      Breast Cancer Paternal Aunt      Breast Cancer Other         fathers 1st cousin     Hyperlipidemia Brother        Objective  /79   Ht 1.797 m (5' 10.75\")   Wt 95.3 kg (210 lb)   LMP 03/28/2010   BMI 29.50 kg/m         GENERAL APPEARANCE: healthy, alert and no distress   GAIT: normal  SKIN: no suspicious lesions or rashes  HEENT: Sclera clear, anicteric  CV: no peripheral edema  RESP: Breathing not labored  NEURO: Normal strength and tone, mentation intact and speech normal  PSYCH:  mentation appears normal and affect normal/bright     Low back exam:  Inspection:     no visible deformity in the low back       normal skin       normal vascular       normal lymphatic     Posture:      normal     ROM:      full flexion - some pain into leg       full extension     Strength:     hip flexion 5/5 bilateral       knee extension 5/5 bilateral       ankle dorsiflexion 5/5 bilateral       ankle plantarflexion 5/5 bilateral     Reflexes:     patellar (L3, L4) symmetric normal       achilles tendons (S1) symmetric normal     Sensation:    grossly intact throughout lower extremities     Special tests:      straight leg raise left positive       straight leg raise right negative     Radiology  I ordered, visualized and reviewed these images with the patient  MRI LUMBAR SPINE WITHOUT CONTRAST   11/6/2020 7:24 PM      HISTORY: eval nerve impingement; Left leg pain      TECHNIQUE: Multiplanar " multisequence MRI of the lumbar spine without  contrast.      COMPARISON: Lumbar spine radiographs dated 12/15/2017.      FINDINGS: Five lumbar vertebral bodies are presumed. Sagittal  alignment is normal. Normal vertebral body heights. Small chronic  appearing Schmorl's node along the inferior endplate of L4. No  concerning marrow signal. Normal appearance of the distal spinal cord  with the conus terminating at L1. Unremarkable visualized paraspinous  soft tissues and visualized bony pelvis.     Segmental analysis:  T12-L1: Normal disc height and signal. No herniation. Normal facets.  No spinal canal or neural foraminal stenosis.     L1-L2: Normal disc height and signal. No herniation. Mild facet  arthropathy. No spinal canal or neural foraminal stenosis.     L2-L3: Mild disc desiccation without disc height loss. No herniation.  Mild-to-moderate facet arthropathy. No spinal canal or neural  foraminal stenosis.     L3-L4: Disc desiccation with minimal disc height loss. Shallow  symmetric disc bulge with mild-to-moderate facet arthropathy. No  spinal canal stenosis. Minimal bilateral neural foraminal narrowing.     L4-L5: Mild disc height loss. Symmetric disc bulge with moderate facet  arthropathy and ligamentum flavum thickening. Mild bilateral lateral  recess stenosis without central spinal canal stenosis. Minimal  bilateral neural foraminal narrowing.     L5-S1: Disc desiccation without significant disc height loss. There is  a disc bulge with superimposed left lateral recess disc extrusion. The  extruded disc material places a significant mass effect upon the  traversing left S1 nerve root, displacing it posteriorly. There is  moderate left lateral recess stenosis. Mild spinal canal stenosis. The  neural foramina are patent.                                                                      IMPRESSION:  Multilevel degenerative disc disease and facet arthropathy of the  lumbar spine, as described. There is a  prominent left lateral recess  disc extrusion at L5-S1 with associated mass effect upon and posterior  displacement of the traversing left S1 nerve root. Correlate  clinically for possible left S1 radiculopathy. Please see the body of  the report for additional details, including level by level findings.     Assessment:  1. Left buttock pain    2. Left leg pain    3. Lumbar disc herniation        Plan:  - Today's Plan of Care:  Epidural Steroid injection done at [facility]  Continue Physical Therapy    -We also discussed other future treatment options:  Referral to Spine    Follow Up: 1 month after the injection    Concerning signs and symptoms were reviewed.  The patient expressed understanding of this management plan and all questions were answered at this time.    Patricia Bear MD CAQ  Primary Care Sports Medicine  [facility]      Again, thank you for allowing me to participate in the care of your patient.        Sincerely,        Patricia Bear MD

## 2020-11-12 NOTE — PATIENT INSTRUCTIONS
Plan:  - Today's Plan of Care:  Epidural Steroid injection done at [facility]  Continue Physical Therapy    -We also discussed other future treatment options:  Referral to Spine    Follow Up: 1 month after the injection    If you have any further questions for your physician or physician s care team you can call [phone] and use option 3 to leave a voice message. Calls received during business hours will be returned same day.

## 2020-11-12 NOTE — PROGRESS NOTES
Sports Medicine Clinic Visit - Interim History November 12, 2020    PCP: Lucía Galvan Dora is a 54 year old female who is seen in f/u up for    Left buttock pain  Left leg pain  Lumbar disc herniation. Since last visit on 11/5/20 patient has has completed and MRI of the lumbar spine. She reports she is sore from work. She reports an increase in pain with walking, bending over, standing, and lifting.    Symptoms are better with: Tylenol and Ibuprofen  Symptoms are worse with: standing, stairs, lifting, walking, bending  Additional Features:   Positive: paresthesias, numbness and weakness   Negative: swelling, bruising, popping, grinding, catching, locking and instability    Social History: [employer]    Review of Systems  Skin: no bruising, no swelling  Musculoskeletal: as above  Neurologic: no numbness, paresthesias  Remainder of review of systems is negative including constitutional, CV, pulmonary, GI, except as noted in HPI or medical history.    Patient's current problem list, past medical and surgical history, and family history were reviewed.    Patient Active Problem List   Diagnosis     DCIS (ductal carcinoma in situ) of breast     GERD (gastroesophageal reflux disease)     PCOS (polycystic ovarian syndrome)     Seasonal allergic rhinitis     Allergic rhinitis due to animal dander     House dust mite allergy     Seasonal allergic conjunctivitis     HYPERLIPIDEMIA LDL GOAL <160     Mild major depression (H)     Diagnostic skin and sensitization tests     Desensitization to allergens     History of adenomatous polyp of colon     BRCA1 positive     Overweight (BMI 25.0-29.9)     History of gastric bypass     Malignant neoplasm of right female breast, unspecified estrogen receptor status, unspecified site of breast (H)     Past Medical History:   Diagnosis Date     Allergic rhinitis due to animal dander did IT x 4 yrs. ended 9/06 per self     BRCA1 positive      DCIS (ductal carcinoma in  situ) of breast 8/08 and 8/10    RT ( stage 2a) and left ( stage 0)BREAST - STAGE 2A - GRADE III     Depressive disorder      Desensitization to allergens     did IT x 4 yrs. ended 9/06 per self     Diagnostic skin and sensitization tests 4/03 skin tests pos. for: cat/dog/DM/T/G/RW     Elevated cholesterol      Endometriosis      GERD (gastroesophageal reflux disease)      House dust mite allergy      Hypertension goal BP (blood pressure) < 140/90      Infertility     ON CLOMID     Mild major depression (H)      PCOS (polycystic ovarian syndrome)      Pelvic relaxation      Seasonal allergic conjunctivitis      Seasonal allergic rhinitis     4/03 skin tests pos. for: cat/dog/DM/T/G/RW--per Dr. Talavera.     Ulcers 1992    DX BY ENDOSCOPY     Past Surgical History:   Procedure Laterality Date     C APPENDECTOMY  1982     C/SECTION, CLASSICAL       COSMETIC SURGERY  10/13, 09/15     ENDOSCOPY      10/2000 Xiao, repeat 10/2002, REPEAT 2/08 (REPEAT IN 2 YRS)     ENT SURGERY  1973     EXCIS VAGINAL CYST/TUMOR       GI SURGERY  04/11     GLAUCOMA SURGERY       HC BIOPSY OF BREAST, OPEN INCISIONAL  2008    sentinel lymph node     HC COLONOSCOPY THRU STOMA, DIAGNOSTIC  12/04, 12/09    (REPEAT IN 5 YRS)     HC REMOVAL GALLBLADDER  6/2005     HYSTERECTOMY, PAP NO LONGER INDICATED       MASTECTOMY BILATERAL, INSERT TISSUE EXPANDER BILATERAL, COMBINED  8/2010     MASTECTOMY, BILATERAL       PELVIS LAPAROSCOPY,DX      x2     Family History   Problem Relation Age of Onset     Hypertension Mother      Gastrointestinal Disease Mother         GAITAN'S ESOPHAGUS     Lipids Mother      Depression Mother      Other - See Comments Mother         Pagets Disease of the Vulva, Dx 06/2014     Osteoporosis Mother      Obesity Mother      Allergies Father         HAYFEVER     Heart Disease Father         AFIB     Hypertension Father      Lipids Father      Hyperlipidemia Father      Gastrointestinal Disease Other         GAITAN'S ESOPHAGUS  "    Breast Cancer Other      Colon Cancer Other      Other Cancer Maternal Grandmother      Other Cancer Maternal Grandfather      Colon Cancer Paternal Grandfather      Gastrointestinal Disease Other         GAITAN'S ESOPHAGUS     Breast Cancer Other      Breast Cancer Paternal Aunt      Breast Cancer Other         fathers 1st cousin     Hyperlipidemia Brother        Objective  /79   Ht 1.797 m (5' 10.75\")   Wt 95.3 kg (210 lb)   LMP 03/28/2010   BMI 29.50 kg/m         GENERAL APPEARANCE: healthy, alert and no distress   GAIT: normal  SKIN: no suspicious lesions or rashes  HEENT: Sclera clear, anicteric  CV: no peripheral edema  RESP: Breathing not labored  NEURO: Normal strength and tone, mentation intact and speech normal  PSYCH:  mentation appears normal and affect normal/bright     Low back exam:  Inspection:     no visible deformity in the low back       normal skin       normal vascular       normal lymphatic     Posture:      normal     ROM:      full flexion - some pain into leg       full extension     Strength:     hip flexion 5/5 bilateral       knee extension 5/5 bilateral       ankle dorsiflexion 5/5 bilateral       ankle plantarflexion 5/5 bilateral     Reflexes:     patellar (L3, L4) symmetric normal       achilles tendons (S1) symmetric normal     Sensation:    grossly intact throughout lower extremities     Special tests:      straight leg raise left positive       straight leg raise right negative     Radiology  I ordered, visualized and reviewed these images with the patient  MRI LUMBAR SPINE WITHOUT CONTRAST   11/6/2020 7:24 PM      HISTORY: eval nerve impingement; Left leg pain      TECHNIQUE: Multiplanar multisequence MRI of the lumbar spine without  contrast.      COMPARISON: Lumbar spine radiographs dated 12/15/2017.      FINDINGS: Five lumbar vertebral bodies are presumed. Sagittal  alignment is normal. Normal vertebral body heights. Small chronic  appearing Schmorl's node along " the inferior endplate of L4. No  concerning marrow signal. Normal appearance of the distal spinal cord  with the conus terminating at L1. Unremarkable visualized paraspinous  soft tissues and visualized bony pelvis.     Segmental analysis:  T12-L1: Normal disc height and signal. No herniation. Normal facets.  No spinal canal or neural foraminal stenosis.     L1-L2: Normal disc height and signal. No herniation. Mild facet  arthropathy. No spinal canal or neural foraminal stenosis.     L2-L3: Mild disc desiccation without disc height loss. No herniation.  Mild-to-moderate facet arthropathy. No spinal canal or neural  foraminal stenosis.     L3-L4: Disc desiccation with minimal disc height loss. Shallow  symmetric disc bulge with mild-to-moderate facet arthropathy. No  spinal canal stenosis. Minimal bilateral neural foraminal narrowing.     L4-L5: Mild disc height loss. Symmetric disc bulge with moderate facet  arthropathy and ligamentum flavum thickening. Mild bilateral lateral  recess stenosis without central spinal canal stenosis. Minimal  bilateral neural foraminal narrowing.     L5-S1: Disc desiccation without significant disc height loss. There is  a disc bulge with superimposed left lateral recess disc extrusion. The  extruded disc material places a significant mass effect upon the  traversing left S1 nerve root, displacing it posteriorly. There is  moderate left lateral recess stenosis. Mild spinal canal stenosis. The  neural foramina are patent.                                                                      IMPRESSION:  Multilevel degenerative disc disease and facet arthropathy of the  lumbar spine, as described. There is a prominent left lateral recess  disc extrusion at L5-S1 with associated mass effect upon and posterior  displacement of the traversing left S1 nerve root. Correlate  clinically for possible left S1 radiculopathy. Please see the body of  the report for additional details, including  level by level findings.     Assessment:  1. Left buttock pain    2. Left leg pain    3. Lumbar disc herniation        Plan:  - Today's Plan of Care:  Epidural Steroid injection done at [facility]  Continue Physical Therapy    -We also discussed other future treatment options:  Referral to Spine    Follow Up: 1 month after the injection    Concerning signs and symptoms were reviewed.  The patient expressed understanding of this management plan and all questions were answered at this time.    Patricia Bear MD CAQ  Primary Care Sports Medicine  [facility]

## 2020-11-16 ENCOUNTER — ANCILLARY PROCEDURE (OUTPATIENT)
Dept: RADIOLOGY | Facility: CLINIC | Age: 55
End: 2020-11-16
Attending: PSYCHIATRY & NEUROLOGY
Payer: COMMERCIAL

## 2020-11-16 ENCOUNTER — HEALTH MAINTENANCE LETTER (OUTPATIENT)
Age: 55
End: 2020-11-16

## 2020-11-16 ENCOUNTER — RADIOLOGY INJECTION OFFICE VISIT (OUTPATIENT)
Dept: PALLIATIVE MEDICINE | Facility: CLINIC | Age: 55
End: 2020-11-16
Attending: PEDIATRICS
Payer: COMMERCIAL

## 2020-11-16 VITALS
HEART RATE: 67 BPM | RESPIRATION RATE: 16 BRPM | OXYGEN SATURATION: 99 % | SYSTOLIC BLOOD PRESSURE: 119 MMHG | DIASTOLIC BLOOD PRESSURE: 79 MMHG

## 2020-11-16 DIAGNOSIS — M79.18 LEFT BUTTOCK PAIN: ICD-10-CM

## 2020-11-16 DIAGNOSIS — M51.26 LUMBAR DISC HERNIATION: ICD-10-CM

## 2020-11-16 DIAGNOSIS — M79.605 LEFT LEG PAIN: ICD-10-CM

## 2020-11-16 DIAGNOSIS — M54.16 LUMBAR RADICULOPATHY: Primary | ICD-10-CM

## 2020-11-16 DIAGNOSIS — M54.16 LUMBAR RADICULOPATHY: ICD-10-CM

## 2020-11-16 PROCEDURE — 64483 NJX AA&/STRD TFRM EPI L/S 1: CPT | Mod: LT | Performed by: PSYCHIATRY & NEUROLOGY

## 2020-11-16 PROCEDURE — A9579 GAD-BASE MR CONTRAST NOS,1ML: HCPCS | Mod: JW | Performed by: PSYCHIATRY & NEUROLOGY

## 2020-11-16 RX ORDER — GADOBUTROL 604.72 MG/ML
5 INJECTION INTRAVENOUS ONCE
Status: COMPLETED | OUTPATIENT
Start: 2020-11-16 | End: 2020-11-16

## 2020-11-16 RX ADMIN — GADOBUTROL 3 ML: 604.72 INJECTION INTRAVENOUS at 10:30

## 2020-11-16 ASSESSMENT — PAIN SCALES - GENERAL: PAINLEVEL: MODERATE PAIN (5)

## 2020-11-16 NOTE — PATIENT INSTRUCTIONS
[facility]   Procedure Discharge Instructions    Today you saw: Dr. Jael Sampson    You had an:  Lumbar Epidural steroid injection       Medications used:  Lidocaine   Bupivacaine   Dexamethasone Omnipaque         If you were holding your blood thinning medication, please restart taking it: N/A    Be cautious when walking. Numbness and/or weakness in the lower extremities may occur for up to 6-8 hours after the procedure due to effect of the local anesthetic    Do not drive for 6 hours. The effect of the local anesthetic could slow your reflexes.     You may resume your regular activities after 24 hours    Avoid strenuous activity for the first 24 hours    You may shower, however avoid swimming, tub baths or hot tubs for 24 hours following your procedure    You may have a mild to moderate increase in pain for several days following the injection.    It may take up to 14 days for the steroid medication to start working although you may feel the effect as early as a few days after the procedure.       You may use ice packs for 10-15 minutes, 3 to 4 times a day at the injection site for comfort    Do not use heat to painful areas for 6 to 8 hours. This will give the local anesthetic time to wear off and prevent you from accidentally burning your skin.     Unless you have been directed to avoid the use of anti-inflammatory medications (NSAIDS), you may use medications such as ibuprofen, Aleve or Tylenol for pain control if needed.     Possible side effects of steroids that you may experience include flushing, elevated blood pressure, increased appetite, mild headaches and restlessness.  All of these symptoms will get better with time.    If you experience any of the following, call the Pain Clinic during work hours (Mon-Friday 8-4:30 pm) at [phone] or the Provider Line after hours at [phone]:  -Fever over 100 degree F  -Swelling, bleeding, redness, drainage, warmth at the  injection site  -Progressive weakness or numbness in your legs  -Loss of bowel or bladder function  -Unusual new onset of pain that is not improving

## 2020-11-16 NOTE — PROGRESS NOTES
Pre procedure Diagnosis: lumbar radiculopathy   Post procedure Diagnosis: Same  Procedure performed: left S1 transforaminal epidural steroid injection   Anesthesia: none  Complications: none  Operators: Jenny Sampson MD ; Fidel Saravia DO (Fellow)     Indications:   Christina John is a 54 year old female was sent by Dr. Bear for epidural steroid injection.  They have a history of left low francisco javier and leg pain for years, with recent exacerbation over 2 moths.  Exam shows pain travelling down the S1 distribution with straight leg raise and they have tried conservative treatment including meds, PT.    MRI was done on 11/6/20 which showed   Multilevel degenerative disc disease and facet arthropathy of the lumbar spine, as described. There is a prominent left lateral recess disc extrusion at L5-S1 with associated mass effect upon and posterior displacement of the traversing left S1 nerve root. Correlate clinically for possible left S1 radiculopathy. Please see the body of the report for additional details, including level by level findings.     Options/alternatives, benefits and risks were discussed with the patient including bleeding, infection, tissue trauma, numbness, weakness, paralysis, spinal cord injury, radiation exposure, headache and reaction to medications. Questions were answered to her satisfaction and she agrees to proceed. Voluntary informed consent was obtained and signed.     Vitals were reviewed: Yes  Allergies were reviewed:  Yes (shellfish, benzocaine)  Medications were reviewed:  Yes   Pre-procedure pain score: 5/10    Procedure:  After getting informed consent, patient was brought into the procedure suite and was placed in a prone position on the procedure table.   A Pause for the Cause was performed.  Patient was prepped and draped in sterile fashion with chloroprep    After identifying the left S1 neuroforamen, the C-arm was rotated to a left lateral oblique angle.  A total of 6ml of  Lidocaine 1% was used to anesthetize the skin and the needle track at a skin entry site coaxial with the fluoroscopy beam, and overriding the superior aspect of the neuroforamen.  A 22 gauge 5 inch spinal needle was advanced under intermittent fluoroscopy until it entered the foramen superiorly.    The position was then inspected from anteroposterior and lateral views, and the needle adjusted appropriately.  A total of 3ml of gadolinium was injected, confirming appropriate position, with spread into the nerve root sheath and the epidural space, with no intravascular uptake. 2ml was wasted    1.5ml of 0.5% bupivacaine with 10mg of dexamethasone was injected.  The needle was flushed with lidocaine and removed.    During the procedure, there was not a paresthesia.   Hemostasis was achieved, the area was cleaned, and bandaids were placed when appropriate.  The patient tolerated the procedure well, and was taken to the recovery room.    Images were saved to PACS.    Post-procedure pain score: 5/10  Follow-up includes:   -f/u phone call in one week  -f/u with referring provider    Jenny Sampson MD  [facility]

## 2020-11-16 NOTE — NURSING NOTE
Pre-procedure Intake    Have you been fasting? NA    If yes, for how long? No     Are you taking a prescribed blood thinner such as coumadin, Plavix, Xarelto?    No    If yes, when did you take your last dose? No     Do you take aspirin?  No    If cervical procedure, have you held aspirin for 6 days?   Yes    Do you have any allergies to contrast dye, iodine, steroid and/or numbing medications?  Not Applicable    Are you currently taking antibiotics or have an active infection?  NO    Have you had a fever/elevated temperature within the past week? NO    Are you currently taking oral steroids? NO    Do you have a ? Yes       Are you pregnant or breastfeeding?  NO    Are the vital signs normal?  Yes    Bobbi Desouza MA

## 2020-11-19 ENCOUNTER — HOSPITAL ENCOUNTER (OUTPATIENT)
Dept: PHYSICAL THERAPY | Facility: CLINIC | Age: 55
Setting detail: THERAPIES SERIES
End: 2020-11-19
Attending: PEDIATRICS
Payer: COMMERCIAL

## 2020-11-19 PROCEDURE — 97110 THERAPEUTIC EXERCISES: CPT | Mod: GP | Performed by: PHYSICAL THERAPIST

## 2020-11-23 ENCOUNTER — HOSPITAL ENCOUNTER (OUTPATIENT)
Dept: PHYSICAL THERAPY | Facility: CLINIC | Age: 55
Setting detail: THERAPIES SERIES
End: 2020-11-23
Attending: PEDIATRICS
Payer: COMMERCIAL

## 2020-11-23 PROCEDURE — 97110 THERAPEUTIC EXERCISES: CPT | Mod: GP | Performed by: PHYSICAL THERAPIST

## 2020-11-23 NOTE — PROGRESS NOTES
Outpatient Physical Therapy Progress Note     Patient: Christina John  : 1965    Beginning/End Dates of Reporting Period:  10/19/20 to 2020    Referring Provider: Dr Bear     Therapy Diagnosis: Left leg pain     Client Self Report: Has f/u with Dr Bear planned. Continues to have LE tingling worse without gabapentin. Worse with rest/sleep    Objective Measurements:  Objective Measure: Slump  Details: + L  Objective Measure: Lumbar ROM  Details: extension limited 75% w/ pain, flexion fingertips to mid shin, w/ pain      Goals:  Goal Identifier 1   Goal Description Pt will demonstrate ambulation x 100' no gait aid   Target Date 20   Date Met  10/22/20   Progress:     Goal Identifier 2   Goal Description Pt will be able to lift 35# from floor to counter for work duties without invcreased symptoms   Target Date 20   Date Met      Progress: not assessed due to ongoing symptoms     Goal Identifier 3   Goal Description Pt will demonstrate full lumbar ROM to be able to pick item from floor   Target Date 20   Date Met      Progress:painful end range         Progress Toward Goals:   Progress this reporting period: Continues to demonstrate LE symptoms consistant with lumbar radiculopathy. Has f/u with referring provider, did send inbasket with pt update to Dr Bear after last session. Continues to perform high level of activity walking/lifitng at work which may contribute to ongoing flare of symptoms. Continues to be appropriate for intervention to normalize ROM, reduce radicular symptoms and return to prior level of function.     Plan:  Changes to therapy plan of care: Cont 2x/week x 4 weeks after follow up with referring provider    Discharge:  No   No.

## 2020-11-30 ENCOUNTER — HOSPITAL ENCOUNTER (OUTPATIENT)
Dept: PHYSICAL THERAPY | Facility: CLINIC | Age: 55
Setting detail: THERAPIES SERIES
End: 2020-11-30
Attending: PEDIATRICS
Payer: COMMERCIAL

## 2020-11-30 PROCEDURE — 97140 MANUAL THERAPY 1/> REGIONS: CPT | Mod: GP | Performed by: PHYSICAL THERAPIST

## 2020-11-30 PROCEDURE — 97110 THERAPEUTIC EXERCISES: CPT | Mod: GP | Performed by: PHYSICAL THERAPIST

## 2020-12-03 ENCOUNTER — HOSPITAL ENCOUNTER (OUTPATIENT)
Dept: PHYSICAL THERAPY | Facility: CLINIC | Age: 55
Setting detail: THERAPIES SERIES
End: 2020-12-03
Attending: PEDIATRICS
Payer: COMMERCIAL

## 2020-12-03 PROCEDURE — 97110 THERAPEUTIC EXERCISES: CPT | Mod: GP | Performed by: PHYSICAL THERAPIST

## 2020-12-03 PROCEDURE — 97140 MANUAL THERAPY 1/> REGIONS: CPT | Mod: GP | Performed by: PHYSICAL THERAPIST

## 2020-12-07 ENCOUNTER — HOSPITAL ENCOUNTER (OUTPATIENT)
Dept: PHYSICAL THERAPY | Facility: CLINIC | Age: 55
Setting detail: THERAPIES SERIES
End: 2020-12-07
Attending: PEDIATRICS
Payer: COMMERCIAL

## 2020-12-07 PROCEDURE — 97110 THERAPEUTIC EXERCISES: CPT | Mod: GP | Performed by: PHYSICAL THERAPIST

## 2020-12-07 NOTE — PROGRESS NOTES
Outpatient Physical Therapy Progress/Discharge Note     Patient: Christina John  : 1965    Beginning/End Dates of Reporting Period:  20 to 2020    Referring Provider: Dr Bear     Therapy Diagnosis: Left leg pain     Client Self Report: Has follow up with Marianela on Thursday. Had previously been trying to take less Gabapentin but notes symptoms significantly worse without. Notes symptoms somewhat better with use of brace still.     Objective Measurements:  Objective Measure: Lumbar ROM  Details: Flexion fingertips to mid shin, extension limited 50%, sidebeding tightness R , pinching L sb  Objective Measure: strength  Details: LLE strength testing: hip flexion 4/5, knee extension 4+/5, dorsiflexion 4+/5     Goals:  Goal Identifier 1   Goal Description Pt will demonstrate ambulation x 100' no gait aid   Target Date 20   Date Met  10/22/20   Progress:     Goal Identifier 2   Goal Description Pt will be able to lift 35# from floor to counter for work duties without invcreased symptoms   Target Date 20   Date Met      Progress: demonstrating fair mechanics, however symptoms continue to increase with work duties     Goal Identifier 3   Goal Description Pt will demonstrate full lumbar ROM to be able to pick item from floor   Target Date 20   Date Met      Progress: not met, see above         Progress Toward Goals:   Progress limited due to Pt has returned to PT for 5 visits s/p injection however has continued to be limited due to ongoing symptoms consistant with lumbar radiculopathy. Due to ongoing pain/activity limitations and weakness, at this time recommend follow up with referring provider, may benefit from spine specialist.     Plan:  Other: Hold PT for follow up due to ongoing symptoms and lack of progress    Discharge:  Yes - pt to follow up with spine specialist. Discharge due to lack progress.

## 2020-12-07 NOTE — PROGRESS NOTES
Sports Medicine Clinic Visit - Interim History December 7, 2020    PCP: Lucía Galvan Dora is a 55 year old female who is seen in f/u up for    Left buttock pain  Left leg pain  Lumbar disc herniation.  Since last visit on 11/12/20 patient has completed a lumbar AMY with Dr Sampson on 11/16/20. She reports the injection provided 2 days of pain relief. She has also continued PT but has not been very beneficial. She reports pain pain the lower back and radiates to her bilateral hips and thighs, overall not much improvement.    Symptoms are better with: Tylenol, Ibuprofen and Other medications: gabapentin  Symptoms are worse with: pain is consistent with all activities  Additional Features:   Positive: paresthesias, numbness and weakness   Negative: swelling, bruising, popping, grinding, catching, locking and instability    Social History: [employer]    Review of Systems  Skin: no bruising, no swelling  Musculoskeletal: as above  Neurologic: yes numbness, paresthesias  Remainder of review of systems is negative including constitutional, CV, pulmonary, GI, except as noted in HPI or medical history.    Patient's current problem list, past medical and surgical history, and family history were reviewed.    Patient Active Problem List   Diagnosis     DCIS (ductal carcinoma in situ) of breast     GERD (gastroesophageal reflux disease)     PCOS (polycystic ovarian syndrome)     Seasonal allergic rhinitis     Allergic rhinitis due to animal dander     House dust mite allergy     Seasonal allergic conjunctivitis     HYPERLIPIDEMIA LDL GOAL <160     Mild major depression (H)     Diagnostic skin and sensitization tests     Desensitization to allergens     History of adenomatous polyp of colon     BRCA1 positive     Overweight (BMI 25.0-29.9)     History of gastric bypass     Malignant neoplasm of right female breast, unspecified estrogen receptor status, unspecified site of breast (H)     Past Medical History:    Diagnosis Date     Allergic rhinitis due to animal dander did IT x 4 yrs. 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 ended 9/06 per self     Diagnostic skin and sensitization tests 4/03 skin tests pos. for: cat/dog/DM/T/G/RW     Elevated cholesterol      Endometriosis      GERD (gastroesophageal reflux disease)      House dust mite allergy      Hypertension goal BP (blood pressure) < 140/90      Infertility     ON CLOMID     Mild major depression (H)      PCOS (polycystic ovarian syndrome)      Pelvic relaxation      Seasonal allergic conjunctivitis      Seasonal allergic rhinitis     4/03 skin tests pos. for: cat/dog/DM/T/G/RW--per Dr. Talavera.     Ulcers 1992    DX BY ENDOSCOPY     Past Surgical History:   Procedure Laterality Date     C APPENDECTOMY  1982     C/SECTION, CLASSICAL       COSMETIC SURGERY  10/13, 09/15     ENDOSCOPY      10/2000 [facility], repeat 10/2002, REPEAT 2/08 (REPEAT IN 2 YRS)     ENT SURGERY  1973     EXCIS VAGINAL CYST/TUMOR       GI SURGERY  04/11     GLAUCOMA SURGERY       HC BIOPSY OF BREAST, OPEN INCISIONAL  2008    sentinel lymph node     HC COLONOSCOPY THRU STOMA, DIAGNOSTIC  12/04, 12/09    (REPEAT IN 5 YRS)     HC REMOVAL GALLBLADDER  6/2005     HYSTERECTOMY, PAP NO LONGER INDICATED       MASTECTOMY BILATERAL, INSERT TISSUE EXPANDER BILATERAL, COMBINED  8/2010     MASTECTOMY, BILATERAL       PELVIS LAPAROSCOPY,DX      x2     Family History   Problem Relation Age of Onset     Hypertension Mother      Gastrointestinal Disease Mother         GAITAN'S ESOPHAGUS     Lipids Mother      Depression Mother      Other - See Comments Mother         Pagets Disease of the Vulva, Dx 06/2014     Osteoporosis Mother      Obesity Mother      Allergies Father         HAYFEVER     Heart Disease Father      "    AFIB     Hypertension Father      Lipids Father      Hyperlipidemia Father      Gastrointestinal Disease Other         GAITAN'S ESOPHAGUS     Breast Cancer Other      Colon Cancer Other      Other Cancer Maternal Grandmother      Other Cancer Maternal Grandfather      Colon Cancer Paternal Grandfather      Gastrointestinal Disease Other         GAITAN'S ESOPHAGUS     Breast Cancer Other      Breast Cancer Paternal Aunt      Breast Cancer Other         fathers 1st cousin     Hyperlipidemia Brother        Objective  Ht 1.797 m (5' 10.75\")   Wt 95.7 kg (211 lb)   LMP 03/28/2010   BMI 29.64 kg/m      GENERAL APPEARANCE: healthy, alert and no distress   GAIT: normal  SKIN: no suspicious lesions or rashes  HEENT: Sclera clear, anicteric  CV: no peripheral edema  RESP: Breathing not labored  NEURO: Normal strength and tone, mentation intact and speech normal  PSYCH:  mentation appears normal and affect normal/bright     Low back exam:  Inspection:     no visible deformity in the low back       normal skin       normal vascular       normal lymphatic     Posture:      normal     ROM:      full flexion - some pain into leg       full extension     Strength:     hip flexion 5/5 bilateral       knee extension 5/5 bilateral       ankle dorsiflexion 5/5 bilateral       ankle plantarflexion 5/5 bilateral     Reflexes:     patellar (L3, L4) symmetric normal       achilles tendons (S1) symmetric normal     Sensation:    grossly intact throughout lower extremities     Special tests:      straight leg raise left positive       straight leg raise right negative      Radiology  I ordered, visualized and reviewed these images with the patient  MRI LUMBAR SPINE WITHOUT CONTRAST   11/6/2020 7:24 PM   HISTORY: eval nerve impingement; Left leg pain   TECHNIQUE: Multiplanar multisequence MRI of the lumbar spine without  contrast.   COMPARISON: Lumbar spine radiographs dated 12/15/2017.   FINDINGS: Five lumbar vertebral bodies are " presumed. Sagittal  alignment is normal. Normal vertebral body heights. Small chronic  appearing Schmorl's node along the inferior endplate of L4. No  concerning marrow signal. Normal appearance of the distal spinal cord  with the conus terminating at L1. Unremarkable visualized paraspinous  soft tissues and visualized bony pelvis.  Segmental analysis:  T12-L1: Normal disc height and signal. No herniation. Normal facets.  No spinal canal or neural foraminal stenosis.  L1-L2: Normal disc height and signal. No herniation. Mild facet  arthropathy. No spinal canal or neural foraminal stenosis.  L2-L3: Mild disc desiccation without disc height loss. No herniation.  Mild-to-moderate facet arthropathy. No spinal canal or neural  foraminal stenosis.  L3-L4: Disc desiccation with minimal disc height loss. Shallow  symmetric disc bulge with mild-to-moderate facet arthropathy. No  spinal canal stenosis. Minimal bilateral neural foraminal narrowing.  L4-L5: Mild disc height loss. Symmetric disc bulge with moderate facet  arthropathy and ligamentum flavum thickening. Mild bilateral lateral  recess stenosis without central spinal canal stenosis. Minimal  bilateral neural foraminal narrowing.  L5-S1: Disc desiccation without significant disc height loss. There is  a disc bulge with superimposed left lateral recess disc extrusion. The  extruded disc material places a significant mass effect upon the  traversing left S1 nerve root, displacing it posteriorly. There is  moderate left lateral recess stenosis. Mild spinal canal stenosis. The  neural foramina are patent.                                                           IMPRESSION:  Multilevel degenerative disc disease and facet arthropathy of the  lumbar spine, as described. There is a prominent left lateral recess  disc extrusion at L5-S1 with associated mass effect upon and posterior  displacement of the traversing left S1 nerve root. Correlate  clinically for possible left S1  radiculopathy. Please see the body of  the report for additional details, including level by level findings.      Assessment:  1. Left buttock pain    2. Left leg pain    3. Lumbar disc herniation      Given lack of improvement with prior physical therapy and injections, discussed next step of surgical referral.    Plan:  - Today's Plan of Care:  Referral to a Spine Surgeon    -We also discussed other future treatment options:  Consideration of repeat epidural steroid injection    Follow Up: as needed    Concerning signs and symptoms were reviewed.  The patient expressed understanding of this management plan and all questions were answered at this time.    Patricia Bear MD CAQ  Primary Care Sports Medicine  [facility]

## 2020-12-10 ENCOUNTER — OFFICE VISIT (OUTPATIENT)
Dept: ORTHOPEDICS | Facility: CLINIC | Age: 55
End: 2020-12-10
Payer: COMMERCIAL

## 2020-12-10 VITALS — HEIGHT: 71 IN | BODY MASS INDEX: 29.54 KG/M2 | WEIGHT: 211 LBS

## 2020-12-10 DIAGNOSIS — M79.18 LEFT BUTTOCK PAIN: Primary | ICD-10-CM

## 2020-12-10 DIAGNOSIS — M51.26 LUMBAR DISC HERNIATION: ICD-10-CM

## 2020-12-10 DIAGNOSIS — M79.605 LEFT LEG PAIN: ICD-10-CM

## 2020-12-10 PROCEDURE — 99214 OFFICE O/P EST MOD 30 MIN: CPT | Performed by: PEDIATRICS

## 2020-12-10 ASSESSMENT — MIFFLIN-ST. JEOR: SCORE: 1649.25

## 2020-12-10 NOTE — PATIENT INSTRUCTIONS
Plan:  - Today's Plan of Care:  Referral to a Spine Surgeon    -We also discussed other future treatment options:  Consideration of repeat epidural steroid injection    Follow Up: as needed    If you have any further questions for your physician or physician s care team you can call [phone] and use option 3 to leave a voice message. Calls received during business hours will be returned same day.

## 2020-12-10 NOTE — LETTER
12/10/2020         RE: Christina John  [address] [facility] [address]        Dear Colleague,    Thank you for referring your patient, Christina John, to the [facility]. Please see a copy of my visit note below.    Sports Medicine Clinic Visit - Interim History December 7, 2020    PCP: Lucía Galvan    Christina John is a 55 year old female who is seen in f/u up for    Left buttock pain  Left leg pain  Lumbar disc herniation.  Since last visit on 11/12/20 patient has completed a lumbar AMY with Dr Sampson on 11/16/20. She reports the injection provided 2 days of pain relief. She has also continued PT but has not been very beneficial. She reports pain pain the lower back and radiates to her bilateral hips and thighs, overall not much improvement.    Symptoms are better with: Tylenol, Ibuprofen and Other medications: gabapentin  Symptoms are worse with: pain is consistent with all activities  Additional Features:   Positive: paresthesias, numbness and weakness   Negative: swelling, bruising, popping, grinding, catching, locking and instability    Social History: [employer]    Review of Systems  Skin: no bruising, no swelling  Musculoskeletal: as above  Neurologic: yes numbness, paresthesias  Remainder of review of systems is negative including constitutional, CV, pulmonary, GI, except as noted in HPI or medical history.    Patient's current problem list, past medical and surgical history, and family history were reviewed.    Patient Active Problem List   Diagnosis     DCIS (ductal carcinoma in situ) of breast     GERD (gastroesophageal reflux disease)     PCOS (polycystic ovarian syndrome)     Seasonal allergic rhinitis     Allergic rhinitis due to animal dander     House dust mite allergy     Seasonal allergic conjunctivitis     HYPERLIPIDEMIA LDL GOAL <160     Mild major depression (H)     Diagnostic skin and sensitization tests     Desensitization to  allergens     History of adenomatous polyp of colon     BRCA1 positive     Overweight (BMI 25.0-29.9)     History of gastric bypass     Malignant neoplasm of right female breast, unspecified estrogen receptor status, unspecified site of breast (H)     Past Medical History:   Diagnosis Date     Allergic rhinitis due to animal dander did IT x 4 yrs. 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 ended 9/06 per self     Diagnostic skin and sensitization tests 4/03 skin tests pos. for: cat/dog/DM/T/G/RW     Elevated cholesterol      Endometriosis      GERD (gastroesophageal reflux disease)      House dust mite allergy      Hypertension goal BP (blood pressure) < 140/90      Infertility     ON CLOMID     Mild major depression (H)      PCOS (polycystic ovarian syndrome)      Pelvic relaxation      Seasonal allergic conjunctivitis      Seasonal allergic rhinitis     4/03 skin tests pos. for: cat/dog/DM/T/G/RW--per Dr. Talavera.     Ulcers 1992    DX BY ENDOSCOPY     Past Surgical History:   Procedure Laterality Date     C APPENDECTOMY  1982     C/SECTION, CLASSICAL       COSMETIC SURGERY  10/13, 09/15     [facility]      [facility] 10/2002, REPEAT 2/08 (REPEAT IN 2 YRS)     ENT SURGERY  1973     EXCIS VAGINAL CYST/TUMOR       GI SURGERY  04/11     GLAUCOMA SURGERY       HC BIOPSY OF BREAST, OPEN INCISIONAL  2008    sentinel lymph node     HC COLONOSCOPY THRU STOMA, DIAGNOSTIC  12/04, 12/09    (REPEAT IN 5 YRS)     HC REMOVAL GALLBLADDER  6/2005     HYSTERECTOMY, PAP NO LONGER INDICATED       MASTECTOMY BILATERAL, INSERT TISSUE EXPANDER BILATERAL, COMBINED  8/2010     MASTECTOMY, BILATERAL       PELVIS LAPAROSCOPY,DX      x2     Family History   Problem Relation Age of Onset     Hypertension Mother      Gastrointestinal  "Disease Mother         GAITAN'S ESOPHAGUS     Lipids Mother      Depression Mother      Other - See Comments Mother         Pagets Disease of the Vulva, Dx 06/2014     Osteoporosis Mother      Obesity Mother      Allergies Father         HAYFEVER     Heart Disease Father         AFIB     Hypertension Father      Lipids Father      Hyperlipidemia Father      Gastrointestinal Disease Other         GAITAN'S ESOPHAGUS     Breast Cancer Other      Colon Cancer Other      Other Cancer Maternal Grandmother      Other Cancer Maternal Grandfather      Colon Cancer Paternal Grandfather      Gastrointestinal Disease Other         GAITAN'S ESOPHAGUS     Breast Cancer Other      Breast Cancer Paternal Aunt      Breast Cancer Other         fathers 1st cousin     Hyperlipidemia Brother        Objective  Ht 1.797 m (5' 10.75\")   Wt 95.7 kg (211 lb)   LMP 03/28/2010   BMI 29.64 kg/m      GENERAL APPEARANCE: healthy, alert and no distress   GAIT: normal  SKIN: no suspicious lesions or rashes  HEENT: Sclera clear, anicteric  CV: no peripheral edema  RESP: Breathing not labored  NEURO: Normal strength and tone, mentation intact and speech normal  PSYCH:  mentation appears normal and affect normal/bright     Low back exam:  Inspection:     no visible deformity in the low back       normal skin       normal vascular       normal lymphatic     Posture:      normal     ROM:      full flexion - some pain into leg       full extension     Strength:     hip flexion 5/5 bilateral       knee extension 5/5 bilateral       ankle dorsiflexion 5/5 bilateral       ankle plantarflexion 5/5 bilateral     Reflexes:     patellar (L3, L4) symmetric normal       achilles tendons (S1) symmetric normal     Sensation:    grossly intact throughout lower extremities     Special tests:      straight leg raise left positive       straight leg raise right negative      Radiology  I ordered, visualized and reviewed these images with the patient  MRI LUMBAR " SPINE WITHOUT CONTRAST   11/6/2020 7:24 PM   HISTORY: eval nerve impingement; Left leg pain   TECHNIQUE: Multiplanar multisequence MRI of the lumbar spine without  contrast.   COMPARISON: Lumbar spine radiographs dated 12/15/2017.   FINDINGS: Five lumbar vertebral bodies are presumed. Sagittal  alignment is normal. Normal vertebral body heights. Small chronic  appearing Schmorl's node along the inferior endplate of L4. No  concerning marrow signal. Normal appearance of the distal spinal cord  with the conus terminating at L1. Unremarkable visualized paraspinous  soft tissues and visualized bony pelvis.  Segmental analysis:  T12-L1: Normal disc height and signal. No herniation. Normal facets.  No spinal canal or neural foraminal stenosis.  L1-L2: Normal disc height and signal. No herniation. Mild facet  arthropathy. No spinal canal or neural foraminal stenosis.  L2-L3: Mild disc desiccation without disc height loss. No herniation.  Mild-to-moderate facet arthropathy. No spinal canal or neural  foraminal stenosis.  L3-L4: Disc desiccation with minimal disc height loss. Shallow  symmetric disc bulge with mild-to-moderate facet arthropathy. No  spinal canal stenosis. Minimal bilateral neural foraminal narrowing.  L4-L5: Mild disc height loss. Symmetric disc bulge with moderate facet  arthropathy and ligamentum flavum thickening. Mild bilateral lateral  recess stenosis without central spinal canal stenosis. Minimal  bilateral neural foraminal narrowing.  L5-S1: Disc desiccation without significant disc height loss. There is  a disc bulge with superimposed left lateral recess disc extrusion. The  extruded disc material places a significant mass effect upon the  traversing left S1 nerve root, displacing it posteriorly. There is  moderate left lateral recess stenosis. Mild spinal canal stenosis. The  neural foramina are patent.                                                           IMPRESSION:  Multilevel degenerative  disc disease and facet arthropathy of the  lumbar spine, as described. There is a prominent left lateral recess  disc extrusion at L5-S1 with associated mass effect upon and posterior  displacement of the traversing left S1 nerve root. Correlate  clinically for possible left S1 radiculopathy. Please see the body of  the report for additional details, including level by level findings.      Assessment:  1. Left buttock pain    2. Left leg pain    3. Lumbar disc herniation      Given lack of improvement with prior physical therapy and injections, discussed next step of surgical referral.    Plan:  - Today's Plan of Care:  Referral to a Spine Surgeon    -We also discussed other future treatment options:  Consideration of repeat epidural steroid injection    Follow Up: as needed    Concerning signs and symptoms were reviewed.  The patient expressed understanding of this management plan and all questions were answered at this time.    Patricia Bear MD [facility]  Primary Care Sports Medicine  [facility]      Again, thank you for allowing me to participate in the care of your patient.        Sincerely,        Patricia Bear MD

## 2020-12-16 ENCOUNTER — TRANSFERRED RECORDS (OUTPATIENT)
Dept: HEALTH INFORMATION MANAGEMENT | Facility: CLINIC | Age: 55
End: 2020-12-16

## 2020-12-28 ENCOUNTER — AMBULATORY - HEALTHEAST (OUTPATIENT)
Dept: SURGERY | Facility: CLINIC | Age: 55
End: 2020-12-28

## 2020-12-28 DIAGNOSIS — Z11.59 ENCOUNTER FOR SCREENING FOR OTHER VIRAL DISEASES: ICD-10-CM

## 2020-12-29 NOTE — PROGRESS NOTES
[facility]  [address] [facility] [address]  Phone: [phone]  Primary Provider: Lucía Galvan  Pre-op Performing Provider: LUCÍA GALVAN    PREOPERATIVE EVALUATION:  Today's date: 1/4/2021    Christina John is a 55 year old female who presents for a preoperative evaluation.    Surgical Information:  Surgery/Procedure: left l5-s1 discectomy  Surgery Location: [facility]   Surgeon: Dr. Theodore  Surgery Date: 1/8/21  Time of Surgery: 9:30 am   Where patient plans to recover: At home with family  Fax number for surgical facility:     Type of Anesthesia Anticipated: General    Subjective     HPI related to upcoming procedure: L5/[id number] due to disc herniation with radiculopathy      Preop Questions 1/4/2021   1. Have you ever had a heart attack or stroke? No   2. Have you ever had surgery on your heart or blood vessels, such as a stent placement, a coronary artery bypass, or surgery on an artery in your head, neck, heart, or legs? No   3. Do you have chest pain with activity? No   4. Do you have a history of  heart failure? No   5. Do you currently have a cold, bronchitis or symptoms of other infection? No   6. Do you have a cough, shortness of breath, or wheezing? No   7. Do you or anyone in your family have previous history of blood clots? No   8. Do you or does anyone in your family have a serious bleeding problem such as prolonged bleeding following surgeries or cuts? No   9. Have you ever had problems with anemia or been told to take iron pills? No   10. Have you had any abnormal blood loss such as black, tarry or bloody stools, or abnormal vaginal bleeding? No   11. Have you ever had a blood transfusion? No   12. Are you willing to have a blood transfusion if it is medically needed before, during, or after your surgery? YES   13. Have you or any of your relatives ever had problems with anesthesia? YES - mom with trouble waking up,  Pt with trouble  staying sedated and has nausea   14. Do you have sleep apnea, excessive snoring or daytime drowsiness? YES - daytime drowsiness   14a. Do you have a CPAP machine? No   15. Do you have any artifical heart valves or other implanted medical devices like a pacemaker, defibrillator, or continuous glucose monitor? No   16. Do you have artificial joints? No   17. Are you allergic to latex? YES: rash due to contact   18. Is there any chance that you may be pregnant? No     Health Care Directive:  Patient does not have a Health Care Directive or Living Will: Discussed advance care planning with patient; however, patient declined at this time.    Preoperative Review of :   reviewed - controlled substances reflected in medication list.      Status of Chronic Conditions:  DEPRESSION - Patient has a long history of Depression of moderate severity requiring medication for control with recent symptoms being stable..Current symptoms of depression include none.     HYPERLIPIDEMIA - Patient has a long history of significant Hyperlipidemia requiring medication for treatment with recent good control. Patient reports no problems or side effects with the medication.       Review of Systems  Constitutional, neuro, ENT, endocrine, pulmonary, cardiac, gastrointestinal, genitourinary, musculoskeletal, integument and psychiatric systems are negative, except as otherwise noted.    Patient Active Problem List    Diagnosis Date Noted     Malignant neoplasm of right female breast, unspecified estrogen receptor status, unspecified site of breast (H) 08/02/2018     Priority: Medium     History of gastric bypass 06/22/2017     Priority: Medium     BRCA1 positive 05/13/2016     Priority: Medium     Overweight (BMI 25.0-29.9) 05/13/2016     Priority: Medium     History of adenomatous polyp of colon 04/16/2014     Priority: Medium     Diagnostic skin and sensitization tests      Priority: Medium     Desensitization to allergens      Priority:  Medium     did IT x 4 yrs. ended 9/06 per self       Mild major depression (H)      Priority: Medium     HYPERLIPIDEMIA LDL GOAL <160 10/31/2010     Priority: Medium     Seasonal allergic rhinitis      Priority: Medium     Allergic rhinitis due to animal dander      Priority: Medium     House dust mite allergy      Priority: Medium     Seasonal allergic conjunctivitis      Priority: Medium     DCIS (ductal carcinoma in situ) of breast      Priority: Medium     RT BREAST - STAGE 2A - GRADE III  Left breast ER+  BRCA1 +       GERD (gastroesophageal reflux disease)      Priority: Medium     PCOS (polycystic ovarian syndrome)      Priority: Medium      Past Medical History:   Diagnosis Date     Allergic rhinitis due to animal dander did IT x 4 yrs. 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 ended 9/06 per self     Diagnostic skin and sensitization tests 4/03 skin tests pos. for: cat/dog/DM/T/G/RW     Elevated cholesterol      Endometriosis      GERD (gastroesophageal reflux disease)      House dust mite allergy      Hypertension goal BP (blood pressure) < 140/90      Infertility     ON CLOMID     Mild major depression (H)      PCOS (polycystic ovarian syndrome)      Pelvic relaxation      Seasonal allergic conjunctivitis      Seasonal allergic rhinitis     4/03 skin tests pos. for: cat/dog/DM/T/G/RW--per Dr. Talavera.     Ulcers 1992    DX BY ENDOSCOPY     Past Surgical History:   Procedure Laterality Date     C APPENDECTOMY  1982     C/SECTION, CLASSICAL       COSMETIC SURGERY  10/13, 09/15     ENDOSCOPY      10/2000 [place], repeat 10/2002, REPEAT 2/08 (REPEAT IN 2 YRS)     ENT SURGERY  1973     EXCIS VAGINAL CYST/TUMOR       GI SURGERY  04/11     GLAUCOMA SURGERY       HC BIOPSY OF BREAST, OPEN INCISIONAL  2008    sentinel  "lymph node     HC COLONOSCOPY THRU STOMA, DIAGNOSTIC  12/04, 12/09    (REPEAT IN 5 YRS)     HC REMOVAL GALLBLADDER  6/2005     HYSTERECTOMY, PAP NO LONGER INDICATED       MASTECTOMY BILATERAL, INSERT TISSUE EXPANDER BILATERAL, COMBINED  8/2010     MASTECTOMY, BILATERAL       PELVIS LAPAROSCOPY,DX      x2     Current Outpatient Medications   Medication Sig Dispense Refill     acetaminophen (TYLENOL) 500 MG tablet Take 2 tablets by mouth daily as needed        calcium carbonate-vitamin D (CALCIUM + D) 600-200 MG-UNIT TABS Take 1 tablet by mouth every evening        escitalopram (LEXAPRO) 20 MG tablet TAKE ONE TABLET (20 MG) BY MOUTH ONCE DAILY 90 tablet 3     gabapentin (NEURONTIN) 100 MG capsule Take 100 mg by mouth 3 times daily       MULTIVITAMIN TABS   OR Take 1 tablet by mouth daily        valACYclovir (VALTREX) 1000 mg tablet Take 1 tablet (1,000 mg) by mouth daily 90 tablet 3     traZODone (DESYREL) 50 MG tablet Take 1 tablet (50 mg) by mouth At Bedtime May increase to max or 150 mg as needed for insomnia (Patient taking differently: Take 25 mg by mouth nightly as needed May increase to max or 150 mg as needed for insomnia) 90 tablet 1       Allergies   Allergen Reactions     Amoxicillin Swelling     Cefzil [Cefprozil] Swelling     Flu Virus Vaccine      \"throat felt funny\"     Adhesive Tape Rash     Benzocaine Other (See Comments)     Shellfish Allergy Other (See Comments)     numbness to mouth when eating lobster     Biaxin [Clarithromycin] Hives     Erythromycin GI Disturbance     Latex Rash     Percocet [Acetaminophen] Hives     Sulfa Drugs Hives        Social History     Tobacco Use     Smoking status: Never Smoker     Smokeless tobacco: Never Used     Tobacco comment: Nonsmoking household   Substance Use Topics     Alcohol use: No     Family History   Problem Relation Age of Onset     Hypertension Mother      Gastrointestinal Disease Mother         GAITAN'S ESOPHAGUS     Lipids Mother      Depression " "Mother      Other - See Comments Mother         Pagets Disease of the Vulva, Dx 06/2014     Osteoporosis Mother      Obesity Mother      Allergies Father         HAYFEVER     Heart Disease Father         AFIB     Hypertension Father      Lipids Father      Hyperlipidemia Father      Gastrointestinal Disease Other         GAITAN'S ESOPHAGUS     Breast Cancer Other      Colon Cancer Other      Other Cancer Maternal Grandmother      Other Cancer Maternal Grandfather      Colon Cancer Paternal Grandfather      Gastrointestinal Disease Other         GAITAN'S ESOPHAGUS     Breast Cancer Other      Breast Cancer Paternal Aunt      Breast Cancer Other         fathers 1st cousin     Hyperlipidemia Brother      History   Drug Use No         Objective     /82   Pulse 68   Temp 98.8  F (37.1  C) (Oral)   Ht 1.791 m (5' 10.5\")   Wt 91.2 kg (201 lb)   LMP 03/28/2010   BMI 28.43 kg/m      Physical Exam    GENERAL APPEARANCE: healthy, alert and no distress     EYES: EOMI, PERRL     HENT: ear canals and TM's normal and nose and mouth without ulcers or lesions     NECK: no adenopathy, no asymmetry, masses, or scars and thyroid normal to palpation     RESP: lungs clear to auscultation - no rales, rhonchi or wheezes     CV: regular rates and rhythm, normal S1 S2, no S3 or S4 and no murmur, click or rub     ABDOMEN:  soft, nontender, no HSM or masses and bowel sounds normal     MS: extremities normal- no gross deformities noted, no evidence of inflammation in joints, FROM in all extremities.     SKIN: no suspicious lesions or rashes     NEURO: Normal strength and tone, sensory exam grossly normal, mentation intact and speech normal     PSYCH: mentation appears normal. and affect normal/bright     LYMPHATICS: No cervical adenopathy    Recent Labs   Lab Test 09/14/20  1551 04/26/20  0943 09/30/19  1504   HGB 12.3 14.4  --     297  --     143 139   POTASSIUM 4.4 4.1 4.0   CR 0.93 1.12* 1.06*   A1C  --   --  5.8* "        Diagnostics:  No labs were ordered during this visit.   No EKG required, no history of coronary heart disease, significant arrhythmia, peripheral arterial disease or other structural heart disease.    Revised Cardiac Risk Index (RCRI):  The patient has the following serious cardiovascular risks for perioperative complications:   - No serious cardiac risks = 0 points     RCRI Interpretation: 0 points: Class I (very low risk - 0.4% complication rate)           Assessment & Plan   The proposed surgical procedure is considered INTERMEDIATE risk.    Preop general physical exam    - Asymptomatic COVID-19 Virus (Coronavirus) by PCR; Future    Herniation of intervertebral disc between L5 and S1        Risks and Recommendations:  The patient has the following additional risks and recommendations for perioperative complications:  Obstructive Sleep Apnea:   Concern for possible DAVIE    Medication Instructions:  Patient is to take all scheduled medications on the day of surgery    RECOMMENDATION:  APPROVAL GIVEN to proceed with proposed procedure, without further diagnostic evaluation.    Signed Electronically by: Lucía Galvan MD    Copy of this evaluation report is provided to requesting physician.    Preop [facility] Preop Guidelines    Revised Cardiac Risk Index

## 2020-12-29 NOTE — PATIENT INSTRUCTIONS

## 2021-01-04 ENCOUNTER — OFFICE VISIT (OUTPATIENT)
Dept: FAMILY MEDICINE | Facility: CLINIC | Age: 56
End: 2021-01-04
Payer: COMMERCIAL

## 2021-01-04 VITALS
DIASTOLIC BLOOD PRESSURE: 82 MMHG | WEIGHT: 201 LBS | SYSTOLIC BLOOD PRESSURE: 130 MMHG | TEMPERATURE: 98.8 F | HEIGHT: 71 IN | BODY MASS INDEX: 28.14 KG/M2 | HEART RATE: 68 BPM

## 2021-01-04 DIAGNOSIS — Z13.220 SCREENING CHOLESTEROL LEVEL: ICD-10-CM

## 2021-01-04 DIAGNOSIS — Z01.818 PREOP GENERAL PHYSICAL EXAM: Primary | ICD-10-CM

## 2021-01-04 DIAGNOSIS — M51.27 HERNIATION OF INTERVERTEBRAL DISC BETWEEN L5 AND S1: ICD-10-CM

## 2021-01-04 LAB
CHOLEST SERPL-MCNC: 208 MG/DL
HDLC SERPL-MCNC: 56 MG/DL
LDLC SERPL CALC-MCNC: 128 MG/DL
NONHDLC SERPL-MCNC: 152 MG/DL
TRIGL SERPL-MCNC: 119 MG/DL

## 2021-01-04 PROCEDURE — U0005 INFEC AGEN DETEC AMPLI PROBE: HCPCS | Performed by: FAMILY MEDICINE

## 2021-01-04 PROCEDURE — 80061 LIPID PANEL: CPT | Performed by: FAMILY MEDICINE

## 2021-01-04 PROCEDURE — 36415 COLL VENOUS BLD VENIPUNCTURE: CPT | Performed by: FAMILY MEDICINE

## 2021-01-04 PROCEDURE — U0003 INFECTIOUS AGENT DETECTION BY NUCLEIC ACID (DNA OR RNA); SEVERE ACUTE RESPIRATORY SYNDROME CORONAVIRUS 2 (SARS-COV-2) (CORONAVIRUS DISEASE [COVID-19]), AMPLIFIED PROBE TECHNIQUE, MAKING USE OF HIGH THROUGHPUT TECHNOLOGIES AS DESCRIBED BY CMS-2020-01-R: HCPCS | Performed by: FAMILY MEDICINE

## 2021-01-04 PROCEDURE — 99214 OFFICE O/P EST MOD 30 MIN: CPT | Performed by: FAMILY MEDICINE

## 2021-01-04 ASSESSMENT — MIFFLIN-ST. JEOR: SCORE: 1599.92

## 2021-01-05 LAB
LABORATORY COMMENT REPORT: NORMAL
SARS-COV-2 RNA SPEC QL NAA+PROBE: NEGATIVE
SARS-COV-2 RNA SPEC QL NAA+PROBE: NORMAL
SPECIMEN SOURCE: NORMAL
SPECIMEN SOURCE: NORMAL

## 2021-01-05 ASSESSMENT — MIFFLIN-ST. JEOR: SCORE: 1604.46

## 2021-01-07 ENCOUNTER — COMMUNICATION - HEALTHEAST (OUTPATIENT)
Dept: SCHEDULING | Facility: CLINIC | Age: 56
End: 2021-01-07

## 2021-01-08 ENCOUNTER — TRANSFERRED RECORDS (OUTPATIENT)
Dept: HEALTH INFORMATION MANAGEMENT | Facility: CLINIC | Age: 56
End: 2021-01-08

## 2021-01-08 ENCOUNTER — SURGERY - HEALTHEAST (OUTPATIENT)
Dept: SURGERY | Facility: CLINIC | Age: 56
End: 2021-01-08

## 2021-01-08 ENCOUNTER — ANESTHESIA - HEALTHEAST (OUTPATIENT)
Dept: SURGERY | Facility: CLINIC | Age: 56
End: 2021-01-08

## 2021-01-08 ASSESSMENT — MIFFLIN-ST. JEOR: SCORE: 1604.91

## 2021-02-03 ENCOUNTER — TRANSFERRED RECORDS (OUTPATIENT)
Dept: HEALTH INFORMATION MANAGEMENT | Facility: CLINIC | Age: 56
End: 2021-02-03

## 2021-02-17 ENCOUNTER — TRANSFERRED RECORDS (OUTPATIENT)
Dept: HEALTH INFORMATION MANAGEMENT | Facility: CLINIC | Age: 56
End: 2021-02-17

## 2021-03-03 ENCOUNTER — OFFICE VISIT (OUTPATIENT)
Dept: PODIATRY | Facility: CLINIC | Age: 56
End: 2021-03-03
Payer: COMMERCIAL

## 2021-03-03 ENCOUNTER — TRANSFERRED RECORDS (OUTPATIENT)
Dept: HEALTH INFORMATION MANAGEMENT | Facility: CLINIC | Age: 56
End: 2021-03-03

## 2021-03-03 VITALS
HEIGHT: 71 IN | DIASTOLIC BLOOD PRESSURE: 75 MMHG | HEART RATE: 80 BPM | BODY MASS INDEX: 28.14 KG/M2 | WEIGHT: 201 LBS | SYSTOLIC BLOOD PRESSURE: 112 MMHG

## 2021-03-03 DIAGNOSIS — M72.2 PLANTAR FASCIITIS, RIGHT: Primary | ICD-10-CM

## 2021-03-03 PROCEDURE — 20550 NJX 1 TENDON SHEATH/LIGAMENT: CPT | Performed by: PODIATRIST

## 2021-03-03 RX ORDER — LIDOCAINE HYDROCHLORIDE 10 MG/ML
1 INJECTION, SOLUTION INFILTRATION; PERINEURAL ONCE
Status: COMPLETED | OUTPATIENT
Start: 2021-03-03 | End: 2021-03-03

## 2021-03-03 RX ADMIN — LIDOCAINE HYDROCHLORIDE 1 ML: 10 INJECTION, SOLUTION INFILTRATION; PERINEURAL at 16:54

## 2021-03-03 ASSESSMENT — PAIN SCALES - GENERAL: PAINLEVEL: MODERATE PAIN (4)

## 2021-03-03 ASSESSMENT — MIFFLIN-ST. JEOR: SCORE: 1599.92

## 2021-03-03 NOTE — PATIENT INSTRUCTIONS
Next Steps:      1. Support:  a. Wear supportive shoes, sandals, boots and/or inserts that have a rigid supportive sole.    i. This will offload the majority of tension forces that travel through your feet every step you take.    b. It is important that you also wear supportive shoe wear in the house to continue providing support to your feet.    c. You may always use a cushioned liner for your shoes if that makes your feet feel better.  2. Stretching  a. Calf stretching is essential to offload the tension forces that travel through your feet every step you take  b. Preferred calf stretch is the Runner's Stretch  i. Place one foot behind the other foot, flat against the ground (it is important to keep the heel on the ground).  The back leg is the one that will be stretched.  1. Start with the knee straight and lean your hips into the wall, counter or whatever you are leaning into - count to ten.  2. Next, bend the knee.  You should feel the stretch lower in the calf muscle - count to ten.  c. Repeat this stretch once an hour to start off with.  When symptoms subside, I recommend performing the stretch 3 times daily to prevent any future problems.  3. Tissue Massage  a. It is important that you physically loosen the inflammation tissue to help your body heal the injured tissue.  b. I recommend soaking your foot in warm water to increase the microcirculation to the soft tissues.  You may add Epson salt to the water if you prefer.  c. You may apply an over-the-counter muscle rub, such as Voltaren gel, and deeply massage the injured tissue.  4. Reduce Inflammation  a. You can ice the injured tissue with an ice pack with a light cloth covering or soaking in ice water 20 minutes to reduce any acute inflammation, typically at the end of the day.  b. If tolerated, you may take a Non-Steroidal Antiinflammatory medication (NSAID), such as Advil or Aleve, to help reduce the inflammation tissue.  This can help the overall  healing of the injured tissue.  i. It is important to take food with any NSAID medication as the most common side effect is stomach irritation.  If you encounter any problems when taking NSAID, it is recommended that you stop taking the medication and notify your provider.    It is important to understand that most problems that develop in the foot and ankle are caused by excessive tension that cause microinjury to the soft tissues and inflammation in the foot and ankle.  By addressing the underlying causes with support and stretching as well as treating the current inflammatory conditions with tissue massage and anti-inflammatory treatments, most foot and ankle musculoskeletal conditions will resolve.  This may take time to heal.  However, if symptoms persist past 4 weeks you should return to the office for reevaluation to determine further treatment options.    After the Injection     After the injection, strenuous and repetitive activity should be minimized for approximately 48 hours.   Ice should be applied to the injected area at least for the next 48 hours.   Apply ice to the injected area at least 3 - 4 times a day for 20 minutes each time for the next 48 hours. This can reduce the painful  flare  reaction that can follow an injection the next day. This reaction can cause the area that was injected to hurt more the next day just from the injection. This will resolve within a day if it does occur.     Use over-the-counter pain medications such as Tylenol to help with the pain if necessary.     After 48 hours, icing the area may be continued if you find it beneficial.     The lidocaine or marcaine (commonly called Novocain) is an anesthetic agent that is injected with the steroid will typically relieve your pain for a few hours following the injection. If the  Novocain  and steroid are injected near a nerve, you may experience local numbness or weakness from the nerve block until it wears off. After this wears  off your pain may return until the steroid takes effect.   The steroid may be effective immediately after the injection. Do not be concerned if the injection is not effective in relieving your symptoms immediately. In some cases, it may take up to two weeks for the steroid to work.   If you are diabetic, the corticosteroid may cause your blood sugar to become elevated for several days following the injection. This response usually lasts about 2-4 days before it returns to your normal level.   You should report any adverse reaction to you doctor. Call if there are any questions.

## 2021-03-03 NOTE — NURSING NOTE
"Chief Complaint   Patient presents with     RECHECK     Follow-up on plantafasciits of right foot        Initial /75   Pulse 80   Ht 1.791 m (5' 10.5\")   Wt 91.2 kg (201 lb)   LMP 03/28/2010   BMI 28.43 kg/m   Estimated body mass index is 28.43 kg/m  as calculated from the following:    Height as of this encounter: 1.791 m (5' 10.5\").    Weight as of this encounter: 91.2 kg (201 lb).  Medications and allergies reviewed.      Hedy WATTS MA    "

## 2021-03-03 NOTE — LETTER
"    3/3/2021         RE: Christina John  [address] [facility] [address]        Dear Colleague,    Thank you for referring your patient, Christina John, to the [facility]. Please see a copy of my visit note below.    Christina returns to the office for reevaluation of the right foot.  The patient relates following the instructions given at the last visit with noted overall more pain and less improvement in function of the right foot.   The patient relates having had lower lumbar discectomy with relief of the nerve pain she was suffering from on the left lower extremity..    Vitals: /75   Pulse 80   Ht 1.791 m (5' 10.5\")   Wt 91.2 kg (201 lb)   LMP 03/28/2010   BMI 28.43 kg/m    BMI= Body mass index is 28.43 kg/m .    Lower Extremity Physical Exam:      Neurovascular status remains unchanged.  Muscular exam is within normal limits to major muscle groups.  Integument is intact.      Noted pain on palpation at the insertion point of the plantar fascia on the right heel.  No surrounding erythema noted.       Assessment:     ICD-10-CM    1. Plantar fasciitis, right  M72.2 triamcinolone acetonide (KENALOG-10) injection 10 mg     lidocaine 1 % injection 1 mL     INJECTION SINGLE TENDON SHEATH/LIGAMENT       Plan:    I have explained to Christina about the progress of the conditions.  At this time, the patient would like to try a steroid injection to the right heel.  The medial aspect of the right heel was swabbed with alcohol.  Next, a mixture of 5mg of Kenalog 10, and 2 cc of 1% lidocaine plain was injected around the medial tubercle of the calcaneal in and around the insertion of the plantar fascia to the right foot.  The patient tolerated the procedure well.  A Band-Aid was applied to the injection site.  The patient will follow up in four weeks.    There was no overlap in work associated with the evaluation/management and the work associated with the " procedure.    Christina verbalized agreement with and understanding of the rational for the diagnosis and treatment plan.  All questions were answered to best of my ability and the patient's satisfaction. The patient was advised to contact the clinic with any questions that may arise after the clinic visit.      Disclaimer: This note consists of symbols derived from keyboarding, dictation and/or voice recognition software. As a result, there may be errors in the script that have gone undetected. Please consider this when interpreting information found in this chart.       STEFANIE Carlos.P.SUJATA., F.A.C.F.A.S.        Again, thank you for allowing me to participate in the care of your patient.        Sincerely,        Reilly Liao DPM

## 2021-03-03 NOTE — PROGRESS NOTES
"Christina returns to the office for reevaluation of the right foot.  The patient relates following the instructions given at the last visit with noted overall more pain and less improvement in function of the right foot.   The patient relates having had lower lumbar discectomy with relief of the nerve pain she was suffering from on the left lower extremity..    Vitals: /75   Pulse 80   Ht 1.791 m (5' 10.5\")   Wt 91.2 kg (201 lb)   LMP 03/28/2010   BMI 28.43 kg/m    BMI= Body mass index is 28.43 kg/m .    Lower Extremity Physical Exam:      Neurovascular status remains unchanged.  Muscular exam is within normal limits to major muscle groups.  Integument is intact.      Noted pain on palpation at the insertion point of the plantar fascia on the right heel.  No surrounding erythema noted.       Assessment:     ICD-10-CM    1. Plantar fasciitis, right  M72.2 triamcinolone acetonide (KENALOG-10) injection 10 mg     lidocaine 1 % injection 1 mL     INJECTION SINGLE TENDON SHEATH/LIGAMENT       Plan:    I have explained to Christina about the progress of the conditions.  At this time, the patient would like to try a steroid injection to the right heel.  The medial aspect of the right heel was swabbed with alcohol.  Next, a mixture of 5mg of Kenalog 10, and 2 cc of 1% lidocaine plain was injected around the medial tubercle of the calcaneal in and around the insertion of the plantar fascia to the right foot.  The patient tolerated the procedure well.  A Band-Aid was applied to the injection site.  The patient will follow up in four weeks.    There was no overlap in work associated with the evaluation/management and the work associated with the procedure.    Christina verbalized agreement with and understanding of the rational for the diagnosis and treatment plan.  All questions were answered to best of my ability and the patient's satisfaction. The patient was advised to contact the clinic with any questions that may " arise after the clinic visit.      Disclaimer: This note consists of symbols derived from keyboarding, dictation and/or voice recognition software. As a result, there may be errors in the script that have gone undetected. Please consider this when interpreting information found in this chart.       GONZALES Liao D.P.M., [email or web address].SHIVANI.

## 2021-03-15 ENCOUNTER — TELEPHONE (OUTPATIENT)
Dept: PODIATRY | Facility: CLINIC | Age: 56
End: 2021-03-15

## 2021-03-15 NOTE — TELEPHONE ENCOUNTER
Wanted to call and talk with patient regarding refill request for meloxicam. Patient recently had an injection on her foot and the last time the medication was prescribed was September of 2020. Wanted to talk to her about her pain and refill request.    Hedy Arias MA on 3/15/2021 at 4:08 PM

## 2021-03-17 ENCOUNTER — TELEPHONE (OUTPATIENT)
Dept: PODIATRY | Facility: CLINIC | Age: 56
End: 2021-03-17

## 2021-03-17 DIAGNOSIS — M72.2 PLANTAR FASCIITIS, RIGHT: Primary | ICD-10-CM

## 2021-03-17 RX ORDER — MELOXICAM 7.5 MG/1
7.5 TABLET ORAL DAILY
Qty: 30 TABLET | Refills: 1 | Status: SHIPPED | OUTPATIENT
Start: 2021-03-17 | End: 2021-07-08

## 2021-03-17 NOTE — TELEPHONE ENCOUNTER
Medication Request for: Meloxicam 7.5MG tab            Patient currently taking: Lexapro, gabapentin, trazodone, valtrex  Patient has 0 of medication remaining.  Patient is also taking OTC: Tylenol, calcium+D, multivitamin    Problems/ Concerns of Patient: no side effects reported  Prescription last written on 10/13/20 by Dr. Liao  Sig: Hedy Arias MA on 3/17/2021 at 2:26 PM     Last Fill Quantity: 30 with # refills: 0     Last Office Visit by provider: 03/03/21  Date of Surgery (if applicable): n/a  Procedure Performed (if applicable): n/a  Future Office Visit:   n/a  Patient desires to have faxed or E-prescribe to pharmacy if available  Pharmacy selected in [employer].    Phone number patient can be reached at: Cell number on file:    Telephone Information:   Mobile [phone]       Can we leave a detailed message on this number? YES    Medication requests may take up to 3 business days for a response  Has patient been notified of this Tried to contact the patient and she was at work.

## 2021-03-27 ENCOUNTER — NURSE TRIAGE (OUTPATIENT)
Dept: NURSING | Facility: CLINIC | Age: 56
End: 2021-03-27

## 2021-03-27 ENCOUNTER — OFFICE VISIT (OUTPATIENT)
Dept: URGENT CARE | Facility: URGENT CARE | Age: 56
End: 2021-03-27
Payer: COMMERCIAL

## 2021-03-27 VITALS
TEMPERATURE: 97.9 F | OXYGEN SATURATION: 98 % | HEART RATE: 70 BPM | DIASTOLIC BLOOD PRESSURE: 72 MMHG | SYSTOLIC BLOOD PRESSURE: 128 MMHG | RESPIRATION RATE: 15 BRPM

## 2021-03-27 DIAGNOSIS — R07.0 THROAT PAIN: ICD-10-CM

## 2021-03-27 DIAGNOSIS — Z20.822 SUSPECTED COVID-19 VIRUS INFECTION: Primary | ICD-10-CM

## 2021-03-27 LAB
DEPRECATED S PYO AG THROAT QL EIA: NEGATIVE
SARS-COV-2 RNA RESP QL NAA+PROBE: NORMAL
SPECIMEN SOURCE: NORMAL
STREP GROUP A PCR: NOT DETECTED

## 2021-03-27 PROCEDURE — 87651 STREP A DNA AMP PROBE: CPT | Performed by: PHYSICIAN ASSISTANT

## 2021-03-27 PROCEDURE — U0005 INFEC AGEN DETEC AMPLI PROBE: HCPCS | Performed by: PHYSICIAN ASSISTANT

## 2021-03-27 PROCEDURE — 99213 OFFICE O/P EST LOW 20 MIN: CPT | Performed by: PHYSICIAN ASSISTANT

## 2021-03-27 PROCEDURE — 99N1174 PR STATISTIC STREP A RAPID: Performed by: PHYSICIAN ASSISTANT

## 2021-03-27 PROCEDURE — U0003 INFECTIOUS AGENT DETECTION BY NUCLEIC ACID (DNA OR RNA); SEVERE ACUTE RESPIRATORY SYNDROME CORONAVIRUS 2 (SARS-COV-2) (CORONAVIRUS DISEASE [COVID-19]), AMPLIFIED PROBE TECHNIQUE, MAKING USE OF HIGH THROUGHPUT TECHNOLOGIES AS DESCRIBED BY CMS-2020-01-R: HCPCS | Performed by: PHYSICIAN ASSISTANT

## 2021-03-27 ASSESSMENT — PAIN SCALES - GENERAL: PAINLEVEL: MILD PAIN (3)

## 2021-03-27 NOTE — TELEPHONE ENCOUNTER
Hortencia has sore throat with exposure to Covid  5 days ago an strep throat by grand kids in past  3 days.     Triage protocol reviewed; advised in home care and quart[place]  Advised to be seen in  today.   [place] understands ioana Alcaraz RN  FNA   COVID 19 Nurse Triage Plan/Patient Instructions    Please be aware that novel coronavirus (COVID-19) may be circulating in the community. If you develop symptoms such as fever, cough, or SOB or if you have concerns about the presence of another infection including coronavirus (COVID-19), please contact your health care provider or visit [email or web address].     Disposition/Instructions    In-Person Visit with provider recommended. Reference Visit Selection Guide.    Thank you for taking steps to prevent the spread of this virus.  o Limit your contact with others.  o Wear a simple mask to cover your cough.  o Wash your hands well and often.    Resources    [facility]: About COVID-19: [email or web address]    CDC: What to Do If You're Sick: www.cdc.gov/coronavirus/2019-ncov/about/steps-when-sick.html    CDC: Ending Home Isolation: www.cdc.gov/coronavirus/2019-ncov/hcp/disposition-in-home-patients.html     CDC: Caring for Someone: www.cdc.gov/coronavirus/2019-ncov/if-you-are-sick/care-for-someone.html     [facility]: Interim Guidance for Hospital Discharge to Home: www.health.[facility].mn.us/diseases/coronavirus/hcp/hospdischarge.pdf    [facility] clinical trials (COVID-19 research studies): [email or web address].[email or web address]/[email or web address]-clinical-trials     Below are the COVID-19 hotlines at the [facility] ([facility]). Interpreters are available.   o For health questions: Call [phone] or 1-103.635.7090 (7 a.m. to 7 p.m.)  o For questions about schools and childcare: Call [phone] or 1-153.466.8807 (7 a.m. to 7 p.m.)                      Additional Information    Negative: Severe difficulty breathing (e.g., struggling for each  "breath, speaks in single words, stridor)    Negative: Sounds like a life-threatening emergency to the triager    Negative: [1] Diagnosed strep throat AND [2] taking antibiotic AND [3] symptoms continue    Negative: Throat culture results, call about    Negative: Productive cough is main symptom    Negative: Non-productive cough is main symptom    Negative: Hoarseness is main symptom    Negative: Runny nose is main symptom    Negative: [1] Drooling or spitting out saliva (because can't swallow) AND [2] normal breathing    Negative: Unable to open mouth completely    Negative: [1] Difficulty breathing AND [2] not severe    Negative: Fever > 104 F (40 C)    Negative: [1] Refuses to drink anything AND [2] for > 12 hours    Negative: [1] Drinking very little AND [2] dehydration suspected (e.g., no urine > 12 hours, very dry mouth, very lightheaded)    Negative: Patient sounds very sick or weak to the triager    SEVERE (e.g., excruciating) throat pain    [1] Pus on tonsils (back of throat) AND [2]  fever AND [3] swollen neck lymph nodes (\"glands\")    Protocols used: SORE THROAT-A-      "

## 2021-03-27 NOTE — PROGRESS NOTES
"    Assessment & Plan     Suspected COVID-19 virus infection  Rapid strep negative. COVID negative.This is likely viral. Continue with supportive care. Get plenty of rest and push fluids. Can use Tylenol and/or ibuprofen as needed for pain and/or fever control. Allow 7-10 days for symptoms to improve. Follow up as needed.      - Symptomatic COVID-19 Virus (Coronavirus) by PCR; Future  - Symptomatic COVID-19 Virus (Coronavirus) by PCR  - Group A Streptococcus PCR Throat Swab  - SARS-CoV-2 COVID-19 Virus (Coronavirus) by PCR    Throat pain    - Streptococcus A Rapid Scr w Reflx to PCR  - Symptomatic COVID-19 Virus (Coronavirus) by PCR; Future  - Symptomatic COVID-19 Virus (Coronavirus) by PCR             BMI:   Estimated body mass index is 28.43 kg/m  as calculated from the following:    Height as of 3/3/21: 1.791 m (5' 10.5\").    Weight as of 3/3/21: 91.2 kg (201 lb).           Return in about 1 week (around 4/3/2021), or if symptoms worsen or fail to improve.    KAYLEEN Chao [facility] [place]    Subjective     Chief Complaint   Patient presents with     Covid Concern     Exposed to COVID at work. Cough, sore throat and headache x1 day      Throat Pain     Exposed to strep by grandcildren who live in her home. She now has a sore throat.          HPI     URI Adult    Onset of symptoms was 1 day(s) ago.  Course of illness is same.    Severity moderate  Current and Associated symptoms: throat  Treatment measures tried include Tylenol/Ibuprofen.  Predisposing factors include COVID exposure 5 days ago and strep exposure with grandchild.                Review of Systems   Constitutional: Negative for chills, fatigue and fever.   HENT: Positive for sore throat. Negative for congestion, ear pain, rhinorrhea, sinus pressure and sinus pain.    Eyes: Negative for pain, discharge and redness.   Respiratory: Positive for cough. Negative for shortness of breath and wheezing.    Cardiovascular: " Negative for palpitations.   Gastrointestinal: Negative for abdominal pain, constipation, diarrhea, heartburn, hematochezia, nausea and vomiting.   Musculoskeletal: Negative for arthralgias, back pain and myalgias.   Skin: Negative for rash.   Neurological: Positive for headaches.            Objective    /72 (BP Location: Right arm, Patient Position: Sitting, Cuff Size: Adult Large)   Pulse 70   Temp 97.9  F (36.6  C) (Tympanic)   Resp 15   LMP 03/28/2010   SpO2 98%   Breastfeeding No   There is no height or weight on file to calculate BMI.  Physical Exam  Constitutional:       Appearance: She is well-developed.   HENT:      Head: Normocephalic.      Right Ear: Tympanic membrane and ear canal normal.      Left Ear: Tympanic membrane and ear canal normal.      Mouth/Throat:      Pharynx: Posterior oropharyngeal erythema present.   Eyes:      Conjunctiva/sclera: Conjunctivae normal.      Pupils: Pupils are equal, round, and reactive to light.   Cardiovascular:      Rate and Rhythm: Normal rate.      Heart sounds: Normal heart sounds.   Pulmonary:      Effort: Pulmonary effort is normal.      Breath sounds: Normal breath sounds.   Skin:     General: Skin is warm and dry.      Findings: No rash.   Psychiatric:         Behavior: Behavior normal.            Recent Results (from the past 168 hour(s))   Streptococcus A Rapid Scr w Reflx to PCR    Specimen: Throat   Result Value Ref Range Status    Strep Specimen Description Throat  Final    Streptococcus Group A Rapid Screen Negative NEG^Negative Final   Symptomatic COVID-19 Virus (Coronavirus) by PCR    Specimen: Nasopharyngeal   Result Value Ref Range Status    COVID-19 Virus PCR to [facility] - Source Nasopharyngeal  Final    COVID-19 Virus PCR to [facility] - Result   Final     Test received-See reflex to IDDL test SARS CoV2 (COVID-19) Virus RT-PCR   Group A Streptococcus PCR Throat Swab    Specimen: Throat   Result Value Ref Range Status    Specimen Description  Throat  Final    Strep Group A PCR Not Detected NDET^Not Detected Final   SARS-CoV-2 COVID-19 Virus (Coronavirus) by PCR    Specimen: Nasopharyngeal   Result Value Ref Range Status    SARS-CoV-2 Virus Specimen Source Nasopharyngeal  Final    SARS-CoV-2 PCR Result NEGATIVE  Final    SARS-CoV-2 PCR Comment   Final     Testing was performed using the Aptima SARS-CoV-2 Assay on the [employer] Instrument System.   Additional information about this Emergency Use Authorization (EUA) assay can be found via   the Lab Guide.

## 2021-03-28 LAB
LABORATORY COMMENT REPORT: NORMAL
SARS-COV-2 RNA RESP QL NAA+PROBE: NEGATIVE
SPECIMEN SOURCE: NORMAL

## 2021-03-28 NOTE — RESULT ENCOUNTER NOTE
Group A Streptococcus PCR is NEGATIVE  No treatment or change in treatment [facility] ED lab result Strep Group A protocol.

## 2021-03-30 ASSESSMENT — ENCOUNTER SYMPTOMS
RHINORRHEA: 0
EYE PAIN: 0
VOMITING: 0
CHILLS: 0
WHEEZING: 0
SINUS PAIN: 0
CONSTIPATION: 0
HEARTBURN: 0
NAUSEA: 0
EYE REDNESS: 0
ARTHRALGIAS: 0
COUGH: 1
FEVER: 0
EYE DISCHARGE: 0
DIARRHEA: 0
MYALGIAS: 0
HEMATOCHEZIA: 0
PALPITATIONS: 0
FATIGUE: 0
SORE THROAT: 1
HEADACHES: 1
BACK PAIN: 0
ABDOMINAL PAIN: 0
SHORTNESS OF BREATH: 0
SINUS PRESSURE: 0

## 2021-05-26 ENCOUNTER — TRANSFERRED RECORDS (OUTPATIENT)
Dept: HEALTH INFORMATION MANAGEMENT | Facility: CLINIC | Age: 56
End: 2021-05-26

## 2021-06-05 VITALS — HEIGHT: 71 IN | WEIGHT: 202.1 LBS | BODY MASS INDEX: 28.29 KG/M2

## 2021-06-14 NOTE — ANESTHESIA POSTPROCEDURE EVALUATION
Patient: Christina John  Procedure(s):  LEFT LUMBAR 5-SACRAL 1 DISCECTOMY (Left)  Anesthesia type: general    Patient location: Phase II Recovery  Last vitals:   Vitals Value Taken Time   /87 01/08/21 1307   Temp 36.3  C (97.3  F) 01/08/21 1240   Pulse 63 01/08/21 1319   Resp 14 01/08/21 1301   SpO2 94 % 01/08/21 1319   Vitals shown include unvalidated device data.  Post vital signs: stable  Level of consciousness: awake and responds to simple questions  Post-anesthesia pain: pain controlled  Post-anesthesia nausea and vomiting: no  Pulmonary: unassisted, return to baseline  Cardiovascular: stable and blood pressure at baseline  Hydration: adequate  Anesthetic events: no    QCDR Measures:  ASA# 11 - Lise-op Cardiac Arrest: ASA11B - Patient did NOT experience unanticipated cardiac arrest  ASA# 12 - Lise-op Mortality Rate: ASA12B - Patient did NOT die  ASA# 13 - PACU Re-Intubation Rate: ASA13B - Patient did NOT require a new airway mgmt  ASA# 10 - Composite Anes Safety: ASA10A - No serious adverse event    Additional Notes:

## 2021-06-14 NOTE — ANESTHESIA PREPROCEDURE EVALUATION
Anesthesia Evaluation      History of anesthetic complications (PONV)     Airway   Mallampati: I  Neck ROM: full   Pulmonary - normal exam    breath sounds clear to auscultation                         Cardiovascular - normal exam  Exercise tolerance: > or = 4 METS  (+) , PVD    Rhythm: regular  Rate: normal,         Neuro/Psych    (+) depression,     Comments: L leg weakness    Endo/Other       Comments: Hx of breast cancer s/p bilateral mastectomy   Glaucoma    GI/Hepatic/Renal      Comments: S/p gastric bypass surgery          Dental - normal exam                        Anesthesia Plan  Planned anesthetic: general endotracheal and total IV anesthesia    ASA 2   Induction: intravenous   Anesthetic plan and risks discussed with: patient  Anesthesia plan special considerations: antiemetics,   Post-op plan: routine recovery

## 2021-06-14 NOTE — ANESTHESIA CARE TRANSFER NOTE
Last vitals:   Vitals:    01/08/21 1204   BP: 140/90   Pulse: 75   Resp: 12   Temp: 36.1  C (97  F)   SpO2: 96%     Patient's level of consciousness is drowsy  Spontaneous respirations: yes  Maintains airway independently: yes  Dentition unchanged: yes  Oropharynx: oropharynx clear of all foreign objects    QCDR Measures:  ASA# 20 - Surgical Safety Checklist: WHO surgical safety checklist completed prior to induction    PQRS# 430 - Adult PONV Prevention: 4558F - Pt received => 2 anti-emetic agents (different classes) preop & intraop  ASA# 8 - Peds PONV Prevention: NA - Not pediatric patient, not GA or 2 or more risk factors NOT present  PQRS# 424 - Lise-op Temp Management: 4559F - At least one body temp DOCUMENTED => 35.5C or 95.9F within required timeframe  PQRS# 426 - PACU Transfer Protocol: - Transfer of care checklist used  ASA# 14 - Acute Post-op Pain: ASA14B - Patient did NOT experience pain >= 7 out of 10

## 2021-06-17 ENCOUNTER — VIRTUAL VISIT (OUTPATIENT)
Dept: URGENT CARE | Facility: CLINIC | Age: 56
End: 2021-06-17
Payer: COMMERCIAL

## 2021-06-17 DIAGNOSIS — J01.90 ACUTE SINUSITIS WITH SYMPTOMS > 10 DAYS: Primary | ICD-10-CM

## 2021-06-17 PROCEDURE — 99213 OFFICE O/P EST LOW 20 MIN: CPT | Mod: 95

## 2021-06-17 RX ORDER — FLUTICASONE PROPIONATE 50 MCG
1 SPRAY, SUSPENSION (ML) NASAL DAILY
Qty: 15.8 ML | Refills: 0 | Status: SHIPPED | OUTPATIENT
Start: 2021-06-17

## 2021-06-17 RX ORDER — DOXYCYCLINE HYCLATE 100 MG
100 TABLET ORAL 2 TIMES DAILY
Qty: 14 TABLET | Refills: 0 | Status: SHIPPED | OUTPATIENT
Start: 2021-06-17 | End: 2021-06-24

## 2021-06-17 NOTE — PROGRESS NOTES
"The patient has been notified of following:     \"This video visit will be conducted via a call between you and your physician/provider. We have found that certain health care needs can be provided without the need for an in-person physical exam.  This service lets us provide the care you need with a video conversation.  If a prescription is necessary we can send it directly to your pharmacy.  If lab work is needed we can place an order for that and you can then stop by our lab to have the test done at a later time.    Video visits are billed at different rates depending on your insurance coverage.  Please reach out to your insurance provider with any questions.    If during the course of the call the physician/provider feels a video visit is not appropriate, you will not be charged for this service.\"      Assessment & Plan:       See patient instructions below.  At the end of the encounter, I discussed results, diagnosis, medications. Discussed red flags for being seen in person at clinic/ER, as well as indications for follow up if no improvement. Patient understood and agreed to plan.       ICD-10-CM    1. Acute sinusitis with symptoms > 10 days  J01.90 doxycycline hyclate (VIBRA-TABS) 100 MG tablet     fluticasone (FLONASE) 50 MCG/ACT nasal spray     Symptomatic COVID-19 Virus (Coronavirus) by PCR       Baseline sinusitis now with laryngitis, purulent discharge and facial pain/pressure          Video-Visit Details    Type of service:  Video Visit    Video Start Time: 1238  Video End Time: 1253    Originating Location (pt. Location): Home    Distant Location (provider location):  [facility]     Platform used for Video Visit: Preston Hernandez PA-C  [facility]    Subjective:   Christina John is a 55 year old female who is contacted via telephone thru scheduled urgent care virtual visit to discuss: No chief complaint on file.      No treatments tried. Patient " reports no fever/chills, headache, chest pain, shortness of breath, abdominal pain, nausea, vomiting, diarrhea, rash, or any other symptoms.     Past Medical History:   Diagnosis Date     Allergic rhinitis due to animal dander did IT x 4 yrs. 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 ended 9/06 per self     Diagnostic skin and sensitization tests 4/03 skin tests pos. for: cat/dog/DM/T/G/RW     Elevated cholesterol      Endometriosis      GERD (gastroesophageal reflux disease)      House dust mite allergy      Hypertension goal BP (blood pressure) < 140/90      Infertility     ON CLOMID     Mild major depression (H)      PCOS (polycystic ovarian syndrome)      Pelvic relaxation      Seasonal allergic conjunctivitis      Seasonal allergic rhinitis     4/03 skin tests pos. for: cat/dog/DM/T/G/RW--per Dr. Talavera.     Ulcers 1992    DX BY ENDOSCOPY       Objective:   Gen:  NAD  Pulm: non-labored work of breathing    No results found for any visits on 06/17/21.    There are no Patient Instructions on file for this visit.

## 2021-07-08 ENCOUNTER — OFFICE VISIT (OUTPATIENT)
Dept: PODIATRY | Facility: CLINIC | Age: 56
End: 2021-07-08
Payer: COMMERCIAL

## 2021-07-08 VITALS
BODY MASS INDEX: 28.14 KG/M2 | HEIGHT: 71 IN | SYSTOLIC BLOOD PRESSURE: 105 MMHG | WEIGHT: 201 LBS | HEART RATE: 73 BPM | DIASTOLIC BLOOD PRESSURE: 79 MMHG

## 2021-07-08 DIAGNOSIS — M72.2 PLANTAR FASCIITIS, RIGHT: Primary | ICD-10-CM

## 2021-07-08 PROCEDURE — 20550 NJX 1 TENDON SHEATH/LIGAMENT: CPT | Mod: RT | Performed by: PODIATRIST

## 2021-07-08 RX ORDER — BUPROPION HYDROCHLORIDE 150 MG/1
150 TABLET ORAL 2 TIMES DAILY
COMMUNITY
End: 2021-09-09

## 2021-07-08 RX ORDER — LIDOCAINE HYDROCHLORIDE 10 MG/ML
1 INJECTION, SOLUTION INFILTRATION; PERINEURAL ONCE
Status: COMPLETED | OUTPATIENT
Start: 2021-07-08 | End: 2021-07-08

## 2021-07-08 RX ADMIN — LIDOCAINE HYDROCHLORIDE 1 ML: 10 INJECTION, SOLUTION INFILTRATION; PERINEURAL at 16:47

## 2021-07-08 ASSESSMENT — PAIN SCALES - GENERAL: PAINLEVEL: MODERATE PAIN (4)

## 2021-07-08 ASSESSMENT — MIFFLIN-ST. JEOR: SCORE: 1599.92

## 2021-07-08 NOTE — NURSING NOTE
"Chief Complaint   Patient presents with     RECHECK     discuss injection, right foot       Initial /79   Pulse 73   Ht 1.791 m (5' 10.5\")   Wt 91.2 kg (201 lb)   LMP 03/28/2010   BMI 28.43 kg/m   Estimated body mass index is 28.43 kg/m  as calculated from the following:    Height as of this encounter: 1.791 m (5' 10.5\").    Weight as of this encounter: 91.2 kg (201 lb).  Medications and allergies reviewed.      Hedy WATTS MA    "

## 2021-07-08 NOTE — LETTER
"    7/8/2021         RE: Christina John  [address] [facility] [address]        Dear Colleague,    Thank you for referring your patient, Christina John, to the [facility] [place]. Please see a copy of my visit note below.    Christina returns to the office for reevaluation of the right foot.  The patient relates following the instructions given at the last visit with noted overall more pain and less improvement in function of the right foot.   The patient relates no other problems.    Vitals: /79   Pulse 73   Ht 1.791 m (5' 10.5\")   Wt 91.2 kg (201 lb)   LMP 03/28/2010   BMI 28.43 kg/m    BMI= Body mass index is 28.43 kg/m .    Lower Extremity Physical Exam:      Neurovascular status remains unchanged.  Muscular exam is within normal limits to major muscle groups.  Integument is intact.      Noted pain on palpation under the central aspect of the right heel.  No surrounding erythema noted.         Assessment:     ICD-10-CM    1. Plantar fasciitis, right  M72.2        Plan:    I have explained to Christina about the progress of the conditions.  At this time, the patient wishes to proceed with a steroid injection to the right heel.  The medial aspect of the right heel was swabbed with alcohol.  Next, a mixture of 5mg of Kenalog 10, and 2 cc of 1% lidocaine plain was injected around the central tubercle of the calcaneal in and around the insertion of the plantar fascia to the right foot.  The patient tolerated the procedure well.  A Band-Aid was applied to the injection site.  The patient will follow up in four weeks.      Christina verbalized agreement with and understanding of the rational for the diagnosis and treatment plan.  All questions were answered to best of my ability and the patient's satisfaction. The patient was advised to contact the clinic with any questions that may arise after the clinic visit.      Disclaimer: This note consists of symbols derived from " keyboarding, dictation and/or voice recognition software. As a result, there may be errors in the script that have gone undetected. Please consider this when interpreting information found in this chart.       GONZALES Liao D.P.M., F.A.C.F.A.S.        Again, thank you for allowing me to participate in the care of your patient.        Sincerely,        Reilly Liao DPM

## 2021-07-08 NOTE — PROGRESS NOTES
"Chirstina returns to the office for reevaluation of the right foot.  The patient relates following the instructions given at the last visit with noted overall more pain and less improvement in function of the right foot.   The patient relates no other problems.    Vitals: /79   Pulse 73   Ht 1.791 m (5' 10.5\")   Wt 91.2 kg (201 lb)   LMP 03/28/2010   BMI 28.43 kg/m    BMI= Body mass index is 28.43 kg/m .    Lower Extremity Physical Exam:      Neurovascular status remains unchanged.  Muscular exam is within normal limits to major muscle groups.  Integument is intact.      Noted pain on palpation under the central aspect of the right heel.  No surrounding erythema noted.         Assessment:     ICD-10-CM    1. Plantar fasciitis, right  M72.2        Plan:    I have explained to Christina about the progress of the conditions.  At this time, the patient wishes to proceed with a steroid injection to the right heel.  The medial aspect of the right heel was swabbed with alcohol.  Next, a mixture of 5mg of Kenalog 10, and 2 cc of 1% lidocaine plain was injected around the central tubercle of the calcaneal in and around the insertion of the plantar fascia to the right foot.  The patient tolerated the procedure well.  A Band-Aid was applied to the injection site.  The patient will follow up in four weeks.      Christina verbalized agreement with and understanding of the rational for the diagnosis and treatment plan.  All questions were answered to best of my ability and the patient's satisfaction. The patient was advised to contact the clinic with any questions that may arise after the clinic visit.      Disclaimer: This note consists of symbols derived from keyboarding, dictation and/or voice recognition software. As a result, there may be errors in the script that have gone undetected. Please consider this when interpreting information found in this chart.       GONZALES Liao D.P.M., F.LAINE.C.F.A.S.    "

## 2021-07-17 ENCOUNTER — HOSPITAL ENCOUNTER (EMERGENCY)
Facility: CLINIC | Age: 56
Discharge: HOME | End: 2021-07-17
Attending: EMERGENCY MEDICINE | Admitting: EMERGENCY MEDICINE
Payer: COMMERCIAL

## 2021-07-17 VITALS
TEMPERATURE: 97.6 F | OXYGEN SATURATION: 99 % | HEART RATE: 61 BPM | DIASTOLIC BLOOD PRESSURE: 93 MMHG | RESPIRATION RATE: 18 BRPM | SYSTOLIC BLOOD PRESSURE: 142 MMHG

## 2021-07-17 DIAGNOSIS — S06.0X0A CLOSED HEAD INJURY WITH CONCUSSION, WITHOUT LOSS OF CONSCIOUSNESS, INITIAL ENCOUNTER: ICD-10-CM
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
## 2021-07-17 DIAGNOSIS — S39.012A LOW BACK STRAIN, INITIAL ENCOUNTER: ICD-10-CM

## 2021-07-17 PROCEDURE — 99284 EMERGENCY DEPT VISIT MOD MDM: CPT | Performed by: EMERGENCY MEDICINE

## 2021-07-17 PROCEDURE — 99283 EMERGENCY DEPT VISIT LOW MDM: CPT | Performed by: EMERGENCY MEDICINE

## 2021-07-17 RX ORDER — HYDROCODONE BITARTRATE AND ACETAMINOPHEN 5; 325 MG/1; MG/1
1-2 TABLET ORAL EVERY 4 HOURS PRN
Qty: 12 TABLET | Refills: 0 | Status: SHIPPED | OUTPATIENT
Start: 2021-07-17 | End: 2021-09-09

## 2021-07-17 RX ORDER — CYCLOBENZAPRINE HCL 10 MG
10 TABLET ORAL 3 TIMES DAILY PRN
Qty: 20 TABLET | Refills: 0 | Status: SHIPPED | OUTPATIENT
Start: 2021-07-17 | End: 2021-07-24
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
## 2021-07-17 NOTE — ED NOTES
"Pt reports right side of face \"drooping\" on scene and right eye wouldn't stay open all the way. Now resolved  "
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
## 2021-07-20 ENCOUNTER — PATIENT OUTREACH (OUTPATIENT)
Dept: FAMILY MEDICINE | Facility: CLINIC | Age: 56
End: 2021-07-20
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
## 2021-07-23 ENCOUNTER — OFFICE VISIT (OUTPATIENT)
Dept: FAMILY MEDICINE | Facility: CLINIC | Age: 56
End: 2021-07-23
Payer: COMMERCIAL

## 2021-07-23 VITALS
WEIGHT: 202 LBS | HEART RATE: 71 BPM | TEMPERATURE: 97.8 F | BODY MASS INDEX: 28.57 KG/M2 | SYSTOLIC BLOOD PRESSURE: 117 MMHG | OXYGEN SATURATION: 100 % | DIASTOLIC BLOOD PRESSURE: 84 MMHG

## 2021-07-23 DIAGNOSIS — S16.1XXD STRAIN OF NECK MUSCLE, SUBSEQUENT ENCOUNTER: Primary | ICD-10-CM

## 2021-07-23 PROCEDURE — 99213 OFFICE O/P EST LOW 20 MIN: CPT | Performed by: FAMILY MEDICINE

## 2021-07-23 ASSESSMENT — PAIN SCALES - GENERAL: PAINLEVEL: MILD PAIN (3)

## 2021-07-23 ASSESSMENT — PATIENT HEALTH QUESTIONNAIRE - PHQ9: SUM OF ALL RESPONSES TO PHQ QUESTIONS 1-9: 12
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
## 2021-07-23 NOTE — PATIENT INSTRUCTIONS
Every morning when you wake up take a hot shower and get the water on the affected area of your neck.  After you feel warmed up start stretching your neck.   There are six stretches to do which include:  Head forward  Head backwards  Head to the left side  Head to the right side  Rotate head to the left   Rotate Head to the right     A good stretch is for a minimum of 30 seconds    Repeat this during the day several times.

## 2021-09-09 ENCOUNTER — OFFICE VISIT (OUTPATIENT)
Dept: FAMILY MEDICINE | Facility: CLINIC | Age: 56
End: 2021-09-09
Payer: COMMERCIAL

## 2021-09-09 VITALS
HEIGHT: 71 IN | RESPIRATION RATE: 16 BRPM | WEIGHT: 204 LBS | OXYGEN SATURATION: 97 % | TEMPERATURE: 98 F | BODY MASS INDEX: 28.56 KG/M2 | HEART RATE: 61 BPM | SYSTOLIC BLOOD PRESSURE: 104 MMHG | DIASTOLIC BLOOD PRESSURE: 72 MMHG

## 2021-09-09 DIAGNOSIS — Z00.00 ROUTINE GENERAL MEDICAL EXAMINATION AT A HEALTH CARE FACILITY: Primary | ICD-10-CM

## 2021-09-09 DIAGNOSIS — Z98.84 HISTORY OF GASTRIC BYPASS: ICD-10-CM

## 2021-09-09 DIAGNOSIS — Z90.13 S/P BILATERAL MASTECTOMY: ICD-10-CM

## 2021-09-09 DIAGNOSIS — R10.13 EPIGASTRIC PAIN: ICD-10-CM

## 2021-09-09 DIAGNOSIS — B00.1 RECURRENT COLD SORES: ICD-10-CM

## 2021-09-09 DIAGNOSIS — E78.5 HYPERLIPIDEMIA LDL GOAL <160: ICD-10-CM

## 2021-09-09 DIAGNOSIS — Z85.3 HISTORY OF BREAST CANCER: ICD-10-CM

## 2021-09-09 DIAGNOSIS — F32.0 MAJOR DEPRESSIVE DISORDER, SINGLE EPISODE, MILD (H): ICD-10-CM

## 2021-09-09 LAB
ALBUMIN SERPL-MCNC: 3.9 G/DL (ref 3.4–5)
ALP SERPL-CCNC: 107 U/L (ref 40–150)
ALT SERPL W P-5'-P-CCNC: 33 U/L (ref 0–50)
ANION GAP SERPL CALCULATED.3IONS-SCNC: 3 MMOL/L (ref 3–14)
AST SERPL W P-5'-P-CCNC: 19 U/L (ref 0–45)
BASOPHILS # BLD AUTO: 0.1 10E3/UL (ref 0–0.2)
BASOPHILS NFR BLD AUTO: 1 %
BILIRUB SERPL-MCNC: 0.9 MG/DL (ref 0.2–1.3)
BUN SERPL-MCNC: 20 MG/DL (ref 7–30)
CALCIUM SERPL-MCNC: 9.2 MG/DL (ref 8.5–10.1)
CHLORIDE BLD-SCNC: 107 MMOL/L (ref 94–109)
CHOLEST SERPL-MCNC: 182 MG/DL
CO2 SERPL-SCNC: 29 MMOL/L (ref 20–32)
CREAT SERPL-MCNC: 1.09 MG/DL (ref 0.52–1.04)
CRP SERPL-MCNC: <2.9 MG/L (ref 0–8)
EOSINOPHIL # BLD AUTO: 0.3 10E3/UL (ref 0–0.7)
EOSINOPHIL NFR BLD AUTO: 4 %
ERYTHROCYTE [DISTWIDTH] IN BLOOD BY AUTOMATED COUNT: 14.4 % (ref 10–15)
ERYTHROCYTE [SEDIMENTATION RATE] IN BLOOD BY WESTERGREN METHOD: 8 MM/HR (ref 0–30)
FASTING STATUS PATIENT QL REPORTED: YES
FERRITIN SERPL-MCNC: 7 NG/ML (ref 8–252)
GFR SERPL CREATININE-BSD FRML MDRD: 58 ML/MIN/1.73M2
GLUCOSE BLD-MCNC: 92 MG/DL (ref 70–99)
HCT VFR BLD AUTO: 38.9 % (ref 35–47)
HDLC SERPL-MCNC: 61 MG/DL
HGB BLD-MCNC: 12.6 G/DL (ref 11.7–15.7)
LDLC SERPL CALC-MCNC: 106 MG/DL
LIPASE SERPL-CCNC: 114 U/L (ref 73–393)
LYMPHOCYTES # BLD AUTO: 3.4 10E3/UL (ref 0.8–5.3)
LYMPHOCYTES NFR BLD AUTO: 46 %
MCH RBC QN AUTO: 30.7 PG (ref 26.5–33)
MCHC RBC AUTO-ENTMCNC: 32.4 G/DL (ref 31.5–36.5)
MCV RBC AUTO: 95 FL (ref 78–100)
MONOCYTES # BLD AUTO: 0.6 10E3/UL (ref 0–1.3)
MONOCYTES NFR BLD AUTO: 8 %
NEUTROPHILS # BLD AUTO: 3.1 10E3/UL (ref 1.6–8.3)
NEUTROPHILS NFR BLD AUTO: 41 %
NONHDLC SERPL-MCNC: 121 MG/DL
PLATELET # BLD AUTO: 307 10E3/UL (ref 150–450)
POTASSIUM BLD-SCNC: 4.3 MMOL/L (ref 3.4–5.3)
PROT SERPL-MCNC: 7 G/DL (ref 6.8–8.8)
RBC # BLD AUTO: 4.11 10E6/UL (ref 3.8–5.2)
SODIUM SERPL-SCNC: 139 MMOL/L (ref 133–144)
TRIGL SERPL-MCNC: 77 MG/DL
TSH SERPL DL<=0.005 MIU/L-ACNC: 0.42 MU/L (ref 0.4–4)
VIT B12 SERPL-MCNC: 414 PG/ML (ref 193–986)
WBC # BLD AUTO: 7.5 10E3/UL (ref 4–11)

## 2021-09-09 PROCEDURE — 86140 C-REACTIVE PROTEIN: CPT | Performed by: FAMILY MEDICINE

## 2021-09-09 PROCEDURE — 82728 ASSAY OF FERRITIN: CPT | Performed by: FAMILY MEDICINE

## 2021-09-09 PROCEDURE — 82746 ASSAY OF FOLIC ACID SERUM: CPT | Performed by: FAMILY MEDICINE

## 2021-09-09 PROCEDURE — 82306 VITAMIN D 25 HYDROXY: CPT | Performed by: FAMILY MEDICINE

## 2021-09-09 PROCEDURE — 80050 GENERAL HEALTH PANEL: CPT | Performed by: FAMILY MEDICINE

## 2021-09-09 PROCEDURE — 80061 LIPID PANEL: CPT | Performed by: FAMILY MEDICINE

## 2021-09-09 PROCEDURE — 82607 VITAMIN B-12: CPT | Performed by: FAMILY MEDICINE

## 2021-09-09 PROCEDURE — 36415 COLL VENOUS BLD VENIPUNCTURE: CPT | Performed by: FAMILY MEDICINE

## 2021-09-09 PROCEDURE — 85652 RBC SED RATE AUTOMATED: CPT | Performed by: FAMILY MEDICINE

## 2021-09-09 PROCEDURE — 99396 PREV VISIT EST AGE 40-64: CPT | Performed by: FAMILY MEDICINE

## 2021-09-09 PROCEDURE — 83690 ASSAY OF LIPASE: CPT | Performed by: FAMILY MEDICINE

## 2021-09-09 PROCEDURE — 99213 OFFICE O/P EST LOW 20 MIN: CPT | Mod: 25 | Performed by: FAMILY MEDICINE

## 2021-09-09 RX ORDER — BUPROPION HYDROCHLORIDE 150 MG/1
150 TABLET ORAL 2 TIMES DAILY
Qty: 180 TABLET | Refills: 3 | Status: SHIPPED | OUTPATIENT
Start: 2021-09-09 | End: 2022-08-15

## 2021-09-09 RX ORDER — ESCITALOPRAM OXALATE 20 MG/1
TABLET ORAL
Qty: 90 TABLET | Refills: 3 | Status: SHIPPED | OUTPATIENT
Start: 2021-09-09 | End: 2022-08-15

## 2021-09-09 RX ORDER — VALACYCLOVIR HYDROCHLORIDE 1 G/1
1000 TABLET, FILM COATED ORAL DAILY
Qty: 90 TABLET | Refills: 3 | Status: SHIPPED | OUTPATIENT
Start: 2021-09-09 | End: 2022-09-26

## 2021-09-09 ASSESSMENT — MIFFLIN-ST. JEOR: SCORE: 1613.53

## 2021-09-09 ASSESSMENT — ENCOUNTER SYMPTOMS
NERVOUS/ANXIOUS: 1
PALPITATIONS: 0
NAUSEA: 0
HEARTBURN: 0
CHILLS: 0
DIARRHEA: 0
DYSURIA: 0
SHORTNESS OF BREATH: 0
DIZZINESS: 0
CONSTIPATION: 0
PARESTHESIAS: 0
FREQUENCY: 0
ABDOMINAL PAIN: 1
HEMATOCHEZIA: 0
EYE PAIN: 0
HEMATURIA: 0
JOINT SWELLING: 0
MYALGIAS: 1
HEADACHES: 1
COUGH: 0
FEVER: 0
BREAST MASS: 0
ARTHRALGIAS: 1
WEAKNESS: 0
SORE THROAT: 0

## 2021-09-09 ASSESSMENT — PATIENT HEALTH QUESTIONNAIRE - PHQ9
10. IF YOU CHECKED OFF ANY PROBLEMS, HOW DIFFICULT HAVE THESE PROBLEMS MADE IT FOR YOU TO DO YOUR WORK, TAKE CARE OF THINGS AT HOME, OR GET ALONG WITH OTHER PEOPLE: SOMEWHAT DIFFICULT
SUM OF ALL RESPONSES TO PHQ QUESTIONS 1-9: 16
SUM OF ALL RESPONSES TO PHQ QUESTIONS 1-9: 16

## 2021-09-09 ASSESSMENT — PAIN SCALES - GENERAL: PAINLEVEL: NO PAIN (0)

## 2021-09-09 NOTE — PROGRESS NOTES
SUBJECTIVE:   CC: Christina John is an 55 year old woman who presents for preventive health visit.         Patient has been advised of split billing requirements and indicates understanding: Yes  Healthy Habits:     Getting at least 3 servings of Calcium per day:  NO    Bi-annual eye exam:  Yes    Dental care twice a year:  Yes    Sleep apnea or symptoms of sleep apnea:  Daytime drowsiness    Diet:  Regular (no restrictions)    Frequency of exercise:  None    Taking medications regularly:  Yes    Medication side effects:  None    PHQ-2 Total Score: 4    Additional concerns today:  Yes    Pt with epigastric pain intermittently  Has been occurring for 4 months  Has now started on prilosec and helping it more tolerable now for 3 months  Noticed the discomfort 2 times  It seemed to be associated with eating, usually after  Pt notes pain and burning. Tea makes it worse  Avoids pop  Has already had gallbladder removed in 2005    Was taking mobic last fall and took it 20 days  Retried it for 5 days  No other nsaid  No diarrhea or black or bloody stools  No weight loss  Daughter just diagnosed with crohns recently     Pt with depression doing well on wellbutrin and lexapro. She needs refill  Pt with insomnia, using trazodone as needed    Pt with h/o gastric bypass needing yearly labwork    Pt with elevated cholesterol not on meds at this time    Pt needing refill of valtrex for recurrent cold sores        Today's PHQ-2 Score:   PHQ-2 ( 1999 Pfizer) 9/9/2021   Q1: Little interest or pleasure in doing things 2   Q2: Feeling down, depressed or hopeless 2   PHQ-2 Score 4   Q1: Little interest or pleasure in doing things More than half the days   Q2: Feeling down, depressed or hopeless More than half the days   PHQ-2 Score 4       Abuse: Current or Past (Physical, Sexual or Emotional) - No  Do you feel safe in your environment? YES        Social History     Tobacco Use     Smoking status: Never Smoker     Smokeless tobacco:  Never Used     Tobacco comment: Nonsmoking household   Substance Use Topics     Alcohol use: No     If you drink alcohol do you typically have >3 drinks per day or >7 drinks per week? No    Alcohol Use 9/9/2021   Prescreen: >3 drinks/day or >7 drinks/week? Not Applicable   Prescreen: >3 drinks/day or >7 drinks/week? -       Reviewed orders with patient.  Reviewed health maintenance and updated orders accordingly - Yes  Lab work is in process    Breast Cancer Screening:    FHS-7:   Breast CA Risk Assessment (FHS-7) 9/9/2021   Did any of your first-degree relatives have breast or ovarian cancer? No   Did any of your relatives have bilateral breast cancer? No   Did any man in your family have breast cancer? No   Did any woman in your family have breast and ovarian cancer? Yes   Did any woman in your family have breast cancer before age 50 y? Yes   Do you have 2 or more relatives with breast and/or ovarian cancer? No   Do you have 2 or more relatives with breast and/or bowel cancer? Yes     click delete button to remove this line now  Mammogram Screening - Alternate mammogram schedule due to breast cancer history  Pertinent mammograms are reviewed under the imaging tab.    History of abnormal Pap smear: Status post hysterectomy. Pap still indicated. No    PAP / HPV 4/16/2014 8/13/2009   PAP (Historical) NIL NIL     Reviewed and updated as needed this visit by clinical staff                 Reviewed and updated as needed this visit by Provider                    Review of Systems   Constitutional: Negative for chills and fever.   HENT: Positive for hearing loss. Negative for congestion, ear pain and sore throat.    Eyes: Negative for pain and visual disturbance.   Respiratory: Negative for cough and shortness of breath.    Cardiovascular: Negative for chest pain, palpitations and peripheral edema.   Gastrointestinal: Positive for abdominal pain. Negative for constipation, diarrhea, heartburn, hematochezia and nausea.    Breasts:  Negative for tenderness, breast mass and discharge.   Genitourinary: Positive for vaginal discharge. Negative for dysuria, frequency, genital sores, hematuria, pelvic pain, urgency and vaginal bleeding.   Musculoskeletal: Positive for arthralgias and myalgias. Negative for joint swelling.   Skin: Negative for rash.   Neurological: Positive for headaches. Negative for dizziness, weakness and paresthesias.   Psychiatric/Behavioral: Positive for mood changes. The patient is nervous/anxious.      CONSTITUTIONAL: NEGATIVE for fever, chills, change in weight  INTEGUMENTARY/SKIN: NEGATIVE for worrisome rashes, moles or lesions  EYES: NEGATIVE for vision changes or irritation  ENT: NEGATIVE for ear, mouth and throat problems  RESP: NEGATIVE for significant cough or SOB  BREAST: NEGATIVE for masses, tenderness or discharge  CV: NEGATIVE for chest pain, palpitations or peripheral edema  GI: NEGATIVE for nausea, abdominal pain, heartburn, or change in bowel habits  : NEGATIVE for unusual urinary or vaginal symptoms. No vaginal bleeding.  MUSCULOSKELETAL: NEGATIVE for significant arthralgias or myalgia  NEURO: NEGATIVE for weakness, dizziness or paresthesias  PSYCHIATRIC: NEGATIVE for changes in mood or affect      OBJECTIVE:   LMP 03/28/2010   Physical Exam  GENERAL: healthy, alert and no distress  EYES: Eyes grossly normal to inspection, PERRL and conjunctivae and sclerae normal  HENT: ear canals and TM's normal, nose and mouth without ulcers or lesions  NECK: no adenopathy, no asymmetry, masses, or scars and thyroid normal to palpation  RESP: lungs clear to auscultation - no rales, rhonchi or wheezes  CV: regular rate and rhythm, normal S1 S2, no S3 or S4, no murmur, click or rub, no peripheral edema and peripheral pulses strong  ABDOMEN: soft, nontender, no hepatosplenomegaly, no masses and bowel sounds normal  MS: no gross musculoskeletal defects noted, no edema  SKIN: no suspicious lesions or rashes  NEURO:  Normal strength and tone, mentation intact and speech normal  PSYCH: mentation appears normal, affect normal/bright    Diagnostic Test Results:  Labs reviewed in Epic    ASSESSMENT/PLAN:   (Z00.00) Routine general medical examination at a health care facility  (primary encounter diagnosis)  Comment:   Plan:     (F32.0) Major depressive disorder, single episode, mild (H)  Comment: refill as is working well  Plan: escitalopram (LEXAPRO) 20 MG tablet, buPROPion         (WELLBUTRIN XL) 150 MG 24 hr tablet, **TSH with        free T4 reflex FUTURE 2mo            (Z98.84) History of gastric bypass  Comment: needs yearly labwork  Plan: **CBC with platelets differential FUTURE 2mo,         **Comprehensive metabolic panel FUTURE 2mo,         **Ferritin FUTURE 2mo, **Folate FUTURE 2mo,         **Vitamin D Deficiency FUTURE 2mo, **Vitamin         B12 FUTURE 2mo, **TSH with free T4 reflex         FUTURE 2mo, CANCELED: **TSH with free T4 reflex        FUTURE 2mo            (E78.5) Hyperlipidemia LDL goal <160  Comment: not on statin at this time  Plan: Lipid panel reflex to direct LDL Fasting            (B00.1) Recurrent cold sores  Comment: refill as needed  Plan: valACYclovir (VALTREX) 1000 mg tablet            (R10.13) Epigastric pain  Comment: pain despite 3 months of ppi,  Needs labwork and if all normal consider EGD  Plan: Lipase, Adult Gastro Ref - Procedure Only,         Helicobacter pylori Antigen Stool, ESR:         Erythrocyte sedimentation rate, CRP,         inflammation, Helicobacter pylori Antigen Stool            (Z85.3) History of breast cancer  Comment: no imaging needed  Plan:     (Z90.13) S/P bilateral mastectomy  Comment:   Plan:     Patient has been advised of split billing requirements and indicates understanding: Yes  COUNSELING:  Reviewed preventive health counseling, as reflected in patient instructions       Regular exercise       Healthy diet/nutrition       Vision screening       Colon cancer  "screening    Estimated body mass index is 28.57 kg/m  as calculated from the following:    Height as of 7/8/21: 1.791 m (5' 10.5\").    Weight as of 7/23/21: 91.6 kg (202 lb).    Weight management plan: Discussed healthy diet and exercise guidelines    She reports that she has never smoked. She has never used smokeless tobacco.      Counseling Resources:  ATP IV Guidelines  Pooled Cohorts Equation Calculator  Breast Cancer Risk Calculator  BRCA-Related Cancer Risk Assessment: FHS-7 Tool  FRAX Risk Assessment  ICSI Preventive Guidelines  Dietary Guidelines for Americans, 2010  [employer] MyPlate  ASA Prophylaxis  Lung CA Screening    Lucía Galvan MD  [facility] [place]  Answers for HPI/ROS submitted by the patient on 9/9/2021  If you checked off any problems, how difficult have these problems made it for you to do your work, take care of things at home, or get along with other people?: Somewhat difficult  PHQ9 TOTAL SCORE: 16      "

## 2021-09-10 PROBLEM — Z85.3 HISTORY OF BREAST CANCER: Status: ACTIVE | Noted: 2021-09-10

## 2021-09-10 PROBLEM — C50.911 MALIGNANT NEOPLASM OF RIGHT FEMALE BREAST, UNSPECIFIED ESTROGEN RECEPTOR STATUS, UNSPECIFIED SITE OF BREAST (H): Status: RESOLVED | Noted: 2018-08-02 | Resolved: 2021-09-10

## 2021-09-10 PROBLEM — B00.1 RECURRENT COLD SORES: Status: ACTIVE | Noted: 2021-09-10

## 2021-09-10 LAB
DEPRECATED CALCIDIOL+CALCIFEROL SERPL-MC: 64 UG/L (ref 20–75)
FOLATE SERPL-MCNC: 30.9 NG/ML

## 2021-09-10 ASSESSMENT — PATIENT HEALTH QUESTIONNAIRE - PHQ9: SUM OF ALL RESPONSES TO PHQ QUESTIONS 1-9: 16

## 2021-09-15 PROCEDURE — 87338 HPYLORI STOOL AG IA: CPT

## 2021-09-16 ENCOUNTER — LAB (OUTPATIENT)
Dept: LAB | Facility: CLINIC | Age: 56
End: 2021-09-16
Payer: COMMERCIAL

## 2021-09-16 DIAGNOSIS — R10.13 EPIGASTRIC PAIN: Primary | ICD-10-CM

## 2021-09-17 LAB — H PYLORI AG STL QL IA: NEGATIVE

## 2021-11-16 ENCOUNTER — TELEPHONE (OUTPATIENT)
Dept: GASTROENTEROLOGY | Facility: CLINIC | Age: 56
End: 2021-11-16
Payer: COMMERCIAL

## 2021-11-16 ENCOUNTER — HOSPITAL ENCOUNTER (OUTPATIENT)
Facility: AMBULATORY SURGERY CENTER | Age: 56
End: 2021-11-16
Attending: INTERNAL MEDICINE
Payer: COMMERCIAL

## 2021-11-16 DIAGNOSIS — Z11.59 ENCOUNTER FOR SCREENING FOR OTHER VIRAL DISEASES: ICD-10-CM

## 2021-11-16 NOTE — TELEPHONE ENCOUNTER
Screening Questions  1. Are you active on mychart? Yes     2. What insurance is in the chart? BC[employer] FED     2.  Ordering/Referring Provider: Lucía Galvan MD    3. BMI 29.0    4.  Respiratory Screening:     Do you use daily home oxygen? N  Do you have mod to severe Obstructive Sleep Apnea? N   Do you have Pulmonary Hypertension? N   Do you have SEVERE asthma? N    5. Have you had a heart or lung transplant? N    6. Are you currently on dialysis or have chronic kidney disease? N    7. Have you had a stroke or Transient ischemic attack (TIA) within 6 months? N    8. In the past 6 months, have you had any heart related issues including cardiomyopathy or heart attack? N      If yes, did it require cardiac stenting or other implantable device?N      9. Do you have any implantable devices in your body (pacemaker, defib, LVAD)? N    10. Do you take nitroglycerin? If yes, how often? N    11. Are you currently taking any blood thinners?N    12. Are you a diabetic? N    13. (Females) Are you currently pregnant? N  If yes, how many weeks?      15. Are you taking any prescription pain medications on a routine schedule? N If yes, MAC sedation.    16. Do you have any chemical dependencies such as alcohol, street drugs, or methadone? NIf yes, MAC sedation.    17. Do you have any history of post-traumatic stress syndrome, severe anxiety or history of psychosis? N    18. Do you transfer independently? Y    19.  Do you have any issues with constipation? N    20. [facility] for [facility] Prescription [facility]     Scheduling Details    Which Colonoscopy Prep was Sent?: n/a  Procedure Scheduled: EGD  Surgeon: Sherie   Date of Procedure: 12/03/2021  Location:  OR   Caller (Please ask for phone number if not scheduled by patient): Christina       Sedation Type: CS   Conscious Sedation- Needs  for 6 hours after the procedure  MAC/General-Needs  for 24 hours after procedure    Pre-op  Required at [facility], [facility] and OR for MAC sedation:   (if yes advise patient they will need a pre-op prior to procedure)      Is patient on blood thinners? -N (If yes- inform patient to follow up with PCP or provider for follow up instructions)     Informed patient they will need an adult  Y  Cannot take any type of public or medical transportation alone    Pre-Procedure Covid test to be completed at [facility] or Externally: Y    Confirmed Nurse will call to complete assessment Y    Additional comments: N

## 2021-11-20 ENCOUNTER — OFFICE VISIT (OUTPATIENT)
Dept: URGENT CARE | Facility: URGENT CARE | Age: 56
End: 2021-11-20
Payer: COMMERCIAL

## 2021-11-20 VITALS
OXYGEN SATURATION: 96 % | TEMPERATURE: 97.4 F | RESPIRATION RATE: 16 BRPM | SYSTOLIC BLOOD PRESSURE: 110 MMHG | DIASTOLIC BLOOD PRESSURE: 72 MMHG | HEART RATE: 86 BPM

## 2021-11-20 DIAGNOSIS — J06.9 UPPER RESPIRATORY TRACT INFECTION, UNSPECIFIED TYPE: Primary | ICD-10-CM

## 2021-11-20 DIAGNOSIS — R05.9 COUGH: ICD-10-CM

## 2021-11-20 PROCEDURE — U0003 INFECTIOUS AGENT DETECTION BY NUCLEIC ACID (DNA OR RNA); SEVERE ACUTE RESPIRATORY SYNDROME CORONAVIRUS 2 (SARS-COV-2) (CORONAVIRUS DISEASE [COVID-19]), AMPLIFIED PROBE TECHNIQUE, MAKING USE OF HIGH THROUGHPUT TECHNOLOGIES AS DESCRIBED BY CMS-2020-01-R: HCPCS | Performed by: FAMILY MEDICINE

## 2021-11-20 PROCEDURE — U0005 INFEC AGEN DETEC AMPLI PROBE: HCPCS | Performed by: FAMILY MEDICINE

## 2021-11-20 PROCEDURE — 99213 OFFICE O/P EST LOW 20 MIN: CPT | Performed by: FAMILY MEDICINE

## 2021-11-20 NOTE — PROGRESS NOTES
Assessment & Plan     Upper respiratory tract infection, unspecified type  Discussed in detail differentials and further management. Symptoms are likely secondary to viral infection.  COVID-19 test ordered.  Recommended well hydration, over-the-counter analgesia, warm fluids, steam inhalation, humidifier use. Follow up if symptoms persist or worsen. Written instructions/information provided. Patient understood and in agreement with the above plan. All questions answered.       Cough  - Symptomatic COVID-19 Virus (Coronavirus) by PCR Nasopharyngeal      Krzysztof Lu MD  [facility]   Christina is a 55 year old who presents for the following health issues  accompanied by her spouse.    HPI     Concern -  Onset: 3 days   Description: x 3 days, sinus pain and pressure, right ear pain, coughing, low grade fever 100.5  Intensity: moderate  Progression of Symptoms:  same  Accompanying Signs & Symptoms: No sore throat, shortness of breath, chest pain or other relevant systemic symptoms  Previous history of similar problem: no  Therapies tried and outcome: tylenol, sudafed  Vaccinated for Covid 19 infection        Review of Systems   Constitutional, HEENT, cardiovascular, pulmonary, gi and gu systems are negative, except as otherwise noted.      Objective    /72 (BP Location: Right arm, Patient Position: Sitting, Cuff Size: Adult Large)   Pulse 86   Temp 97.4  F (36.3  C) (Tympanic)   Resp 16   LMP 03/28/2010   SpO2 96%   There is no height or weight on file to calculate BMI.  Physical Exam   GENERAL: healthy, alert and no distress  EYES: Eyes grossly normal to inspection, PERRL and conjunctivae and sclerae normal  HENT: normal cephalic/atraumatic, ear canals and TM's normal, nasal mucosa edematous , oropharynx clear, oral mucous membranes moist and sinuses: not tender  NECK: no adenopathy, no asymmetry, masses, or scars and thyroid normal to palpation  RESP: lungs  clear to auscultation - no rales, rhonchi or wheezes  CV: regular rates and rhythm, normal S1 S2, no S3 or S4 and no murmur, click or rub  MS: no gross musculoskeletal defects noted, no edema  NEURO: Normal strength and tone, mentation intact and speech normal

## 2021-11-20 NOTE — PATIENT INSTRUCTIONS
"  Patient Education   After Your COVID-19 (Coronavirus) Test  You have been tested for COVID-19 (coronavirus).   If you'll have surgery in the next few days, we'll let you know ahead of time if you have the virus. Please call your surgeon's office with any questions.  For all other patients: Results are usually available in [employer] within 2 to 3 days.   If you do not have a [employer] account, you'll get a letter in the mail in about 7 to 10 days.   [employer] is often the fastest way to get test results. Please sign up if you do not already have a [employer] account. See the handout Getting COVID-19 Test Results in [employer] for help.  What if my test result is positive?  If your test is positive and you have not viewed your result in [employer], you'll get a phone call with your result. (A positive test means that you have the virus.)     Follow the tips under \"How do I self-isolate?\" below for 10 days (20 days if you have a weak immune system).    You don't need to be retested for COVID-19 before going back to school or work. As long as you're fever-free and feeling better, you can go back to school, work and other activities after waiting the 10 or 20 days.  What if I have questions after I get my results?  If you have questions about your results, please visit our testing website at [email or web address].   After 7 to 10 days, if you have not gotten your results:     Call [phone] ([phone]) and ask to speak with our COVID-19 results team.    If you're being treated at an infusion center: Call your infusion center directly.  What are the symptoms of COVID-19?  Cough, fever and trouble breathing are the most common signs of COVID-19.  Other symptoms can include new headaches, new muscle or body aches, new and unexplained fatigue (feeling very tired), chills, sore throat, congestion (stuffy or runny nose), diarrhea (loose poop), loss of taste or smell, belly pain, and nausea or vomiting " "(feeling sick to your stomach or throwing up).  You may already have symptoms of COVID-19, or they may show up later.  What should I do if I have symptoms?  If you're having surgery: Call your surgeon's office.  For all other patients: Stay home and away from others (self-isolate) until ...    You've had no fever--and no medicine that reduces fever--for 1 full day (24 hours), AND    Other symptoms have gotten better. For example, your cough or breathing has improved, AND    At least 10 days have passed since your symptoms first started.  How do I self-isolate?    Stay in your own room, even for meals. Use your own bathroom if you can.    Stay away from others in your home. No hugging, kissing or shaking hands. No visitors.    Don't go to work, school or anywhere else.    Clean \"high touch\" surfaces often (doorknobs, counters, handles). Use household cleaning spray or wipes. You'll find a full list of  on the EPA website: www.epa.gov/pesticide-registration/list-n-disinfectants-use-against-sars-cov-2.    Cover your mouth and nose with a mask or other face covering to avoid spreading germs.    Wash your hands and face often. Use soap and water.    Caregivers in these groups are at risk for severe illness due to COVID-19:  ? People 65 years and older  ? People who live in a nursing home or long-term care facility  ? People with chronic disease (lung, heart, cancer, diabetes, kidney, liver, immunologic)  ? People who have a weakened immune system, including those who:    Are in cancer treatment    Take medicine that weakens the immune system, such as corticosteroids    Had a bone marrow or organ transplant    Have an immune deficiency    Have poorly controlled HIV or AIDS    Are obese (body mass index of 40 or higher)    Smoke regularly    Caregivers should wear gloves while washing dishes, handling laundry and cleaning bedrooms and bathrooms.    Use caution when washing and drying laundry: Don't shake dirty " laundry and use the warmest water setting that you can.    For more tips on managing your health at home, go to www.cdc.gov/coronavirus/2019-ncov/downloads/10Things.pdf.  How can I take care of myself at home?  1. Get lots of rest. Drink extra fluids (unless a doctor has told you not to).  2. Take Tylenol (acetaminophen) for fever or pain. If you have liver or kidney problems, ask your family doctor if it's OK to take Tylenol.   Adults can take either:  ? 650 mg (two 325 mg pills) every 4 to 6 hours, or   ? 1,000 mg (two 500 mg pills) every 8 hours as needed.  ? Note: Don't take more than 3,000 mg in one day. Acetaminophen is found in many medicines (both prescribed and over-the-counter medicines). Read all labels to be sure you don't take too much.   For children, check the Tylenol bottle for the right dose. The dose is based on the child's age or weight.  3. If you have other health problems (like cancer, heart failure, an organ transplant or severe kidney disease): Call your specialty clinic if you don't feel better in the next 2 days.  4. Know when to call 911. Emergency warning signs include:  ? Trouble breathing or shortness of breath  ? Chest pain or pressure that doesn't go away  ? Feeling confused like you haven't felt before, or not being able to wake up  ? Bluish-colored lips or face  5. If your doctor prescribed a blood thinner medicine: Follow their instructions.  Where can I get more information?    [facility] - About COVID-19:   [email or web address]    CDC - If You're Sick: cdc.gov/coronavirus/2019-ncov/about/steps-when-sick.html    CDC - Ending Home Isolation: www.cdc.gov/coronavirus/2019-ncov/hcp/disposition-in-home-patients.html    CDC - Caring for Someone: www.cdc.gov/coronavirus/2019-ncov/if-you-are-sick/care-for-someone.html    [facility] - Interim Guidance for Hospital Discharge to Home: www.health.[facility].[place].us/diseases/coronavirus/hcp/hospdischarge.pdf    [facility]  clinical trials (COVID-19 research studies): [email or web address].[facility]-clinical-trials    Below are the COVID-19 hotlines at the [place] Department of Health ([facility]). Interpreters are available.  ? For health questions: Call 664-525-5615 or 1-247.151.3669 (7 a.m. to 7 p.m.)  ? For questions about schools and childcare: Call 070-146-6245 or 1-979.134.7160 (7 a.m. to 7 p.m.)    For informational purposes only. Not to replace the advice of your health care provider. Clinically reviewed by Infection Prevention and the [facility] COVID-19 Clinical Team. Copyright   2020 [facility]. All rights reserved. [employer] 487725 - Rev 11/11/20.

## 2021-11-21 LAB — SARS-COV-2 RNA RESP QL NAA+PROBE: POSITIVE

## 2021-11-23 ENCOUNTER — TELEPHONE (OUTPATIENT)
Dept: GASTROENTEROLOGY | Facility: CLINIC | Age: 56
End: 2021-11-23
Payer: COMMERCIAL

## 2021-11-23 NOTE — TELEPHONE ENCOUNTER
Caller: Left message with Kash John     Procedure: EGD    Date, Location, and Surgeon of Procedure Cancelled: 12/3/2021, Sherie BENTON    Ordering Provider:Lucía Galvan MD    Reason for cancel (please be detailed, any staff messages or encounters to note?): Yoanna Castro, RN  P Endoscopy Scheduling Pool  Cc: P [facility] Rn  Endo scheduling,     A message came to our box that pt tested positive for COVID this weekend.     Please reach out to pt to assist in r/s.     Thank you,   [facility] Team,   Lets move appt out at least 6 weeks due to positive COVID. Thanks, Mannie Rehman MD              Rescheduled: Not at the time of the call. I left our phone number with Kash so Christina can call back and reschedule. We will need this procedure rescheduled 6 weeks from 11/20/2021 due to a positive covid test.      If rescheduled:    Date:    Location:    Note any change or update to original order/sedation:

## 2021-11-26 ENCOUNTER — TELEPHONE (OUTPATIENT)
Dept: GASTROENTEROLOGY | Facility: CLINIC | Age: 56
End: 2021-11-26
Payer: COMMERCIAL

## 2021-11-26 NOTE — TELEPHONE ENCOUNTER
PT called to schedule however she wanted to schedule at [facility] as she said [facility] is just too far for her. I gave her the number.

## 2021-11-29 ENCOUNTER — OFFICE VISIT (OUTPATIENT)
Dept: FAMILY MEDICINE | Facility: CLINIC | Age: 56
End: 2021-11-29
Payer: COMMERCIAL

## 2021-11-29 VITALS
OXYGEN SATURATION: 99 % | SYSTOLIC BLOOD PRESSURE: 106 MMHG | TEMPERATURE: 97.8 F | WEIGHT: 204 LBS | DIASTOLIC BLOOD PRESSURE: 62 MMHG | HEART RATE: 90 BPM | HEIGHT: 71 IN | BODY MASS INDEX: 28.56 KG/M2 | RESPIRATION RATE: 16 BRPM

## 2021-11-29 DIAGNOSIS — U07.1 INFECTION DUE TO 2019 NOVEL CORONAVIRUS: Primary | ICD-10-CM

## 2021-11-29 PROCEDURE — 99213 OFFICE O/P EST LOW 20 MIN: CPT | Performed by: FAMILY MEDICINE

## 2021-11-29 ASSESSMENT — MIFFLIN-ST. JEOR: SCORE: 1613.53

## 2021-11-29 ASSESSMENT — PAIN SCALES - GENERAL: PAINLEVEL: NO PAIN (0)

## 2021-11-29 NOTE — LETTER
[facility]  [address] [place] [address]  Phone: [phone]    November 29, 2021        [place]  [address] [place] [address]          To whom it may concern:    RE: Christina John    Patient was seen and treated today at our clinic.  Tested positive for COVID on 11/20/2021.  May return to work on 12/1/2021 without restrictions.     Please contact me for questions or concerns.      Sincerely,        Renea Salgado MD

## 2021-11-29 NOTE — PROGRESS NOTES
"  Assessment & Plan     Infection due to 2019 novel coronavirus  Return to work letter done.  She had no other paperwork at this time.                    No follow-ups on file.    Renea Salgado MD  [facility]   Christina is a 55 year old who presents for the following health issues     HPI     Chief Complaint   Patient presents with     Forms     Patient needs a letter stating positive covid results and return to work. Patient's only symptoms at this time is fatigue and at times short of breath.      Patient left work 11/19 with chills.  Had mild symptoms (she thought allergies) for a few days before that, but on 11/19 had chills/aches, etc and failed the screening questions at work.  Was tested on 11/20/2021 and was positive for COVID, she is vaccinated.    She needs return to work letter for 12/1/2021.     She is overall feeling better, mildly SOA at times.     Review of Systems   Constitutional, HEENT, cardiovascular, pulmonary, gi and gu systems are negative, except as otherwise noted.      Objective    /62   Pulse 90   Temp 97.8  F (36.6  C) (Tympanic)   Resp 16   Ht 1.791 m (5' 10.5\")   Wt 92.5 kg (204 lb)   LMP 03/28/2010   SpO2 99%   BMI 28.86 kg/m    Body mass index is 28.86 kg/m .  Physical Exam   GENERAL APPEARANCE: healthy, alert and no distress                "

## 2022-04-13 NOTE — PROGRESS NOTES
"  Assessment & Plan     Neck pain  Xray okay, get MRI, if no acute findings, consider PT  - XR Cervical Spine 2/3 Views; Future  - MR Cervical Spine w/o Contrast; Future    Major depressive disorder, single episode, mild (H)  meds doing well per pt despite PHQ-9 score               BMI:   Estimated body mass index is 28.86 kg/m  as calculated from the following:    Height as of this encounter: 1.791 m (5' 10.5\").    Weight as of this encounter: 92.5 kg (204 lb).   Weight management plan: Discussed healthy diet and exercise guidelines        No follow-ups on file.    Lucía Galvan MD  [facility]   Christina is a 56 year old who presents for the following health issues     HPI     Concern - headache   Onset: 1 month   Description: at base of skull, pressure   Intensity: mild, moderate, severe  Progression of Symptoms:  improving  Accompanying Signs & Symptoms: blurry vision, nausea, dizzy, light and sound sensitive   Previous history of similar problem: no  Precipitating factors:        Worsened by: light, sound   Alleviating factors:        Improved by: none   Therapies tried and outcome: migraine medication otc, sudafed, meclizine     Pt with pain over upper neck, with point tenderness  Puts her jacket behind her head when she drives  Cracking with range of motion of head. Pain not worse with movement however  Gets weakness in hands when headache is worse  Notes can only lift to shoulder level before strength is too weak  Prior to the headaches she could lift things over her head.     Discussed PHQ-9 which is elevated. States it is mainly due to her current pains  She is happy with her current medications and feels they ar working    Review of Systems   Constitutional, HEENT, cardiovascular, pulmonary, gi and gu systems are negative, except as otherwise noted.      Objective    /76   Pulse 68   Temp 98.8  F (37.1  C) (Oral)   Resp 17   Ht 1.791 m (5' 10.5\")   Wt " 92.5 kg (204 lb)   [facility] 03/28/2010   SpO2 99%   BMI 28.86 kg/m    Body mass index is 28.86 kg/m .  Physical Exam   GENERAL: healthy, alert and no distress  NECK: pain to palpation over cervical spine, and bilateral trapezius muscles. Pain with ROM    Xray of cervical spine. : disc spaces look okay but does have some spondulolisthesis    No results found for this or any previous visit (from the past 24 hour(s)).

## 2022-04-14 ENCOUNTER — OFFICE VISIT (OUTPATIENT)
Dept: FAMILY MEDICINE | Facility: CLINIC | Age: 57
End: 2022-04-14
Payer: COMMERCIAL

## 2022-04-14 VITALS
WEIGHT: 204 LBS | HEIGHT: 71 IN | DIASTOLIC BLOOD PRESSURE: 76 MMHG | RESPIRATION RATE: 17 BRPM | SYSTOLIC BLOOD PRESSURE: 114 MMHG | BODY MASS INDEX: 28.56 KG/M2 | OXYGEN SATURATION: 99 % | HEART RATE: 68 BPM | TEMPERATURE: 98.8 F

## 2022-04-14 DIAGNOSIS — M54.2 NECK PAIN: Primary | ICD-10-CM

## 2022-04-14 DIAGNOSIS — F32.0 MAJOR DEPRESSIVE DISORDER, SINGLE EPISODE, MILD (H): ICD-10-CM

## 2022-04-14 PROCEDURE — 99213 OFFICE O/P EST LOW 20 MIN: CPT | Performed by: FAMILY MEDICINE

## 2022-04-14 ASSESSMENT — ANXIETY QUESTIONNAIRES
5. BEING SO RESTLESS THAT IT IS HARD TO SIT STILL: NOT AT ALL
1. FEELING NERVOUS, ANXIOUS, OR ON EDGE: NOT AT ALL
IF YOU CHECKED OFF ANY PROBLEMS ON THIS QUESTIONNAIRE, HOW DIFFICULT HAVE THESE PROBLEMS MADE IT FOR YOU TO DO YOUR WORK, TAKE CARE OF THINGS AT HOME, OR GET ALONG WITH OTHER PEOPLE: SOMEWHAT DIFFICULT
6. BECOMING EASILY ANNOYED OR IRRITABLE: MORE THAN HALF THE DAYS
GAD7 TOTAL SCORE: 5
2. NOT BEING ABLE TO STOP OR CONTROL WORRYING: NOT AT ALL
7. FEELING AFRAID AS IF SOMETHING AWFUL MIGHT HAPPEN: NOT AT ALL
3. WORRYING TOO MUCH ABOUT DIFFERENT THINGS: NOT AT ALL

## 2022-04-14 ASSESSMENT — PAIN SCALES - GENERAL: PAINLEVEL: MODERATE PAIN (4)

## 2022-04-14 ASSESSMENT — PATIENT HEALTH QUESTIONNAIRE - PHQ9
5. POOR APPETITE OR OVEREATING: NEARLY EVERY DAY
SUM OF ALL RESPONSES TO PHQ QUESTIONS 1-9: 17

## 2022-04-15 ASSESSMENT — ANXIETY QUESTIONNAIRES: GAD7 TOTAL SCORE: 5

## 2022-04-16 ENCOUNTER — HOSPITAL ENCOUNTER (OUTPATIENT)
Dept: MRI IMAGING | Facility: CLINIC | Age: 57
Discharge: HOME | End: 2022-04-16
Attending: FAMILY MEDICINE | Admitting: FAMILY MEDICINE
Payer: COMMERCIAL

## 2022-04-16 DIAGNOSIS — M54.2 NECK PAIN: ICD-10-CM

## 2022-04-16 PROCEDURE — 72141 MRI NECK SPINE W/O DYE: CPT

## 2022-04-18 ENCOUNTER — MYC MEDICAL ADVICE (OUTPATIENT)
Dept: FAMILY MEDICINE | Facility: CLINIC | Age: 57
End: 2022-04-18
Payer: COMMERCIAL

## 2022-04-18 DIAGNOSIS — M54.2 NECK PAIN: Primary | ICD-10-CM

## 2022-04-18 NOTE — TELEPHONE ENCOUNTER
Patient responding to Result Note below:    Christina,   The MRI of your cervical spine did not show any worrisome findings that could be causing your symptoms. I would recommend a trial of physical therapy at this point. Let me know if you are agreeable.    Lucía Galvan MD    Routing to provider to advise.  Nedra BROWNN, RN

## 2022-05-19 ENCOUNTER — HOSPITAL ENCOUNTER (OUTPATIENT)
Dept: PHYSICAL THERAPY | Facility: CLINIC | Age: 57
Setting detail: THERAPIES SERIES
Discharge: HOME | End: 2022-05-19
Attending: FAMILY MEDICINE
Payer: COMMERCIAL

## 2022-05-19 DIAGNOSIS — M54.2 NECK PAIN: ICD-10-CM

## 2022-05-19 PROCEDURE — 97161 PT EVAL LOW COMPLEX 20 MIN: CPT | Mod: GP | Performed by: PHYSICAL THERAPIST

## 2022-05-19 PROCEDURE — 97110 THERAPEUTIC EXERCISES: CPT | Mod: GP | Performed by: PHYSICAL THERAPIST

## 2022-05-19 PROCEDURE — 97140 MANUAL THERAPY 1/> REGIONS: CPT | Mod: GP | Performed by: PHYSICAL THERAPIST

## 2022-05-19 NOTE — PROGRESS NOTES
05/19/22 0600   General Information   Type of Visit Initial OP Ortho PT Evaluation   Start of Care Date 05/19/22   Referring Physician Lucía Galvan MD   Patient/Family Goals Statement reduce pain and headahces   Orders Evaluate and Treat   Date of Order 04/18/22   Certification Required? No   Medical Diagnosis Neck pain (M54.2)  - Primary   Surgical/Medical history reviewed Yes   Precautions/Limitations no known precautions/limitations   General Information Comments PMH: Cancer, depresssion, Back surgery 2021, 2013,2011,2010,2008,12005,1995 Breast cancer   Body Part(s)   Body Part(s) Cervical Spine;Shoulder   Presentation and Etiology   Pertinent history of current problem (include personal factors and/or comorbidities that impact the POC) Pt relates insidious onset neck pain. Pt relates doing massage, chiro and cupping which helped with headaches. Headaches are still constant. Pt relates will get pain down L upper arm. Pt relates L arm will get weak and poor  strength. Pt relates overuses L due to previously surgery on R arm.   Impairments A. Pain;E. Decreased flexibility;F. Decreased strength and endurance   Functional Limitations perform activities of daily living   Symptom Location Shoulders/base of skull   How/Where did it occur From insidious onset   Onset date of current episode/exacerbation 04/18/22   Chronicity New   Pain rating (0-10 point scale) Best (/10);Worst (/10)   Best (/10) 2   Worst (/10) 6   Pain quality A. Sharp;C. Aching   Frequency of pain/symptoms A. Constant   Pain/symptoms exacerbated by C. Lifting;E. Rest;H. Overhead reach   Pain/symptoms eased by G. Heat;I. OTC medication(s)   Progression of symptoms since onset: Improved   Current Level of Function   Current Community Support Family/friend caregiver   Patient role/employment history A. Employed   Employment Comments [employer] - lifting all positions approx 50 lbs - no restrictions.   [facility]    Fall Risk Screen   Fall screen completed by PT   Have you fallen 2 or more times in the past year? No   Have you fallen and had an injury in the past year? No   Is patient a fall risk? No   Abuse Screen (yes response referral indicated)   Feels Unsafe at Home or Work/School no   Feels Threatened by Someone no   Does Anyone Try to Keep You From Having Contact with Others or Doing Things Outside Your Home? no   Physical Signs of Abuse Present no   Functional Scales   Functional Scales Other   Other Scales  NDI: 30   Cervical Spine   Cervical Left Side Bending ROM 20   Cervical Right Rotation ROM 60 w pain on L   Cervical Left Rotation ROM 42 w pain on L   Cervical Flexion ROM 32   Cervical Extension ROM 45 - increased stiffness   Cervical Right Side Bending ROM 10   Shoulder AROM Screen WNL   Shoulder Shrug (C2-C4) Strength 5/5   Shoulder Abd (C5) Strength R: 3/5, L: 5/5   Shoulder Add (C7) Strength R: 4/5, L: 5/5   Shoulder ER (C5, C6) Strength R: 4/5, L: 5/5   Shoulder IR (C5, C6) Strength R: 4/5, L: 5/5   Elbow Flexion (C5, C6) Strength R: 4/5, L: 5/5   Elbow Extension (C7) Strength R: 3/5, L: 5/5   Wrist Extension (C6) Strength R: 3/5, L: 5/5   Wrist Flexion (C7) Strength R: 4/5, L: 5/5   Shoulder/Wrist/Hand Strength Comments  strength: R:65  L: 55   Vertebral Artery Test negative   Alar Ligament Test negative   Transverse Ligament Test negative   Spurling Test negative   Cervical Rotation/Lateral Flexion Test postiive on L   Kwon Impingement Test L: +   Sulcus Sign negative   Cervical/Shoulder Special Tests Comments speeds: negative   Segmental Mobility-Cervical mod tightness   Segmental Mobility-Thoracic mod tightness   UE Neural Tension UE Neural Tension Tests   Posture fwd head posture, rounded shoulders   ULTT I (Median and Anterior Interosseous) Negative   ULTT II (Median and Musculocutaneous and Axillary) Negative   ULTT III (Radial) Negative   ULTT IV (Ulnar) Negative   Planned Therapy  Interventions   Planned Therapy Interventions gait training;joint mobilization;manual therapy;ROM;strengthening;stretching;neuromuscular re-education   Planned Modality Interventions   Planned Modality Interventions Cryotherapy;Electrical stimulation;Hot packs;TENS   Clinical Impression   Criteria for Skilled Therapeutic Interventions Met yes, treatment indicated   PT Diagnosis neck pain/CT junction, mild L RTC pain   Influenced by the following impairments limited ROM, weakness, pain   Functional limitations due to impairments lifting, working, driving   Clinical Presentation Stable/Uncomplicated   Clinical Presentation Rationale Pt is 56 yr old female who presents with neck pain as well as signs consistent with L RTC tendonitits. Pt is motivated to get better but has a demanding job   Clinical Decision Making (Complexity) Low complexity   Therapy Frequency 1 time/week   Predicted Duration of Therapy Intervention (days/wks) 6 weeks   Risk & Benefits of therapy have been explained Yes   Patient, Family & other staff in agreement with plan of care Yes   Education Assessment   Preferred Learning Style Listening;Reading;Demonstration;Pictures/video   Barriers to Learning No barriers   ORTHO GOALS   PT Ortho Eval Goals 1;2;3;4   Ortho Goal 1   Goal Identifier headaches   Goal Description Pt will report having less than 2 HA/migraine per week to return to PLOF and improve concentration.   Target Date 06/09/22   Ortho Goal 2   Goal Identifier Driving - cervical ROM   Goal Description Pt will demonstrate 65 deg bi of cervical rot in order to be able to safely turn head to drive car safely w/o pain in neck or LE UE   Target Date 06/09/22   Ortho Goal 3   Goal Identifier Working - lifting   Goal Description Pt will demonstrate 5/5 B UE strength to be able to lift at work w/o pain   Target Date 06/30/22   Ortho Goal 4   Goal Identifier HEP   Goal Description Pt will be compliant and competent in HEP to allow them to achieve  maximal strength and reduce pain   Target Date 06/30/22   Total Evaluation Time   PT Eval, Low Complexity Minutes (50995) 20

## 2022-05-26 ENCOUNTER — HOSPITAL ENCOUNTER (OUTPATIENT)
Dept: PHYSICAL THERAPY | Facility: CLINIC | Age: 57
Setting detail: THERAPIES SERIES
Discharge: HOME | End: 2022-05-26
Attending: FAMILY MEDICINE
Payer: COMMERCIAL

## 2022-05-26 PROCEDURE — 97140 MANUAL THERAPY 1/> REGIONS: CPT | Mod: GP | Performed by: PHYSICAL THERAPIST

## 2022-06-02 ENCOUNTER — HOSPITAL ENCOUNTER (OUTPATIENT)
Dept: PHYSICAL THERAPY | Facility: CLINIC | Age: 57
Setting detail: THERAPIES SERIES
Discharge: HOME | End: 2022-06-02
Attending: FAMILY MEDICINE
Payer: COMMERCIAL

## 2022-06-02 PROCEDURE — 97140 MANUAL THERAPY 1/> REGIONS: CPT | Mod: GP | Performed by: PHYSICAL THERAPIST

## 2022-06-02 PROCEDURE — 97110 THERAPEUTIC EXERCISES: CPT | Mod: GP | Performed by: PHYSICAL THERAPIST

## 2022-06-09 ENCOUNTER — HOSPITAL ENCOUNTER (OUTPATIENT)
Dept: PHYSICAL THERAPY | Facility: CLINIC | Age: 57
Setting detail: THERAPIES SERIES
Discharge: HOME | End: 2022-06-09
Attending: FAMILY MEDICINE
Payer: COMMERCIAL

## 2022-06-09 PROCEDURE — 97110 THERAPEUTIC EXERCISES: CPT | Mod: GP | Performed by: PHYSICAL THERAPIST

## 2022-06-09 PROCEDURE — 97140 MANUAL THERAPY 1/> REGIONS: CPT | Mod: GP | Performed by: PHYSICAL THERAPIST

## 2022-06-16 ENCOUNTER — HOSPITAL ENCOUNTER (OUTPATIENT)
Dept: PHYSICAL THERAPY | Facility: CLINIC | Age: 57
Setting detail: THERAPIES SERIES
Discharge: HOME | End: 2022-06-16
Attending: FAMILY MEDICINE
Payer: COMMERCIAL

## 2022-06-16 PROCEDURE — 97110 THERAPEUTIC EXERCISES: CPT | Mod: GP | Performed by: PHYSICAL THERAPIST

## 2022-06-30 ENCOUNTER — HOSPITAL ENCOUNTER (OUTPATIENT)
Dept: PHYSICAL THERAPY | Facility: CLINIC | Age: 57
Setting detail: THERAPIES SERIES
Discharge: HOME | End: 2022-06-30
Attending: FAMILY MEDICINE
Payer: COMMERCIAL

## 2022-06-30 PROCEDURE — 97110 THERAPEUTIC EXERCISES: CPT | Mod: GP | Performed by: PHYSICAL THERAPIST

## 2022-06-30 NOTE — PROGRESS NOTES
[facility] Rehabilitation Service    Outpatient Physical Therapy Progress/Discharge Note  Patient: Christina John  : 1965    Beginning/End Dates of Reporting Period:  22 to 22      Referring Provider: Lucía Galvan MD    Therapy Diagnosis: neck pain/CT junction, mild L RTC pain     Client Self Report: Pt relates needs to reduce stress level to reduce. Gets sore with mowing lawn. Pt relates she would be approx 50% better if always compliant with HEP    Objective Measurements:  Objective Measure: cervical ROM  Details: flex: 40 ext:40  R rot: 65 L rot:50    Objective Measure: posture  Details: cervical ext, fwd head posture, rounded shoulders    Objective Measure: UE MMT  Details: 1.) shoulder abd: R: 5/5, L: 3/5, shoulder ER/IR  R: 5/5, L: 4/5, elbow flex/ext: R: 5/5, L: 3/5, wrist flex/ext: R: 5/5, L: 4/5,                        Outcome Measures (most recent score):  NDI 20% (previously 30%)    Goals:  Goal Identifier headaches   Goal Description Pt will report having less than 2 HA/migraine per week to return to PLOF and improve concentration.   Target Date 22   Date Met      Progress (detail required for progress note): much better - x2     Goal Identifier Driving - cervical ROM   Goal Description Pt will demonstrate 65 deg bi of cervical rot in order to be able to safely turn head to drive car safely w/o pain in neck or LE UE   Target Date 22   Date Met  22   Progress (detail required for progress note): drives to keep hands under wheel     Goal Identifier Working - lifting   Goal Description Pt will demonstrate 5/5 B UE strength to be able to lift at work w/o pain   Target Date 22   Date Met      Progress (detail required for progress note): changed jobs - but doing better when has to lift     Goal Identifier HEP   Goal Description Pt will be compliant and competent in HEP to  allow them to achieve maximal strength and reduce pain   Target Date 06/30/22   Date Met      Progress (detail required for progress note):  Partially met     Pt has been seen for 6 visits over this POC and has made small gains overall. Pt demonstrates improved cervical ROM since IE and small improvements in UE strength. Pt does demonstrate significant weakness in L UE compared to R though thus will contact MD re continued weakness. Pt has made small improvements in posture but relates will not be able to modify certain work tasks/desk space for good ergonomics thus pt may never be pain free.       Plan:  Other: Will contact MD - pt given option to return to MD vs continue PT for 4 more weeks due to small improvement however pt continues to have L UE weakness    Discharge:  Yes. Pt failed to return to PT within 30 days thus is being discharged at this time.

## 2022-07-13 ENCOUNTER — TELEPHONE (OUTPATIENT)
Dept: FAMILY MEDICINE | Facility: CLINIC | Age: 57
End: 2022-07-13

## 2022-07-13 DIAGNOSIS — R29.898 LUE WEAKNESS: Primary | ICD-10-CM

## 2022-07-13 NOTE — TELEPHONE ENCOUNTER
----- Message from Renea Grimaldo, PT sent at 6/30/2022  2:56 PM CDT -----  Regarding: update on physical therapy  [place]    I have been working with Christina in physical therapy for the past 6 weeks. She has made small gains in cervical ROM but continues to have significant L UE weakness. Pt relates some  improvement with PT however I gave her the option of returning to MD (if pain was still bad/not manageable) vs continuing PT for 4 weeks. Pt was unsure if you wanted to see her and wanted me to send you an update. My biggest concern at this time is just no clear cut etiology for L UE weakness however due to improvement continuing physical therpay would be appropriate. Please let me know if you have any questions or if you would like to see her again due to the L UE weakness.     Thanks, Renea Grimaldo, PT, DPT

## 2022-07-13 NOTE — TELEPHONE ENCOUNTER
Please call pt and have her see neurology.   She may need an EMG to determine why she has muscle weakness.  If pt is agreeable, referral is ready to sign if she is agreeable to seeing [place] neurology    Lucía Galvan MD

## 2022-07-14 NOTE — TELEPHONE ENCOUNTER
Patient notified of provider's message as written below. The patient was given the number [phone] to call and make an appointment if they do not call. Patient verbalized good understanding, had no further questions and needed no further support.Esme Penn R.N.

## 2022-08-14 DIAGNOSIS — F32.0 MAJOR DEPRESSIVE DISORDER, SINGLE EPISODE, MILD (H): ICD-10-CM

## 2022-08-15 RX ORDER — BUPROPION HYDROCHLORIDE 150 MG/1
TABLET ORAL
Qty: 180 TABLET | Refills: 0 | Status: SHIPPED | OUTPATIENT
Start: 2022-08-15 | End: 2022-09-26

## 2022-08-15 RX ORDER — ESCITALOPRAM OXALATE 20 MG/1
TABLET ORAL
Qty: 90 TABLET | Refills: 0 | Status: SHIPPED | OUTPATIENT
Start: 2022-08-15 | End: 2022-09-26

## 2022-08-15 NOTE — TELEPHONE ENCOUNTER
Routing refill request to provider for review/approval because:  Drug not on the [facility] refill protocol   PHQ-9 score:    PHQ 4/14/2022   PHQ-9 Total Score 17   Q9: Thoughts of better off dead/self-harm past 2 weeks Not at all

## 2022-09-01 ENCOUNTER — OFFICE VISIT (OUTPATIENT)
Dept: URGENT CARE | Facility: URGENT CARE | Age: 57
End: 2022-09-01
Payer: COMMERCIAL

## 2022-09-01 VITALS
WEIGHT: 205 LBS | BODY MASS INDEX: 29 KG/M2 | DIASTOLIC BLOOD PRESSURE: 89 MMHG | HEART RATE: 68 BPM | TEMPERATURE: 98.6 F | OXYGEN SATURATION: 99 % | SYSTOLIC BLOOD PRESSURE: 126 MMHG

## 2022-09-01 DIAGNOSIS — R10.2 VAGINAL PAIN: ICD-10-CM

## 2022-09-01 DIAGNOSIS — R30.0 DYSURIA: Primary | ICD-10-CM

## 2022-09-01 LAB
ALBUMIN UR-MCNC: NEGATIVE MG/DL
APPEARANCE UR: CLEAR
BILIRUB UR QL STRIP: NEGATIVE
CLUE CELLS: ABNORMAL
COLOR UR AUTO: YELLOW
GLUCOSE UR STRIP-MCNC: NEGATIVE MG/DL
HGB UR QL STRIP: NEGATIVE
KETONES UR STRIP-MCNC: NEGATIVE MG/DL
LEUKOCYTE ESTERASE UR QL STRIP: NEGATIVE
NITRATE UR QL: NEGATIVE
PH UR STRIP: 5.5 [PH] (ref 5–7)
SP GR UR STRIP: 1.01 (ref 1–1.03)
TRICHOMONAS, WET PREP: ABNORMAL
UROBILINOGEN UR STRIP-ACNC: 0.2 E.U./DL
WBC'S/HIGH POWER FIELD, WET PREP: ABNORMAL
YEAST, WET PREP: ABNORMAL

## 2022-09-01 PROCEDURE — 87210 SMEAR WET MOUNT SALINE/INK: CPT | Performed by: FAMILY MEDICINE

## 2022-09-01 PROCEDURE — 81003 URINALYSIS AUTO W/O SCOPE: CPT | Performed by: FAMILY MEDICINE

## 2022-09-01 PROCEDURE — 99213 OFFICE O/P EST LOW 20 MIN: CPT | Performed by: FAMILY MEDICINE

## 2022-09-01 RX ORDER — AZITHROMYCIN 250 MG/1
TABLET, FILM COATED ORAL
Qty: 6 TABLET | Refills: 0 | Status: SHIPPED | OUTPATIENT
Start: 2022-09-01 | End: 2022-09-06

## 2022-09-01 NOTE — PROGRESS NOTES
(R30.0) Dysuria  (primary encounter diagnosis)  Comment:   Plan: Wet prep - Clinic Collect, UA macro with reflex        to Microscopic and Culture - Clinc Collect            (R10.2) Vaginal pain  Comment:   Plan: azithromycin (ZITHROMAX) 250 MG tablet            Discussion:  Vaginal pain with relatively minimal findings on exam.  WBCs on wet prep.  I suggested starting with antibiotic.  She has some allergies which we noted.  Soaks.  Manage expectantly and plan follow-up if not improving.      CHIEF COMPLAINT    Vaginal discomfort for about 2 days.      HISTORY    This 56-year-old woman began experiencing vaginal discomfort about 2 days ago.  It is quite uncomfortable and sometimes hard for her to sit.  She feels irritation and tenderness in her labia.  Not having a great deal of discharge.  Notices some local discomfort when passing urine.    She is s/p hysterectomy and wound oophorectomy.      REVIEW OF SYSTEMS    No fevers or chills.  No abdominal pain or nausea.  No diarrhea.      EXAM  /89   Pulse 68   Temp 98.6  F (37  C) (Tympanic)   Wt 93 kg (205 lb)   LMP 03/28/2010   SpO2 99%   BMI 29.00 kg/m      Abdomen nondistended.  External genitalia appear relatively normal.  No labial swelling or redness.  Some mild redness posterior vaginal area without swelling.      Results for orders placed or performed in visit on 09/01/22   UA macro with reflex to Microscopic and Culture - Clinc Collect     Status: Normal    Specimen: Urine, Clean Catch   Result Value Ref Range    Color Urine Yellow Colorless, Straw, Light Yellow, Yellow    Appearance Urine Clear Clear    Glucose Urine Negative Negative mg/dL    Bilirubin Urine Negative Negative    Ketones Urine Negative Negative mg/dL    Specific Gravity Urine 1.015 1.003 - 1.035    Blood Urine Negative Negative    pH Urine 5.5 5.0 - 7.0    Protein Albumin Urine Negative Negative mg/dL    Urobilinogen Urine 0.2 0.2, 1.0 E.U./dL    Nitrite Urine Negative  Negative    Leukocyte Esterase Urine Negative Negative    Narrative    Microscopic not indicated   Wet prep - Clinic Collect     Status: Abnormal    Specimen: Vagina; Swab   Result Value Ref Range    Trichomonas Absent Absent    Yeast Absent Absent    Clue Cells Absent Absent    WBCs/high power field 2+ (A) None

## 2022-09-01 NOTE — PATIENT INSTRUCTIONS
Take prescribed medication as directed.    Soak in bath twice daily.    Have a follow up check if not improving or if symptoms worsen.

## 2022-09-06 ENCOUNTER — E-VISIT (OUTPATIENT)
Dept: FAMILY MEDICINE | Facility: CLINIC | Age: 57
End: 2022-09-06
Payer: COMMERCIAL

## 2022-09-06 DIAGNOSIS — R30.0 DYSURIA: Primary | ICD-10-CM

## 2022-09-06 PROCEDURE — 99421 OL DIG E/M SVC 5-10 MIN: CPT | Performed by: FAMILY MEDICINE

## 2022-09-06 NOTE — PATIENT INSTRUCTIONS
Dear Christina John    At your convenience, please schedule a lab appointment to leave a urine sample.  Once I receive those results, I will respond with a mycMuchasat message and a prescription for an antibiotic if indicated. Please let me know if you have any questions.     Thanks for choosing us as your health care partner,    Lucía Galvan MD

## 2022-09-08 ENCOUNTER — DOCUMENTATION ONLY (OUTPATIENT)
Dept: LAB | Facility: CLINIC | Age: 57
End: 2022-09-08

## 2022-09-08 ENCOUNTER — LAB (OUTPATIENT)
Dept: LAB | Facility: CLINIC | Age: 57
End: 2022-09-08
Payer: COMMERCIAL

## 2022-09-08 ENCOUNTER — TELEPHONE (OUTPATIENT)
Dept: FAMILY MEDICINE | Facility: CLINIC | Age: 57
End: 2022-09-08

## 2022-09-08 DIAGNOSIS — R30.0 DYSURIA: ICD-10-CM

## 2022-09-08 DIAGNOSIS — N89.8 VAGINAL DISCHARGE: ICD-10-CM

## 2022-09-08 DIAGNOSIS — N89.8 VAGINAL DISCHARGE: Primary | ICD-10-CM

## 2022-09-08 LAB
ALBUMIN UR-MCNC: NEGATIVE MG/DL
APPEARANCE UR: CLEAR
BILIRUB UR QL STRIP: NEGATIVE
CLUE CELLS: ABNORMAL
COLOR UR AUTO: YELLOW
GLUCOSE UR STRIP-MCNC: NEGATIVE MG/DL
HGB UR QL STRIP: NEGATIVE
KETONES UR STRIP-MCNC: NEGATIVE MG/DL
LEUKOCYTE ESTERASE UR QL STRIP: NEGATIVE
NITRATE UR QL: NEGATIVE
PH UR STRIP: 5.5 [PH] (ref 5–7)
SP GR UR STRIP: 1.02 (ref 1–1.03)
TRICHOMONAS, WET PREP: ABNORMAL
UROBILINOGEN UR STRIP-ACNC: 0.2 E.U./DL
WBC'S/HIGH POWER FIELD, WET PREP: ABNORMAL
YEAST, WET PREP: ABNORMAL

## 2022-09-08 PROCEDURE — 81003 URINALYSIS AUTO W/O SCOPE: CPT

## 2022-09-08 PROCEDURE — 87210 SMEAR WET MOUNT SALINE/INK: CPT

## 2022-09-08 NOTE — TELEPHONE ENCOUNTER
Reason for call:  Other   Patient called regarding (reason for call):   Requesting Wet prep orders for today lab appt at 11:30    Additional comments:   Requesting Wet prep orders for today lab appt at 11:30 in [place]. Patient states still symptoms and wants to re-do the wet prep that was positive the first time. No need to call patient unless you want to or have questions.    Phone number to reach patient:  Cell number on file:    Telephone Information:   Mobile [phone]       Best Time:  any    Can we leave a detailed message on this number?  YES    Travel screening: Not Applicable

## 2022-09-08 NOTE — PROGRESS NOTES
Hello-   This patient is requesting for a Wet Prep lab. Please order future lab for today's (9-8) appointment @ 2480.   Thanks, Cyn GOSS

## 2022-09-20 NOTE — PROGRESS NOTES
Assessment & Plan     Gross hematuria  Has had several episodes of gross hematuria. None today along with bladder spasm  Will have her follow-up with urology  - Adult Urology  Referral; Future    Vaginal irritation  Trial of diflucan to see if improves symptoms.  zpak from UC did not help  - fluconazole (DIFLUCAN) 150 MG tablet; Take one tablet every 3 days x 3 doses    Dysuria  R/o bladder infection  - Urine Culture Aerobic Bacterial - lab collect; Future    Major depressive disorder, single episode, mild (H)  Doing well, will refill  - escitalopram (LEXAPRO) 20 MG tablet; Take 1 tablet by mouth once daily  - buPROPion (WELLBUTRIN XL) 150 MG 24 hr tablet; Take 2 tablets (300 mg) by mouth every evening    Recurrent cold sores  refill  - valACYclovir (VALTREX) 1000 mg tablet; Take 1 tablet (1,000 mg) by mouth daily                 No follow-ups on file.    Lucía Galvan MD  [facility]   Christina is a 56 year old, presenting for the following health issues:  UTI      HPI     Concern - discomfort when urinating   Onset: 1 month ago   Description: abdominal pain and burning with urination     Progression of Symptoms:  Improving      Was seen in urgent care and took a zpack, still has discomfort    Pt with suprapubic pain  Has had hysterectomy and ovaries removed  This was in 2011    Hurts with urination on outside on vulva  Feels like her bladder will spasm at times as well  Sometimes urine comes out fine/sometimes trickles out  Always has some vaginal discharge  The zpak given in UC did not help.     Has done UA and wet prep and negative except some WBCs  She is not sexually active.     Pt with depression well controlled on lexapro  Pt on valtrex for recurrent cold sores          Review of Systems   Constitutional, HEENT, cardiovascular, pulmonary, gi and gu systems are negative, except as otherwise noted.      Objective    /79   Pulse 58   Temp 98.7  F  "(37.1  C) (Oral)   Resp 15   Ht 1.791 m (5' 10.5\")   Wt 94.3 kg (208 lb)   LMP 03/28/2010   SpO2 100%   BMI 29.42 kg/m    Body mass index is 29.42 kg/m .  Physical Exam   GENERAL: healthy, alert and no distress   (female): normal female external genitalia, normal urethral meatus , vaginal mucosa pink, moist, well rugated and vaginal skin findings: some erythema noted at vaginal introitus.     Results for orders placed or performed in visit on 09/26/22 (from the past 24 hour(s))   UA Macro with Reflex to Micro and Culture - lab collect    Specimen: Urine, Midstream   Result Value Ref Range    Color Urine Yellow Colorless, Straw, Light Yellow, Yellow    Appearance Urine Clear Clear    Glucose Urine Negative Negative mg/dL    Bilirubin Urine Negative Negative    Ketones Urine Negative Negative mg/dL    Specific Gravity Urine >=1.030 1.003 - 1.035    Blood Urine Negative Negative    pH Urine 5.5 5.0 - 7.0    Protein Albumin Urine Negative Negative mg/dL    Urobilinogen Urine 0.2 0.2, 1.0 E.U./dL    Nitrite Urine Negative Negative    Leukocyte Esterase Urine Negative Negative    Narrative    Microscopic not indicated                   Answers for HPI/ROS submitted by the patient on 9/26/2022  If you checked off any problems, how difficult have these problems made it for you to do your work, take care of things at home, or get along with other people?: Very difficult  PHQ9 TOTAL SCORE: 15      "

## 2022-09-26 ENCOUNTER — OFFICE VISIT (OUTPATIENT)
Dept: FAMILY MEDICINE | Facility: CLINIC | Age: 57
End: 2022-09-26
Payer: COMMERCIAL

## 2022-09-26 VITALS
HEART RATE: 58 BPM | WEIGHT: 208 LBS | TEMPERATURE: 98.7 F | DIASTOLIC BLOOD PRESSURE: 79 MMHG | HEIGHT: 71 IN | BODY MASS INDEX: 29.12 KG/M2 | SYSTOLIC BLOOD PRESSURE: 126 MMHG | RESPIRATION RATE: 15 BRPM | OXYGEN SATURATION: 100 %

## 2022-09-26 DIAGNOSIS — Z23 HIGH PRIORITY FOR 2019-NCOV VACCINE: ICD-10-CM

## 2022-09-26 DIAGNOSIS — R30.0 DYSURIA: ICD-10-CM

## 2022-09-26 DIAGNOSIS — B00.1 RECURRENT COLD SORES: ICD-10-CM

## 2022-09-26 DIAGNOSIS — F32.0 MAJOR DEPRESSIVE DISORDER, SINGLE EPISODE, MILD (H): ICD-10-CM

## 2022-09-26 DIAGNOSIS — N89.8 VAGINAL IRRITATION: ICD-10-CM

## 2022-09-26 DIAGNOSIS — R31.0 GROSS HEMATURIA: Primary | ICD-10-CM

## 2022-09-26 LAB
ALBUMIN UR-MCNC: NEGATIVE MG/DL
APPEARANCE UR: CLEAR
BILIRUB UR QL STRIP: NEGATIVE
COLOR UR AUTO: YELLOW
GLUCOSE UR STRIP-MCNC: NEGATIVE MG/DL
HGB UR QL STRIP: NEGATIVE
KETONES UR STRIP-MCNC: NEGATIVE MG/DL
LEUKOCYTE ESTERASE UR QL STRIP: NEGATIVE
NITRATE UR QL: NEGATIVE
PH UR STRIP: 5.5 [PH] (ref 5–7)
SP GR UR STRIP: >=1.03 (ref 1–1.03)
UROBILINOGEN UR STRIP-ACNC: 0.2 E.U./DL

## 2022-09-26 PROCEDURE — 81003 URINALYSIS AUTO W/O SCOPE: CPT | Performed by: FAMILY MEDICINE

## 2022-09-26 PROCEDURE — 91313 COVID-19,PF,MODERNA BIVALENT: CPT | Performed by: FAMILY MEDICINE

## 2022-09-26 PROCEDURE — 90715 TDAP VACCINE 7 YRS/> IM: CPT | Performed by: FAMILY MEDICINE

## 2022-09-26 PROCEDURE — 87086 URINE CULTURE/COLONY COUNT: CPT | Performed by: FAMILY MEDICINE

## 2022-09-26 PROCEDURE — 0134A COVID-19,PF,MODERNA BIVALENT: CPT | Performed by: FAMILY MEDICINE

## 2022-09-26 PROCEDURE — 90471 IMMUNIZATION ADMIN: CPT | Performed by: FAMILY MEDICINE

## 2022-09-26 PROCEDURE — 99213 OFFICE O/P EST LOW 20 MIN: CPT | Mod: 25 | Performed by: FAMILY MEDICINE

## 2022-09-26 RX ORDER — BUPROPION HYDROCHLORIDE 150 MG/1
150 TABLET ORAL 2 TIMES DAILY
Qty: 180 TABLET | Refills: 3 | Status: CANCELLED | OUTPATIENT
Start: 2022-09-26

## 2022-09-26 RX ORDER — VALACYCLOVIR HYDROCHLORIDE 1 G/1
1000 TABLET, FILM COATED ORAL DAILY
Qty: 90 TABLET | Refills: 3 | Status: SHIPPED | OUTPATIENT
Start: 2022-09-26 | End: 2023-10-10

## 2022-09-26 RX ORDER — ESCITALOPRAM OXALATE 20 MG/1
TABLET ORAL
Qty: 90 TABLET | Refills: 3 | Status: SHIPPED | OUTPATIENT
Start: 2022-09-26 | End: 2024-03-07

## 2022-09-26 RX ORDER — FLUCONAZOLE 150 MG/1
TABLET ORAL
Qty: 3 TABLET | Refills: 0 | Status: SHIPPED | OUTPATIENT
Start: 2022-09-26 | End: 2022-10-18

## 2022-09-26 RX ORDER — BUPROPION HYDROCHLORIDE 150 MG/1
300 TABLET ORAL EVERY EVENING
Qty: 180 TABLET | Refills: 3 | Status: SHIPPED | OUTPATIENT
Start: 2022-09-26 | End: 2024-01-15

## 2022-09-26 ASSESSMENT — PATIENT HEALTH QUESTIONNAIRE - PHQ9
SUM OF ALL RESPONSES TO PHQ QUESTIONS 1-9: 15
SUM OF ALL RESPONSES TO PHQ QUESTIONS 1-9: 15
10. IF YOU CHECKED OFF ANY PROBLEMS, HOW DIFFICULT HAVE THESE PROBLEMS MADE IT FOR YOU TO DO YOUR WORK, TAKE CARE OF THINGS AT HOME, OR GET ALONG WITH OTHER PEOPLE: VERY DIFFICULT

## 2022-09-26 ASSESSMENT — PAIN SCALES - GENERAL: PAINLEVEL: NO PAIN (0)

## 2022-09-26 NOTE — NURSING NOTE
Prior to immunization administration, verified patients identity using patient s name and date of birth. Please see Immunization Activity for additional information.     Screening Questionnaire for Adult Immunization    Are you sick today?   No   Do you have allergies to medications, food, a vaccine component or latex?   No   Have you ever had a serious reaction after receiving a vaccination?   No   Do you have a long-term health problem with heart, lung, kidney, or metabolic disease (e.g., diabetes), asthma, a blood disorder, no spleen, complement component deficiency, a cochlear implant, or a spinal fluid leak?  Are you on long-term aspirin therapy?   No   Do you have cancer, leukemia, HIV/AIDS, or any other immune system problem?   No   Do you have a parent, brother, or sister with an immune system problem?   No   In the past 3 months, have you taken medications that affect  your immune system, such as prednisone, other steroids, or anticancer drugs; drugs for the treatment of rheumatoid arthritis, Crohn s disease, or psoriasis; or have you had radiation treatments?   No   Have you had a seizure, or a brain or other nervous system problem?   No   During the past year, have you received a transfusion of blood or blood    products, or been given immune (gamma) globulin or antiviral drug?   No   For women: Are you pregnant or is there a chance you could become       pregnant during the next month?   No   Have you received any vaccinations in the past 4 weeks?   No     Immunization questionnaire answers were all negative.        Per orders of Dr. Turner, injection  given by Marnie Raymundo MA. Patient instructed to remain in clinic for 15 minutes afterwards, and to report any adverse reaction to me immediately.       Screening performed by Marnie Raymundo MA on 9/26/2022 at 8:22 AM.

## 2022-09-28 ENCOUNTER — MYC MEDICAL ADVICE (OUTPATIENT)
Dept: FAMILY MEDICINE | Facility: CLINIC | Age: 57
End: 2022-09-28

## 2022-09-28 LAB — BACTERIA UR CULT: NO GROWTH

## 2022-10-04 ENCOUNTER — OFFICE VISIT (OUTPATIENT)
Dept: UROLOGY | Facility: CLINIC | Age: 57
End: 2022-10-04
Payer: COMMERCIAL

## 2022-10-04 VITALS
SYSTOLIC BLOOD PRESSURE: 122 MMHG | HEIGHT: 71 IN | BODY MASS INDEX: 28.84 KG/M2 | WEIGHT: 206 LBS | DIASTOLIC BLOOD PRESSURE: 72 MMHG

## 2022-10-04 DIAGNOSIS — R31.0 GROSS HEMATURIA: ICD-10-CM

## 2022-10-04 LAB
ALBUMIN UR-MCNC: NEGATIVE MG/DL
APPEARANCE UR: ABNORMAL
BILIRUB UR QL STRIP: NEGATIVE
COLOR UR AUTO: YELLOW
GLUCOSE UR STRIP-MCNC: NEGATIVE MG/DL
HGB UR QL STRIP: NEGATIVE
KETONES UR STRIP-MCNC: NEGATIVE MG/DL
LEUKOCYTE ESTERASE UR QL STRIP: ABNORMAL
MUCOUS THREADS #/AREA URNS LPF: PRESENT /LPF
NITRATE UR QL: NEGATIVE
PH UR STRIP: 5 [PH] (ref 5–7)
RBC URINE: 0 /HPF
SP GR UR STRIP: 1.03 (ref 1–1.03)
UROBILINOGEN UR STRIP-MCNC: NORMAL MG/DL
WBC URINE: 3 /HPF

## 2022-10-04 PROCEDURE — 99205 OFFICE O/P NEW HI 60 MIN: CPT | Mod: 25 | Performed by: UROLOGY

## 2022-10-04 PROCEDURE — 81001 URINALYSIS AUTO W/SCOPE: CPT | Performed by: UROLOGY

## 2022-10-04 PROCEDURE — 52000 CYSTOURETHROSCOPY: CPT | Performed by: UROLOGY

## 2022-10-04 NOTE — PROGRESS NOTES
"Christina John is a 56 year old female seen in consultation for \"hematuria.\" Consult from Lucía Galvan.      Pt reports episode of heme on toilet paper in early Sept, now resolved.    Episode was associated with lower abd cramping \"making it hard for me to walk or lift anything. It felt like the bad cramps I had when I was menstruating a long time ago.\" (Hyster in 2011). On further questioning pt repeatedly returns to the \"menstrual period\" analogy.    Pain has largely resolved, now describes it as a \"discomfort.\"     Specifically denies prior similar episodes, dysuria today, gross hematuria, frequency, prior  eval.    Hx one c/s delivery (failure to progress).    Some constipation, takes daily stool softener. Hx hemorroids.    Moderate fluids and caffeine.    Pt states that her breast cancer was HR positive.    Reviewed PMH in detail including breast ca, major depression, endometriosis, GERD, vaginal discharge, neck pain, lumbar discectomy, buttock and sciatic pain, atypical chest pain, dizziness, herpes labalis, tension headache, DAVIE, PCOS      Past Medical History:   Diagnosis Date     Allergic rhinitis due to animal dander did IT x 4 yrs. 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 ended 9/06 per self     Diagnostic skin and sensitization tests 4/03 skin tests pos. for: cat/dog/DM/T/G/RW     Elevated cholesterol      Endometriosis      Endometriosis      GERD (gastroesophageal reflux disease)      GERD (gastroesophageal reflux disease)      Glaucoma      History of anesthesia complications      House dust mite allergy      Hyperlipidemia      Hypertension      Hypertension goal BP " (blood pressure) < 140/90      Infertility     ON CLOMID     Infertility, female      Lumbar disc herniation     L5-S1     Mild major depression (H)      DAVIE (obstructive sleep apnea)     No CPAP     PCOS (polycystic ovarian syndrome)      PCOS (polycystic ovarian syndrome)      Pelvic relaxation      PONV (postoperative nausea and vomiting)      Seasonal allergic conjunctivitis      Seasonal allergic rhinitis     4/03 skin tests pos. for: cat/dog/DM/T/G/RW--per Dr. Talavera.     Ulcers 1992    DX BY ENDOSCOPY       Past Surgical History:   Procedure Laterality Date     APPENDECTOMY       BACK SURGERY  1/2021    Discectomy L5S1     C/SECTION, CLASSICAL       CHOLECYSTECTOMY       COLONOSCOPY W/ BIOPSIES AND POLYPECTOMY       COSMETIC SURGERY  10/13, 09/15     ENDOSCOPY      10/2000 [facility], repeat 10/2002, REPEAT 2/08 (REPEAT IN 2 YRS)     ENT SURGERY  1973     EXCIS VAGINAL CYST/TUMOR       GASTRIC BYPASS       GI SURGERY  04/11     GLAUCOMA SURGERY       GLAUCOMA SURGERY       HC REMOVAL GALLBLADDER  6/2005     HYSTERECTOMY       HYSTERECTOMY, PAP NO LONGER INDICATED       MASTECTOMY Bilateral      MASTECTOMY BILATERAL, INSERT TISSUE EXPANDER BILATERAL, COMBINED  8/2010     MASTECTOMY, BILATERAL       OTHER SURGICAL HISTORY      laparoscopy     PELVIS LAPAROSCOPY,DX      x2     KS LAMNOTMY INCL W/DCMPRSN NRV ROOT 1 INTRSPC LUMBR Left 1/8/2021    Procedure: LEFT LUMBAR 5-SACRAL 1 DISCECTOMY;  Surgeon: Vadim Thedoore MD;  Location: [facility] OR;  Service: Spine     REVISE RECONSTRUCTED BREAST       Z APPENDECTOMY  1982     [facility] BIOPSY [facility] BRE[facility], OPEN INCISIONAL  2008    sentinel lymph node     [facility] COLONOSCOPY THRU STOMA, DIAGNOSTIC  12/04, 12/09    (REPEAT IN 5 YRS)       Social History     Socioeconomic History     Marital status:      Spouse name: Not on file     Number of children: Not on file     Years of education: Not on file     Highest education level: Not on file   Occupational History     Not on  file   Tobacco Use     Smoking status: Never Smoker     Smokeless tobacco: Never Used     Tobacco comment: Nonsmoking household   Vaping Use     Vaping Use: Never used   Substance and Sexual Activity     Alcohol use: No     Drug use: No     Sexual activity: Not Currently     Partners: Male     Birth control/protection: Post-menopausal   Other Topics Concern     Parent/sibling w/ CABG, MI or angioplasty before 65F 55M? No      Service No     Blood Transfusions No     Caffeine Concern No     Occupational Exposure No     Hobby Hazards No     Sleep Concern No     Stress Concern No     Weight Concern Yes     Special Diet No     Back Care No     Exercise No     Bike Helmet No     Seat Belt Yes     Self-Exams Yes   Social History Narrative     Not on file     Social Determinants of Health     Financial Resource Strain: Not on file   Food Insecurity: Not on file   Transportation Needs: Not on file   Physical Activity: Not on file   Stress: Not on file   Social Connections: Not on file   Intimate Partner Violence: Not on file   Housing Stability: Not on file       Current Outpatient Medications   Medication Sig Dispense Refill     acetaminophen (TYLENOL) 500 MG tablet Take 2 tablets by mouth daily as needed        buPROPion (WELLBUTRIN XL) 150 MG 24 hr tablet Take 2 tablets (300 mg) by mouth every evening 180 tablet 3     calcium carbonate-vitamin D 600-200 MG-UNIT TABS Take 1 tablet by mouth every evening        escitalopram (LEXAPRO) 20 MG tablet Take 1 tablet by mouth once daily 90 tablet 3     fluconazole (DIFLUCAN) 150 MG tablet Take one tablet every 3 days x 3 doses 3 tablet 0     fluticasone (FLONASE) 50 MCG/ACT nasal spray Spray 1 spray into both nostrils daily 15.8 mL 0     gabapentin (NEURONTIN) 100 MG capsule Take 100 mg by mouth daily        guaiFENesin (MUCINEX PO)        MULTIVITAMIN TABS   OR Take 1 tablet by mouth daily        Pseudoephedrine-Acetaminophen (SUDAFED SINUS PO)        valACYclovir  (VALTREX) 1000 mg tablet Take 1 tablet (1,000 mg) by mouth daily 90 tablet 3       Physical Exam:    GENL: NAD.    ABD: Soft, non-tender, no masses.    EG: Moderately-estrogenized, small urethral caruncle.    VAGINA: Moderately-estrogenized, no masses.    BN HYPERMOBILITY: None.    CYSTOCELE: None.    APICAL PROLAPSE: None.    RECTOCELE: None.    BIMANUAL: No mass or tenderness.    Cysto:    (Informed consent obtained. Pause for cause performed)   Sterile prep.    17 Fr scope inserted through urethra. Systematic examination w 70 degree lens.   PVR: 2 cc   MUCOSA: Normal without lesion   ORIFICES: Normal location and morphology   CAPACITY: 400 cc; no pain with filling   Scope withdrawn without untoward effect.      (Pt tolerated procedure without difficulty).              9/26/22 UC: negative      Today:  Results for orders placed or performed in visit on 10/04/22   UA reflex to Microscopic     Status: Abnormal   Result Value Ref Range    Color Urine Yellow Colorless, Straw, Light Yellow, Yellow    Appearance Urine Slightly Cloudy (A) Clear    Glucose Urine Negative Negative mg/dL    Bilirubin Urine Negative Negative    Ketones Urine Negative Negative mg/dL    Specific Gravity Urine 1.030 1.003 - 1.035    Blood Urine Negative Negative    pH Urine 5.0 5.0 - 7.0    Protein Albumin Urine Negative Negative mg/dL    Urobilinogen Urine Normal Normal, 2.0 mg/dL    Nitrite Urine Negative Negative    Leukocyte Esterase Urine Trace (A) Negative    RBC Urine 0 <=2 /HPF    WBC Urine 3 <=5 /HPF    Mucus Urine Present (A) None Seen /LPF         IMP:  1. Recent pelvic pain, nearly completely resolved  2. Mild atrophic vulvovaginitis      PLAN:  1. Discussed situation with patient in detail.  2. Considered topical HRT but will hold off due to hx of breast ca  3. CT abd/pelvis >> virtual visit to discuss  4. Total time spent in reviewing patient records, discussing history, performing exam, discussing diagnosis, outlining treatment  plan and documentation: 60 minutes

## 2022-10-06 NOTE — TELEPHONE ENCOUNTER
RECORDS RECEIVED FROM: Internal   REASON FOR VISIT: LUE weakness [R29.898]   Date of Appt: 01/04/2023   NOTES (FOR ALL VISITS) STATUS DETAILS   OFFICE NOTE from referring provider Internal 07/13/2022 Dr Galvan [facility] telephone encounter   OFFICE NOTE from other specialist N/A    DISCHARGE SUMMARY from hospital N/A    DISCHARGE REPORT from the ER N/A    OPERATIVE REPORT N/A    MEDICATION LIST N/A    IMAGING  (FOR ALL VISITS)     EMG Received 09/25/2020 [facility]   EEG N/A    LUMBAR PUNCTURE N/A    ELIDA SCAN N/A    ULTRASOUND (CAROTID BILAT) *VASCULAR* N/A    MRI (HEAD, NECK, SPINE) N/A    CT (HEAD, NECK, SPINE) N/A

## 2022-10-07 ENCOUNTER — HOSPITAL ENCOUNTER (OUTPATIENT)
Dept: CT IMAGING | Facility: CLINIC | Age: 57
Discharge: HOME | End: 2022-10-07
Attending: UROLOGY | Admitting: UROLOGY
Payer: COMMERCIAL

## 2022-10-07 DIAGNOSIS — R31.0 GROSS HEMATURIA: ICD-10-CM

## 2022-10-07 PROCEDURE — 250N000009 HC RX 250: Performed by: UROLOGY

## 2022-10-07 PROCEDURE — 250N000011 HC RX IP 250 OP 636: Performed by: UROLOGY

## 2022-10-07 PROCEDURE — 74178 CT ABD&PLV WO CNTR FLWD CNTR: CPT

## 2022-10-07 RX ORDER — IOPAMIDOL 755 MG/ML
500 INJECTION, SOLUTION INTRAVASCULAR ONCE
Status: COMPLETED | OUTPATIENT
Start: 2022-10-07 | End: 2022-10-07

## 2022-10-07 RX ADMIN — IOPAMIDOL 100 ML: 755 INJECTION, SOLUTION INTRAVENOUS at 09:36

## 2022-10-07 RX ADMIN — SODIUM CHLORIDE 100 ML: 9 INJECTION, SOLUTION INTRAVENOUS at 09:36

## 2022-10-18 ENCOUNTER — VIRTUAL VISIT (OUTPATIENT)
Dept: UROLOGY | Facility: CLINIC | Age: 57
End: 2022-10-18
Payer: COMMERCIAL

## 2022-10-18 DIAGNOSIS — R31.0 GROSS HEMATURIA: Primary | ICD-10-CM

## 2022-10-18 PROCEDURE — 99213 OFFICE O/P EST LOW 20 MIN: CPT | Mod: 95 | Performed by: UROLOGY

## 2022-10-18 NOTE — PROGRESS NOTES
"F/u pelvic pain, atrophic vagintitis    \"I'm feeling much better.\"    Specifically denies dysuria, gross hematuria, frequency; nocturia x 2.    Quite pleased.            IMP:  1. F/u hematuria, pelvic pain; resolved and satisfactory anatomic eval      PLAN:  1. Discussed situation with patient in detail.  2. RTC only PRN  3. Total time spent in reviewing patient's previous records, discussion with patient and documentation: 20 minutes      "

## 2022-10-31 ENCOUNTER — MYC REFILL (OUTPATIENT)
Dept: FAMILY MEDICINE | Facility: CLINIC | Age: 57
End: 2022-10-31

## 2022-10-31 DIAGNOSIS — R05.1 ACUTE COUGH: Primary | ICD-10-CM

## 2022-10-31 RX ORDER — GUAIFENESIN 600 MG/1
600 TABLET, EXTENDED RELEASE ORAL 2 TIMES DAILY
Qty: 20 TABLET | Refills: 1 | Status: SHIPPED | OUTPATIENT
Start: 2022-10-31 | End: 2023-08-10

## 2022-11-01 ENCOUNTER — MYC REFILL (OUTPATIENT)
Dept: FAMILY MEDICINE | Facility: CLINIC | Age: 57
End: 2022-11-01

## 2022-11-01 DIAGNOSIS — G62.9 NEUROPATHY: Primary | ICD-10-CM

## 2022-11-01 NOTE — TELEPHONE ENCOUNTER
I have never filled this medication for her before.   It is only historical in our system  Why is she on this medication?    Lucía Galvan MD

## 2022-11-02 RX ORDER — GABAPENTIN 100 MG/1
200 CAPSULE ORAL DAILY
Qty: 180 CAPSULE | Refills: 3 | Status: SHIPPED | OUTPATIENT
Start: 2022-11-02 | End: 2023-09-27

## 2022-11-19 ENCOUNTER — HEALTH MAINTENANCE LETTER (OUTPATIENT)
Age: 57
End: 2022-11-19

## 2023-01-04 ENCOUNTER — PRE VISIT (OUTPATIENT)
Dept: NEUROLOGY | Facility: CLINIC | Age: 58
End: 2023-01-04

## 2023-02-03 ENCOUNTER — MYC MEDICAL ADVICE (OUTPATIENT)
Dept: FAMILY MEDICINE | Facility: CLINIC | Age: 58
End: 2023-02-03
Payer: COMMERCIAL

## 2023-02-03 DIAGNOSIS — G47.00 INSOMNIA, UNSPECIFIED TYPE: ICD-10-CM

## 2023-02-05 RX ORDER — TRAZODONE HYDROCHLORIDE 50 MG/1
50 TABLET, FILM COATED ORAL AT BEDTIME
Qty: 90 TABLET | Refills: 0 | Status: SHIPPED | OUTPATIENT
Start: 2023-02-05

## 2023-05-17 ENCOUNTER — ANCILLARY PROCEDURE (OUTPATIENT)
Dept: GENERAL RADIOLOGY | Facility: CLINIC | Age: 58
End: 2023-05-17
Attending: PEDIATRICS
Payer: COMMERCIAL

## 2023-05-17 ENCOUNTER — HOSPITAL ENCOUNTER (OUTPATIENT)
Dept: MRI IMAGING | Facility: CLINIC | Age: 58
Discharge: HOME | End: 2023-05-17
Attending: PEDIATRICS | Admitting: PEDIATRICS
Payer: COMMERCIAL

## 2023-05-17 ENCOUNTER — OFFICE VISIT (OUTPATIENT)
Dept: ORTHOPEDICS | Facility: CLINIC | Age: 58
End: 2023-05-17
Payer: COMMERCIAL

## 2023-05-17 DIAGNOSIS — G89.29 CHRONIC LEFT SHOULDER PAIN: ICD-10-CM

## 2023-05-17 DIAGNOSIS — M25.512 CHRONIC LEFT SHOULDER PAIN: ICD-10-CM

## 2023-05-17 DIAGNOSIS — M25.512 LEFT ANTERIOR SHOULDER PAIN: ICD-10-CM

## 2023-05-17 DIAGNOSIS — M25.512 LEFT ANTERIOR SHOULDER PAIN: Primary | ICD-10-CM

## 2023-05-17 PROCEDURE — 99214 OFFICE O/P EST MOD 30 MIN: CPT | Performed by: PEDIATRICS

## 2023-05-17 PROCEDURE — 73221 MRI JOINT UPR EXTREM W/O DYE: CPT | Mod: LT

## 2023-05-17 PROCEDURE — 73030 X-RAY EXAM OF SHOULDER: CPT | Mod: TC | Performed by: RADIOLOGY

## 2023-05-17 NOTE — PROGRESS NOTES
ASSESSMENT & PLAN    Christina was seen today for pain.    Diagnoses and all orders for this visit:    Left anterior shoulder pain  -     XR Shoulder Left G/E 3 Views; Future    Chronic left shoulder pain  -     MR Shoulder Left w/o Contrast; Future      This issue is acute on chronic and Worsening.      Patient Instructions   We discussed these other possible diagnosis: Left Shoulder pain, concerning for rotator cuff tear, will obtain MRI to evaluate.    Plan:  - Today's Plan of Care:  MRI of the Left Shoulder - Call [phone] to schedule MRI     Discussed activity considerations and other supportive care including Ice/Heat, OTC and other topical medications as needed.    -We also discussed other future treatment options:  Referral to Orthopedic Surgery    Follow Up: In clinic with Dr. Bear after MRI (wait at least 1-2 days)     If you have any further questions for your physician or physician s care team you can call [phone] and use option 3 to leave a voice message.    Concerning signs and symptoms were reviewed and all questions were answered at this time.    Patricia Bear MD [facility]  Sports Medicine Physician  [facility]    -----  Chief Complaint   Patient presents with     Left Shoulder - Pain       SUBJECTIVE  Christina John is a/an 57 year old female who is seen as a self referral for evaluation of left shoulder.     The patient is seen by themselves.  The patient is Left handed    Onset: 1.5 years(s) ago it started hurting, 3 months ago worsened, patient describes injury as her dog plowing into her a couple months ago, maybe this caused worsening.    Location of Pain: left shoulder; anterior, RTC, posterior  Worsened by: rolling over, lifting things, abduction, internal rotation, dressing, bathing   Better with: nothing   Treatments tried: ice, heat, Tylenol, ibuprofen, Aleve, home exercises and physical therapy (12 visits)  Associated symptoms: swelling, weakness of left  "shoulder, feeling of instability and \"creeking\"    Orthopedic/Surgical history: YES - Date: chronic left shoulder pain   Social History/Occupation: working at [employer], she is doing some lifting, not a lot      REVIEW OF SYSTEMS:  Review of Systems    OBJECTIVE:  LMP 03/28/2010    General: healthy, alert and in no distress  Skin: no suspicious lesions or rash.  CV: distal perfusion intact  Resp: normal respiratory effort without conversational dyspnea   Psych: normal mood and affect  Gait: NORMAL  Neuro: Normal light sensory exam of lower extremity    Bilateral Shoulder exam    Inspection and Posture:       rounded shoulders and upper back    Skin:        no visible deformities    Tender:        none    Non Tender:       remainder of shoulder bilateral    ROM:        forward flexion 90  left       abduction 90 left       internal rotation gluteal region left       external rotation 35 left    Painful motions:       flexion left       elevation left    Strength:        abduction 4/5 left       flexion 4/5 left       internal rotation 5/5 bilateral       external rotation 5/5 bilateral    Impingement testing:       positive (+) Neer left       positive (+) Kwon left    Sensation:        normal sensation over shoulder and upper extremity     RADIOLOGY:  Final results and radiologist's interpretation, available in the [facility] health record.  Images were reviewed with the patient in the office today.  My personal interpretation of the performed imaging:  3 XR views of left shoulder reviewed: no acute bony abnormality, mild AC joint degenerative change  - will follow official read    Reviewed prior PT notes  Review of the result(s) of each unique test - XR         "

## 2023-05-17 NOTE — LETTER
5/17/2023         RE: Christina John  [address] [facility] [address]        Dear Colleague,    Thank you for referring your patient, Christina John, to the [facility] [place]. Please see a copy of my visit note below.    ASSESSMENT & PLAN    Christina was seen today for pain.    Diagnoses and all orders for this visit:    Left anterior shoulder pain  -     XR Shoulder Left G/E 3 Views; Future    Chronic left shoulder pain  -     MR Shoulder Left w/o Contrast; Future      This issue is acute on chronic and Worsening.      Patient Instructions   We discussed these other possible diagnosis: Left Shoulder pain, concerning for rotator cuff tear, will obtain MRI to evaluate.    Plan:  - Today's Plan of Care:  MRI of the Left Shoulder - Call [phone] to schedule MRI     Discussed activity considerations and other supportive care including Ice/Heat, OTC and other topical medications as needed.    -We also discussed other future treatment options:  Referral to Orthopedic Surgery    Follow Up: In clinic with Dr. Bear after MRI (wait at least 1-2 days)     If you have any further questions for your physician or physician s care team you can call [phone] and use option 3 to leave a voice message.    Concerning signs and symptoms were reviewed and all questions were answered at this time.    Patricia Bear MD [facility]  Sports Medicine Physician  [facility]    -----  Chief Complaint   Patient presents with     Left Shoulder - Pain       SUBJECTIVE  Christina John is a/an 57 year old female who is seen as a self referral for evaluation of left shoulder.     The patient is seen by themselves.  The patient is Left handed    Onset: 1.5 years(s) ago it started hurting, 3 months ago worsened, patient describes injury as her dog plowing into her a couple months ago, maybe this caused worsening.    Location of Pain: left shoulder; anterior, RTC,  "posterior  Worsened by: rolling over, lifting things, abduction, internal rotation, dressing, bathing   Better with: nothing   Treatments tried: ice, heat, Tylenol, ibuprofen, Aleve, home exercises and physical therapy (12 visits)  Associated symptoms: swelling, weakness of left shoulder, feeling of instability and \"creeking\"    Orthopedic/Surgical history: YES - Date: chronic left shoulder pain   Social History/Occupation: working at [employer], she is doing some lifting, not a lot      REVIEW OF SYSTEMS:  Review of Systems    OBJECTIVE:  LMP 03/28/2010    General: healthy, alert and in no distress  Skin: no suspicious lesions or rash.  CV: distal perfusion intact  Resp: normal respiratory effort without conversational dyspnea   Psych: normal mood and affect  Gait: NORMAL  Neuro: Normal light sensory exam of lower extremity    Bilateral Shoulder exam    Inspection and Posture:       rounded shoulders and upper back    Skin:        no visible deformities    Tender:        none    Non Tender:       remainder of shoulder bilateral    ROM:        forward flexion 90  left       abduction 90 left       internal rotation gluteal region left       external rotation 35 left    Painful motions:       flexion left       elevation left    Strength:        abduction 4/5 left       flexion 4/5 left       internal rotation 5/5 bilateral       external rotation 5/5 bilateral    Impingement testing:       positive (+) Neer left       positive (+) Kwon left    Sensation:        normal sensation over shoulder and upper extremity     RADIOLOGY:  Final results and radiologist's interpretation, available in the [facility] health record.  Images were reviewed with the patient in the office today.  My personal interpretation of the performed imaging:  3 XR views of left shoulder reviewed: no acute bony abnormality, mild AC joint degenerative change  - will follow official read    Reviewed prior PT notes  Review of the result(s) of each unique " test - XR             Again, thank you for allowing me to participate in the care of your patient.        Sincerely,        Patricia Bear MD

## 2023-05-17 NOTE — PATIENT INSTRUCTIONS
We discussed these other possible diagnosis: Left Shoulder pain, concerning for rotator cuff tear, will obtain MRI to evaluate.    Plan:  - Today's Plan of Care:  MRI of the Left Shoulder - Call [phone] to schedule MRI     Discussed activity considerations and other supportive care including Ice/Heat, OTC and other topical medications as needed.    -We also discussed other future treatment options:  Referral to Orthopedic Surgery    Follow Up: In clinic with Dr. Bear after MRI (wait at least 1-2 days)     If you have any further questions for your physician or physician s care team you can call [phone] and use option 3 to leave a voice message.

## 2023-05-18 ENCOUNTER — OFFICE VISIT (OUTPATIENT)
Dept: ORTHOPEDICS | Facility: CLINIC | Age: 58
End: 2023-05-18
Payer: COMMERCIAL

## 2023-05-18 VITALS
HEART RATE: 58 BPM | DIASTOLIC BLOOD PRESSURE: 72 MMHG | WEIGHT: 206 LBS | SYSTOLIC BLOOD PRESSURE: 122 MMHG | BODY MASS INDEX: 29.14 KG/M2

## 2023-05-18 DIAGNOSIS — G89.29 CHRONIC LEFT SHOULDER PAIN: ICD-10-CM

## 2023-05-18 DIAGNOSIS — M75.02 ADHESIVE CAPSULITIS OF LEFT SHOULDER: Primary | ICD-10-CM

## 2023-05-18 DIAGNOSIS — M25.512 CHRONIC LEFT SHOULDER PAIN: ICD-10-CM

## 2023-05-18 PROCEDURE — 20611 DRAIN/INJ JOINT/BURSA W/US: CPT | Mod: LT | Performed by: FAMILY MEDICINE

## 2023-05-18 PROCEDURE — 99213 OFFICE O/P EST LOW 20 MIN: CPT | Performed by: PEDIATRICS

## 2023-05-18 RX ORDER — ROPIVACAINE HYDROCHLORIDE 5 MG/ML
4 INJECTION, SOLUTION EPIDURAL; INFILTRATION; PERINEURAL
Status: SHIPPED | OUTPATIENT
Start: 2023-05-18

## 2023-05-18 RX ORDER — BETAMETHASONE SODIUM PHOSPHATE AND BETAMETHASONE ACETATE 3; 3 MG/ML; MG/ML
6 INJECTION, SUSPENSION INTRA-ARTICULAR; INTRALESIONAL; INTRAMUSCULAR; SOFT TISSUE
Status: SHIPPED | OUTPATIENT
Start: 2023-05-18

## 2023-05-18 RX ADMIN — ROPIVACAINE HYDROCHLORIDE 4 ML: 5 INJECTION, SOLUTION EPIDURAL; INFILTRATION; PERINEURAL at 16:42

## 2023-05-18 RX ADMIN — BETAMETHASONE SODIUM PHOSPHATE AND BETAMETHASONE ACETATE 6 MG: 3; 3 INJECTION, SUSPENSION INTRA-ARTICULAR; INTRALESIONAL; INTRAMUSCULAR; SOFT TISSUE at 16:42

## 2023-05-18 NOTE — PROGRESS NOTES
Large Joint Injection/Arthocentesis: L glenohumeral joint    Date/Time: 5/18/2023 4:42 PM    Performed by: Feroz Avalos MD  Authorized by: Feroz Avalos MD    Indications:  Pain  Needle Size:  21 G  Guidance: ultrasound    Approach:  Posterolateral  Location:  Shoulder      Site:  L glenohumeral joint  Medications:  6 mg betamethasone acet & sod phos 6 (3-3) MG/ML; 4 mL ropivacaine 5 MG/ML  Medications comment:  20 mL of sodium chloride used   Outcome:  Tolerated well, no immediate complications  Procedure discussed: discussed risks, benefits, and alternatives    Consent Given by:  Patient  Timeout: timeout called immediately prior to procedure    Prep: patient was prepped and draped in usual sterile fashion     Ultrasound images of procedure were permanently stored.   Referred by Dr. Bear     Patient reported increased tightness after left large volumer glenohumeral joint steroid injection.  Ultrasound guided images were permanently stored.  Aftercare instructions given to patient.  Plan to follow-up as previously discussed with referring provider.     Feroz Avalos MD [facility]

## 2023-05-18 NOTE — PROGRESS NOTES
ASSESSMENT & PLAN    Christina was seen today for follow up and mri transcription.    Diagnoses and all orders for this visit:    Adhesive capsulitis of left shoulder  -     Orthopedic  Referral; Future  -     Physical Therapy Referral; Future    Chronic left shoulder pain  -     Orthopedic  Referral; Future  -     Physical Therapy Referral; Future      This issue is chronic and Unchanged.    We discussed these other possible diagnosis: Reviewed MRI, clinical picture is more likely adhesive capsulitis.  Discussed next steps in treatment including US guided injection and physical therapy.    Plan:  - Today's Plan of Care:  Referral for US guided injection  Rehab: Physical Therapy: [facility] - [phone]    Discussed activity considerations and other supportive care including Ice/Heat, OTC and other topical medications as needed.    -We also discussed other future treatment options:  Referral to Orthopedic Surgery    Follow Up: 6 - 8 weeks      Concerning signs and symptoms were reviewed and all questions were answered at this time.    Patricia Bear MD [facility]  Sports Medicine Physician  [facility]      SUBJECTIVE- Interim History May 18, 2023    Chief Complaint   Patient presents with     Left Shoulder - Follow Up, MRI Transcription       Christina John is a 57 year old female who is seen in f/u up for    Adhesive capsulitis of left shoulder  Chronic left shoulder pain. Since last visit on 5/17/23 patient has not had any changes since. Here to review MRI.    Onset: 1.5 years(s) ago it started hurting, 3 months ago worsened, patient describes injury as her dog plowing into her a couple months ago, maybe this caused worsening.     Location of Pain: left shoulder; anterior, RTC, posterior  Worsened by: rolling over, lifting things, abduction, internal rotation, dressing, bathing   Better with: nothing   Treatments tried: ice, heat, Tylenol, ibuprofen, Aleve, home exercises  "and physical therapy (12 visits)  Associated symptoms: swelling, weakness of left shoulder, feeling of instability and \"creeking\"     Orthopedic/Surgical history: YES - Date: chronic left shoulder pain   Social History/Occupation: working at [employer], she is doing some lifting, not a lot    REVIEW OF SYSTEMS:  Review of Systems    OBJECTIVE:  /72   Pulse 58   Wt 93.4 kg (206 lb)   LMP 03/28/2010   BMI 29.14 kg/m     General: healthy, alert and in no distress  Skin: no suspicious lesions or rash.  CV: distal perfusion intact  Resp: normal respiratory effort without conversational dyspnea   Psych: normal mood and affect  Gait: NORMAL  Neuro: Normal light sensory exam of lower extremity     Bilateral Shoulder exam     Inspection and Posture:       rounded shoulders and upper back     Skin:        no visible deformities     Tender:        none     Non Tender:       remainder of shoulder bilateral     ROM:        forward flexion 90  left       abduction 90 left       internal rotation gluteal region left       external rotation 35 left     Painful motions:       flexion left       elevation left     Strength:        abduction 4/5 left       flexion 4/5 left       internal rotation 5/5 bilateral       external rotation 5/5 bilateral     Impingement testing:       positive (+) Neer left       positive (+) Kwon left     Sensation:        normal sensation over shoulder and upper extremity     RADIOLOGY:  Final results and radiologist's interpretation, available in the [facility] health record.  Images were reviewed with the patient in the office today.  My personal interpretation of the performed imaging:     MRI Left Shoulder 5/17/2023 -mild to moderate tendinopathy without full-thickness rotator cuff tear, sequelae of adhesive capsulitis including chronic inflammation in the inferior glenohumeral ligament, AC joint arthritis, trace subacromial bursitis small effusion    Results for orders placed or performed during " the hospital encounter of 05/17/23   MR Shoulder Left w/o Contrast    Narrative    EXAM: MR SHOULDER LEFT W/O CONTRAST  LOCATION: [facility]  DATE/TIME: 5/17/2023 10:49 AM CDT    INDICATION: eval RC tear  COMPARISON: None.  TECHNIQUE: Unenhanced.    FINDINGS:    ROTATOR CUFF:  -Supraspinatus: Mild to moderate supraspinatus tendon tendinopathy without evidence for well-defined partial or full-thickness tearing. No retraction. No atrophy.  -Infraspinatus: No tendon tear, tendinopathy, or fatty atrophy.  -Subscapularis: Mild tendinopathy. No tearing or retraction.  -Teres minor: No tendon tear, tendinopathy or fatty atrophy.    CORACOACROMIAL ARCH:  -Morphology: Type II acromion. No subacromial spur. Subacromial and subcoracoid space are normal.   -Bursa: Trace fluid in the subacromial subdeltoid bursa. The coracoacromial and coracoclavicular joint are negative.    ACROMIOCLAVICULAR JOINT:   -Degenerative and hypertrophic change at the AC joint with reactive edema both sides of the articulation.     LONG HEAD OF BICEPS TENDON:   -Mild tendinopathy within the intra-articular portion of the long head of the biceps. No tearing or tenosynovitis.    GLENOHUMERAL JOINT:   -Labrum: No labral tear. No paralabral cyst.   -Cartilage: Normal.   -Joint space: Small effusion with fluid in the subcoracoid recess.  -Glenohumeral ligaments and capsule: Sprain or more chronic inflammation within the inferior glenohumeral ligament. Inflammation within this region, as well as around the bicipital interval, which is seen in this case, can be seen in the setting of   adhesive capsulitis.    BONES:   -No fracture or concerning marrow replacing lesion.    SOFT TISSUES:   -Normal deltoid muscle bulk. Normal visualized chest wall and axilla.      Impression    IMPRESSION:  1.  Mild to moderate supraspinatus tendinopathy without evidence for well-defined partial or full-thickness tearing. No retraction or  atrophy.  2.  Mild subscapularis tendinopathy without tearing.  3.  Mild tendinopathy within the intra-articular portion of the long head of the biceps without tearing or tenosynovitis.  4.  Sprain or more chronic inflammation within the inferior glenohumeral ligament. This finding coupled with inflammation around the bicipital interval can be seen in the setting of adhesive capsulitis.  5.  Hypertrophic change at the AC joint with reactive edema on both sides of the articulation.  6.  No evidence for fracture.  7.  Trace fluid in the subacromial subdeltoid bursa.  8.  Small shoulder joint effusion with fluid in the subcoracoid recess         Review of the result(s) of each unique test - MRI

## 2023-05-18 NOTE — PATIENT INSTRUCTIONS
We discussed these other possible diagnosis: Reviewed MRI, clinical picture is more likely adhesive capsulitis.  Discussed next steps in treatment including US guided injection and physical therapy.    Plan:  - Today's Plan of Care:  Referral for US guided injection  Rehab: [facility] - [phone]    Discussed activity considerations and other supportive care including Ice/Heat, OTC and other topical medications as needed.    -We also discussed other future treatment options:  Referral to Orthopedic Surgery    Follow Up: 6 - 8 weeks    If you have any further questions for your physician or physician s care team you can call [phone] and use option 3 to leave a voice message.

## 2023-05-18 NOTE — LETTER
5/18/2023         RE: Christina John  [address] [facility] [address]        Dear Colleague,    Thank you for referring your patient, Christina John, to the [facility] [place]. Please see a copy of my visit note below.    Large Joint Injection/Arthocentesis: L glenohumeral joint    Date/Time: 5/18/2023 4:42 PM    Performed by: Feroz Avalos MD  Authorized by: Feroz Avalos MD    Indications:  Pain  Needle Size:  21 G  Guidance: ultrasound    Approach:  Posterolateral  Location:  Shoulder      Site:  L glenohumeral joint  Medications:  6 mg betamethasone acet & sod phos 6 (3-3) MG/ML; 4 mL ropivacaine 5 MG/ML  Medications comment:  20 mL of sodium chloride used   Outcome:  Tolerated well, no immediate complications  Procedure discussed: discussed risks, benefits, and alternatives    Consent Given by:  Patient  Timeout: timeout called immediately prior to procedure    Prep: patient was prepped and draped in usual sterile fashion     Ultrasound images of procedure were permanently stored.   Referred by Dr. Bear     Patient reported increased tightness after left large volumer glenohumeral joint steroid injection.  Ultrasound guided images were permanently stored.  Aftercare instructions given to patient.  Plan to follow-up as previously discussed with referring provider.     Feroz Avalos MD [facility]              Again, thank you for allowing me to participate in the care of your patient.        Sincerely,        Feroz Avalos MD

## 2023-05-18 NOTE — LETTER
5/18/2023         RE: Christina John  [address] [facility] [address]        Dear Colleague,    Thank you for referring your patient, Christina John, to the [facility] [place]. Please see a copy of my visit note below.    ASSESSMENT & PLAN    Christina was seen today for follow up and mri transcription.    Diagnoses and all orders for this visit:    Adhesive capsulitis of left shoulder  -     Orthopedic  Referral; Future  -     Physical Therapy Referral; Future    Chronic left shoulder pain  -     Orthopedic  Referral; Future  -     Physical Therapy Referral; Future      This issue is chronic and Unchanged.    We discussed these other possible diagnosis: Reviewed MRI, clinical picture is more likely adhesive capsulitis.  Discussed next steps in treatment including US guided injection and physical therapy.    Plan:  - Today's Plan of Care:  Referral for US guided injection  Rehab: Physical Therapy: [facility] - [phone]    Discussed activity considerations and other supportive care including Ice/Heat, OTC and other topical medications as needed.    -We also discussed other future treatment options:  Referral to Orthopedic Surgery    Follow Up: 6 - 8 weeks      Concerning signs and symptoms were reviewed and all questions were answered at this time.    Patricia Bear MD [facility]  Sports Medicine Physician  [facility]      SUBJECTIVE- Interim History May 18, 2023    Chief Complaint   Patient presents with     Left Shoulder - Follow Up, MRI Transcription       Christina John is a 57 year old female who is seen in f/u up for    Adhesive capsulitis of left shoulder  Chronic left shoulder pain. Since last visit on 5/17/23 patient has not had any changes since. Here to review MRI.    Onset: 1.5 years(s) ago it started hurting, 3 months ago worsened, patient describes injury as her dog plowing into her a couple months ago, maybe  "this caused worsening.     Location of Pain: left shoulder; anterior, RTC, posterior  Worsened by: rolling over, lifting things, abduction, internal rotation, dressing, bathing   Better with: nothing   Treatments tried: ice, heat, Tylenol, ibuprofen, Aleve, home exercises and physical therapy (12 visits)  Associated symptoms: swelling, weakness of left shoulder, feeling of instability and \"creeking\"     Orthopedic/Surgical history: YES - Date: chronic left shoulder pain   Social History/Occupation: working at [employer], she is doing some lifting, not a lot    REVIEW OF SYSTEMS:  Review of Systems    OBJECTIVE:  /72   Pulse 58   Wt 93.4 kg (206 lb)   LMP 03/28/2010   BMI 29.14 kg/m     General: healthy, alert and in no distress  Skin: no suspicious lesions or rash.  CV: distal perfusion intact  Resp: normal respiratory effort without conversational dyspnea   Psych: normal mood and affect  Gait: NORMAL  Neuro: Normal light sensory exam of lower extremity     Bilateral Shoulder exam     Inspection and Posture:       rounded shoulders and upper back     Skin:        no visible deformities     Tender:        none     Non Tender:       remainder of shoulder bilateral     ROM:        forward flexion 90  left       abduction 90 left       internal rotation gluteal region left       external rotation 35 left     Painful motions:       flexion left       elevation left     Strength:        abduction 4/5 left       flexion 4/5 left       internal rotation 5/5 bilateral       external rotation 5/5 bilateral     Impingement testing:       positive (+) Neer left       positive (+) Kwon left     Sensation:        normal sensation over shoulder and upper extremity     RADIOLOGY:  Final results and radiologist's interpretation, available in the [facility] health record.  Images were reviewed with the patient in the office today.  My personal interpretation of the performed imaging:     MRI Left Shoulder 5/17/2023 -mild to " moderate tendinopathy without full-thickness rotator cuff tear, sequelae of adhesive capsulitis including chronic inflammation in the inferior glenohumeral ligament, AC joint arthritis, trace subacromial bursitis small effusion    Results for orders placed or performed during the hospital encounter of 05/17/23   MR Shoulder Left w/o Contrast    Narrative    EXAM: MR SHOULDER LEFT W/O CONTRAST  LOCATION: [facility]  DATE/TIME: 5/17/2023 10:49 AM CDT    INDICATION: eval RC tear  COMPARISON: None.  TECHNIQUE: Unenhanced.    FINDINGS:    ROTATOR CUFF:  -Supraspinatus: Mild to moderate supraspinatus tendon tendinopathy without evidence for well-defined partial or full-thickness tearing. No retraction. No atrophy.  -Infraspinatus: No tendon tear, tendinopathy, or fatty atrophy.  -Subscapularis: Mild tendinopathy. No tearing or retraction.  -Teres minor: No tendon tear, tendinopathy or fatty atrophy.    CORACOACROMIAL ARCH:  -Morphology: Type II acromion. No subacromial spur. Subacromial and subcoracoid space are normal.   -Bursa: Trace fluid in the subacromial subdeltoid bursa. The coracoacromial and coracoclavicular joint are negative.    ACROMIOCLAVICULAR JOINT:   -Degenerative and hypertrophic change at the AC joint with reactive edema both sides of the articulation.     LONG HEAD OF BICEPS TENDON:   -Mild tendinopathy within the intra-articular portion of the long head of the biceps. No tearing or tenosynovitis.    GLENOHUMERAL JOINT:   -Labrum: No labral tear. No paralabral cyst.   -Cartilage: Normal.   -Joint space: Small effusion with fluid in the subcoracoid recess.  -Glenohumeral ligaments and capsule: Sprain or more chronic inflammation within the inferior glenohumeral ligament. Inflammation within this region, as well as around the bicipital interval, which is seen in this case, can be seen in the setting of   adhesive capsulitis.    BONES:   -No fracture or concerning marrow  replacing lesion.    SOFT TISSUES:   -Normal deltoid muscle bulk. Normal visualized chest wall and axilla.      Impression    IMPRESSION:  1.  Mild to moderate supraspinatus tendinopathy without evidence for well-defined partial or full-thickness tearing. No retraction or atrophy.  2.  Mild subscapularis tendinopathy without tearing.  3.  Mild tendinopathy within the intra-articular portion of the long head of the biceps without tearing or tenosynovitis.  4.  Sprain or more chronic inflammation within the inferior glenohumeral ligament. This finding coupled with inflammation around the bicipital interval can be seen in the setting of adhesive capsulitis.  5.  Hypertrophic change at the AC joint with reactive edema on both sides of the articulation.  6.  No evidence for fracture.  7.  Trace fluid in the subacromial subdeltoid bursa.  8.  Small shoulder joint effusion with fluid in the subcoracoid recess         Review of the result(s) of each unique test - MRI             Again, thank you for allowing me to participate in the care of your patient.        Sincerely,        Patricia Bear MD

## 2023-05-18 NOTE — PATIENT INSTRUCTIONS
[facility]    Procedure: Left Large Volume Glenohumeral Joint Steroid Injection     You may shower, however avoid swimming, tub baths or hot tubs for 24 hours following your procedure  You may have a mild to moderate increase in pain for several days following the injection.  It may take up to 14 days for the steroid medication to start working although you may feel the effect as early as a few days after the procedure.  You may use ice packs for 10-15 minutes, 3 to 4 times a day at the injection site for comfort  You may use anti-inflammatory medications (such as Ibuprofen or Aleve or Advil) or Tylenol for pain control if necessary  If you were fasting, you may resume your normal diet and medications after the procedure  If you have diabetes, check your blood sugar more frequently than usual as your blood sugar may be higher than normal for 10-14 days following a steroid injection. Contact your doctor who manages your diabetes if your blood sugar is higher than usual    If you experience any of the following, call [facility] @ [phone] or [phone]  -Fever over 100 degree F  -Swelling, bleeding, redness, drainage, warmth at the injection site  - New or worsening pain    It was great seeing you today!    Feroz Avalos

## 2023-05-31 ASSESSMENT — ACTIVITIES OF DAILY LIVING (ADL)
PLACING_AN_OBJECT_ON_A_HIGH_SHELF: 5
TOUCHING_THE_BACK_OF_YOUR_NECK: 0
REMOVING_SOMETHING_FROM_YOUR_BACK_POCKET: 6
WASHING_YOUR_BACK: 6
PLEASE_INDICATE_YOR_PRIMARY_REASON_FOR_REFERRAL_TO_THERAPY:: SHOULDER
WASHING_YOUR_HAIR?: 5
PUTTING_ON_AN_UNDERSHIRT_OR_A_PULLOVER_SWEATER: 5
WHEN_LYING_ON_THE_INVOLVED_SIDE: 6
PUSHING_WITH_THE_INVOLVED_ARM: 6
CARRYING_A_HEAVY_OBJECT_OF_10_POUNDS: 4
AT_ITS_WORST?: 6
PUTTING_ON_A_SHIRT_THAT_BUTTONS_DOWN_THE_FRONT: 4
PUTTING_ON_YOUR_PANTS: 4
REACHING_FOR_SOMETHING_ON_A_HIGH_SHELF: 6

## 2023-06-01 ENCOUNTER — THERAPY VISIT (OUTPATIENT)
Dept: PHYSICAL THERAPY | Facility: CLINIC | Age: 58
End: 2023-06-01
Attending: PEDIATRICS
Payer: COMMERCIAL

## 2023-06-01 DIAGNOSIS — G89.29 CHRONIC LEFT SHOULDER PAIN: ICD-10-CM

## 2023-06-01 DIAGNOSIS — M75.02 ADHESIVE CAPSULITIS OF LEFT SHOULDER: ICD-10-CM

## 2023-06-01 DIAGNOSIS — M25.512 CHRONIC LEFT SHOULDER PAIN: ICD-10-CM

## 2023-06-01 PROCEDURE — 97161 PT EVAL LOW COMPLEX 20 MIN: CPT | Mod: GP | Performed by: PHYSICAL MEDICINE & REHABILITATION

## 2023-06-01 PROCEDURE — 97110 THERAPEUTIC EXERCISES: CPT | Mod: GP | Performed by: PHYSICAL MEDICINE & REHABILITATION

## 2023-06-01 PROCEDURE — 97140 MANUAL THERAPY 1/> REGIONS: CPT | Mod: GP | Performed by: PHYSICAL MEDICINE & REHABILITATION

## 2023-06-01 NOTE — PROGRESS NOTES
PHYSICAL THERAPY EVALUATION  Type of Visit: Evaluation    See electronic medical record for Abuse and Falls Screening details.    Subjective   Pt arrived to PT today for chronic L shoulder pain that has been ongoing for some time, notes it really worsened 3 months ago when dog ran into arm. Pt notes pain with sleeping, pushing, and most ADLs. Pt reports no real PAU. Pt reports pain radiates down into elbow. No numbness/tingling. Pt reports pain is dull/achy, but can become sharp with certain movements. Pt did get an injection and has tried Voltaren. Not noting much of a difference. Has tried cupping and reflexology, no big change or long term change.     Presenting condition or subjective complaint: Frozen shoulder  Date of onset: 03/01/23      Prior diagnostic imaging/testing results: MRI; X-ray --see Epic  Prior therapy history for the same diagnosis, illness or injury: Yes 5/18 guided cortisone shot    Prior Level of Function   Transfers: Independent  Ambulation: Independent  ADL: Independent    Living Environment  Social support: With a significant other or spouse   Type of home: 2-story   Stairs to enter the home: Yes 6 Is there a railing: Yes   Ramp: No   Stairs inside the home: Yes 13 Is there a railing: Yes   Help at home: Self Cares (home health aide/personal care attendant, family, etc)  Equipment owned:       Employment: Yes Retail  Hobbies/Interests: Sewing    Patient goals for therapy: Basic functions. Sleep.    Pain assessment: Pain present     Objective   SHOULDER EVALUATION  INTEGUMENTARY (edema, incisions): no palpable edema  POSTURE: rounded shoulders  ROM: AROM: flexion: 130*, abd: 69*, ER at 0* abd: 30*, IR: L PSIS   PROM: flexion: 110*, abd: 45*, ER in scaption: 25*, IR in scaption: 30*. Capsular endfeel, muscle guarding  STRENGTH: flexion: 4/5, abd: 4-/5, IR: 4/5, ER: 4+/5, ext: 4+/5  Special Tests: difficulty with special tests due to limited ROM and pain  PALPATION: notes pain throughout  shoulder girdle  JOINT MOBILITY: hypomobility of L GHJ in capsular pattern, SCJ and ACJ  CERVICAL SCREEN: WFL    Assessment & Plan   CLINICAL IMPRESSIONS   Medical Diagnosis: Adhesive capsulitis of left shoulder; Chronic left shoulder pain   Treatment Diagnosis: L shoulder pain   Impression/Assessment: Patient is a 57 year old female with L shoulder pain complaints.  The following significant findings have been identified: Pain, Decreased ROM/flexibility, Decreased joint mobility, Decreased strength, Impaired muscle performance, Decreased activity tolerance and Impaired posture. These impairments interfere with their ability to perform self care tasks, work tasks, recreational activities, household chores, driving , household mobility and community mobility as compared to previous level of function.     Clinical Decision Making (Complexity):   Clinical Presentation: Stable/Uncomplicated  Clinical Presentation Rationale: based on medical and personal factors listed in PT evaluation  Clinical Decision Making (Complexity): Low complexity    PLAN OF CARE  Treatment Interventions:  Modalities: Cryotherapy, E-stim, Hot Pack, Mechanical Traction, Ultrasound, TENS  Interventions: Manual Therapy, Neuromuscular Re-education, Therapeutic Activity, Therapeutic Exercise, Self-Care/Home Management    Long Term Goals   PT Goal 1  Goal Identifier: 1  Goal Description: Pt will be able to flex L shoulder 150* in order to decrease diffiuclty with reaching overhead  Target Date: 06/29/23  PT Goal 2  Goal Identifier: 2  Goal Description: Pt will be able to abd L shoulder 130* in order to decrease difficulty wiht reaching overhead  Target Date: 06/29/23  PT Goal 3  Goal Identifier: 3  Goal Description: Pt will be independent with HEP in order to self manage symptoms  Target Date: 07/13/23  PT Goal 4  Goal Identifier: 4  Goal Description: Pt will be able to IR/ER L shoulder ROM 50* in order to decrease difficulty with ADLs  Target Date:  07/28/23  PT Goal 5  Goal Identifier: 5  Goal Description: Pt will be able to demonstrate 5/5 L shoulder strength in order to decrease diffiuclty with lifting brother and assisting on family vacation  Target Date: 07/28/23    Frequency of Treatment: 1x/week  Duration of Treatment: 8 weeks    Recommended Referrals to Other Professionals: none  Education Assessment:Learner/Method: Patient  Education Comments: pt demonstrated and verbalized good understanding of exercises    Risks and benefits of evaluation/treatment have been explained.   Patient/Family/caregiver agrees with Plan of Care.     Evaluation Time: PT Eval, Low Complexity Minutes (69084): 15   Present: Not applicable    Signing Clinician: Ania Salgado PT

## 2023-06-07 ENCOUNTER — THERAPY VISIT (OUTPATIENT)
Dept: PHYSICAL THERAPY | Facility: CLINIC | Age: 58
End: 2023-06-07
Attending: PEDIATRICS
Payer: COMMERCIAL

## 2023-06-07 DIAGNOSIS — M75.02 ADHESIVE CAPSULITIS OF LEFT SHOULDER: Primary | ICD-10-CM

## 2023-06-07 PROCEDURE — 97140 MANUAL THERAPY 1/> REGIONS: CPT | Mod: GP | Performed by: PHYSICAL MEDICINE & REHABILITATION

## 2023-06-07 PROCEDURE — 97110 THERAPEUTIC EXERCISES: CPT | Mod: GP | Performed by: PHYSICAL MEDICINE & REHABILITATION

## 2023-06-08 ENCOUNTER — ANCILLARY PROCEDURE (OUTPATIENT)
Dept: GENERAL RADIOLOGY | Facility: CLINIC | Age: 58
End: 2023-06-08
Attending: FAMILY MEDICINE
Payer: COMMERCIAL

## 2023-06-08 ENCOUNTER — OFFICE VISIT (OUTPATIENT)
Dept: FAMILY MEDICINE | Facility: CLINIC | Age: 58
End: 2023-06-08
Payer: COMMERCIAL

## 2023-06-08 VITALS
HEART RATE: 76 BPM | DIASTOLIC BLOOD PRESSURE: 72 MMHG | TEMPERATURE: 97.9 F | RESPIRATION RATE: 18 BRPM | WEIGHT: 220 LBS | BODY MASS INDEX: 30.8 KG/M2 | SYSTOLIC BLOOD PRESSURE: 110 MMHG | OXYGEN SATURATION: 98 % | HEIGHT: 71 IN

## 2023-06-08 DIAGNOSIS — S59.902A INJURY OF LEFT ELBOW, INITIAL ENCOUNTER: ICD-10-CM

## 2023-06-08 DIAGNOSIS — S69.92XA INJURY OF LEFT WRIST, INITIAL ENCOUNTER: ICD-10-CM

## 2023-06-08 DIAGNOSIS — S69.92XA INJURY OF LEFT WRIST, INITIAL ENCOUNTER: Primary | ICD-10-CM

## 2023-06-08 PROCEDURE — 73070 X-RAY EXAM OF ELBOW: CPT | Mod: TC | Performed by: RADIOLOGY

## 2023-06-08 PROCEDURE — 73110 X-RAY EXAM OF WRIST: CPT | Mod: TC | Performed by: RADIOLOGY

## 2023-06-08 PROCEDURE — 99213 OFFICE O/P EST LOW 20 MIN: CPT | Performed by: FAMILY MEDICINE

## 2023-06-08 ASSESSMENT — PATIENT HEALTH QUESTIONNAIRE - PHQ9
10. IF YOU CHECKED OFF ANY PROBLEMS, HOW DIFFICULT HAVE THESE PROBLEMS MADE IT FOR YOU TO DO YOUR WORK, TAKE CARE OF THINGS AT HOME, OR GET ALONG WITH OTHER PEOPLE: SOMEWHAT DIFFICULT
SUM OF ALL RESPONSES TO PHQ QUESTIONS 1-9: 13
SUM OF ALL RESPONSES TO PHQ QUESTIONS 1-9: 13

## 2023-06-08 ASSESSMENT — PAIN SCALES - GENERAL: PAINLEVEL: SEVERE PAIN (6)

## 2023-06-08 NOTE — PROGRESS NOTES
Assessment & Plan     (S69.92XA) Injury of left wrist, initial encounter  (primary encounter diagnosis)  Comment: Fell on outstretched left hand 6/4/23. She has been taking Tylenol for pain, expresses pain uponextension of wrist,mild tenderness of dorsum of hand and proximal radius.She has been taking Tylenol  and Voltaren gel for pain. Discussed sprain injury versus fracture with patient, and feel that based on physical exam findings, this is most likely a sprain injury. Left Wrist X-ray with no obvious fracture, awaiting final repor  Plan: XR Wrist Left G/E 3 Views   Recommended ice, rest, and Tylenol or NSAIDs for pain. Activity as tolerated. Instructed her to return to clinic should she have worsening pain or swelling.         (S59.906F) Injury of left elbow, initial encounter  Comment: proximal radius tenderness after fall on outstretched left hand 6/4/23  Plan: XR Elbow Left 2 Views  -Left elbow X-ray with no obvious fracture, awaiting final report. Recommended ice, rest, and Tylenol or NSAIDs for pain. Activity as tolerated. Instructed her to return to clinic should she have worsening pain or swelling.    Ada Cedillo PA student      I have reviewed today's vital signs, notes, medications, labs and imaging. I have also seen and examined the patient and agree with the findings and plan as outlined above.      Krzysztof Lu MD  [facility]    Babatunde Vasquez is a 57 year old, presenting for the following health issues:  Musculoskeletal Problem    She denies hitting her head or other injuries.  History of Present Illness       Reason for visit:  Sore wrist- left   Symptom onset:  3-7 days ago  Symptoms include:  Lack of ability to use- Fell backwards on her wrist on sunday   Symptom intensity:  Mild  Symptom progression:  Staying the same  Had these symptoms before:  No  What makes it worse:  Lifting or using thumb  What makes it better:  No    She eats 2-3 servings of fruits  "and vegetables daily.She consumes 0 sweetened beverage(s) daily. She exercises with enough effort to increase her heart rate 3 or less days per week.   She is taking medications regularly.    Today's PHQ-9         PHQ-9 Total Score: 13    PHQ-9 Q9 Thoughts of better off dead/self-harm past 2 weeks :   Not at all    How difficult have these problems made it for you to do your work, take care of things at home, or get along with other people: Somewhat difficult      Review of Systems   Constitutional, HEENT, cardiovascular, pulmonary, gi and gu systems are negative, except as otherwise noted.      Objective    /72 (Cuff Size: Adult Large)   Pulse 76   Temp 97.9  F (36.6  C) (Tympanic)   Resp 18   Ht 1.791 m (5' 10.5\")   Wt 99.8 kg (220 lb)   LMP 03/28/2010   SpO2 98%   BMI 31.12 kg/m    Body mass index is 31.12 kg/m .  Physical Exam   GENERAL: healthy, alert and no distress  NECK: no adenopathy, no asymmetry, masses, or scars and thyroid normal to palpation  RESP: lungs clear to auscultation - no rales, rhonchi or wheezes  CV: regular rate and rhythm, normal S1 S2, no S3 or S4, no murmur, click or rub  MS: Mildly tender left wrist, range of movement normal, no joint swelling or skin discoloration noted, radial pulses 3+, sensation to touch and pressure intact  NEURO: grossly intact; sensation to light touch symmetric without deficits; gait normal  PSYCH:  Normal mood and affect                "

## 2023-06-08 NOTE — NURSING NOTE
"Chief Complaint   Patient presents with     Musculoskeletal Problem     /72 (Cuff Size: Adult Large)   Pulse 76   Temp 97.9  F (36.6  C) (Tympanic)   Resp 18   Ht 1.791 m (5' 10.5\")   Wt 99.8 kg (220 lb)   LMP 03/28/2010   SpO2 98%   BMI 31.12 kg/m   Estimated body mass index is 31.12 kg/m  as calculated from the following:    Height as of this encounter: 1.791 m (5' 10.5\").    Weight as of this encounter: 99.8 kg (220 lb).  Patient presents to the clinic using No DME      Health Maintenance that is potentially due pending provider review:    Health Maintenance Due   Topic Date Due     HEPATITIS B IMMUNIZATION (1 of 3 - 3-dose series) Never done     ZOSTER IMMUNIZATION (2 of 2) 11/12/2021     YEARLY PREVENTIVE VISIT  09/09/2022     ANNUAL REVIEW OF HM ORDERS  11/29/2022                "

## 2023-06-14 ENCOUNTER — THERAPY VISIT (OUTPATIENT)
Dept: PHYSICAL THERAPY | Facility: CLINIC | Age: 58
End: 2023-06-14
Attending: PEDIATRICS
Payer: COMMERCIAL

## 2023-06-14 DIAGNOSIS — M75.02 ADHESIVE CAPSULITIS OF LEFT SHOULDER: Primary | ICD-10-CM

## 2023-06-14 PROCEDURE — 97110 THERAPEUTIC EXERCISES: CPT | Mod: GP | Performed by: PHYSICAL MEDICINE & REHABILITATION

## 2023-06-14 PROCEDURE — 97140 MANUAL THERAPY 1/> REGIONS: CPT | Mod: GP | Performed by: PHYSICAL MEDICINE & REHABILITATION

## 2023-06-28 ENCOUNTER — THERAPY VISIT (OUTPATIENT)
Dept: PHYSICAL THERAPY | Facility: CLINIC | Age: 58
End: 2023-06-28
Attending: PEDIATRICS
Payer: COMMERCIAL

## 2023-06-28 DIAGNOSIS — M75.02 ADHESIVE CAPSULITIS OF LEFT SHOULDER: Primary | ICD-10-CM

## 2023-06-28 PROCEDURE — 97110 THERAPEUTIC EXERCISES: CPT | Mod: GP | Performed by: PHYSICAL MEDICINE & REHABILITATION

## 2023-08-09 ENCOUNTER — THERAPY VISIT (OUTPATIENT)
Dept: PHYSICAL THERAPY | Facility: CLINIC | Age: 58
End: 2023-08-09
Attending: PEDIATRICS
Payer: COMMERCIAL

## 2023-08-09 DIAGNOSIS — M75.02 ADHESIVE CAPSULITIS OF LEFT SHOULDER: Primary | ICD-10-CM

## 2023-08-09 PROCEDURE — 97110 THERAPEUTIC EXERCISES: CPT | Mod: GP | Performed by: PHYSICAL MEDICINE & REHABILITATION

## 2023-08-09 ASSESSMENT — ENCOUNTER SYMPTOMS
JOINT SWELLING: 0
NERVOUS/ANXIOUS: 1
FEVER: 0
HEADACHES: 1
HEARTBURN: 0
CONSTIPATION: 0
DIZZINESS: 0
ABDOMINAL PAIN: 0
COUGH: 0
PARESTHESIAS: 0
HEMATOCHEZIA: 0
DYSURIA: 0
NAUSEA: 0
ARTHRALGIAS: 1
MYALGIAS: 1
HEMATURIA: 0
CHILLS: 0
BREAST MASS: 0
SHORTNESS OF BREATH: 0
DIARRHEA: 0
WEAKNESS: 0
EYE PAIN: 0
FREQUENCY: 0
SORE THROAT: 0
PALPITATIONS: 1

## 2023-08-09 ASSESSMENT — PATIENT HEALTH QUESTIONNAIRE - PHQ9
10. IF YOU CHECKED OFF ANY PROBLEMS, HOW DIFFICULT HAVE THESE PROBLEMS MADE IT FOR YOU TO DO YOUR WORK, TAKE CARE OF THINGS AT HOME, OR GET ALONG WITH OTHER PEOPLE: VERY DIFFICULT
SUM OF ALL RESPONSES TO PHQ QUESTIONS 1-9: 19
SUM OF ALL RESPONSES TO PHQ QUESTIONS 1-9: 19

## 2023-08-09 NOTE — PROGRESS NOTES
Progress Note  08/09/23 0500   Appointment Info   Signing clinician's name / credentials Ania Salgado, PT, DPT   Total/Authorized Visits 8   Visits Used 5   Medical Diagnosis Adhesive capsulitis of left shoulder; Chronic left shoulder pain   PT Tx Diagnosis L shoulder pain   Precautions/Limitations none   Other pertinent information BCBS   Progress Note/Certification   Onset of illness/injury or Date of Surgery 03/01/23   Therapy Frequency 1x/week   Predicted Duration 8 weeks (additional 8 weeks)   Progress Note Due Date 10/06/23   Progress Note Completed Date 08/09/23   GOALS   PT Goals 2;3;4;5   PT Goal 1   Goal Identifier 1   Goal Description Pt will be able to flex L shoulder 150* in order to decrease diffiuclty with reaching overhead   Target Date 06/29/23   Date Met 08/09/23   PT Goal 2   Goal Identifier 2   Goal Description Pt will be able to abd L shoulder 130* in order to decrease difficulty wiht reaching overhead   Goal Progress met AAROM, 95* AROM   Target Date 10/06/23   PT Goal 3   Goal Identifier 3   Goal Description Pt will be independent with HEP in order to self manage symptoms   Target Date 07/13/23   Date Met 08/09/23   PT Goal 4   Goal Identifier 4   Goal Description Pt will be able to IR/ER L shoulder ROM 50* in order to decrease difficulty with ADLs   Goal Progress see objective for progress   Target Date 10/06/23   PT Goal 5   Goal Identifier 5   Goal Description Pt will be able to demonstrate 5/5 L shoulder strength in order to decrease diffiuclty with lifting brother and assisting on family vacation   Goal Progress nearly met, see objective for progress   Target Date 10/06/23   Subjective Report   Subjective Report Pt reports pain seems to be improving. Notes still having difficulty with pulling up pants, but reports can push 2000# at work. Reports she feels stuck/blocked like her shoulder can't move, but notes pain is not her limiting factor   Objective Measures   Objective Measures  Objective Measure 1;Objective Measure 2;Objective Measure 3   Objective Measure 1   Objective Measure L shoulder ROM   Details flexion: 151*, abd: 95*, ER: back of head, IR: L PSIS   Objective Measure 2   Objective Measure L shoulder strength   Details flexion: 5-/5, abd: 4+/5, IR: 5-/5, ER: 5-/5, ext: 5-/5.  No pain with MMT   Objective Measure 3   Objective Measure SPADI   Details incomplete due to pt unable to complete all functions   Treatment Interventions (PT)   Interventions Therapeutic Procedure/Exercise;Manual Therapy   Therapeutic Procedure/Exercise   Therapeutic Procedures: strength, endurance, ROM, flexibillity minutes (41257) 24   Therapeutic Procedures Ther Proc 2;Ther Proc 3;Ther Proc 4;Ther Proc 5;Ther Proc 6;Ther Proc 7;Ther Proc 8;Ther Proc 9   Ther Proc 1 shoulder flexion PROM in standing   Ther Proc 1 - Details 10x1   Ther Proc 2 shoulder flexion, scaption and abd on pulleys   Ther Proc 2 - Details 10x1 each   Ther Proc 3 door slides into flexion   Ther Proc 3 - Details 10x1   Ther Proc 4 shoulder AAROM ER with dowel   Ther Proc 4 - Details 10x1   Skilled Intervention ROM/strength in order to decrease difficulty with ADLs, lifting, carrying, and reaching   Patient Response/Progress fair tolerance   Eval/Assessments   Assessments   (Pt returned after 1 month+ away. Notes pain has improved but still having difficulty with mobility. Pt appropriate for continued skilled PT to further improve residual ROM deficits and decrease difficulty with reaching and dressing)   Education   Learner/Method Patient   Education Comments pt demonstrated and verbalized good understanding of exercises   Plan   Home program PTRx: [id number]   Plan for next session Progress ROM/strength as tolerated   Total Session Time   Timed Code Treatment Minutes 24   Total Treatment Time (sum of timed and untimed services) 24     PLAN  Continue therapy per current plan of care, for 8 additional weeks.    Beginning/End Dates of  Progress Note Reporting Period  6/1/2023 to 08/09/2023    Referring Provider:  Patricia Bear      Please contact me with any questions or concerns.    Thank you for your referral,     Ania Salgado, PT, DPT  Physical Therapist  [address] [address] [phone]

## 2023-08-10 ENCOUNTER — OFFICE VISIT (OUTPATIENT)
Dept: FAMILY MEDICINE | Facility: CLINIC | Age: 58
End: 2023-08-10
Payer: COMMERCIAL

## 2023-08-10 VITALS
OXYGEN SATURATION: 99 % | RESPIRATION RATE: 14 BRPM | DIASTOLIC BLOOD PRESSURE: 74 MMHG | WEIGHT: 217 LBS | HEIGHT: 70 IN | HEART RATE: 74 BPM | BODY MASS INDEX: 31.07 KG/M2 | SYSTOLIC BLOOD PRESSURE: 118 MMHG

## 2023-08-10 DIAGNOSIS — F32.0 MAJOR DEPRESSIVE DISORDER, SINGLE EPISODE, MILD (H): ICD-10-CM

## 2023-08-10 DIAGNOSIS — D50.8 IRON DEFICIENCY ANEMIA SECONDARY TO INADEQUATE DIETARY IRON INTAKE: ICD-10-CM

## 2023-08-10 DIAGNOSIS — N18.31 CHRONIC KIDNEY DISEASE, STAGE 3A (H): ICD-10-CM

## 2023-08-10 DIAGNOSIS — R61 NIGHT SWEATS: ICD-10-CM

## 2023-08-10 DIAGNOSIS — K90.9 IRON MALABSORPTION: ICD-10-CM

## 2023-08-10 DIAGNOSIS — D72.829 LEUKOCYTOSIS, UNSPECIFIED TYPE: ICD-10-CM

## 2023-08-10 DIAGNOSIS — R74.8 ELEVATED ALKALINE PHOSPHATASE LEVEL: ICD-10-CM

## 2023-08-10 DIAGNOSIS — Z00.00 ROUTINE GENERAL MEDICAL EXAMINATION AT A HEALTH CARE FACILITY: Primary | ICD-10-CM

## 2023-08-10 DIAGNOSIS — E61.1 IRON DEFICIENCY: ICD-10-CM

## 2023-08-10 DIAGNOSIS — Z98.84 HISTORY OF GASTRIC BYPASS: ICD-10-CM

## 2023-08-10 DIAGNOSIS — Z82.69 FAMILY HISTORY OF OSTEOPENIA: ICD-10-CM

## 2023-08-10 DIAGNOSIS — R53.82 CHRONIC FATIGUE: ICD-10-CM

## 2023-08-10 LAB
ALBUMIN SERPL BCG-MCNC: 4.5 G/DL (ref 3.5–5.2)
ALP SERPL-CCNC: 135 U/L (ref 35–104)
ALT SERPL W P-5'-P-CCNC: 29 U/L (ref 0–50)
ANION GAP SERPL CALCULATED.3IONS-SCNC: 9 MMOL/L (ref 7–15)
AST SERPL W P-5'-P-CCNC: 25 U/L (ref 0–45)
BILIRUB SERPL-MCNC: 0.3 MG/DL
BUN SERPL-MCNC: 23 MG/DL (ref 6–20)
CALCIUM SERPL-MCNC: 9.4 MG/DL (ref 8.6–10)
CHLORIDE SERPL-SCNC: 105 MMOL/L (ref 98–107)
CREAT SERPL-MCNC: 0.93 MG/DL (ref 0.51–0.95)
CREAT UR-MCNC: 107.1 MG/DL
CRP SERPL-MCNC: <3 MG/L
DEPRECATED HCO3 PLAS-SCNC: 28 MMOL/L (ref 22–29)
ERYTHROCYTE [DISTWIDTH] IN BLOOD BY AUTOMATED COUNT: 15.2 % (ref 10–15)
FERRITIN SERPL-MCNC: 20 NG/ML (ref 11–328)
FOLATE SERPL-MCNC: 18.9 NG/ML (ref 4.6–34.8)
GFR SERPL CREATININE-BSD FRML MDRD: 72 ML/MIN/1.73M2
GLUCOSE SERPL-MCNC: 98 MG/DL (ref 70–99)
HCT VFR BLD AUTO: 39.5 % (ref 35–47)
HGB BLD-MCNC: 12.6 G/DL (ref 11.7–15.7)
IRON BINDING CAPACITY (ROCHE): 504 UG/DL (ref 240–430)
IRON SATN MFR SERPL: 8 % (ref 15–46)
IRON SERPL-MCNC: 38 UG/DL (ref 37–145)
MAGNESIUM SERPL-MCNC: 2.2 MG/DL (ref 1.7–2.3)
MCH RBC QN AUTO: 30.4 PG (ref 26.5–33)
MCHC RBC AUTO-ENTMCNC: 31.9 G/DL (ref 31.5–36.5)
MCV RBC AUTO: 95 FL (ref 78–100)
MICROALBUMIN UR-MCNC: <12 MG/L
MICROALBUMIN/CREAT UR: NORMAL MG/G{CREAT}
PHOSPHATE SERPL-MCNC: 4.5 MG/DL (ref 2.5–4.5)
PLATELET # BLD AUTO: 316 10E3/UL (ref 150–450)
POTASSIUM SERPL-SCNC: 4.3 MMOL/L (ref 3.4–5.3)
PROT SERPL-MCNC: 7.2 G/DL (ref 6.4–8.3)
RBC # BLD AUTO: 4.15 10E6/UL (ref 3.8–5.2)
SODIUM SERPL-SCNC: 142 MMOL/L (ref 136–145)
TSH SERPL DL<=0.005 MIU/L-ACNC: 1.29 UIU/ML (ref 0.3–4.2)
VIT B12 SERPL-MCNC: 1513 PG/ML (ref 232–1245)
WBC # BLD AUTO: 11.3 10E3/UL (ref 4–11)

## 2023-08-10 PROCEDURE — 82043 UR ALBUMIN QUANTITATIVE: CPT | Performed by: STUDENT IN AN ORGANIZED HEALTH CARE EDUCATION/TRAINING PROGRAM

## 2023-08-10 PROCEDURE — 84100 ASSAY OF PHOSPHORUS: CPT | Performed by: STUDENT IN AN ORGANIZED HEALTH CARE EDUCATION/TRAINING PROGRAM

## 2023-08-10 PROCEDURE — 83735 ASSAY OF MAGNESIUM: CPT | Performed by: STUDENT IN AN ORGANIZED HEALTH CARE EDUCATION/TRAINING PROGRAM

## 2023-08-10 PROCEDURE — 83550 IRON BINDING TEST: CPT | Performed by: STUDENT IN AN ORGANIZED HEALTH CARE EDUCATION/TRAINING PROGRAM

## 2023-08-10 PROCEDURE — 82306 VITAMIN D 25 HYDROXY: CPT | Performed by: STUDENT IN AN ORGANIZED HEALTH CARE EDUCATION/TRAINING PROGRAM

## 2023-08-10 PROCEDURE — 82977 ASSAY OF GGT: CPT | Performed by: STUDENT IN AN ORGANIZED HEALTH CARE EDUCATION/TRAINING PROGRAM

## 2023-08-10 PROCEDURE — 83540 ASSAY OF IRON: CPT | Performed by: STUDENT IN AN ORGANIZED HEALTH CARE EDUCATION/TRAINING PROGRAM

## 2023-08-10 PROCEDURE — 82746 ASSAY OF FOLIC ACID SERUM: CPT | Performed by: STUDENT IN AN ORGANIZED HEALTH CARE EDUCATION/TRAINING PROGRAM

## 2023-08-10 PROCEDURE — 82607 VITAMIN B-12: CPT | Performed by: STUDENT IN AN ORGANIZED HEALTH CARE EDUCATION/TRAINING PROGRAM

## 2023-08-10 PROCEDURE — 86140 C-REACTIVE PROTEIN: CPT | Performed by: STUDENT IN AN ORGANIZED HEALTH CARE EDUCATION/TRAINING PROGRAM

## 2023-08-10 PROCEDURE — 36415 COLL VENOUS BLD VENIPUNCTURE: CPT | Performed by: STUDENT IN AN ORGANIZED HEALTH CARE EDUCATION/TRAINING PROGRAM

## 2023-08-10 PROCEDURE — 99396 PREV VISIT EST AGE 40-64: CPT | Performed by: STUDENT IN AN ORGANIZED HEALTH CARE EDUCATION/TRAINING PROGRAM

## 2023-08-10 PROCEDURE — 80053 COMPREHEN METABOLIC PANEL: CPT | Performed by: STUDENT IN AN ORGANIZED HEALTH CARE EDUCATION/TRAINING PROGRAM

## 2023-08-10 PROCEDURE — 99214 OFFICE O/P EST MOD 30 MIN: CPT | Mod: 25 | Performed by: STUDENT IN AN ORGANIZED HEALTH CARE EDUCATION/TRAINING PROGRAM

## 2023-08-10 PROCEDURE — 82728 ASSAY OF FERRITIN: CPT | Performed by: STUDENT IN AN ORGANIZED HEALTH CARE EDUCATION/TRAINING PROGRAM

## 2023-08-10 PROCEDURE — 82570 ASSAY OF URINE CREATININE: CPT | Performed by: STUDENT IN AN ORGANIZED HEALTH CARE EDUCATION/TRAINING PROGRAM

## 2023-08-10 PROCEDURE — 85027 COMPLETE CBC AUTOMATED: CPT | Performed by: STUDENT IN AN ORGANIZED HEALTH CARE EDUCATION/TRAINING PROGRAM

## 2023-08-10 PROCEDURE — 84443 ASSAY THYROID STIM HORMONE: CPT | Performed by: STUDENT IN AN ORGANIZED HEALTH CARE EDUCATION/TRAINING PROGRAM

## 2023-08-10 ASSESSMENT — ENCOUNTER SYMPTOMS
CHILLS: 0
HEMATURIA: 0
EYE PAIN: 0
HEMATOCHEZIA: 0
HEARTBURN: 0
DYSURIA: 0
SORE THROAT: 0
ARTHRALGIAS: 1
HEADACHES: 1
NAUSEA: 0
NERVOUS/ANXIOUS: 1
CONSTIPATION: 0
PARESTHESIAS: 0
JOINT SWELLING: 0
DIZZINESS: 0
MYALGIAS: 1
DIARRHEA: 0
FEVER: 0
ABDOMINAL PAIN: 0
SHORTNESS OF BREATH: 0
COUGH: 0
WEAKNESS: 0
PALPITATIONS: 1
BREAST MASS: 0
FREQUENCY: 0

## 2023-08-10 ASSESSMENT — PAIN SCALES - GENERAL: PAINLEVEL: NO PAIN (0)

## 2023-08-10 NOTE — PROGRESS NOTES
SUBJECTIVE:   CC: Christina is an 57 year old who presents for preventive health visit.   Chief Complaint   Patient presents with     Physical     Fatigue     X couple months           8/10/2023     4:46 PM   Additional Questions   Roomed by Hedy WATTS   Accompanied by Self     Feels tired all the time.   Adhesive capsulitis on the side she sleeps on (left)   Not sleeping great because of this     At least the last couple of months to this point where she ultimately made the appointment.     Taking 1/2 to 1 whole trazodone. Has to make sure she has a long time to be able to rest. Otherwise makes her groggy in the mornings.     Slight restless leg type syndrome.     Night sweats.     2011 was hysterectomy.     Told she snores. sleep study? Never had one      Healthy Habits:     Getting at least 3 servings of Calcium per day:  NO    Bi-annual eye exam:  Yes    Dental care twice a year:  Yes    Sleep apnea or symptoms of sleep apnea:  Daytime drowsiness    Diet:  Regular (no restrictions)    Frequency of exercise:  None    Taking medications regularly:  Yes    Medication side effects:  None    Additional concerns today:  No      Today's PHQ-9 Score:       8/9/2023     9:28 AM   PHQ-9 SCORE   PHQ-9 Total Score MyChart 19 (Moderately severe depression)   PHQ-9 Total Score 19       Social History     Tobacco Use     Smoking status: Never     Passive exposure: Never     Smokeless tobacco: Never     Tobacco comments:     Nonsmoking household   Substance Use Topics     Alcohol use: No             8/9/2023     9:32 AM   Alcohol Use   Prescreen: >3 drinks/day or >7 drinks/week? No     Reviewed orders with patient.  Reviewed health maintenance and updated orders accordingly - Yes    Breast Cancer Screening:    FHS-7:       9/9/2021    10:26 AM 8/9/2023     9:33 AM   Breast CA Risk Assessment (FHS-7)   Did any of your first-degree relatives have breast or ovarian cancer? No No   Did any of your relatives have bilateral breast  cancer? No Yes   Did any man in your family have breast cancer? No No   Did any woman in your family have breast and ovarian cancer? Yes No   Did any woman in your family have breast cancer before age 50 y? Yes Yes   Do you have 2 or more relatives with breast and/or ovarian cancer? No Yes   Do you have 2 or more relatives with breast and/or bowel cancer? Yes Yes       Mammogram Screening - Mammography discussed, not appropriate due to history of bilateral radical mastectomy  Pertinent mammograms are reviewed under the imaging tab.    History of abnormal Pap smear: Status post benign hysterectomy. Health Maintenance and Surgical History updated.      4/16/2014    12:00 AM 8/13/2009    12:00 AM   PAP / HPV   PAP (Historical) NIL  NIL      Reviewed and updated as needed this visit by clinical staff   Tobacco  Allergies  Meds              Reviewed and updated as needed this visit by Provider                   Review of Systems   Constitutional:  Negative for chills and fever.   HENT:  Negative for congestion, ear pain, hearing loss and sore throat.    Eyes:  Negative for pain and visual disturbance.   Respiratory:  Negative for cough and shortness of breath.    Cardiovascular:  Positive for palpitations and peripheral edema. Negative for chest pain.   Gastrointestinal:  Negative for abdominal pain, constipation, diarrhea, heartburn, hematochezia and nausea.   Breasts:  Negative for tenderness, breast mass and discharge.   Genitourinary:  Positive for vaginal discharge. Negative for dysuria, frequency, genital sores, hematuria, pelvic pain, urgency and vaginal bleeding.   Musculoskeletal:  Positive for arthralgias and myalgias. Negative for joint swelling.   Skin:  Negative for rash.   Neurological:  Positive for headaches. Negative for dizziness, weakness and paresthesias.   Psychiatric/Behavioral:  Positive for mood changes. The patient is nervous/anxious.         OBJECTIVE:   /74 (BP Location: Right arm,  "Patient Position: Sitting, Cuff Size: Adult Regular)   Pulse 74   Resp 14   Ht 1.778 m (5' 10\")   Wt 98.4 kg (217 lb)   LMP 03/28/2010   SpO2 99%   BMI 31.14 kg/m    Physical Exam  GENERAL: healthy, alert and no distress  EYES: Eyes grossly normal to inspection, and conjunctivae and sclerae normal  HENT: ear canals and TM's normal, nose and mouth without ulcers or lesions  NECK: no adenopathy, no asymmetry, no masses  RESP: lungs clear to auscultation - no rales, rhonchi or wheezes  CV: regular rate and rhythm, normal S1 S2, no S3 or S4, no murmur, click or rub, trace peripheral edema   ABDOMEN: soft, nontender, no hepatosplenomegaly, no masses and bowel sounds normal  MS: no gross musculoskeletal defects noted, no edema  SKIN: no suspicious lesions or rashes  NEURO: Normal strength and tone, mentation intact and speech normal  PSYCH: mentation appears normal, affect normal/bright     Results from this visit  Results for orders placed or performed in visit on 08/10/23   CBC with platelets     Status: Abnormal   Result Value Ref Range    WBC Count 11.3 (H) 4.0 - 11.0 10e3/uL    RBC Count 4.15 3.80 - 5.20 10e6/uL    Hemoglobin 12.6 11.7 - 15.7 g/dL    Hematocrit 39.5 35.0 - 47.0 %    MCV 95 78 - 100 fL    MCH 30.4 26.5 - 33.0 pg    MCHC 31.9 31.5 - 36.5 g/dL    RDW 15.2 (H) 10.0 - 15.0 %    Platelet Count 316 150 - 450 10e3/uL         ASSESSMENT/PLAN:   Christina was seen today for physical and fatigue.    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  She presents today for annual visit.    History of gastric bypass  For monitoring with a history of gastric bypass, the below workup is completed.  -     Vitamin D deficiency screening  -     " Vitamin B12  -     Ferritin  -     Iron and iron binding capacity  -     CBC with platelets  -     Comprehensive metabolic panel  -     Folate  -     Magnesium    Chronic kidney disease, stage 3a (H)  CMP completed as noted elsewhere, and did obtain a urine sample for monitoring protein with a history of CKD stage IIIa.  -     Albumin Random Urine Quantitative with Creat Ratio; Future  -     Albumin Random Urine Quantitative with Creat Ratio    Family history of osteopenia  Patient has a family history of osteopenia and her mother.  Interested in her phosphorus level, so this was completed today.  She is postmenopausal after hysterectomy with bilateral oophorectomy in 2011, so consider DEXA scan early.  -     Comprehensive metabolic panel; Future  -     Phosphorus; Future  -     Comprehensive metabolic panel  -     Phosphorus    Iron deficiency  History of very low ferritin, instructed to take juice with her daily supplement.  She notes that she has not been quite as good with her vitamin intake with a history of gastric bypass.  Pending ferritin level, may need iron infusion therapy, which she understands may be a possibility.  -     Ferritin  -     Iron and iron binding capacity  -     CBC with platelets    Chronic fatigue   Last thyroid check was about 2 years ago.  Ongoing chronic fatigue.  Suspect that another etiology will be the main cause, such as iron deficiency, though will also check thyroid just to be sure.  -     TSH with free T4 reflex    Night sweats  Likely secondary to postmenopausal status.  We briefly discussed some potential options, nonhormonal for her.  She has a history of glaucoma as well, so anything related to antihistamines may not be a good option for her.  CRP obtained today for further workup.  -     CRP inflammation; Future  -     CRP inflammation    Major depressive disorder, single episode, mild (H)  Has seen counseling in the past, currently on Wellbutrin and Lexapro.      Patient  has been advised of split billing requirements and indicates understanding: Yes      COUNSELING:  Reviewed preventive health counseling, as reflected in patient instructions        She reports that she has never smoked. She has never been exposed to tobacco smoke. She has never used smokeless tobacco.      I spent a total of 35 minutes on the day of the visit.  30 min of that Time spent by me doing chart review, history and exam, documentation and further activities per the note in addition to preventive visit.            Nancy Ontiveros MD  [facility] MinnesotaAnswers submitted by the patient for this visit:  Patient Health Questionnaire (Submitted on 8/9/2023)  If you checked off any problems, how difficult have these problems made it for you to do your work, take care of things at home, or get along with other people?: Very difficult  PHQ9 TOTAL SCORE: 19

## 2023-08-11 PROBLEM — K90.9 IRON MALABSORPTION: Status: ACTIVE | Noted: 2023-08-11

## 2023-08-11 PROBLEM — E61.1 IRON DEFICIENCY: Status: ACTIVE | Noted: 2023-08-11

## 2023-08-11 LAB
DEPRECATED CALCIDIOL+CALCIFEROL SERPL-MC: 52 UG/L (ref 20–75)
GGT SERPL-CCNC: 17 U/L (ref 5–36)

## 2023-08-16 ENCOUNTER — MYC MEDICAL ADVICE (OUTPATIENT)
Dept: FAMILY MEDICINE | Facility: CLINIC | Age: 58
End: 2023-08-16
Payer: COMMERCIAL

## 2023-08-16 DIAGNOSIS — K90.9 IRON MALABSORPTION: Primary | ICD-10-CM

## 2023-08-16 DIAGNOSIS — E61.1 IRON DEFICIENCY: ICD-10-CM

## 2023-08-16 DIAGNOSIS — D50.8 IRON DEFICIENCY ANEMIA SECONDARY TO INADEQUATE DIETARY IRON INTAKE: ICD-10-CM

## 2023-08-16 DIAGNOSIS — Z98.84 HISTORY OF GASTRIC BYPASS: ICD-10-CM

## 2023-08-17 PROBLEM — D50.8 IRON DEFICIENCY ANEMIA SECONDARY TO INADEQUATE DIETARY IRON INTAKE: Status: ACTIVE | Noted: 2023-08-17

## 2023-08-28 RX ORDER — DIPHENHYDRAMINE HYDROCHLORIDE 50 MG/ML
50 INJECTION INTRAMUSCULAR; INTRAVENOUS
Status: CANCELLED
Start: 2023-08-28

## 2023-08-28 RX ORDER — METHYLPREDNISOLONE SODIUM SUCCINATE 125 MG/2ML
125 INJECTION, POWDER, LYOPHILIZED, FOR SOLUTION INTRAMUSCULAR; INTRAVENOUS
Status: CANCELLED
Start: 2023-08-28

## 2023-08-28 RX ORDER — ALBUTEROL SULFATE 90 UG/1
1-2 AEROSOL, METERED RESPIRATORY (INHALATION)
Status: CANCELLED
Start: 2023-08-28

## 2023-08-28 RX ORDER — ALBUTEROL SULFATE 0.83 MG/ML
2.5 SOLUTION RESPIRATORY (INHALATION)
Status: CANCELLED | OUTPATIENT
Start: 2023-08-28

## 2023-08-28 RX ORDER — EPINEPHRINE 1 MG/ML
0.3 INJECTION, SOLUTION, CONCENTRATE INTRAVENOUS EVERY 5 MIN PRN
Status: CANCELLED | OUTPATIENT
Start: 2023-08-28

## 2023-08-28 RX ORDER — MEPERIDINE HYDROCHLORIDE 25 MG/ML
25 INJECTION INTRAMUSCULAR; INTRAVENOUS; SUBCUTANEOUS EVERY 30 MIN PRN
Status: CANCELLED | OUTPATIENT
Start: 2023-08-28

## 2023-08-28 RX ORDER — HEPARIN SODIUM (PORCINE) LOCK FLUSH IV SOLN 100 UNIT/ML 100 UNIT/ML
5 SOLUTION INTRAVENOUS
Status: CANCELLED | OUTPATIENT
Start: 2023-08-28

## 2023-08-28 RX ORDER — HEPARIN SODIUM,PORCINE 10 UNIT/ML
5-20 VIAL (ML) INTRAVENOUS DAILY PRN
Status: CANCELLED | OUTPATIENT
Start: 2023-08-28

## 2023-08-30 ENCOUNTER — INFUSION THERAPY VISIT (OUTPATIENT)
Dept: INFUSION THERAPY | Facility: CLINIC | Age: 58
End: 2023-08-30
Attending: STUDENT IN AN ORGANIZED HEALTH CARE EDUCATION/TRAINING PROGRAM
Payer: COMMERCIAL

## 2023-08-30 VITALS
SYSTOLIC BLOOD PRESSURE: 117 MMHG | DIASTOLIC BLOOD PRESSURE: 78 MMHG | TEMPERATURE: 98.6 F | HEART RATE: 58 BPM | BODY MASS INDEX: 31.57 KG/M2 | OXYGEN SATURATION: 100 % | RESPIRATION RATE: 16 BRPM | WEIGHT: 220 LBS

## 2023-08-30 DIAGNOSIS — K90.9 IRON MALABSORPTION: Primary | ICD-10-CM

## 2023-08-30 DIAGNOSIS — E61.1 IRON DEFICIENCY: ICD-10-CM

## 2023-08-30 DIAGNOSIS — D50.8 IRON DEFICIENCY ANEMIA SECONDARY TO INADEQUATE DIETARY IRON INTAKE: ICD-10-CM

## 2023-08-30 DIAGNOSIS — Z98.84 HISTORY OF GASTRIC BYPASS: ICD-10-CM

## 2023-08-30 PROCEDURE — 96366 THER/PROPH/DIAG IV INF ADDON: CPT

## 2023-08-30 PROCEDURE — 250N000011 HC RX IP 250 OP 636: Mod: JZ | Performed by: STUDENT IN AN ORGANIZED HEALTH CARE EDUCATION/TRAINING PROGRAM

## 2023-08-30 PROCEDURE — 96365 THER/PROPH/DIAG IV INF INIT: CPT

## 2023-08-30 PROCEDURE — 258N000003 HC RX IP 258 OP 636: Performed by: STUDENT IN AN ORGANIZED HEALTH CARE EDUCATION/TRAINING PROGRAM

## 2023-08-30 RX ORDER — METHYLPREDNISOLONE SODIUM SUCCINATE 125 MG/2ML
125 INJECTION, POWDER, LYOPHILIZED, FOR SOLUTION INTRAMUSCULAR; INTRAVENOUS
Status: CANCELLED
Start: 2023-09-01

## 2023-08-30 RX ORDER — EPINEPHRINE 1 MG/ML
0.3 INJECTION, SOLUTION INTRAMUSCULAR; SUBCUTANEOUS EVERY 5 MIN PRN
Status: CANCELLED | OUTPATIENT
Start: 2023-09-01

## 2023-08-30 RX ORDER — DIPHENHYDRAMINE HYDROCHLORIDE 50 MG/ML
50 INJECTION INTRAMUSCULAR; INTRAVENOUS
Status: CANCELLED
Start: 2023-09-01

## 2023-08-30 RX ORDER — HEPARIN SODIUM (PORCINE) LOCK FLUSH IV SOLN 100 UNIT/ML 100 UNIT/ML
5 SOLUTION INTRAVENOUS
Status: CANCELLED | OUTPATIENT
Start: 2023-09-01

## 2023-08-30 RX ORDER — ALBUTEROL SULFATE 0.83 MG/ML
2.5 SOLUTION RESPIRATORY (INHALATION)
Status: CANCELLED | OUTPATIENT
Start: 2023-09-01

## 2023-08-30 RX ORDER — HEPARIN SODIUM,PORCINE 10 UNIT/ML
5-20 VIAL (ML) INTRAVENOUS DAILY PRN
Status: CANCELLED | OUTPATIENT
Start: 2023-09-01

## 2023-08-30 RX ORDER — ALBUTEROL SULFATE 90 UG/1
1-2 AEROSOL, METERED RESPIRATORY (INHALATION)
Status: CANCELLED
Start: 2023-09-01

## 2023-08-30 RX ORDER — MEPERIDINE HYDROCHLORIDE 25 MG/ML
25 INJECTION INTRAMUSCULAR; INTRAVENOUS; SUBCUTANEOUS EVERY 30 MIN PRN
Status: CANCELLED | OUTPATIENT
Start: 2023-09-01

## 2023-08-30 RX ADMIN — SODIUM CHLORIDE 250 ML: 9 INJECTION, SOLUTION INTRAVENOUS at 12:38

## 2023-08-30 RX ADMIN — IRON SUCROSE 300 MG: 20 INJECTION, SOLUTION INTRAVENOUS at 12:42

## 2023-08-30 ASSESSMENT — PAIN SCALES - GENERAL: PAINLEVEL: NO PAIN (0)

## 2023-08-30 NOTE — PROGRESS NOTES
Infusion Nursing Note:  Christina John presents today for Venofer 1 of 3..    Patient seen by provider today: No   present during visit today: Not Applicable.    Note: N/A.      Intravenous Access:  Peripheral IV placed.    Treatment Conditions:  Not Applicable.      Post Infusion Assessment:  Patient tolerated infusion without incident.  Patient observed for 10 minutes post infusion per protocol.       Discharge Plan:   Patient discharged in stable condition accompanied by: self.  Departure Mode: Ambulatory.      Ginette Bridges RN

## 2023-09-05 ENCOUNTER — INFUSION THERAPY VISIT (OUTPATIENT)
Dept: INFUSION THERAPY | Facility: CLINIC | Age: 58
End: 2023-09-05
Attending: PEDIATRICS
Payer: COMMERCIAL

## 2023-09-05 VITALS
DIASTOLIC BLOOD PRESSURE: 83 MMHG | TEMPERATURE: 98.4 F | WEIGHT: 224.9 LBS | BODY MASS INDEX: 31.48 KG/M2 | SYSTOLIC BLOOD PRESSURE: 127 MMHG | RESPIRATION RATE: 16 BRPM | HEIGHT: 71 IN | OXYGEN SATURATION: 97 % | HEART RATE: 71 BPM

## 2023-09-05 DIAGNOSIS — K90.9 IRON MALABSORPTION: ICD-10-CM

## 2023-09-05 DIAGNOSIS — Z98.84 HISTORY OF GASTRIC BYPASS: Primary | ICD-10-CM

## 2023-09-05 DIAGNOSIS — E61.1 IRON DEFICIENCY: ICD-10-CM

## 2023-09-05 DIAGNOSIS — D50.8 IRON DEFICIENCY ANEMIA SECONDARY TO INADEQUATE DIETARY IRON INTAKE: ICD-10-CM

## 2023-09-05 PROCEDURE — 250N000011 HC RX IP 250 OP 636: Mod: JZ | Performed by: STUDENT IN AN ORGANIZED HEALTH CARE EDUCATION/TRAINING PROGRAM

## 2023-09-05 PROCEDURE — 96365 THER/PROPH/DIAG IV INF INIT: CPT

## 2023-09-05 PROCEDURE — 258N000003 HC RX IP 258 OP 636: Performed by: STUDENT IN AN ORGANIZED HEALTH CARE EDUCATION/TRAINING PROGRAM

## 2023-09-05 PROCEDURE — 96366 THER/PROPH/DIAG IV INF ADDON: CPT

## 2023-09-05 RX ORDER — HEPARIN SODIUM,PORCINE 10 UNIT/ML
5-20 VIAL (ML) INTRAVENOUS DAILY PRN
Status: CANCELLED | OUTPATIENT
Start: 2023-09-07

## 2023-09-05 RX ORDER — EPINEPHRINE 1 MG/ML
0.3 INJECTION, SOLUTION INTRAMUSCULAR; SUBCUTANEOUS EVERY 5 MIN PRN
Status: CANCELLED | OUTPATIENT
Start: 2023-09-07

## 2023-09-05 RX ORDER — ALBUTEROL SULFATE 0.83 MG/ML
2.5 SOLUTION RESPIRATORY (INHALATION)
Status: CANCELLED | OUTPATIENT
Start: 2023-09-07

## 2023-09-05 RX ORDER — MEPERIDINE HYDROCHLORIDE 25 MG/ML
25 INJECTION INTRAMUSCULAR; INTRAVENOUS; SUBCUTANEOUS EVERY 30 MIN PRN
Status: CANCELLED | OUTPATIENT
Start: 2023-09-07

## 2023-09-05 RX ORDER — DIPHENHYDRAMINE HYDROCHLORIDE 50 MG/ML
50 INJECTION INTRAMUSCULAR; INTRAVENOUS
Status: CANCELLED
Start: 2023-09-07

## 2023-09-05 RX ORDER — ALBUTEROL SULFATE 90 UG/1
1-2 AEROSOL, METERED RESPIRATORY (INHALATION)
Status: CANCELLED
Start: 2023-09-07

## 2023-09-05 RX ORDER — METHYLPREDNISOLONE SODIUM SUCCINATE 125 MG/2ML
125 INJECTION, POWDER, LYOPHILIZED, FOR SOLUTION INTRAMUSCULAR; INTRAVENOUS
Status: CANCELLED
Start: 2023-09-07

## 2023-09-05 RX ORDER — HEPARIN SODIUM (PORCINE) LOCK FLUSH IV SOLN 100 UNIT/ML 100 UNIT/ML
5 SOLUTION INTRAVENOUS
Status: CANCELLED | OUTPATIENT
Start: 2023-09-07

## 2023-09-05 RX ADMIN — SODIUM CHLORIDE 250 ML: 9 INJECTION, SOLUTION INTRAVENOUS at 14:32

## 2023-09-05 RX ADMIN — IRON SUCROSE 300 MG: 20 INJECTION, SOLUTION INTRAVENOUS at 14:37

## 2023-09-05 NOTE — PROGRESS NOTES
Infusion Nursing Note:      Post Infusion Assessment:  Patient tolerated infusion without incident.  Site patent and intact, free from redness, edema or discomfort.  No evidence of extravasations.  Access discontinued per protocol.       Discharge Plan:   Patient discharged in stable condition accompanied by: self.  Departure Mode: Ambulatory.      Christina Cox RN

## 2023-09-05 NOTE — PROGRESS NOTES
Infusion Nursing Note:  Christina John presents today for Venofer infusion (second of three)  Patient seen by provider today: No   present during visit today: Not Applicable.    Note: Feeling fatigued with low energy.Steady on feet. Oriented x4.      Intravenous Access:  Peripheral IV placed.    Treatment Conditions:  Not Applicable.        Patricia Barry RN

## 2023-09-05 NOTE — PATIENT INSTRUCTIONS
Pt to return on 9-8-23 for Venofer. Copies of medication list and upcoming appointments given prior to discharge.

## 2023-09-06 ENCOUNTER — THERAPY VISIT (OUTPATIENT)
Dept: PHYSICAL THERAPY | Facility: CLINIC | Age: 58
End: 2023-09-06
Attending: PEDIATRICS
Payer: COMMERCIAL

## 2023-09-06 DIAGNOSIS — M75.02 ADHESIVE CAPSULITIS OF LEFT SHOULDER: Primary | ICD-10-CM

## 2023-09-06 PROCEDURE — 97110 THERAPEUTIC EXERCISES: CPT | Mod: GP | Performed by: PHYSICAL MEDICINE & REHABILITATION

## 2023-09-06 NOTE — PROGRESS NOTES
09/06/23 0500   Appointment Info   Signing clinician's name / credentials Ania Salgado, PT, DPT   Total/Authorized Visits 8   Visits Used 6   Medical Diagnosis Adhesive capsulitis of left shoulder; Chronic left shoulder pain   PT Tx Diagnosis L shoulder pain   Precautions/Limitations none   Other pertinent information BCBS   Progress Note/Certification   Onset of illness/injury or Date of Surgery 03/01/23   Therapy Frequency 1x/week   Predicted Duration 8 weeks (additional 8 weeks)   Progress Note Due Date 10/06/23   Progress Note Completed Date 08/09/23   GOALS   PT Goals 2;3;4;5   PT Goal 1   Goal Identifier 1   Goal Description Pt will be able to flex L shoulder 150* in order to decrease diffiuclty with reaching overhead   Target Date 06/29/23   Date Met 08/09/23   PT Goal 2   Goal Identifier 2   Goal Description Pt will be able to abd L shoulder 130* in order to decrease difficulty wiht reaching overhead   Target Date 10/06/23   Date Met 09/06/23   PT Goal 3   Goal Identifier 3   Goal Description Pt will be independent with HEP in order to self manage symptoms   Target Date 07/13/23   Date Met 08/09/23   PT Goal 4   Goal Identifier 4   Goal Description Pt will be able to IR/ER L shoulder ROM 50* in order to decrease difficulty with ADLs   Target Date 10/06/23   Date Met 09/06/23   PT Goal 5   Goal Identifier 5   Goal Description Pt will be able to demonstrate 5/5 L shoulder strength in order to decrease diffiuclty with lifting brother and assisting on family vacation   Goal Progress nearly met, see objective for progress   Target Date 10/06/23   Subjective Report   Subjective Report Pt reports shoulder is feeling better since last visit. Notes mobility improving. Does still have occasional difficulty with heavy lifting at work.   Objective Measures   Objective Measures Objective Measure 1;Objective Measure 2;Objective Measure 3   Objective Measure 1   Objective Measure L shoulder ROM   Details flexion: 160*  (10* less than R), abd: 165* (same as R), ER: T3/4 (same as R), IR: T10/11 (same as R)   Objective Measure 2   Objective Measure L shoulder strength   Details flexion: 5/5, abd: 5-/5 (slight pain), IR: 5/5 (slight discomfort), ER: 5/5, ext: 5/5.   Objective Measure 3   Objective Measure L shoulder PROM   Details IR at 90*: 70*, ER at 90*: 50*   Treatment Interventions (PT)   Interventions Therapeutic Procedure/Exercise;Manual Therapy   Therapeutic Procedure/Exercise   Therapeutic Procedures: strength, endurance, ROM, flexibillity minutes (19791) 25   Therapeutic Procedures Ther Proc 2;Ther Proc 3;Ther Proc 4;Ther Proc 5;Ther Proc 6;Ther Proc 7;Ther Proc 8;Ther Proc 9   Ther Proc 1 shoulder flexion-door slides   Ther Proc 1 - Details 10x1   Ther Proc 2 shoulder AAROM ER with dowel   Ther Proc 2 - Details 10x1   Ther Proc 3 low row   Ther Proc 3 - Details 10x1, green TB   Ther Proc 4 lat pull down   Ther Proc 4 - Details 10x1, green TB   Ther Proc 5 bicep curl   Ther Proc 5 - Details 10x1, green TB   Ther Proc 6 tricep extension   Ther Proc 6 - Details 10x1, green TB   Ther Proc 7 shoulder IR   Ther Proc 7 - Details 10x1, green TB   Ther Proc 8 shoulder ER   Ther Proc 8 - Details 10x1, green TB   Ther Proc 9 pt edu   Ther Proc 9 - Details pt edu regarding progressing HEP over the next month and then weaning from exercises following   Skilled Intervention HEP review/instruct for D/C   Patient Response/Progress pt demonstrated and verbalized good understanding. no pain   Eval/Assessments   Assessments   (Pt has attended 6 PT sessions over the last several months and has met 4/5 short term and long term goals. Pt's remaining goal is nearly met and pt can cont to progress I with continued I with HEP. Pt appropriate for D/C)   Education   Learner/Method Patient   Education Comments pt demonstrated and verbalized good understanding of exercises   Plan   Home program PTRx: [email or web address]   Updates to plan of care D/C from PT    Total Session Time   Timed Code Treatment Minutes 25   Total Treatment Time (sum of timed and untimed services) 25       DISCHARGE  Reason for Discharge: Patient has nearly met all goals.    Equipment Issued: therabands    Discharge Plan: Patient to continue home program.    Referring Provider:  Patricia Bear     Please contact me with any questions or concerns.    Thank you for your referral,     Ania Salgado, PT, DPT  Physical Therapist  [place] [id number] [phone]

## 2023-09-08 ENCOUNTER — INFUSION THERAPY VISIT (OUTPATIENT)
Dept: INFUSION THERAPY | Facility: CLINIC | Age: 58
End: 2023-09-08
Attending: STUDENT IN AN ORGANIZED HEALTH CARE EDUCATION/TRAINING PROGRAM
Payer: COMMERCIAL

## 2023-09-08 VITALS
TEMPERATURE: 97.6 F | HEART RATE: 60 BPM | DIASTOLIC BLOOD PRESSURE: 71 MMHG | SYSTOLIC BLOOD PRESSURE: 117 MMHG | OXYGEN SATURATION: 97 % | RESPIRATION RATE: 16 BRPM

## 2023-09-08 DIAGNOSIS — E61.1 IRON DEFICIENCY: ICD-10-CM

## 2023-09-08 DIAGNOSIS — Z98.84 HISTORY OF GASTRIC BYPASS: Primary | ICD-10-CM

## 2023-09-08 DIAGNOSIS — K90.9 IRON MALABSORPTION: ICD-10-CM

## 2023-09-08 DIAGNOSIS — D50.8 IRON DEFICIENCY ANEMIA SECONDARY TO INADEQUATE DIETARY IRON INTAKE: ICD-10-CM

## 2023-09-08 PROCEDURE — 250N000011 HC RX IP 250 OP 636: Mod: JZ | Performed by: STUDENT IN AN ORGANIZED HEALTH CARE EDUCATION/TRAINING PROGRAM

## 2023-09-08 PROCEDURE — 96365 THER/PROPH/DIAG IV INF INIT: CPT

## 2023-09-08 PROCEDURE — 96366 THER/PROPH/DIAG IV INF ADDON: CPT

## 2023-09-08 PROCEDURE — 258N000003 HC RX IP 258 OP 636: Performed by: STUDENT IN AN ORGANIZED HEALTH CARE EDUCATION/TRAINING PROGRAM

## 2023-09-08 RX ORDER — ALBUTEROL SULFATE 0.83 MG/ML
2.5 SOLUTION RESPIRATORY (INHALATION)
Status: CANCELLED | OUTPATIENT
Start: 2023-09-09

## 2023-09-08 RX ORDER — ALBUTEROL SULFATE 90 UG/1
1-2 AEROSOL, METERED RESPIRATORY (INHALATION)
Status: CANCELLED
Start: 2023-09-09

## 2023-09-08 RX ORDER — EPINEPHRINE 1 MG/ML
0.3 INJECTION, SOLUTION INTRAMUSCULAR; SUBCUTANEOUS EVERY 5 MIN PRN
Status: CANCELLED | OUTPATIENT
Start: 2023-09-09

## 2023-09-08 RX ORDER — MEPERIDINE HYDROCHLORIDE 25 MG/ML
25 INJECTION INTRAMUSCULAR; INTRAVENOUS; SUBCUTANEOUS EVERY 30 MIN PRN
Status: CANCELLED | OUTPATIENT
Start: 2023-09-09

## 2023-09-08 RX ORDER — DIPHENHYDRAMINE HYDROCHLORIDE 50 MG/ML
50 INJECTION INTRAMUSCULAR; INTRAVENOUS
Status: CANCELLED
Start: 2023-09-09

## 2023-09-08 RX ORDER — HEPARIN SODIUM (PORCINE) LOCK FLUSH IV SOLN 100 UNIT/ML 100 UNIT/ML
5 SOLUTION INTRAVENOUS
Status: CANCELLED | OUTPATIENT
Start: 2023-09-09

## 2023-09-08 RX ORDER — HEPARIN SODIUM,PORCINE 10 UNIT/ML
5-20 VIAL (ML) INTRAVENOUS DAILY PRN
Status: CANCELLED | OUTPATIENT
Start: 2023-09-09

## 2023-09-08 RX ORDER — METHYLPREDNISOLONE SODIUM SUCCINATE 125 MG/2ML
125 INJECTION, POWDER, LYOPHILIZED, FOR SOLUTION INTRAMUSCULAR; INTRAVENOUS
Status: CANCELLED
Start: 2023-09-09

## 2023-09-08 RX ADMIN — IRON SUCROSE 300 MG: 20 INJECTION, SOLUTION INTRAVENOUS at 13:31

## 2023-09-08 RX ADMIN — SODIUM CHLORIDE 250 ML: 9 INJECTION, SOLUTION INTRAVENOUS at 13:31

## 2023-09-08 ASSESSMENT — PAIN SCALES - GENERAL: PAINLEVEL: NO PAIN (0)

## 2023-09-08 NOTE — PROGRESS NOTES
Infusion Nursing Note:  Christina John presents today for venofer.    Patient seen by provider today: No   present during visit today: Not Applicable.    Note: Patient has no complaints. Denies pain. VSS.      Intravenous Access:  Peripheral IV placed.    Treatment Conditions:  Not Applicable.      Post Infusion Assessment:  Patient tolerated infusion without incident.  Blood return noted pre and post infusion.  Site patent and intact, free from redness, edema or discomfort.  Access discontinued per protocol.       Discharge Plan:   Patient discharged in stable condition accompanied by: self.  Departure Mode: Ambulatory.      Christina Cox RN

## 2023-09-11 ENCOUNTER — LAB (OUTPATIENT)
Dept: LAB | Facility: CLINIC | Age: 58
End: 2023-09-11
Attending: STUDENT IN AN ORGANIZED HEALTH CARE EDUCATION/TRAINING PROGRAM
Payer: COMMERCIAL

## 2023-09-11 DIAGNOSIS — R74.8 ELEVATED ALKALINE PHOSPHATASE LEVEL: ICD-10-CM

## 2023-09-11 DIAGNOSIS — Z78.0 POSTMENOPAUSAL STATUS: Primary | ICD-10-CM

## 2023-09-11 DIAGNOSIS — D72.829 LEUKOCYTOSIS, UNSPECIFIED TYPE: ICD-10-CM

## 2023-09-11 LAB
BASOPHILS # BLD AUTO: 0.1 10E3/UL (ref 0–0.2)
BASOPHILS NFR BLD AUTO: 1 %
EOSINOPHIL # BLD AUTO: 0.6 10E3/UL (ref 0–0.7)
EOSINOPHIL NFR BLD AUTO: 8 %
ERYTHROCYTE [DISTWIDTH] IN BLOOD BY AUTOMATED COUNT: 16.2 % (ref 10–15)
HCT VFR BLD AUTO: 38.4 % (ref 35–47)
HGB BLD-MCNC: 12.3 G/DL (ref 11.7–15.7)
IMM GRANULOCYTES # BLD: 0 10E3/UL
IMM GRANULOCYTES NFR BLD: 0 %
LYMPHOCYTES # BLD AUTO: 2.8 10E3/UL (ref 0.8–5.3)
LYMPHOCYTES NFR BLD AUTO: 36 %
MCH RBC QN AUTO: 30.5 PG (ref 26.5–33)
MCHC RBC AUTO-ENTMCNC: 32 G/DL (ref 31.5–36.5)
MCV RBC AUTO: 95 FL (ref 78–100)
MONOCYTES # BLD AUTO: 0.6 10E3/UL (ref 0–1.3)
MONOCYTES NFR BLD AUTO: 8 %
NEUTROPHILS # BLD AUTO: 3.7 10E3/UL (ref 1.6–8.3)
NEUTROPHILS NFR BLD AUTO: 48 %
PLATELET # BLD AUTO: 317 10E3/UL (ref 150–450)
RBC # BLD AUTO: 4.03 10E6/UL (ref 3.8–5.2)
WBC # BLD AUTO: 7.7 10E3/UL (ref 4–11)

## 2023-09-11 PROCEDURE — 99000 SPECIMEN HANDLING OFFICE-LAB: CPT

## 2023-09-11 PROCEDURE — 85025 COMPLETE CBC W/AUTO DIFF WBC: CPT

## 2023-09-11 PROCEDURE — 36415 COLL VENOUS BLD VENIPUNCTURE: CPT

## 2023-09-11 PROCEDURE — 84080 ASSAY ALKALINE PHOSPHATASES: CPT | Mod: 90

## 2023-09-13 LAB — ALP BONE SERPL-MCNC: 22.8 UG/L

## 2023-09-26 DIAGNOSIS — G62.9 NEUROPATHY: ICD-10-CM

## 2023-09-27 RX ORDER — GABAPENTIN 100 MG/1
200 CAPSULE ORAL DAILY
Qty: 180 CAPSULE | Refills: 3 | Status: SHIPPED | OUTPATIENT
Start: 2023-09-27 | End: 2024-10-04

## 2023-10-10 DIAGNOSIS — B00.1 RECURRENT COLD SORES: ICD-10-CM

## 2023-10-10 RX ORDER — VALACYCLOVIR HYDROCHLORIDE 1 G/1
1000 TABLET, FILM COATED ORAL DAILY
Qty: 30 TABLET | Refills: 0 | Status: SHIPPED | OUTPATIENT
Start: 2023-10-10 | End: 2023-11-14

## 2023-10-25 ENCOUNTER — MYC MEDICAL ADVICE (OUTPATIENT)
Dept: FAMILY MEDICINE | Facility: CLINIC | Age: 58
End: 2023-10-25
Payer: COMMERCIAL

## 2023-10-25 DIAGNOSIS — Z78.0 POSTMENOPAUSAL STATUS: ICD-10-CM

## 2023-10-25 DIAGNOSIS — R74.8 ELEVATED ALKALINE PHOSPHATASE LEVEL: Primary | ICD-10-CM

## 2023-10-25 DIAGNOSIS — D50.8 IRON DEFICIENCY ANEMIA SECONDARY TO INADEQUATE DIETARY IRON INTAKE: ICD-10-CM

## 2023-10-26 ENCOUNTER — HOSPITAL ENCOUNTER (OUTPATIENT)
Dept: BONE DENSITY | Facility: CLINIC | Age: 58
Discharge: HOME | End: 2023-10-26
Attending: STUDENT IN AN ORGANIZED HEALTH CARE EDUCATION/TRAINING PROGRAM | Admitting: STUDENT IN AN ORGANIZED HEALTH CARE EDUCATION/TRAINING PROGRAM
Payer: COMMERCIAL

## 2023-10-26 DIAGNOSIS — R74.8 ELEVATED ALKALINE PHOSPHATASE LEVEL: ICD-10-CM

## 2023-10-26 DIAGNOSIS — Z78.0 POSTMENOPAUSAL STATUS: ICD-10-CM

## 2023-10-26 PROCEDURE — 77080 DXA BONE DENSITY AXIAL: CPT

## 2023-10-31 ENCOUNTER — LAB (OUTPATIENT)
Dept: LAB | Facility: CLINIC | Age: 58
End: 2023-10-31
Attending: STUDENT IN AN ORGANIZED HEALTH CARE EDUCATION/TRAINING PROGRAM
Payer: COMMERCIAL

## 2023-10-31 DIAGNOSIS — D50.8 IRON DEFICIENCY ANEMIA SECONDARY TO INADEQUATE DIETARY IRON INTAKE: ICD-10-CM

## 2023-10-31 DIAGNOSIS — R74.8 ELEVATED ALKALINE PHOSPHATASE LEVEL: ICD-10-CM

## 2023-10-31 DIAGNOSIS — R73.9 HYPERGLYCEMIA: Primary | ICD-10-CM

## 2023-10-31 DIAGNOSIS — Z78.0 POSTMENOPAUSAL STATUS: ICD-10-CM

## 2023-10-31 LAB
ALBUMIN SERPL BCG-MCNC: 4.3 G/DL (ref 3.5–5.2)
ALP SERPL-CCNC: 126 U/L (ref 35–104)
ALT SERPL W P-5'-P-CCNC: 30 U/L (ref 0–50)
ANION GAP SERPL CALCULATED.3IONS-SCNC: 14 MMOL/L (ref 7–15)
AST SERPL W P-5'-P-CCNC: 28 U/L (ref 0–45)
BILIRUB SERPL-MCNC: 0.5 MG/DL
BUN SERPL-MCNC: 17.3 MG/DL (ref 6–20)
CALCIUM SERPL-MCNC: 9.4 MG/DL (ref 8.6–10)
CHLORIDE SERPL-SCNC: 103 MMOL/L (ref 98–107)
CREAT SERPL-MCNC: 1.13 MG/DL (ref 0.51–0.95)
DEPRECATED HCO3 PLAS-SCNC: 21 MMOL/L (ref 22–29)
EGFRCR SERPLBLD CKD-EPI 2021: 57 ML/MIN/1.73M2
ERYTHROCYTE [DISTWIDTH] IN BLOOD BY AUTOMATED COUNT: 16.4 % (ref 10–15)
FERRITIN SERPL-MCNC: 206 NG/ML (ref 11–328)
GLUCOSE SERPL-MCNC: 277 MG/DL (ref 70–99)
HCT VFR BLD AUTO: 42 % (ref 35–47)
HGB BLD-MCNC: 13.5 G/DL (ref 11.7–15.7)
IRON BINDING CAPACITY (ROCHE): 342 UG/DL (ref 240–430)
IRON SATN MFR SERPL: 32 % (ref 15–46)
IRON SERPL-MCNC: 108 UG/DL (ref 37–145)
MCH RBC QN AUTO: 31.5 PG (ref 26.5–33)
MCHC RBC AUTO-ENTMCNC: 32.1 G/DL (ref 31.5–36.5)
MCV RBC AUTO: 98 FL (ref 78–100)
PLATELET # BLD AUTO: 258 10E3/UL (ref 150–450)
POTASSIUM SERPL-SCNC: 5.1 MMOL/L (ref 3.4–5.3)
PROT SERPL-MCNC: 6.8 G/DL (ref 6.4–8.3)
PTH-INTACT SERPL-MCNC: 101 PG/ML (ref 15–65)
RBC # BLD AUTO: 4.29 10E6/UL (ref 3.8–5.2)
SODIUM SERPL-SCNC: 138 MMOL/L (ref 135–145)
WBC # BLD AUTO: 8.4 10E3/UL (ref 4–11)

## 2023-10-31 PROCEDURE — 82728 ASSAY OF FERRITIN: CPT

## 2023-10-31 PROCEDURE — 83036 HEMOGLOBIN GLYCOSYLATED A1C: CPT

## 2023-10-31 PROCEDURE — 83550 IRON BINDING TEST: CPT

## 2023-10-31 PROCEDURE — 83970 ASSAY OF PARATHORMONE: CPT

## 2023-10-31 PROCEDURE — 82985 ASSAY OF GLYCATED PROTEIN: CPT | Mod: 90

## 2023-10-31 PROCEDURE — 36415 COLL VENOUS BLD VENIPUNCTURE: CPT

## 2023-10-31 PROCEDURE — 85027 COMPLETE CBC AUTOMATED: CPT

## 2023-10-31 PROCEDURE — 80053 COMPREHEN METABOLIC PANEL: CPT

## 2023-10-31 PROCEDURE — 99000 SPECIMEN HANDLING OFFICE-LAB: CPT

## 2023-10-31 PROCEDURE — 83540 ASSAY OF IRON: CPT

## 2023-11-02 LAB — HBA1C MFR BLD: 5.8 % (ref 0–5.6)

## 2023-11-05 LAB — FRUCTOSAMINE SERPL-SCNC: 267 UMOL/L

## 2023-11-13 ENCOUNTER — E-VISIT (OUTPATIENT)
Dept: FAMILY MEDICINE | Facility: CLINIC | Age: 58
End: 2023-11-13
Payer: COMMERCIAL

## 2023-11-13 DIAGNOSIS — B00.1 RECURRENT COLD SORES: ICD-10-CM

## 2023-11-13 PROCEDURE — 99421 OL DIG E/M SVC 5-10 MIN: CPT | Performed by: FAMILY MEDICINE

## 2023-11-14 RX ORDER — VALACYCLOVIR HYDROCHLORIDE 1 G/1
1000 TABLET, FILM COATED ORAL DAILY
Qty: 90 TABLET | Refills: 4 | Status: SHIPPED | OUTPATIENT
Start: 2023-11-14

## 2023-11-14 RX ORDER — VALACYCLOVIR HYDROCHLORIDE 1 G/1
1000 TABLET, FILM COATED ORAL DAILY
Qty: 30 TABLET | Refills: 0 | Status: SHIPPED | OUTPATIENT
Start: 2023-11-14 | End: 2023-11-14

## 2023-12-27 DIAGNOSIS — F32.0 MAJOR DEPRESSIVE DISORDER, SINGLE EPISODE, MILD (H): ICD-10-CM

## 2023-12-27 RX ORDER — BUPROPION HYDROCHLORIDE 150 MG/1
TABLET ORAL
Qty: 180 TABLET | Refills: 0 | OUTPATIENT
Start: 2023-12-27

## 2024-01-15 ENCOUNTER — MYC REFILL (OUTPATIENT)
Dept: FAMILY MEDICINE | Facility: CLINIC | Age: 59
End: 2024-01-15
Payer: COMMERCIAL

## 2024-01-15 DIAGNOSIS — F32.0 MAJOR DEPRESSIVE DISORDER, SINGLE EPISODE, MILD (H): ICD-10-CM

## 2024-01-15 RX ORDER — BUPROPION HYDROCHLORIDE 150 MG/1
300 TABLET ORAL EVERY EVENING
Qty: 180 TABLET | Refills: 3 | Status: SHIPPED | OUTPATIENT
Start: 2024-01-15

## 2024-02-12 ENCOUNTER — PATIENT OUTREACH (OUTPATIENT)
Dept: GASTROENTEROLOGY | Facility: CLINIC | Age: 59
End: 2024-02-12
Payer: COMMERCIAL

## 2024-03-07 ENCOUNTER — MYC REFILL (OUTPATIENT)
Dept: FAMILY MEDICINE | Facility: CLINIC | Age: 59
End: 2024-03-07
Payer: COMMERCIAL

## 2024-03-07 DIAGNOSIS — F32.0 MAJOR DEPRESSIVE DISORDER, SINGLE EPISODE, MILD (H): ICD-10-CM

## 2024-03-08 RX ORDER — ESCITALOPRAM OXALATE 20 MG/1
TABLET ORAL
Qty: 90 TABLET | Refills: 0 | Status: SHIPPED | OUTPATIENT
Start: 2024-03-08 | End: 2024-06-14

## 2024-03-19 ENCOUNTER — PATIENT OUTREACH (OUTPATIENT)
Dept: GASTROENTEROLOGY | Facility: CLINIC | Age: 59
End: 2024-03-19
Payer: COMMERCIAL

## 2024-03-19 DIAGNOSIS — Z12.11 SPECIAL SCREENING FOR MALIGNANT NEOPLASMS, COLON: Primary | ICD-10-CM

## 2024-03-19 NOTE — PROGRESS NOTES
"Patient has a history of polyps and surveillance colonoscopy is due June 2024. Patient meets criteria for CRC high risk standing order protocol.    CRC Screening Colonoscopy Referral Review    Patient meets the inclusion criteria for screening colonoscopy standing order.    Ordering/Referring Provider:  Lucía Galvan MD    BMI: Estimated body mass index is 31.59 kg/m  as calculated from the following:    Height as of 9/5/23: 1.797 m (5' 10.75\").    Weight as of 9/5/23: 102 kg (224 lb 14.4 oz).     Sedation:  Does patient have any of the following conditions affecting sedation?  No medical conditions affecting sedation.    Previous Scopes:  Any previous recommendations or follow up needs based on previous scope?  Double prep in 2019    Medical Concerns to Postpone Order:  Does patient have any of the following medical concerns that should postpone/delay colonoscopy referral?  No medical conditions affecting colonoscopy referral.    Final Referral Details:  Based on patient's medical history patient is appropriate for referral order with moderate sedation. If patient's BMI > 50 do not schedule in ASC.  "

## 2024-06-13 ENCOUNTER — MYC REFILL (OUTPATIENT)
Dept: FAMILY MEDICINE | Facility: CLINIC | Age: 59
End: 2024-06-13
Payer: COMMERCIAL

## 2024-06-13 DIAGNOSIS — F32.0 MAJOR DEPRESSIVE DISORDER, SINGLE EPISODE, MILD (H): ICD-10-CM

## 2024-06-14 ENCOUNTER — ANESTHESIA EVENT (OUTPATIENT)
Dept: GASTROENTEROLOGY | Facility: CLINIC | Age: 59
End: 2024-06-14
Payer: COMMERCIAL

## 2024-06-14 ENCOUNTER — E-VISIT (OUTPATIENT)
Dept: FAMILY MEDICINE | Facility: CLINIC | Age: 59
End: 2024-06-14
Payer: COMMERCIAL

## 2024-06-14 DIAGNOSIS — F32.0 MAJOR DEPRESSIVE DISORDER, SINGLE EPISODE, MILD (H): ICD-10-CM

## 2024-06-14 PROCEDURE — 99421 OL DIG E/M SVC 5-10 MIN: CPT | Performed by: FAMILY MEDICINE

## 2024-06-14 RX ORDER — ESCITALOPRAM OXALATE 20 MG/1
TABLET ORAL
Qty: 90 TABLET | Refills: 0 | OUTPATIENT
Start: 2024-06-14

## 2024-06-14 RX ORDER — ESCITALOPRAM OXALATE 20 MG/1
TABLET ORAL
Qty: 90 TABLET | Refills: 3 | Status: SHIPPED | OUTPATIENT
Start: 2024-06-14

## 2024-06-14 ASSESSMENT — ANXIETY QUESTIONNAIRES
IF YOU CHECKED OFF ANY PROBLEMS ON THIS QUESTIONNAIRE, HOW DIFFICULT HAVE THESE PROBLEMS MADE IT FOR YOU TO DO YOUR WORK, TAKE CARE OF THINGS AT HOME, OR GET ALONG WITH OTHER PEOPLE: SOMEWHAT DIFFICULT
3. WORRYING TOO MUCH ABOUT DIFFERENT THINGS: SEVERAL DAYS
1. FEELING NERVOUS, ANXIOUS, OR ON EDGE: SEVERAL DAYS
7. FEELING AFRAID AS IF SOMETHING AWFUL MIGHT HAPPEN: SEVERAL DAYS
8. IF YOU CHECKED OFF ANY PROBLEMS, HOW DIFFICULT HAVE THESE MADE IT FOR YOU TO DO YOUR WORK, TAKE CARE OF THINGS AT HOME, OR GET ALONG WITH OTHER PEOPLE?: SOMEWHAT DIFFICULT
GAD7 TOTAL SCORE: 8
7. FEELING AFRAID AS IF SOMETHING AWFUL MIGHT HAPPEN: SEVERAL DAYS
6. BECOMING EASILY ANNOYED OR IRRITABLE: SEVERAL DAYS
2. NOT BEING ABLE TO STOP OR CONTROL WORRYING: SEVERAL DAYS
5. BEING SO RESTLESS THAT IT IS HARD TO SIT STILL: NOT AT ALL
4. TROUBLE RELAXING: NEARLY EVERY DAY

## 2024-06-14 ASSESSMENT — PATIENT HEALTH QUESTIONNAIRE - PHQ9
SUM OF ALL RESPONSES TO PHQ QUESTIONS 1-9: 10
10. IF YOU CHECKED OFF ANY PROBLEMS, HOW DIFFICULT HAVE THESE PROBLEMS MADE IT FOR YOU TO DO YOUR WORK, TAKE CARE OF THINGS AT HOME, OR GET ALONG WITH OTHER PEOPLE: SOMEWHAT DIFFICULT
SUM OF ALL RESPONSES TO PHQ QUESTIONS 1-9: 10

## 2024-06-14 NOTE — TELEPHONE ENCOUNTER
Relayed message to patient. She is doing well and will do an evisit for future refills. Can this be filled in the meantime?     Dalila GLEZ,    [facility]

## 2024-06-14 NOTE — ANESTHESIA PREPROCEDURE EVALUATION
Anesthesia Pre-Procedure Evaluation    Patient: Christina John   MRN: [id number] : 1965        Procedure : Procedure(s):  Colonoscopy          Past Medical History:   Diagnosis Date    Adhesive capsulitis of left shoulder 2023    Allergic rhinitis due to animal dander did IT x 4 yrs. ended  per self    Allergies     Appendicitis     BRCA1 positive     Breast CA (H)     Colon polyp     DCIS (ductal carcinoma in situ)     DCIS (ductal carcinoma in situ) of breast  and 8/10    RT ( stage 2a) and left ( stage 0)BREAST - STAGE 2A - GRADE III    Depression     Depressive disorder     Desensitization to allergens     did IT x 4 yrs. ended  per self    Diagnostic skin and sensitization tests  skin tests pos. for: cat/dog/DM/T/G/RW    Elevated cholesterol     Endometriosis     Endometriosis     GERD (gastroesophageal reflux disease)     GERD (gastroesophageal reflux disease)     Glaucoma     History of anesthesia complications     House dust mite allergy     Hyperlipidemia     Hypertension     Hypertension goal BP (blood pressure) < 140/90     Infertility     ON CLOMID    Infertility, female     Lumbar disc herniation     L5-S1    Mild major depression (H)     DAVIE (obstructive sleep apnea)     No CPAP    PCOS (polycystic ovarian syndrome)     PCOS (polycystic ovarian syndrome)     Pelvic relaxation     PONV (postoperative nausea and vomiting)     Seasonal allergic conjunctivitis     Seasonal allergic rhinitis      skin tests pos. for: cat/dog/DM/T/G/RW--per Dr. Talavera.    Ulcers     DX BY ENDOSCOPY      Past Surgical History:   Procedure Laterality Date    APPENDECTOMY      BACK SURGERY  2021    Discectomy L5S1    C/SECTION, CLASSICAL      CHOLECYSTECTOMY      COLONOSCOPY W/ BIOPSIES AND POLYPECTOMY      COSMETIC SURGERY  10/13, 09/15    ENDOSCOPY      10/2000 Xiao, repeat 10/2002, REPEAT  (REPEAT IN 2 YRS)    ENT SURGERY  1973    EXCIS VAGINAL CYST/TUMOR      GASTRIC BYPASS      GI  "SURGERY  04/11    GLAUCOMA SURGERY      GLAUCOMA SURGERY      HC REMOVAL GALLBLADDER  6/2005    HYSTERECTOMY      HYSTERECTOMY, PAP NO LONGER INDICATED      MASTECTOMY Bilateral     MASTECTOMY BILATERAL, INSERT TISSUE EXPANDER BILATERAL, COMBINED  8/2010    MASTECTOMY, BILATERAL      OTHER SURGICAL HISTORY      laparoscopy    PELVIS LAPAROSCOPY,DX      x2    CO LAMNOTMY INCL W/DCMPRSN NRV ROOT 1 INTRSPC LUMBR Left 1/8/2021    Procedure: LEFT LUMBAR 5-SACRAL 1 DISCECTOMY;  Surgeon: Vadim Theodore MD;  Location: [facility] Main OR;  Service: Spine    REVISE RECONSTRUCTED BREAST      [facility] APPENDECTOMY  1982    [facility] BIOPSY OF BREAST, OPEN INCISIONAL  2008    sentinel lymph node    [facility] COLONOSCOPY THRU STOMA, DIAGNOSTIC  12/04, 12/09    (REPEAT IN 5 YRS)      Allergies   Allergen Reactions    Amoxicillin Swelling    Cefzil [Cefprozil] Swelling    Influenza Virus Vaccine      \"throat felt funny\"    Adhesive Tape Rash     Steri Strips    Benzocaine Other (See Comments)    Shellfish Allergy Other (See Comments)     numbness to mouth when eating lobster    Biaxin [Clarithromycin] Hives    Erythromycin GI Disturbance    Latex Rash    Latex Rash    Percocet [Acetaminophen] Hives    Sulfa Antibiotics Hives      Social History     Tobacco Use    Smoking status: Never     Passive exposure: Never    Smokeless tobacco: Never    Tobacco comments:     Nonsmoking household   Substance Use Topics    Alcohol use: No      Wt Readings from Last 1 Encounters:   09/05/23 102 kg (224 lb 14.4 oz)        Anesthesia Evaluation   Pt has had prior anesthetic.     History of anesthetic complications  - PONV.      ROS/MED HX  ENT/Pulmonary:     (+) sleep apnea,                                       Neurologic:  - neg neurologic ROS     Cardiovascular:     (+) Dyslipidemia hypertension- -   -  - -                                 Previous cardiac testing   Echo: Date: Results:    Stress Test:  Date: Results:    ECG Reviewed:  Date: 2020 " "Results:  NSR  Cath:  Date: Results:      METS/Exercise Tolerance: >4 METS    Hematologic:     (+)      anemia,          Musculoskeletal:  - neg musculoskeletal ROS     GI/Hepatic:     (+) GERD,                   Renal/Genitourinary:     (+) renal disease, type: CRI,            Endo: Comment: PCOS      Psychiatric/Substance Use:  - neg psychiatric ROS     Infectious Disease:  - neg infectious disease ROS     Malignancy:  - neg malignancy ROS     Other:  - neg other ROS          Physical Exam    Airway  airway exam normal      Mallampati: II   TM distance: > 3 FB   Neck ROM: full   Mouth opening: > 3 cm    Respiratory Devices and Support         Dental           Cardiovascular   cardiovascular exam normal       Rhythm and rate: regular and normal     Pulmonary   pulmonary exam normal        breath sounds clear to auscultation           OUTSIDE LABS:  CBC:   Lab Results   Component Value Date    WBC 8.4 10/31/2023    WBC 7.7 09/11/2023    HGB 13.5 10/31/2023    HGB 12.3 09/11/2023    HCT 42.0 10/31/2023    HCT 38.4 09/11/2023     10/31/2023     09/11/2023     BMP:   Lab Results   Component Value Date     10/31/2023     08/10/2023    POTASSIUM 5.1 10/31/2023    POTASSIUM 4.3 08/10/2023    CHLORIDE 103 10/31/2023    CHLORIDE 105 08/10/2023    CO2 21 (L) 10/31/2023    CO2 28 08/10/2023    BUN 17.3 10/31/2023    BUN 23.0 (H) 08/10/2023    CR 1.13 (H) 10/31/2023    CR 0.93 08/10/2023     (H) 10/31/2023    GLC 98 08/10/2023     COAGS: No results found for: \"PTT\", \"INR\", \"FIBR\"  POC:   Lab Results   Component Value Date     (H) 04/26/2020     HEPATIC:   Lab Results   Component Value Date    ALBUMIN 4.3 10/31/2023    PROTTOTAL 6.8 10/31/2023    ALT 30 10/31/2023    AST 28 10/31/2023    GGT 17 08/10/2023    ALKPHOS 126 (H) 10/31/2023    BILITOTAL 0.5 10/31/2023     OTHER:   Lab Results   Component Value Date    A1C 5.8 (H) 10/31/2023    GAY 9.4 10/31/2023    PHOS 4.5 08/10/2023    MAG " 2.2 08/10/2023    LIPASE 114 09/09/2021    TSH 1.29 08/10/2023    T4 1.10 06/10/2010    CRP <2.9 09/09/2021    SED 8 09/09/2021       Anesthesia Plan    ASA Status:  3    NPO Status:  NPO Appropriate    Anesthesia Type: General.     - Airway: Native airway   Induction: Intravenous, Propofol.   Maintenance: TIVA.        Consents    Anesthesia Plan(s) and associated risks, benefits, and realistic alternatives discussed. Questions answered and patient/representative(s) expressed understanding.     - Discussed: Risks, Benefits and Alternatives for BOTH SEDATION and the PROCEDURE were discussed     - Discussed with:  Patient      - Extended Intubation/Ventilatory Support Discussed: No.      - Patient is DNR/DNI Status: No     Use of blood products discussed: No .     Postoperative Care    Pain management: Oral pain medications.   PONV prophylaxis: Ondansetron (or other 5HT-3), Background Propofol Infusion     Comments:               Roby Rodríguez CRNA, APRN CRNA    I have reviewed the pertinent notes and labs in the chart from the past 30 days and (re)examined the patient.  Any updates or changes from those notes are reflected in this note.

## 2024-06-14 NOTE — TELEPHONE ENCOUNTER
Please call pt   Needs visit for further refills  If she is doing well with it she could just do an evisit if she would prefer    Lucía Galvan MD

## 2024-06-15 ASSESSMENT — PATIENT HEALTH QUESTIONNAIRE - PHQ9: SUM OF ALL RESPONSES TO PHQ QUESTIONS 1-9: 10

## 2024-06-17 ENCOUNTER — HOSPITAL ENCOUNTER (OUTPATIENT)
Facility: CLINIC | Age: 59
Discharge: HOME | End: 2024-06-17
Attending: SURGERY | Admitting: SURGERY
Payer: COMMERCIAL

## 2024-06-17 ENCOUNTER — ANESTHESIA (OUTPATIENT)
Dept: GASTROENTEROLOGY | Facility: CLINIC | Age: 59
End: 2024-06-17
Payer: COMMERCIAL

## 2024-06-17 VITALS
BODY MASS INDEX: 31.36 KG/M2 | RESPIRATION RATE: 16 BRPM | TEMPERATURE: 97.2 F | HEART RATE: 62 BPM | HEIGHT: 71 IN | OXYGEN SATURATION: 99 % | SYSTOLIC BLOOD PRESSURE: 111 MMHG | DIASTOLIC BLOOD PRESSURE: 72 MMHG | WEIGHT: 224 LBS

## 2024-06-17 LAB — COLONOSCOPY: NORMAL

## 2024-06-17 PROCEDURE — 250N000009 HC RX 250: Performed by: SURGERY

## 2024-06-17 PROCEDURE — 258N000003 HC RX IP 258 OP 636: Mod: JZ | Performed by: SURGERY

## 2024-06-17 PROCEDURE — 88305 TISSUE EXAM BY PATHOLOGIST: CPT | Mod: TC | Performed by: SURGERY

## 2024-06-17 PROCEDURE — 250N000009 HC RX 250: Performed by: NURSE ANESTHETIST, CERTIFIED REGISTERED

## 2024-06-17 PROCEDURE — 250N000011 HC RX IP 250 OP 636: Mod: JZ | Performed by: NURSE ANESTHETIST, CERTIFIED REGISTERED

## 2024-06-17 PROCEDURE — 88305 TISSUE EXAM BY PATHOLOGIST: CPT | Mod: 26 | Performed by: PATHOLOGY

## 2024-06-17 PROCEDURE — 45380 COLONOSCOPY AND BIOPSY: CPT | Mod: PT | Performed by: SURGERY

## 2024-06-17 PROCEDURE — 370N000017 HC ANESTHESIA TECHNICAL FEE, PER MIN: Performed by: SURGERY

## 2024-06-17 RX ORDER — NALOXONE HYDROCHLORIDE 0.4 MG/ML
0.1 INJECTION, SOLUTION INTRAMUSCULAR; INTRAVENOUS; SUBCUTANEOUS
Status: DISCONTINUED | OUTPATIENT
Start: 2024-06-17 | End: 2024-06-17 | Stop reason: HOSPADM

## 2024-06-17 RX ORDER — ONDANSETRON 2 MG/ML
INJECTION INTRAMUSCULAR; INTRAVENOUS PRN
Status: DISCONTINUED | OUTPATIENT
Start: 2024-06-17 | End: 2024-06-17

## 2024-06-17 RX ORDER — PROPOFOL 10 MG/ML
INJECTION, EMULSION INTRAVENOUS CONTINUOUS PRN
Status: DISCONTINUED | OUTPATIENT
Start: 2024-06-17 | End: 2024-06-17

## 2024-06-17 RX ORDER — LIDOCAINE HYDROCHLORIDE 20 MG/ML
INJECTION, SOLUTION INFILTRATION; PERINEURAL PRN
Status: DISCONTINUED | OUTPATIENT
Start: 2024-06-17 | End: 2024-06-17

## 2024-06-17 RX ORDER — SODIUM CHLORIDE 9 MG/ML
INJECTION, SOLUTION INTRAVENOUS CONTINUOUS
Status: DISCONTINUED | OUTPATIENT
Start: 2024-06-17 | End: 2024-06-17 | Stop reason: HOSPADM

## 2024-06-17 RX ORDER — DEXAMETHASONE SODIUM PHOSPHATE 4 MG/ML
4 INJECTION, SOLUTION INTRA-ARTICULAR; INTRALESIONAL; INTRAMUSCULAR; INTRAVENOUS; SOFT TISSUE
Status: DISCONTINUED | OUTPATIENT
Start: 2024-06-17 | End: 2024-06-17 | Stop reason: HOSPADM

## 2024-06-17 RX ORDER — SODIUM CHLORIDE, SODIUM LACTATE, POTASSIUM CHLORIDE, CALCIUM CHLORIDE 600; 310; 30; 20 MG/100ML; MG/100ML; MG/100ML; MG/100ML
INJECTION, SOLUTION INTRAVENOUS CONTINUOUS
Status: DISCONTINUED | OUTPATIENT
Start: 2024-06-17 | End: 2024-06-17 | Stop reason: HOSPADM

## 2024-06-17 RX ORDER — ONDANSETRON 4 MG/1
4 TABLET, ORALLY DISINTEGRATING ORAL EVERY 30 MIN PRN
Status: DISCONTINUED | OUTPATIENT
Start: 2024-06-17 | End: 2024-06-17 | Stop reason: HOSPADM

## 2024-06-17 RX ORDER — ONDANSETRON 2 MG/ML
4 INJECTION INTRAMUSCULAR; INTRAVENOUS EVERY 30 MIN PRN
Status: DISCONTINUED | OUTPATIENT
Start: 2024-06-17 | End: 2024-06-17 | Stop reason: HOSPADM

## 2024-06-17 RX ORDER — LIDOCAINE 40 MG/G
CREAM TOPICAL
Status: DISCONTINUED | OUTPATIENT
Start: 2024-06-17 | End: 2024-06-17 | Stop reason: HOSPADM

## 2024-06-17 RX ORDER — PROPOFOL 10 MG/ML
INJECTION, EMULSION INTRAVENOUS PRN
Status: DISCONTINUED | OUTPATIENT
Start: 2024-06-17 | End: 2024-06-17

## 2024-06-17 RX ADMIN — SODIUM CHLORIDE, POTASSIUM CHLORIDE, SODIUM LACTATE AND CALCIUM CHLORIDE: 600; 310; 30; 20 INJECTION, SOLUTION INTRAVENOUS at 09:18

## 2024-06-17 RX ADMIN — PROPOFOL 100 MG: 10 INJECTION, EMULSION INTRAVENOUS at 09:33

## 2024-06-17 RX ADMIN — PROPOFOL 200 MCG/KG/MIN: 10 INJECTION, EMULSION INTRAVENOUS at 09:33

## 2024-06-17 RX ADMIN — LIDOCAINE HYDROCHLORIDE 0.2 ML: 10 INJECTION, SOLUTION EPIDURAL; INFILTRATION; INTRACAUDAL; PERINEURAL at 08:56

## 2024-06-17 RX ADMIN — ONDANSETRON 4 MG: 2 INJECTION INTRAMUSCULAR; INTRAVENOUS at 09:32

## 2024-06-17 RX ADMIN — LIDOCAINE HYDROCHLORIDE 100 MG: 20 INJECTION, SOLUTION INFILTRATION; PERINEURAL at 09:33

## 2024-06-17 ASSESSMENT — ACTIVITIES OF DAILY LIVING (ADL)
ADLS_ACUITY_SCORE: 35
ADLS_ACUITY_SCORE: 35

## 2024-06-17 NOTE — LETTER
June 20, 2024      Christina John  [address] [facility] [address]          Christina John  [address] [facility] [address]    June 20, 2024    Dear Christina,  This letter is written to inform you of the results of your recent colonoscopy.  Your examination showed polyp(s) in your descending colon. All polyps were removed in their entirety and sent for review by a pathologist. As you will see on the pathology report below, the tissue(s) were hyperplastic polyps. Your examination was otherwise without abnormality.    Final Diagnosis   A. Colon, Descending, polyp, polypectomy:  -Colonic mucosa with surface serrated changes consistent with hyperplastic polyp  -Negative for dysplasia or malignancy.     Hyperplastic polyps are entirely benign (non-cancerous) and rarely associated with the development of additional polyps or colorectal cancer.    Given these findings, I recommend that you undergo a repeat colonoscopy in ten years for screening. We will enter you into a recall system so you receive a reminder closer to the time that you are due for repeat examination.     Please remember that this recommendation is made with the understanding that you are not experiencing persistent changes in bowel function, bleeding per rectum, and/or significant abdominal pain. If you experience these symptoms, please contact your primary care provider for a further evaluation.     If you have any questions or concerns about the results of your colonoscopy or the appropriate follow-up, please contact the general surgery access center at [phone].    Sincerely,      Kian Grace DO   [place] [facility]  ___

## 2024-06-17 NOTE — H&P
General Surgery H&P  Christina John MRN# [id number]   Age/Sex: 58 year old female YOB: 1965     Reason for visit: Colonoscopy       Referring physician: Dr. Lalitha Harvey                   Assessment and Plan:   Assessment:  Hx of colon polyps     Plan:  -To the OR for colonoscopy  -Risk and benefits of procedure explained detail to the patient.  Risks include infection, bleeding, damage to the surrounding structures and possible perforation of the hollow organs.  Patient verbalized understanding provided consent to undergo the procedure above.          Chief Complaint:   Presenting for colonoscopy     History is obtained from the patient    HPI:   Christina John is a 58 year old female who presents for colonoscopy.  Patient tolerated the prep well.  The patient's last colonoscopy was 2019.  Family history shows no history of colon cancer.  No new complaints.           Past Medical History:     Past Medical History:   Diagnosis Date    Adhesive capsulitis of left shoulder 6/1/2023    Allergic rhinitis due to animal dander did IT x 4 yrs. ended 9/06 per self    Allergies     Appendicitis     BRCA1 positive     Breast CA (H)     Colon polyp     DCIS (ductal carcinoma in situ)     DCIS (ductal carcinoma in situ) of breast 8/08 and 8/10    RT ( stage 2a) and left ( stage 0)BREAST - STAGE 2A - GRADE III    Depression     Depressive disorder     Desensitization to allergens     did IT x 4 yrs. ended 9/06 per self    Diagnostic skin and sensitization tests 4/03 skin tests pos. for: cat/dog/DM/T/G/RW    Elevated cholesterol     Endometriosis     Endometriosis     GERD (gastroesophageal reflux disease)     GERD (gastroesophageal reflux disease)     Glaucoma     History of anesthesia complications     House dust mite allergy     Hyperlipidemia     Hypertension     Hypertension goal BP (blood pressure) < 140/90     Infertility     ON CLOMID    Infertility, female     Lumbar disc herniation     L5-S1    Mild  major depression (H)     DAVIE (obstructive sleep apnea)     No CPAP    PCOS (polycystic ovarian syndrome)     PCOS (polycystic ovarian syndrome)     Pelvic relaxation     PONV (postoperative nausea and vomiting)     Seasonal allergic conjunctivitis     Seasonal allergic rhinitis     4/03 skin tests pos. for: cat/dog/DM/T/G/RW--per Dr. Talavera.    Ulcers 1992    DX BY ENDOSCOPY              Past Surgical History:     Past Surgical History:   Procedure Laterality Date    APPENDECTOMY      BACK SURGERY  1/2021    Discectomy L5S1    C/SECTION, CLASSICAL      CHOLECYSTECTOMY      COLONOSCOPY W/ BIOPSIES AND POLYPECTOMY      COSMETIC SURGERY  10/13, 09/15    ENDOSCOPY      10/2000 Xiao, repeat 10/2002, REPEAT 2/08 (REPEAT IN 2 YRS)    ENT SURGERY  1973    EXCIS VAGINAL CYST/TUMOR      GASTRIC BYPASS      GI SURGERY  04/11    GLAUCOMA SURGERY      GLAUCOMA SURGERY      HC REMOVAL GALLBLADDER  6/2005    HYSTERECTOMY      HYSTERECTOMY, PAP NO LONGER INDICATED      MASTECTOMY Bilateral     MASTECTOMY BILATERAL, INSERT TISSUE EXPANDER BILATERAL, COMBINED  8/2010    MASTECTOMY, BILATERAL      OTHER SURGICAL HISTORY      laparoscopy    PELVIS LAPAROSCOPY,DX      x2    [place] LAMNOTMY INCL W/DCMPRSN NRV ROOT 1 INTRSPC LUMBR Left 1/8/2021    Procedure: LEFT LUMBAR 5-SACRAL 1 DISCECTOMY;  Surgeon: Vadim Theodore MD;  Location: [facility];  Service: Spine    REVISE RECONSTRUCTED BREAST      [facility] APPENDECTOMY  1982    [facility] BIOPSY OF BREAST, OPEN INCISIONAL  2008    sentinel lymph node    [facility] COLONOSCOPY THRU STOMA, DIAGNOSTIC  12/04, 12/09    (REPEAT IN 5 YRS)             Social History:    reports that she has never smoked. She has never been exposed to tobacco smoke. She has never used smokeless tobacco. She reports that she does not drink alcohol and does not use drugs.           Family History:     Family History   Problem Relation Age of Onset    Hypertension Mother     Gastrointestinal Disease Mother         GAITAN'S ESOPHAGUS  "   Lipids Mother     Depression Mother     Other - See Comments Mother         Pagets Disease of the Vulva, Dx 06/2014    Osteoporosis Mother     Obesity Mother     Allergies Father         HAYFEVER    Heart Disease Father         AFIB    Hypertension Father     Lipids Father     Hyperlipidemia Father     Gastrointestinal Disease Other         GAITAN'S ESOPHAGUS    Breast Cancer Other     Colon Cancer Other     Other Cancer Maternal Grandmother         Gall bladder    Other Cancer Maternal Grandfather         Esophageal    Colon Cancer Paternal Grandfather     Gastrointestinal Disease Other         GAITAN'S ESOPHAGUS    Breast Cancer Other     Breast Cancer Paternal Aunt     Breast Cancer Other         fathers 1st cousin    Hyperlipidemia Brother     Colon Cancer Other               Allergies:     Allergies   Allergen Reactions    Amoxicillin Swelling    Cefzil [Cefprozil] Swelling    Influenza Virus Vaccine      \"throat felt funny\"    Adhesive Tape Rash     Steri Strips    Benzocaine Other (See Comments)    Shellfish Allergy Other (See Comments)     numbness to mouth when eating lobster    Biaxin [Clarithromycin] Hives    Erythromycin GI Disturbance    Latex Rash    Latex Rash    Percocet [Acetaminophen] Hives    Sulfa Antibiotics Hives              Medications:     Prior to Admission medications    Medication Sig Start Date End Date Taking? Authorizing Provider   acetaminophen (TYLENOL) 500 MG tablet Take 2 tablets by mouth daily as needed     Reported, Patient   buPROPion (WELLBUTRIN XL) 150 MG 24 hr tablet Take 2 tablets (300 mg) by mouth every evening 1/15/24   Nancy Ontiveros MD   calcium carbonate-vitamin D 600-200 MG-UNIT TABS Take 1 tablet by mouth every evening     Reported, Patient   escitalopram (LEXAPRO) 20 MG tablet Take 1 tablet by mouth once daily 6/14/24   Lucía Galvan MD   fluticasone (FLONASE) 50 MCG/ACT nasal spray Spray 1 spray into both nostrils daily 6/17/21   Yaya Hernandez " Dakotah Silver PA-C   gabapentin (NEURONTIN) 100 MG capsule Take 2 capsules by mouth once daily 9/27/23   Nancy Ontiveros MD   MULTIVITAMIN TABS   OR Take 1 tablet by mouth daily     Reported, Patient   traZODone (DESYREL) 50 MG tablet Take 1 tablet (50 mg) by mouth At Bedtime May increase to max or 150 mg as needed for insomnia 2/5/23   Lucía Galvan MD   valACYclovir (VALTREX) 1000 mg tablet Take 1 tablet (1,000 mg) by mouth daily 11/14/23   Lucía Galvan MD              Review of Systems:   A 12 point Review of Systems is negative other than noted in the HPI            Physical Exam:   No data found.     No intake or output data in the 24 hours ending 06/17/24 0727   Constitutional:   awake, alert, cooperative, no apparent distress, and appears stated age       Eyes:   PERRL, conjunctiva/corneas clear, EOM's intact; no scleral edema or icterus noted        ENT:   Normocephalic, without obvious abnormality, atraumatic, Lips, mucosa, and tongue normal        Hematologic / Lymphatic:   No lymphadenopathy       Lungs:   Normal respiratory effort, no accessory muscle use, breath sounds bilaterally on auscultation       Cardiovascular:   Regular rate and rhythm       Abdomen:   Soft, nondistended, nontender to palpation       Musculoskeletal:   No obvious swelling, bruising or deformity       Skin:   Skin color and texture normal for patient, no rashes or lesions              Data:          DO Kian Patterson,   General Surgeon  [facility]  Surgery [address] [address]?  Office: [phone]  Employed by - [facility]  Pager: [phone]

## 2024-06-17 NOTE — ANESTHESIA CARE TRANSFER NOTE
Patient: Christina John    Procedure: Procedure(s):  COLONOSCOPY, WITH POLYPECTOMY AND BIOPSY       Diagnosis: Special screening for malignant neoplasms, colon [Z12.11]  Diagnosis Additional Information: No value filed.    Anesthesia Type:   General     Note:    Oropharynx: oropharynx clear of all foreign objects and spontaneously breathing  Level of Consciousness: drowsy  Oxygen Supplementation: nasal cannula  Level of Supplemental Oxygen (L/min / FiO2): 2  Independent Airway: airway patency satisfactory and stable  Dentition: dentition unchanged  Vital Signs Stable: post-procedure vital signs reviewed and stable  Report to RN Given: handoff report given  Patient transferred to: Phase II    Handoff Report: Identifed the Patient, Identified the Reponsible Provider, Reviewed the pertinent medical history, Discussed the surgical course, Reviewed Intra-OP anesthesia mangement and issues during anesthesia, Set expectations for post-procedure period and Allowed opportunity for questions and acknowledgement of understanding    Vitals:  Vitals Value Taken Time   /61 06/17/24 1001   Temp     Pulse 72 06/17/24 1001   Resp     SpO2 96 % 06/17/24 1003   Vitals shown include unfiled device data.    Electronically Signed By: Roby Rodríguez CRNA, APRN CRNA  June 17, 2024  10:03 AM

## 2024-06-17 NOTE — DISCHARGE INSTRUCTIONS
"Learning About Colonoscopy  What is a colonoscopy?     A colonoscopy is a test (also called a procedure) that lets a doctor look inside your large intestine. The doctor uses a thin, lighted tube called a colonoscope. The doctor uses it to look for small growths called polyps, colon or rectal cancer (colorectal cancer), or other problems like bleeding.  During the procedure, the doctor can take samples of tissue. The samples can then be checked for cancer or other conditions. The doctor can also take out polyps.  How is a colonoscopy done?  This procedure is done in a doctor's office or a clinic or hospital. You will get medicine to help you relax and not feel pain. Some people find that they don't remember having the test because of the medicine.  The doctor gently moves the colonoscope, or scope, through the colon. The scope is also a small video camera. It lets the doctor see the colon and take pictures.  How do you prepare for the procedure?  You need to clean out your colon before the procedure so the doctor can see your colon. This depends on which \"colon prep\" your doctor recommends.  To clean out your colon, you'll do a \"colon prep\" before the test. This means you stop eating solid foods and drink only clear liquids. You can have water, tea, coffee, clear juices, clear broths, flavored ice pops, and gelatin (such as Jell-O). Do not drink anything red or purple.  The day or night before the procedure, you drink a large amount of a special liquid. This causes loose, frequent stools. You will go to the bathroom a lot. Your doctor may have you drink part of the liquid the evening before and the rest on the day of the test. It's very important to drink all of the liquid. If you have problems drinking it, call your doctor.  Arrange to have someone take you home after the test.  What can you expect after a colonoscopy?  Your doctor will tell you when you can eat and do your usual activities.  Drink a lot of fluid " "after the test to replace the fluids you may have lost during the colon prep. But don't drink alcohol.  Your doctor will talk to you about when you'll need your next colonoscopy. The results of your test and your risk for colorectal cancer will help your doctor decide how often you need to be checked.  After the test, you may be bloated or have gas pains. You may need to pass gas. If a biopsy was done or a polyp was removed, you may have streaks of blood in your stool (feces) for a few days. Check with your doctor to see when it is safe to take aspirin and nonsteroidal anti-inflammatory drugs (NSAIDs) again.  Problems such as heavy rectal bleeding may not occur until several weeks after the test. This isn't common. But it can happen after polyps are removed.  Follow-up care is a key part of your treatment and safety. Be sure to make and go to all appointments, and call your doctor if you are having problems. It's also a good idea to know your test results and keep a list of the medicines you take.  Where can you learn more?  Go to https://www.[employer].net/patiented  Enter Z368 in the search box to learn more about \"Learning About Colonoscopy.\"  Current as of: October 25, 2023               Content Version: 14.0    [id number] [employer].   Care instructions adapted under license by your healthcare professional. If you have questions about a medical condition or this instruction, always ask your healthcare professional. [employer] disclaims any warranty or liability for your use of this information.      "

## 2024-06-17 NOTE — ANESTHESIA POSTPROCEDURE EVALUATION
Patient: Christina John    Procedure: Procedure(s):  COLONOSCOPY, WITH POLYPECTOMY AND BIOPSY       Anesthesia Type:  General    Note:  Disposition: Outpatient   Postop Pain Control: Uneventful            Sign Out: Well controlled pain   PONV: No   Neuro/Psych: Uneventful            Sign Out: Acceptable/Baseline neuro status   Airway/Respiratory: Uneventful            Sign Out: Acceptable/Baseline resp. status   CV/Hemodynamics: Uneventful            Sign Out: Acceptable CV status; No obvious hypovolemia; No obvious fluid overload   Other NRE: NONE   DID A NON-ROUTINE EVENT OCCUR? No           Last vitals:  Vitals Value Taken Time   /72 06/17/24 1016   Temp 36.2  C (97.2  F) 06/17/24 1001   Pulse 62 06/17/24 1016   Resp 16 06/17/24 1015   SpO2 99 % 06/17/24 1017   Vitals shown include unfiled device data.    Electronically Signed By: Roby Rodríguez CRNA, APRN CRNA  June 17, 2024  10:44 AM

## 2024-06-18 LAB
PATH REPORT.COMMENTS IMP SPEC: NORMAL
PATH REPORT.COMMENTS IMP SPEC: NORMAL
PATH REPORT.FINAL DX SPEC: NORMAL
PATH REPORT.GROSS SPEC: NORMAL
PATH REPORT.MICROSCOPIC SPEC OTHER STN: NORMAL
PATH REPORT.RELEVANT HX SPEC: NORMAL
PHOTO IMAGE: NORMAL

## 2024-06-18 NOTE — RESULT ENCOUNTER NOTE
Called patient with results.Your colonoscopy results show no cancer.  Due to the findings of the polyps, recommendation is to repeat colonoscopy in 7 years.      Kian Grace DO  General Surgeon  [facility]  Surgery [facility] - [address] [address]?  Office: [phone]  Employed by - [employer]  Pager: [phone]

## 2024-07-11 ENCOUNTER — PATIENT OUTREACH (OUTPATIENT)
Dept: CARE COORDINATION | Facility: CLINIC | Age: 59
End: 2024-07-11
Payer: COMMERCIAL

## 2024-07-25 ENCOUNTER — PATIENT OUTREACH (OUTPATIENT)
Dept: CARE COORDINATION | Facility: CLINIC | Age: 59
End: 2024-07-25
Payer: COMMERCIAL

## 2024-10-03 DIAGNOSIS — G62.9 NEUROPATHY: ICD-10-CM

## 2024-10-04 RX ORDER — GABAPENTIN 100 MG/1
200 CAPSULE ORAL DAILY
Qty: 180 CAPSULE | Refills: 0 | Status: SHIPPED | OUTPATIENT
Start: 2024-10-04

## 2024-10-29 ENCOUNTER — HOSPITAL ENCOUNTER (EMERGENCY)
Facility: CLINIC | Age: 59
Discharge: HOME | End: 2024-10-29
Attending: STUDENT IN AN ORGANIZED HEALTH CARE EDUCATION/TRAINING PROGRAM | Admitting: STUDENT IN AN ORGANIZED HEALTH CARE EDUCATION/TRAINING PROGRAM
Payer: COMMERCIAL

## 2024-10-29 ENCOUNTER — APPOINTMENT (OUTPATIENT)
Dept: GENERAL RADIOLOGY | Facility: CLINIC | Age: 59
End: 2024-10-29
Attending: STUDENT IN AN ORGANIZED HEALTH CARE EDUCATION/TRAINING PROGRAM
Payer: COMMERCIAL

## 2024-10-29 VITALS
RESPIRATION RATE: 18 BRPM | OXYGEN SATURATION: 98 % | DIASTOLIC BLOOD PRESSURE: 92 MMHG | HEIGHT: 70 IN | SYSTOLIC BLOOD PRESSURE: 137 MMHG | HEART RATE: 65 BPM | TEMPERATURE: 98.6 F | BODY MASS INDEX: 32.14 KG/M2

## 2024-10-29 DIAGNOSIS — S82.832A CLOSED FRACTURE OF DISTAL END OF LEFT FIBULA, UNSPECIFIED FRACTURE MORPHOLOGY, INITIAL ENCOUNTER: ICD-10-CM

## 2024-10-29 PROCEDURE — 73610 X-RAY EXAM OF ANKLE: CPT | Mod: LT

## 2024-10-29 PROCEDURE — 99284 EMERGENCY DEPT VISIT MOD MDM: CPT | Mod: 25 | Performed by: STUDENT IN AN ORGANIZED HEALTH CARE EDUCATION/TRAINING PROGRAM

## 2024-10-29 PROCEDURE — 250N000013 HC RX MED GY IP 250 OP 250 PS 637: Performed by: STUDENT IN AN ORGANIZED HEALTH CARE EDUCATION/TRAINING PROGRAM

## 2024-10-29 PROCEDURE — 29515 APPLICATION SHORT LEG SPLINT: CPT | Mod: LT | Performed by: STUDENT IN AN ORGANIZED HEALTH CARE EDUCATION/TRAINING PROGRAM

## 2024-10-29 PROCEDURE — 99283 EMERGENCY DEPT VISIT LOW MDM: CPT | Mod: 25 | Performed by: STUDENT IN AN ORGANIZED HEALTH CARE EDUCATION/TRAINING PROGRAM

## 2024-10-29 RX ORDER — HYDROCODONE BITARTRATE AND ACETAMINOPHEN 5; 325 MG/1; MG/1
1 TABLET ORAL EVERY 6 HOURS PRN
Qty: 10 TABLET | Refills: 0 | Status: SHIPPED | OUTPATIENT
Start: 2024-10-29

## 2024-10-29 RX ORDER — HYDROCODONE BITARTRATE AND ACETAMINOPHEN 5; 325 MG/1; MG/1
1 TABLET ORAL ONCE
Status: COMPLETED | OUTPATIENT
Start: 2024-10-29 | End: 2024-10-29

## 2024-10-29 RX ADMIN — HYDROCODONE BITARTRATE AND ACETAMINOPHEN 1 TABLET: 5; 325 TABLET ORAL at 03:21

## 2024-10-29 ASSESSMENT — COLUMBIA-SUICIDE SEVERITY RATING SCALE - C-SSRS
6. HAVE YOU EVER DONE ANYTHING, STARTED TO DO ANYTHING, OR PREPARED TO DO ANYTHING TO END YOUR LIFE?: NO
1. IN THE PAST MONTH, HAVE YOU WISHED YOU WERE DEAD OR WISHED YOU COULD GO TO SLEEP AND NOT WAKE UP?: NO
2. HAVE YOU ACTUALLY HAD ANY THOUGHTS OF KILLING YOURSELF IN THE PAST MONTH?: NO

## 2024-10-29 ASSESSMENT — ACTIVITIES OF DAILY LIVING (ADL)
ADLS_ACUITY_SCORE: 0
ADLS_ACUITY_SCORE: 0

## 2024-10-29 NOTE — ED TRIAGE NOTES
Pt here after a fall this morning in her garage missing a step. Left ankle pain and swelling. Pt cannot bare weight on LLE     Triage Assessment (Adult)       Row Name 10/29/24 0310          Triage Assessment    Airway WDL WDL        Respiratory WDL    Respiratory WDL WDL        Skin Circulation/Temperature WDL    Skin Circulation/Temperature WDL WDL        Cardiac WDL    Cardiac WDL WDL        Peripheral/Neurovascular WDL    Peripheral Neurovascular WDL WDL        Cognitive/Neuro/Behavioral WDL    Cognitive/Neuro/Behavioral WDL WDL

## 2024-10-29 NOTE — PROGRESS NOTES
chief complaint:   Chief Complaint   Patient presents with    Left Ankle - Pain     Left ankle pain. Patient states that she was getting ready for work when she missed the last step and rolled her left ankle yesterday, 10/29/24. Patient was seen in the ED, XR taken and patient fitted with a walking boot and crutches. Patient was provided with hydrocodone for pain. Pain today is 5/10. 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        HISTORY OF PRESENT ILLNESS    Christina John is a 58 year old female, with history of GERD, PCOS, hyperlipidemia, history of breast cancer, chronic kidney disease, deficiency anemia,  seen for evaluation of a left ankle injury that occurred 1 days ago. The injury occurred 10/29/2024 while  she was getting ready for work when she missed the last step and rolled her left ankle. She did not hit her head or sustain any other injuries. She was not been able to bear weight due to pain. Initially could not wiggle her toes, but now she can. She denies weakness or numbness or tingling. She denies foot pain. Denies knee pain. Seen in the ED, xrays of the ankle showed a nondisplaced fracture of the distal fibula. Has been in a boot, still not able to bear weight.    Presents today for management.    Took hydrocodone last night, 1am.        Present symptoms: moderate pain, mild swelling.    Symptoms occur weight bearing .    The frequency of symptoms: are constant.  Denies associated numbness or tingling.   Pain severity: 5/10  Pain quality: aching, sharp, and burning        Other PMH:  has a past medical history of Adhesive capsulitis of left shoulder (6/1/2023), Allergic rhinitis due to animal dander (did IT x 4 yrs. ended 9/06 per self), Allergies, Appendicitis, BRCA1 positive, Breast CA (H), Colon polyp, DCIS (ductal carcinoma in situ), DCIS (ductal carcinoma in situ) of breast (8/08 and 8/10), Depression, Depressive disorder, Desensitization to allergens, Diagnostic skin and  sensitization tests (4/03 skin tests pos. for: cat/dog/DM/T/G/RW), Elevated cholesterol, Endometriosis, Endometriosis, GERD (gastroesophageal reflux disease), GERD (gastroesophageal reflux disease), Glaucoma, History of anesthesia complications, House dust mite allergy, Hyperlipidemia, Hypertension, Hypertension goal BP (blood pressure) < 140/90, Infertility, Infertility, female, Lumbar disc herniation, Mild major depression (H), DAVIE (obstructive sleep apnea), PCOS (polycystic ovarian syndrome), PCOS (polycystic ovarian syndrome), Pelvic relaxation, PONV (postoperative nausea and vomiting), Seasonal allergic conjunctivitis, Seasonal allergic rhinitis, and Ulcers (1992).    She has no past medical history of Cerebral infarction (H), Congestive heart failure (H), COPD (chronic obstructive pulmonary disease) (H), Diabetes (H), Heart disease, History of blood transfusion, Thyroid disease, or Uncomplicated asthma.  Patient Active Problem List    Diagnosis Date Noted    Iron deficiency anemia secondary to inadequate dietary iron intake 08/17/2023     Priority: Medium    Iron deficiency 08/11/2023     Priority: Medium    Iron malabsorption 08/11/2023     Priority: Medium    Chronic kidney disease, stage 3a (H) 08/10/2023     Priority: Medium    Chronic left shoulder pain 05/18/2023     Priority: Medium    History of breast cancer 09/10/2021     Priority: Medium    Recurrent cold sores 09/10/2021     Priority: Medium    History of gastric bypass 06/22/2017     Priority: Medium    BRCA1 positive 05/13/2016     Priority: Medium    Overweight (BMI 25.0-29.9) 05/13/2016     Priority: Medium    History of adenomatous polyp of colon 04/16/2014     Priority: Medium    Diagnostic skin and sensitization tests      Priority: Medium    Desensitization to allergens      Priority: Medium     did IT x 4 yrs. ended 9/06 per self      Major depressive disorder, single episode, mild (H)      Priority: Medium    HYPERLIPIDEMIA LDL GOAL <160  10/31/2010     Priority: Medium    Seasonal allergic rhinitis      Priority: Medium    Allergic rhinitis due to animal dander      Priority: Medium    House dust mite allergy      Priority: Medium    Seasonal allergic conjunctivitis      Priority: Medium    DCIS (ductal carcinoma in situ) of breast      Priority: Medium     RT BREAST - STAGE 2A - GRADE III  Left breast ER+  BRCA1 +      GERD (gastroesophageal reflux disease)      Priority: Medium    PCOS (polycystic ovarian syndrome)      Priority: Medium       Surgical Hx:  has a past surgical history that includes BIOPSY OF BREAST, INCISIONAL (2008); APPENDECTOMY (1982); c/section, classical; excis vaginal cyst/tumor; endoscopy; pelvis laparoscopy,dx; REMOVAL GALLBLADDER (6/2005); COLONOSCOPY THRU STOMA, DIAGNOSTIC (12/04, 12/09); hysterectomy, pap no longer indicated; Mastectomy bilateral, insert tissue expander bilateral, combined (8/2010); Glaucoma surgery; mastectomy, bilateral; Cosmetic surgery (10/13, 09/15); ENT surgery (1973); GI surgery (04/11); appendectomy; gastric bypass; Revise reconstructed breast; Cholecystectomy; Hysterectomy; Mastectomy (Bilateral); other surgical history; Colonoscopy W/ Biopsies And Polypectomy; Glaucoma surgery; Pr Lamnotmy Incl W/Dcmprsn Nrv Root 1 Intrspc Lumbr (Left, 1/8/2021); back surgery (1/2021); and Colonoscopy (N/A, 6/17/2024).    Medications:   Current Outpatient Medications:     acetaminophen (TYLENOL) 500 MG tablet, Take 2 tablets by mouth daily as needed , Disp: , Rfl:     buPROPion (WELLBUTRIN XL) 150 MG 24 hr tablet, Take 2 tablets (300 mg) by mouth every evening, Disp: 180 tablet, Rfl: 3    calcium carbonate-vitamin D 600-200 MG-UNIT TABS, Take 1 tablet by mouth every evening , Disp: , Rfl:     escitalopram (LEXAPRO) 20 MG tablet, Take 1 tablet by mouth once daily, Disp: 90 tablet, Rfl: 3    fluticasone (FLONASE) 50 MCG/ACT nasal spray, Spray 1 spray into both nostrils daily, Disp: 15.8 mL, Rfl: 0    gabapentin  "(NEURONTIN) 100 MG capsule, Take 2 capsules (200 mg) by mouth daily. NEEDS APPOINTMENT, Disp: 180 capsule, Rfl: 0    HYDROcodone-acetaminophen (NORCO) 5-325 MG tablet, Take 1 tablet by mouth every 6 hours as needed for severe pain., Disp: 10 tablet, Rfl: 0    MULTIVITAMIN TABS   OR, Take 1 tablet by mouth daily , Disp: , Rfl:     traZODone (DESYREL) 50 MG tablet, Take 1 tablet (50 mg) by mouth At Bedtime May increase to max or 150 mg as needed for insomnia, Disp: 90 tablet, Rfl: 0    valACYclovir (VALTREX) 1000 mg tablet, Take 1 tablet (1,000 mg) by mouth daily, Disp: 90 tablet, Rfl: 4    Current Facility-Administered Medications:     4 mL ropivacaine (NAROPIN) injection 5 mg/mL, 4 mL, , , Feroz Avalos MD, 4 mL at 05/18/23 1642    betamethasone acet & sod phos (CELESTONE) injection 6 mg, 6 mg, , , Feroz Avalos MD, 6 mg at 05/18/23 1642    Allergies:   Allergies   Allergen Reactions    Amoxicillin Swelling    Cefzil [Cefprozil] Swelling    Influenza Virus Vaccine      \"throat felt funny\"    Adhesive Tape Rash     Steri Strips    Benzocaine Other (See Comments)    Shellfish Allergy Other (See Comments)     numbness to mouth when eating lobster    Biaxin [Clarithromycin] Hives    Erythromycin GI Disturbance    Latex Rash    Latex Rash    Percocet [Acetaminophen] Hives    Sulfa Antibiotics Hives       Social Hx: works retail.  reports that she has never smoked. She has never been exposed to tobacco smoke. She has never used smokeless tobacco. She reports that she does not drink alcohol and does not use drugs.    Family Hx: family history includes Allergies in her father; Breast Cancer in her paternal aunt and other family members; Colon Cancer in her paternal grandfather and other family members; Depression in her mother; Gastrointestinal Disease in her mother and other family members; Heart Disease in her father; Hyperlipidemia in her brother and father; Hypertension in her father and mother; Lipids in " "her father and mother; Obesity in her mother; Osteoporosis in her mother; Other - See Comments in her mother; Other Cancer in her maternal grandfather and maternal grandmother.    REVIEW OF SYSTEMS:    CONSTITUTIONAL:NEGATIVE for fever, chills, change in weight  INTEGUMENTARY/SKIN: NEGATIVE for worrisome rashes, moles or lesions  MUSCULOSKELETAL:See HPI above  NEURO: NEGATIVE for weakness, dizziness or paresthesias    PHYSICAL EXAM:  /76   Pulse 71   Resp 19   Ht 1.797 m (5' 10.75\")   Wt 101.6 kg (224 lb)   LMP 03/28/2010   BMI 31.46 kg/m     GENERAL APPEARANCE: healthy, alert, no distress  SKIN: no suspicious lesions or rashes  NEURO: Normal strength and tone, mentation intact and speech normal  PSYCH:  mentation appears normal and affect normal  RESPIRATORY: No increased work of breathing.    BILATERAL LOWER EXTREMITIES:  Gait: in wheelchair.    Left lower extremity:  Intact sensation deep peroneal nerve, superficial peroneal nerve, med/lat tibial nerve, sural nerve, saphenous nerve  Intact EHL, EDL, TA, FHL, GS, quadriceps hamstrings and hip flexors  Toes warm and well perfused, brisk capillary refill. Palpable 2+ dp pulses.   calf soft and nttp or squeeze.     left ANKLE  Inspection:Swelling:lateral , mild. No ecchymosis.   Tender:ATFL, lateral malleolus  Non-tender:medial malleolus, deltoid ligament, achilles tendon, distal 3rd fibula shaft, mid-fibula shaft, proximal fibula, distal tibia, 5th metatarsal base  Range of Motion:decreased, pain limited.  Strength:grossly intact, pain limited.       X-RAY:  3 views left ankle from 10/29/2024 were reviewed in clinic today. On my review, Multiple views of the left ankle were obtained. Linear lucency in the distal aspect of the fibula, could represent nondisplaced fracture. Postsurgical changes of the 1st metatarsal bone with two screws. .     Impression: 57yo with acute left ankle pain status post injury, nondisplaced left distal fibula " fracture.    Plan:  Nonop fracture management.  Images reviewed, showing nondisplaced fracture. Tend to be stable and can be treated without surgery.  Typically weight bearing with some sort of protective device, boot versus ankle brace, when comfortable from acute injury pain.  Will take 2-3 months to heal.  Rest, ice, elevation, over the counter pain control  Return to clinic 4 weeks, sooner if needed, repeat xrays left ankle.  Advised patient to let us know what she needs for work and we can send a letter through [employer] of fax if given a number. She will talk to her boss about options for work and let us know.    * All questions were addressed and answered prior to discharge from clinic today. The patient acknowledges an understanding of and agreement with the plan set forth during today's visit. Patient was advised to call our office or MyChart us if any further questions arise upon leaving our office today.        Aguilar Wharton M.D., M.S.  Dept. of Orthopaedic Surgery  [facility]

## 2024-10-29 NOTE — DISCHARGE INSTRUCTIONS
You have a nondisplaced fracture of your distal fibula involving your left ankle.  This will be treated with a walking boot.  You should remain nonweightbearing with crutches until cleared by an orthopedic doctor.  Whenever you are resting, keep the extremity elevated, and you can ice it for up to 20 minutes 3 times per day.  Use the prescribed Norco as needed for pain.  You can take Tylenol in between doses of Norco, just be aware that there is a small amount of Tylenol in the pain medication, so make sure you do not take over the maximum daily dose.  The maximum recommended daily dose is 4000 mg in a 24-hour period.  I have referred you to follow-up with orthopedics and they will call you to schedule an appointment.  Return with new or worsening symptoms.

## 2024-10-29 NOTE — ED PROVIDER NOTES
"  History     Chief Complaint   Patient presents with    Ankle Pain     HPI  Christina John is a 58 year old female who has GERD, PCOS, hyperlipidemia, history of breast cancer, chronic kidney disease, deficiency anemia, who presents to the emergency department for evaluation of an ankle injury.  Patient states that she was getting ready for work when she missed the last step and rolled her left ankle.  She did not hit her head or sustain any other injuries.  She has not been able to bear weight due to pain.  Initially could not wiggle her toes, but now she can.  She denies weakness or numbness or tingling.  She denies foot pain.  Denies knee pain.    Allergies:  Allergies   Allergen Reactions    Amoxicillin Swelling    Cefzil [Cefprozil] Swelling    Influenza Virus Vaccine      \"throat felt funny\"    Adhesive Tape Rash     Steri Strips    Benzocaine Other (See Comments)    Shellfish Allergy Other (See Comments)     numbness to mouth when eating lobster    Biaxin [Clarithromycin] Hives    Erythromycin GI Disturbance    Latex Rash    Latex Rash    Percocet [Acetaminophen] Hives    Sulfa Antibiotics Hives       Problem List:    Patient Active Problem List    Diagnosis Date Noted    Iron deficiency anemia secondary to inadequate dietary iron intake 08/17/2023     Priority: Medium    Iron deficiency 08/11/2023     Priority: Medium    Iron malabsorption 08/11/2023     Priority: Medium    Chronic kidney disease, stage 3a (H) 08/10/2023     Priority: Medium    Chronic left shoulder pain 05/18/2023     Priority: Medium    History of breast cancer 09/10/2021     Priority: Medium    Recurrent cold sores 09/10/2021     Priority: Medium    History of gastric bypass 06/22/2017     Priority: Medium    BRCA1 positive 05/13/2016     Priority: Medium    Overweight (BMI 25.0-29.9) 05/13/2016     Priority: Medium    History of adenomatous polyp of colon 04/16/2014     Priority: Medium    Diagnostic skin and sensitization tests      " Priority: Medium    Desensitization to allergens      Priority: Medium     did IT x 4 yrs. ended 9/06 per self      Major depressive disorder, single episode, mild (H)      Priority: Medium    HYPERLIPIDEMIA LDL GOAL <160 10/31/2010     Priority: Medium    Seasonal allergic rhinitis      Priority: Medium    Allergic rhinitis due to animal dander      Priority: Medium    House dust mite allergy      Priority: Medium    Seasonal allergic conjunctivitis      Priority: Medium    DCIS (ductal carcinoma in situ) of breast      Priority: Medium     RT BREAST - STAGE 2A - GRADE III  Left breast ER+  BRCA1 +      GERD (gastroesophageal reflux disease)      Priority: Medium    PCOS (polycystic ovarian syndrome)      Priority: Medium        Past Medical History:    Past Medical History:   Diagnosis Date    Adhesive capsulitis of left shoulder 6/1/2023    Allergic rhinitis due to animal dander did IT x 4 yrs. ended 9/06 per self    Allergies     Appendicitis     BRCA1 positive     Breast CA (H)     Colon polyp     DCIS (ductal carcinoma in situ)     DCIS (ductal carcinoma in situ) of breast 8/08 and 8/10    Depression     Depressive disorder     Desensitization to allergens     Diagnostic skin and sensitization tests 4/03 skin tests pos. for: cat/dog/DM/T/G/RW    Elevated cholesterol     Endometriosis     Endometriosis     GERD (gastroesophageal reflux disease)     GERD (gastroesophageal reflux disease)     Glaucoma     History of anesthesia complications     House dust mite allergy     Hyperlipidemia     Hypertension     Hypertension goal BP (blood pressure) < 140/90     Infertility     Infertility, female     Lumbar disc herniation     Mild major depression (H)     DAVIE (obstructive sleep apnea)     PCOS (polycystic ovarian syndrome)     PCOS (polycystic ovarian syndrome)     Pelvic relaxation     PONV (postoperative nausea and vomiting)     Seasonal allergic conjunctivitis     Seasonal allergic rhinitis     Ulcers 1992        Past Surgical History:    Past Surgical History:   Procedure Laterality Date    APPENDECTOMY      BACK SURGERY  1/2021    Discectomy L5S1    C/SECTION, CLASSICAL      CHOLECYSTECTOMY      COLONOSCOPY N/A 6/17/2024    Procedure: COLONOSCOPY, WITH POLYPECTOMY AND BIOPSY;  Surgeon: Kian Grace DO;  Location: [facility]    COLONOSCOPY W/ BIOPSIES AND POLYPECTOMY      COSMETIC SURGERY  10/13, 09/15    ENDOSCOPY      10/2000 [place], repeat 10/2002, REPEAT 2/08 (REPEAT IN 2 YRS)    ENT SURGERY  1973    EXCIS VAGINAL CYST/TUMOR      GASTRIC BYPASS      GI SURGERY  04/11    GLAUCOMA SURGERY      GLAUCOMA SURGERY      HC REMOVAL GALLBLADDER  6/2005    HYSTERECTOMY      HYSTERECTOMY, PAP NO LONGER INDICATED      MASTECTOMY Bilateral     MASTECTOMY BILATERAL, INSERT TISSUE EXPANDER BILATERAL, COMBINED  8/2010    MASTECTOMY, BILATERAL      OTHER SURGICAL HISTORY      laparoscopy    PELVIS LAPAROSCOPY,DX      x2    CT LAMNOTMY INCL W/DCMPRSN NRV ROOT 1 INTRSPC LUMBR Left 1/8/2021    Procedure: LEFT LUMBAR 5-SACRAL 1 DISCECTOMY;  Surgeon: Vadim Theodore MD;  Location: [facility];  Service: Spine    REVISE RECONSTRUCTED BREAST      [facility] APPENDECTOMY  1982    [facility] BIOPSY OF BREAST, OPEN INCISIONAL  2008    sentinel lymph node    [facility] COLONOSCOPY THRU STOMA, DIAGNOSTIC  12/04, 12/09    (REPEAT IN 5 YRS)       Family History:    Family History   Problem Relation Age of Onset    Hypertension Mother     Gastrointestinal Disease Mother         GAITAN'S ESOPHAGUS    Lipids Mother     Depression Mother     Other - See Comments Mother         Pagets Disease of the Vulva, Dx 06/2014    Osteoporosis Mother     Obesity Mother     Allergies Father         HAYFEVER    Heart Disease Father         AFIB    Hypertension Father     Lipids Father     Hyperlipidemia Father     Gastrointestinal Disease Other         GAITAN'S ESOPHAGUS    Breast Cancer Other     Colon Cancer Other     Other Cancer Maternal Grandmother         Gall bladder     Other Cancer Maternal Grandfather         Esophageal    Colon Cancer Paternal Grandfather     Gastrointestinal Disease Other         GAITAN'S ESOPHAGUS    Breast Cancer Other     Breast Cancer Paternal Aunt     Breast Cancer Other         fathers 1st cousin    Hyperlipidemia Brother     Colon Cancer Other        Social History:  Marital Status:   [2]  Social History     Tobacco Use    Smoking status: Never     Passive exposure: Never    Smokeless tobacco: Never    Tobacco comments:     Nonsmoking household   Vaping Use    Vaping status: Never Used   Substance Use Topics    Alcohol use: No    Drug use: No        Medications:    HYDROcodone-acetaminophen (NORCO) 5-325 MG tablet  acetaminophen (TYLENOL) 500 MG tablet  buPROPion (WELLBUTRIN XL) 150 MG 24 hr tablet  calcium carbonate-vitamin D 600-200 MG-UNIT TABS  escitalopram (LEXAPRO) 20 MG tablet  fluticasone (FLONASE) 50 MCG/ACT nasal spray  gabapentin (NEURONTIN) 100 MG capsule  MULTIVITAMIN TABS   OR  traZODone (DESYREL) 50 MG tablet  valACYclovir (VALTREX) 1000 mg tablet          Review of Systems  See HPI  Physical Exam   BP: (!) 137/92  Pulse: 71  Temp: 98.6  F (37  C)  Resp: 18  SpO2: 98 %      Physical Exam  BP (!) 137/92   Pulse 65   Temp 98.6  F (37  C) (Oral)   Resp 18   LMP 03/28/2010   SpO2 98%   General: alert, interactive, in no apparent distress  Head: atraumatic  Nose: no rhinorrhea or epistaxis  Eyes: Sclera nonicteric. Conjunctiva noninjected.   Neck: moving spontaneously   Lungs: No increased work of breathing.  C  CV: Pedal pulses palpable and symmetric  Musculoskeletal: There is swelling and tenderness on the lateral left ankle.  No swelling or tenderness of the foot.  The left knee is normal.  Skin: no rash or diaphoresis  Neuro: CN II-XII grossly intact, able to wiggle her toes.  Sensation intact to light touch in the left foot    ED Course        Procedures             Critical Care time:  none             Results for orders  placed or performed during the hospital encounter of 10/29/24 (from the past 24 hours)   XR Ankle Left G/E 3 Views    Narrative    EXAM: XR ANKLE LEFT G/E 3 VIEWS  LOCATION: [facility]  DATE: 10/29/2024    INDICATION: Lateral ankle pain and swelling after a fall.  COMPARISON: None available.      Impression    IMPRESSION: Multiple views of the left ankle were obtained. Linear lucency in the distal aspect of the fibula, could represent nondisplaced fracture. Postsurgical changes of the 1st metatarsal bone with two screws.       Medications   HYDROcodone-acetaminophen (NORCO) 5-325 MG per tablet 1 tablet (1 tablet Oral $Given 10/29/24 0321)       Assessments & Plan (with Medical Decision Making)     I have reviewed the nursing notes.    I have reviewed the findings, diagnosis, plan and need for follow up with the patient.          Medical Decision Making  Christina John is a 58 year old female who has GERD, PCOS, hyperlipidemia, history of breast cancer, chronic kidney disease, deficiency anemia, who presents to the emergency department for evaluation of an ankle injury.  Vital signs reviewed notable for mild hypertension otherwise reassuring.  Patient is neurovascularly intact.  She has no other injuries besides the left ankle.  She given Norco for pain.  X-rays obtained and independently reviewed by me and radiology report reviewed.  Patient appears to have a nondisplaced fracture of the distal fibula.  The ankle mortise is intact.  Will treat with a walking boot and have her stay nonweightbearing until cleared by orthopedics.  Anticipatory guidance and reassurance discussed.  She is given a prescription of [place].  We discussed RICE.  Return precautions discussed.        New Prescriptions    HYDROCODONE-ACETAMINOPHEN (NORCO) 5-325 MG TABLET    Take 1 tablet by mouth every 6 hours as needed for severe pain.       Final diagnoses:   Closed fracture of distal end of left fibula, unspecified  fracture morphology, initial encounter       10/29/2024   [facility] EMERGENCY DEPT       Jean Carlos Bhagat MD  10/29/24 3988

## 2024-10-30 ENCOUNTER — OFFICE VISIT (OUTPATIENT)
Dept: ORTHOPEDICS | Facility: CLINIC | Age: 59
End: 2024-10-30
Attending: STUDENT IN AN ORGANIZED HEALTH CARE EDUCATION/TRAINING PROGRAM
Payer: COMMERCIAL

## 2024-10-30 VITALS
RESPIRATION RATE: 19 BRPM | HEIGHT: 71 IN | DIASTOLIC BLOOD PRESSURE: 76 MMHG | WEIGHT: 224 LBS | HEART RATE: 71 BPM | SYSTOLIC BLOOD PRESSURE: 113 MMHG | BODY MASS INDEX: 31.36 KG/M2

## 2024-10-30 DIAGNOSIS — S82.832A CLOSED FRACTURE OF DISTAL END OF LEFT FIBULA, UNSPECIFIED FRACTURE MORPHOLOGY, INITIAL ENCOUNTER: Primary | ICD-10-CM

## 2024-10-30 PROCEDURE — 27786 TREATMENT OF ANKLE FRACTURE: CPT | Mod: LT | Performed by: ORTHOPAEDIC SURGERY

## 2024-10-30 ASSESSMENT — PAIN SCALES - GENERAL: PAINLEVEL_OUTOF10: MODERATE PAIN (5)

## 2024-10-30 NOTE — LETTER
10/30/2024      Christina John  [id number] [facility] [id number]      Dear Colleague,    Thank you for referring your patient, Christina John, to the [facility] [place]. Please see a copy of my visit note below.    chief complaint:   Chief Complaint   Patient presents with     Left Ankle - Pain     Left ankle pain. Patient states that she was getting ready for work when she missed the last step and rolled her left ankle yesterday, 10/29/24. Patient was seen in the ED, XR taken and patient fitted with a walking boot and crutches. Patient was provided with hydrocodone for pain. Pain today is 5/10. 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        HISTORY OF PRESENT ILLNESS    Christina John is a 58 year old female, with history of GERD, PCOS, hyperlipidemia, history of breast cancer, chronic kidney disease, deficiency anemia,  seen for evaluation of a left ankle injury that occurred 1 days ago. The injury occurred 10/29/2024 while  she was getting ready for work when she missed the last step and rolled her left ankle. She did not hit her head or sustain any other injuries. She was not been able to bear weight due to pain. Initially could not wiggle her toes, but now she can. She denies weakness or numbness or tingling. She denies foot pain. Denies knee pain. Seen in the ED, xrays of the ankle showed a nondisplaced fracture of the distal fibula. Has been in a boot, still not able to bear weight.    Presents today for management.    Took hydrocodone last night, 1am.        Present symptoms: moderate pain, mild swelling.    Symptoms occur weight bearing .    The frequency of symptoms: are constant.  Denies associated numbness or tingling.   Pain severity: 5/10  Pain quality: aching, sharp, and burning        Other PMH:  has a past medical history of Adhesive capsulitis of left shoulder (6/1/2023), Allergic rhinitis due to animal dander (did IT x 4 yrs. ended 9/06 per  self), Allergies, Appendicitis, BRCA1 positive, Breast CA (H), Colon polyp, DCIS (ductal carcinoma in situ), DCIS (ductal carcinoma in situ) of breast (8/08 and 8/10), Depression, Depressive disorder, Desensitization to allergens, Diagnostic skin and sensitization tests (4/03 skin tests pos. for: cat/dog/DM/T/G/RW), Elevated cholesterol, Endometriosis, Endometriosis, GERD (gastroesophageal reflux disease), GERD (gastroesophageal reflux disease), Glaucoma, History of anesthesia complications, House dust mite allergy, Hyperlipidemia, Hypertension, Hypertension goal BP (blood pressure) < 140/90, Infertility, Infertility, female, Lumbar disc herniation, Mild major depression (H), DAVIE (obstructive sleep apnea), PCOS (polycystic ovarian syndrome), PCOS (polycystic ovarian syndrome), Pelvic relaxation, PONV (postoperative nausea and vomiting), Seasonal allergic conjunctivitis, Seasonal allergic rhinitis, and Ulcers (1992).    She has no past medical history of Cerebral infarction (H), Congestive heart failure (H), COPD (chronic obstructive pulmonary disease) (H), Diabetes (H), Heart disease, History of blood transfusion, Thyroid disease, or Uncomplicated asthma.  Patient Active Problem List    Diagnosis Date Noted     Iron deficiency anemia secondary to inadequate dietary iron intake 08/17/2023     Priority: Medium     Iron deficiency 08/11/2023     Priority: Medium     Iron malabsorption 08/11/2023     Priority: Medium     Chronic kidney disease, stage 3a (H) 08/10/2023     Priority: Medium     Chronic left shoulder pain 05/18/2023     Priority: Medium     History of breast cancer 09/10/2021     Priority: Medium     Recurrent cold sores 09/10/2021     Priority: Medium     History of gastric bypass 06/22/2017     Priority: Medium     BRCA1 positive 05/13/2016     Priority: Medium     Overweight (BMI 25.0-29.9) 05/13/2016     Priority: Medium     History of adenomatous polyp of colon 04/16/2014     Priority: Medium      Diagnostic skin and sensitization tests      Priority: Medium     Desensitization to allergens      Priority: Medium     did IT x 4 yrs. ended 9/06 per self       Major depressive disorder, single episode, mild (H)      Priority: Medium     HYPERLIPIDEMIA LDL GOAL <160 10/31/2010     Priority: Medium     Seasonal allergic rhinitis      Priority: Medium     Allergic rhinitis due to animal dander      Priority: Medium     House dust mite allergy      Priority: Medium     Seasonal allergic conjunctivitis      Priority: Medium     DCIS (ductal carcinoma in situ) of breast      Priority: Medium     RT BREAST - STAGE 2A - GRADE III  Left breast ER+  BRCA1 +       GERD (gastroesophageal reflux disease)      Priority: Medium     PCOS (polycystic ovarian syndrome)      Priority: Medium       Surgical Hx:  has a past surgical history that includes BIOPSY OF BREAST, INCISIONAL (2008); APPENDECTOMY (1982); c/section, classical; excis vaginal cyst/tumor; endoscopy; pelvis laparoscopy,dx; REMOVAL GALLBLADDER (6/2005); COLONOSCOPY THRU STOMA, DIAGNOSTIC (12/04, 12/09); hysterectomy, pap no longer indicated; Mastectomy bilateral, insert tissue expander bilateral, combined (8/2010); Glaucoma surgery; mastectomy, bilateral; Cosmetic surgery (10/13, 09/15); ENT surgery (1973); GI surgery (04/11); appendectomy; gastric bypass; Revise reconstructed breast; Cholecystectomy; Hysterectomy; Mastectomy (Bilateral); other surgical history; Colonoscopy W/ Biopsies And Polypectomy; Glaucoma surgery; Pr Lamnotmy Incl W/Dcmprsn Nrv Root 1 Intrspc Lumbr (Left, 1/8/2021); back surgery (1/2021); and Colonoscopy (N/A, 6/17/2024).    Medications:   Current Outpatient Medications:      acetaminophen (TYLENOL) 500 MG tablet, Take 2 tablets by mouth daily as needed , Disp: , Rfl:      buPROPion (WELLBUTRIN XL) 150 MG 24 hr tablet, Take 2 tablets (300 mg) by mouth every evening, Disp: 180 tablet, Rfl: 3     calcium carbonate-vitamin D 600-200 MG-UNIT  "TABS, Take 1 tablet by mouth every evening , Disp: , Rfl:      escitalopram (LEXAPRO) 20 MG tablet, Take 1 tablet by mouth once daily, Disp: 90 tablet, Rfl: 3     fluticasone (FLONASE) 50 MCG/ACT nasal spray, Spray 1 spray into both nostrils daily, Disp: 15.8 mL, Rfl: 0     gabapentin (NEURONTIN) 100 MG capsule, Take 2 capsules (200 mg) by mouth daily. NEEDS APPOINTMENT, Disp: 180 capsule, Rfl: 0     HYDROcodone-acetaminophen (NORCO) 5-325 MG tablet, Take 1 tablet by mouth every 6 hours as needed for severe pain., Disp: 10 tablet, Rfl: 0     MULTIVITAMIN TABS   OR, Take 1 tablet by mouth daily , Disp: , Rfl:      traZODone (DESYREL) 50 MG tablet, Take 1 tablet (50 mg) by mouth At Bedtime May increase to max or 150 mg as needed for insomnia, Disp: 90 tablet, Rfl: 0     valACYclovir (VALTREX) 1000 mg tablet, Take 1 tablet (1,000 mg) by mouth daily, Disp: 90 tablet, Rfl: 4    Current Facility-Administered Medications:      4 mL ropivacaine (NAROPIN) injection 5 mg/mL, 4 mL, , , Feroz Avalos MD, 4 mL at 05/18/23 1642     betamethasone acet & sod phos (CELESTONE) injection 6 mg, 6 mg, , , Feroz Avalos MD, 6 mg at 05/18/23 1642    Allergies:   Allergies   Allergen Reactions     Amoxicillin Swelling     Cefzil [Cefprozil] Swelling     Influenza Virus Vaccine      \"throat felt funny\"     Adhesive Tape Rash     Steri Strips     Benzocaine Other (See Comments)     Shellfish Allergy Other (See Comments)     numbness to mouth when eating lobster     Biaxin [Clarithromycin] Hives     Erythromycin GI Disturbance     Latex Rash     Latex Rash     Percocet [Acetaminophen] Hives     Sulfa Antibiotics Hives       Social Hx: works retail.  reports that she has never smoked. She has never been exposed to tobacco smoke. She has never used smokeless tobacco. She reports that she does not drink alcohol and does not use drugs.    Family Hx: family history includes Allergies in her father; Breast Cancer in her paternal aunt " "and other family members; Colon Cancer in her paternal grandfather and other family members; Depression in her mother; Gastrointestinal Disease in her mother and other family members; Heart Disease in her father; Hyperlipidemia in her brother and father; Hypertension in her father and mother; Lipids in her father and mother; Obesity in her mother; Osteoporosis in her mother; Other - See Comments in her mother; Other Cancer in her maternal grandfather and maternal grandmother.    REVIEW OF SYSTEMS:    CONSTITUTIONAL:NEGATIVE for fever, chills, change in weight  INTEGUMENTARY/SKIN: NEGATIVE for worrisome rashes, moles or lesions  MUSCULOSKELETAL:See HPI above  NEURO: NEGATIVE for weakness, dizziness or paresthesias    PHYSICAL EXAM:  /76   Pulse 71   Resp 19   Ht 1.797 m (5' 10.75\")   Wt 101.6 kg (224 lb)   LMP 03/28/2010   BMI 31.46 kg/m     GENERAL APPEARANCE: healthy, alert, no distress  SKIN: no suspicious lesions or rashes  NEURO: Normal strength and tone, mentation intact and speech normal  PSYCH:  mentation appears normal and affect normal  RESPIRATORY: No increased work of breathing.    BILATERAL LOWER EXTREMITIES:  Gait: in wheelchair.    Left lower extremity:  Intact sensation deep peroneal nerve, superficial peroneal nerve, med/lat tibial nerve, sural nerve, saphenous nerve  Intact EHL, EDL, TA, FHL, GS, quadriceps hamstrings and hip flexors  Toes warm and well perfused, brisk capillary refill. Palpable 2+ dp pulses.   calf soft and nttp or squeeze.     left ANKLE  Inspection:Swelling:lateral , mild. No ecchymosis.   Tender:ATFL, lateral malleolus  Non-tender:medial malleolus, deltoid ligament, achilles tendon, distal 3rd fibula shaft, mid-fibula shaft, proximal fibula, distal tibia, 5th metatarsal base  Range of Motion:decreased, pain limited.  Strength:grossly intact, pain limited.       X-RAY:  3 views left ankle from 10/29/2024 were reviewed in clinic today. On my review, Multiple views of " the left ankle were obtained. Linear lucency in the distal aspect of the fibula, could represent nondisplaced fracture. Postsurgical changes of the 1st metatarsal bone with two screws. .     Impression: 57yo with acute left ankle pain status post injury, nondisplaced left distal fibula fracture.    Plan:  Nonop fracture management.  Images reviewed, showing nondisplaced fracture. Tend to be stable and can be treated without surgery.  Typically weight bearing with some sort of protective device, boot versus ankle brace, when comfortable from acute injury pain.  Will take 2-3 months to heal.  Rest, ice, elevation, over the counter pain control  Return to clinic 4 weeks, sooner if needed, repeat xrays left ankle.  Advised patient to let us know what she needs for work and we can send a letter through [employer] of fax if given a number. She will talk to her boss about options for work and let us know.    * All questions were addressed and answered prior to discharge from clinic today. The patient acknowledges an understanding of and agreement with the plan set forth during today's visit. Patient was advised to call our office or [facility] us if any further questions arise upon leaving our office today.        Aguilar Wharton M.D., M.S.  Dept. of Orthopaedic Surgery  [facility]           Again, thank you for allowing me to participate in the care of your patient.        Sincerely,        Aguilar Wharton MD

## 2024-11-03 ENCOUNTER — HEALTH MAINTENANCE LETTER (OUTPATIENT)
Age: 59
End: 2024-11-03

## 2024-11-21 NOTE — PROGRESS NOTES
chief complaint:   Chief Complaint   Patient presents with    Left Ankle - Fracture     DOI 10/29/24, 4 wk s/p. Patient notes her ankle is doing good. She started to weight bear in boot for the last 2.5 weeks, going good. She sleeps with it elevated nightly. No issues or concerns.     INJURY: left ankle Peacock A ankle fracture, nondisplaced.  DATE of INJURY: 10/29/2024        HISTORY OF PRESENT ILLNESS    Christina John is a 58 year old female, whom presents for followup of left ankle injury 4 weeks ago. Returns today doing well, walking in the boot the past 2.5 weeks. Elevates at night. No concerns..    The injury occurred 10/29/2024 while  she was getting ready for work when she missed the last step and rolled her left ankle. She did not hit her head or sustain any other injuries. She was not been able to bear weight due to pain. Initially could not wiggle her toes, but now she can. She denies weakness or numbness or tingling. She denies foot pain. Denies knee pain. Seen in the ED, xrays of the ankle showed a nondisplaced fracture of the distal fibula.          Pain 2/10.       Other PMH:  has a past medical history of Adhesive capsulitis of left shoulder (6/1/2023), Allergic rhinitis due to animal dander (did IT x 4 yrs. ended 9/06 per self), Allergies, Appendicitis, BRCA1 positive, Breast CA (H), Colon polyp, DCIS (ductal carcinoma in situ), DCIS (ductal carcinoma in situ) of breast (8/08 and 8/10), Depression, Depressive disorder, Desensitization to allergens, Diagnostic skin and sensitization tests (4/03 skin tests pos. for: cat/dog/DM/T/G/RW), Elevated cholesterol, Endometriosis, Endometriosis, GERD (gastroesophageal reflux disease), GERD (gastroesophageal reflux disease), Glaucoma, History of anesthesia complications, House dust mite allergy, Hyperlipidemia, Hypertension, Hypertension goal BP (blood pressure) < 140/90, Infertility, Infertility, female, Lumbar disc herniation, Mild major depression (H), DAVIE  (obstructive sleep apnea), PCOS (polycystic ovarian syndrome), PCOS (polycystic ovarian syndrome), Pelvic relaxation, PONV (postoperative nausea and vomiting), Seasonal allergic conjunctivitis, Seasonal allergic rhinitis, and Ulcers (1992).    She has no past medical history of Cerebral infarction (H), Congestive heart failure (H), COPD (chronic obstructive pulmonary disease) (H), Diabetes (H), Heart disease, History of blood transfusion, Thyroid disease, or Uncomplicated asthma.  Patient Active Problem List    Diagnosis Date Noted    Iron deficiency anemia secondary to inadequate dietary iron intake 08/17/2023     Priority: Medium    Iron deficiency 08/11/2023     Priority: Medium    Iron malabsorption 08/11/2023     Priority: Medium    Chronic kidney disease, stage 3a (H) 08/10/2023     Priority: Medium    Chronic left shoulder pain 05/18/2023     Priority: Medium    History of breast cancer 09/10/2021     Priority: Medium    Recurrent cold sores 09/10/2021     Priority: Medium    History of gastric bypass 06/22/2017     Priority: Medium    BRCA1 positive 05/13/2016     Priority: Medium    Overweight (BMI 25.0-29.9) 05/13/2016     Priority: Medium    History of adenomatous polyp of colon 04/16/2014     Priority: Medium    Diagnostic skin and sensitization tests      Priority: Medium    Desensitization to allergens      Priority: Medium     did IT x 4 yrs. ended 9/06 per self      Major depressive disorder, single episode, mild (H)      Priority: Medium    HYPERLIPIDEMIA LDL GOAL <160 10/31/2010     Priority: Medium    Seasonal allergic rhinitis      Priority: Medium    Allergic rhinitis due to animal dander      Priority: Medium    House dust mite allergy      Priority: Medium    Seasonal allergic conjunctivitis      Priority: Medium    DCIS (ductal carcinoma in situ) of breast      Priority: Medium     RT BREAST - STAGE 2A - GRADE III  Left breast ER+  BRCA1 +      GERD (gastroesophageal reflux disease)       Priority: Medium    PCOS (polycystic ovarian syndrome)      Priority: Medium       Surgical Hx:  has a past surgical history that includes BIOPSY OF BREAST, INCISIONAL (2008); APPENDECTOMY (1982); c/section, classical; excis vaginal cyst/tumor; endoscopy; pelvis laparoscopy,dx; REMOVAL GALLBLADDER (6/2005); COLONOSCOPY THRU STOMA, DIAGNOSTIC (12/04, 12/09); hysterectomy, pap no longer indicated; Mastectomy bilateral, insert tissue expander bilateral, combined (8/2010); Glaucoma surgery; mastectomy, bilateral; Cosmetic surgery (10/13, 09/15); ENT surgery (1973); GI surgery (04/11); appendectomy; gastric bypass; Revise reconstructed breast; Cholecystectomy; Hysterectomy; Mastectomy (Bilateral); other surgical history; Colonoscopy W/ Biopsies And Polypectomy; Glaucoma surgery; Pr Lamnotmy Incl W/Dcmprsn Nrv Root 1 Intrspc Lumbr (Left, 1/8/2021); back surgery (1/2021); and Colonoscopy (N/A, 6/17/2024).    Medications:   Current Outpatient Medications:     acetaminophen (TYLENOL) 500 MG tablet, Take 2 tablets by mouth daily as needed , Disp: , Rfl:     buPROPion (WELLBUTRIN XL) 150 MG 24 hr tablet, Take 2 tablets (300 mg) by mouth every evening, Disp: 180 tablet, Rfl: 3    calcium carbonate-vitamin D 600-200 MG-UNIT TABS, Take 1 tablet by mouth every evening , Disp: , Rfl:     escitalopram (LEXAPRO) 20 MG tablet, Take 1 tablet by mouth once daily, Disp: 90 tablet, Rfl: 3    fluticasone (FLONASE) 50 MCG/ACT nasal spray, Spray 1 spray into both nostrils daily, Disp: 15.8 mL, Rfl: 0    gabapentin (NEURONTIN) 100 MG capsule, Take 2 capsules (200 mg) by mouth daily. NEEDS APPOINTMENT, Disp: 180 capsule, Rfl: 0    HYDROcodone-acetaminophen (NORCO) 5-325 MG tablet, Take 1 tablet by mouth every 6 hours as needed for severe pain., Disp: 10 tablet, Rfl: 0    MULTIVITAMIN TABS   OR, Take 1 tablet by mouth daily , Disp: , Rfl:     traZODone (DESYREL) 50 MG tablet, Take 1 tablet (50 mg) by mouth At Bedtime May increase to max or 150  "mg as needed for insomnia, Disp: 90 tablet, Rfl: 0    valACYclovir (VALTREX) 1000 mg tablet, Take 1 tablet (1,000 mg) by mouth daily, Disp: 90 tablet, Rfl: 4    Current Facility-Administered Medications:     4 mL ropivacaine (NAROPIN) injection 5 mg/mL, 4 mL, , , Feroz Avalos MD, 4 mL at 05/18/23 1642    betamethasone acet & sod phos (CELESTONE) injection 6 mg, 6 mg, , , Feroz Avalos MD, 6 mg at 05/18/23 1642    Allergies:   Allergies   Allergen Reactions    Amoxicillin Swelling    Cefzil [Cefprozil] Swelling    Influenza Virus Vaccine      \"throat felt funny\"    Adhesive Tape Rash     Steri Strips    Benzocaine Other (See Comments)    Shellfish Allergy Other (See Comments)     numbness to mouth when eating lobster    Biaxin [Clarithromycin] Hives    Erythromycin GI Disturbance    Latex Rash    Latex Rash    Percocet [Acetaminophen] Hives    Sulfa Antibiotics Hives       Social Hx: works retail.  reports that she has never smoked. She has never been exposed to tobacco smoke. She has never used smokeless tobacco. She reports that she does not drink alcohol and does not use drugs.    Family Hx: family history includes Allergies in her father; Breast Cancer in her paternal aunt and other family members; Colon Cancer in her paternal grandfather and other family members; Depression in her mother; Gastrointestinal Disease in her mother and other family members; Heart Disease in her father; Hyperlipidemia in her brother and father; Hypertension in her father and mother; Lipids in her father and mother; Obesity in her mother; Osteoporosis in her mother; Other - See Comments in her mother; Other Cancer in her maternal grandfather and maternal grandmother.    REVIEW OF SYSTEMS:    CONSTITUTIONAL:NEGATIVE for fever, chills, change in weight  INTEGUMENTARY/SKIN: NEGATIVE for worrisome rashes, moles or lesions  MUSCULOSKELETAL:See HPI above  NEURO: NEGATIVE for weakness, dizziness or paresthesias    PHYSICAL " "EXAM:  /86   Pulse 92   Ht 1.797 m (5' 10.75\")   LMP 03/28/2010   BMI 30.06 kg/m     GENERAL APPEARANCE: healthy, alert, no distress  SKIN: no suspicious lesions or rashes  NEURO: Normal strength and tone, mentation intact and speech normal  PSYCH:  mentation appears normal and affect normal  RESPIRATORY: No increased work of breathing.    BILATERAL LOWER EXTREMITIES:  Gait: favors left in boot.    Left lower extremity:    calf soft and nttp or squeeze.     left ANKLE  Inspection:Swelling:lateral , mild.    Tender:ATFL, lateral malleolus  Non-tender:medial malleolus, deltoid ligament, achilles tendon, distal 3rd fibula shaft, mid-fibula shaft, proximal fibula, distal tibia, 5th metatarsal base  Range of Motion:decreased   Strength:grossly intact       X-RAY:  3 views left ankle from 11/27/2024 were reviewed in clinic today. Decreased Linear lucency in the distal aspect of the fibula, interval sclerosis.  Postsurgical changes of the 1st metatarsal bone with two screws. .     Impression: 59yo with acute left ankle pain status post injury, nondisplaced left distal fibula fracture.    Plan:  Nonop fracture management.  Images reviewed, showing stable alignment of nondisplaced fracture. Early healing noted with sclerosis, decreased fracture lucency.  Again these fractures Tend to be stable and can be treated without surgery.  Progress weight bearing in ankle brace, provided today. Work on range of motion.  Will take 2-3 months to heal.  Rest, ice, elevation, over the counter pain control  Return to clinic 4 weeks, sooner if needed, repeat xrays left ankle.  Advised patient to let us know what she needs for work and we can send a letter through [employer] of fax if given a number. She will talk to her boss about options for work and let us know.    * All questions were addressed and answered prior to discharge from clinic today. The patient acknowledges an understanding of and agreement with the plan set forth " during today's visit. Patient was advised to call our office or MyChart us if any further questions arise upon leaving our office today.        Aguilar Wharton M.D., M.S.  Dept. of Orthopaedic Surgery  [facility]

## 2024-11-22 SDOH — HEALTH STABILITY: PHYSICAL HEALTH: ON AVERAGE, HOW MANY MINUTES DO YOU ENGAGE IN EXERCISE AT THIS LEVEL?: 0 MIN

## 2024-11-22 SDOH — HEALTH STABILITY: PHYSICAL HEALTH: ON AVERAGE, HOW MANY DAYS PER WEEK DO YOU ENGAGE IN MODERATE TO STRENUOUS EXERCISE (LIKE A BRISK WALK)?: 0 DAYS

## 2024-11-22 ASSESSMENT — SOCIAL DETERMINANTS OF HEALTH (SDOH): HOW OFTEN DO YOU GET TOGETHER WITH FRIENDS OR RELATIVES?: NEVER

## 2024-11-26 ENCOUNTER — OFFICE VISIT (OUTPATIENT)
Dept: FAMILY MEDICINE | Facility: CLINIC | Age: 59
End: 2024-11-26
Payer: COMMERCIAL

## 2024-11-26 VITALS
BODY MASS INDEX: 29.96 KG/M2 | OXYGEN SATURATION: 99 % | RESPIRATION RATE: 14 BRPM | TEMPERATURE: 97.1 F | HEART RATE: 62 BPM | WEIGHT: 214 LBS | SYSTOLIC BLOOD PRESSURE: 114 MMHG | HEIGHT: 71 IN | DIASTOLIC BLOOD PRESSURE: 84 MMHG

## 2024-11-26 DIAGNOSIS — Z86.39 HISTORY OF IRON DEFICIENCY: ICD-10-CM

## 2024-11-26 DIAGNOSIS — R79.89 ELEVATED PARATHYROID HORMONE: ICD-10-CM

## 2024-11-26 DIAGNOSIS — Z98.84 HISTORY OF GASTRIC BYPASS: ICD-10-CM

## 2024-11-26 DIAGNOSIS — E78.5 HYPERLIPIDEMIA LDL GOAL <100: ICD-10-CM

## 2024-11-26 DIAGNOSIS — H10.10 SEASONAL ALLERGIC CONJUNCTIVITIS: ICD-10-CM

## 2024-11-26 DIAGNOSIS — Z00.00 ROUTINE GENERAL MEDICAL EXAMINATION AT A HEALTH CARE FACILITY: Primary | ICD-10-CM

## 2024-11-26 DIAGNOSIS — F32.0 MAJOR DEPRESSIVE DISORDER, SINGLE EPISODE, MILD (H): ICD-10-CM

## 2024-11-26 DIAGNOSIS — N18.31 CHRONIC KIDNEY DISEASE, STAGE 3A (H): ICD-10-CM

## 2024-11-26 DIAGNOSIS — R73.03 PREDIABETES: ICD-10-CM

## 2024-11-26 DIAGNOSIS — B00.1 RECURRENT COLD SORES: ICD-10-CM

## 2024-11-26 PROBLEM — E61.1 IRON DEFICIENCY: Status: RESOLVED | Noted: 2023-08-11 | Resolved: 2024-11-26

## 2024-11-26 LAB
ANION GAP SERPL CALCULATED.3IONS-SCNC: 8 MMOL/L (ref 7–15)
BUN SERPL-MCNC: 20.4 MG/DL (ref 6–20)
CALCIUM SERPL-MCNC: 9.6 MG/DL (ref 8.8–10.4)
CHLORIDE SERPL-SCNC: 105 MMOL/L (ref 98–107)
CHOLEST SERPL-MCNC: 185 MG/DL
CREAT SERPL-MCNC: 0.98 MG/DL (ref 0.51–0.95)
CREAT UR-MCNC: 140.2 MG/DL
EGFRCR SERPLBLD CKD-EPI 2021: 67 ML/MIN/1.73M2
ERYTHROCYTE [DISTWIDTH] IN BLOOD BY AUTOMATED COUNT: 13.2 % (ref 10–15)
EST. AVERAGE GLUCOSE BLD GHB EST-MCNC: 123 MG/DL
FASTING STATUS PATIENT QL REPORTED: YES
FASTING STATUS PATIENT QL REPORTED: YES
FERRITIN SERPL-MCNC: 152 NG/ML (ref 11–328)
GLUCOSE SERPL-MCNC: 96 MG/DL (ref 70–99)
HBA1C MFR BLD: 5.9 % (ref 0–5.6)
HCO3 SERPL-SCNC: 29 MMOL/L (ref 22–29)
HCT VFR BLD AUTO: 41.7 % (ref 35–47)
HDLC SERPL-MCNC: 59 MG/DL
HGB BLD-MCNC: 13.2 G/DL (ref 11.7–15.7)
IRON BINDING CAPACITY (ROCHE): 358 UG/DL (ref 240–430)
IRON SATN MFR SERPL: 18 % (ref 15–46)
IRON SERPL-MCNC: 64 UG/DL (ref 37–145)
LDLC SERPL CALC-MCNC: 105 MG/DL
MCH RBC QN AUTO: 31.1 PG (ref 26.5–33)
MCHC RBC AUTO-ENTMCNC: 31.7 G/DL (ref 31.5–36.5)
MCV RBC AUTO: 98 FL (ref 78–100)
MICROALBUMIN UR-MCNC: <12 MG/L
MICROALBUMIN/CREAT UR: NORMAL MG/G{CREAT}
NONHDLC SERPL-MCNC: 126 MG/DL
PLATELET # BLD AUTO: 264 10E3/UL (ref 150–450)
POTASSIUM SERPL-SCNC: 4.9 MMOL/L (ref 3.4–5.3)
PTH-INTACT SERPL-MCNC: 84 PG/ML (ref 15–65)
RBC # BLD AUTO: 4.25 10E6/UL (ref 3.8–5.2)
SODIUM SERPL-SCNC: 142 MMOL/L (ref 135–145)
TRIGL SERPL-MCNC: 105 MG/DL
WBC # BLD AUTO: 8.5 10E3/UL (ref 4–11)

## 2024-11-26 PROCEDURE — 83036 HEMOGLOBIN GLYCOSYLATED A1C: CPT | Performed by: STUDENT IN AN ORGANIZED HEALTH CARE EDUCATION/TRAINING PROGRAM

## 2024-11-26 PROCEDURE — 82728 ASSAY OF FERRITIN: CPT | Performed by: STUDENT IN AN ORGANIZED HEALTH CARE EDUCATION/TRAINING PROGRAM

## 2024-11-26 PROCEDURE — 90471 IMMUNIZATION ADMIN: CPT | Performed by: STUDENT IN AN ORGANIZED HEALTH CARE EDUCATION/TRAINING PROGRAM

## 2024-11-26 PROCEDURE — 83970 ASSAY OF PARATHORMONE: CPT | Performed by: STUDENT IN AN ORGANIZED HEALTH CARE EDUCATION/TRAINING PROGRAM

## 2024-11-26 PROCEDURE — 90746 HEPB VACCINE 3 DOSE ADULT IM: CPT | Performed by: STUDENT IN AN ORGANIZED HEALTH CARE EDUCATION/TRAINING PROGRAM

## 2024-11-26 PROCEDURE — 80048 BASIC METABOLIC PNL TOTAL CA: CPT | Performed by: STUDENT IN AN ORGANIZED HEALTH CARE EDUCATION/TRAINING PROGRAM

## 2024-11-26 PROCEDURE — 82043 UR ALBUMIN QUANTITATIVE: CPT | Performed by: STUDENT IN AN ORGANIZED HEALTH CARE EDUCATION/TRAINING PROGRAM

## 2024-11-26 PROCEDURE — 80061 LIPID PANEL: CPT | Performed by: STUDENT IN AN ORGANIZED HEALTH CARE EDUCATION/TRAINING PROGRAM

## 2024-11-26 PROCEDURE — 36415 COLL VENOUS BLD VENIPUNCTURE: CPT | Performed by: STUDENT IN AN ORGANIZED HEALTH CARE EDUCATION/TRAINING PROGRAM

## 2024-11-26 PROCEDURE — 82570 ASSAY OF URINE CREATININE: CPT | Performed by: STUDENT IN AN ORGANIZED HEALTH CARE EDUCATION/TRAINING PROGRAM

## 2024-11-26 PROCEDURE — 90472 IMMUNIZATION ADMIN EACH ADD: CPT | Performed by: STUDENT IN AN ORGANIZED HEALTH CARE EDUCATION/TRAINING PROGRAM

## 2024-11-26 PROCEDURE — 83550 IRON BINDING TEST: CPT | Performed by: STUDENT IN AN ORGANIZED HEALTH CARE EDUCATION/TRAINING PROGRAM

## 2024-11-26 PROCEDURE — 85027 COMPLETE CBC AUTOMATED: CPT | Performed by: STUDENT IN AN ORGANIZED HEALTH CARE EDUCATION/TRAINING PROGRAM

## 2024-11-26 PROCEDURE — 99214 OFFICE O/P EST MOD 30 MIN: CPT | Mod: 25 | Performed by: STUDENT IN AN ORGANIZED HEALTH CARE EDUCATION/TRAINING PROGRAM

## 2024-11-26 PROCEDURE — 83540 ASSAY OF IRON: CPT | Performed by: STUDENT IN AN ORGANIZED HEALTH CARE EDUCATION/TRAINING PROGRAM

## 2024-11-26 PROCEDURE — 99396 PREV VISIT EST AGE 40-64: CPT | Mod: 25 | Performed by: STUDENT IN AN ORGANIZED HEALTH CARE EDUCATION/TRAINING PROGRAM

## 2024-11-26 PROCEDURE — 90750 HZV VACC RECOMBINANT IM: CPT | Performed by: STUDENT IN AN ORGANIZED HEALTH CARE EDUCATION/TRAINING PROGRAM

## 2024-11-26 RX ORDER — LOTEPREDNOL ETABONATE 2 MG/ML
1 SUSPENSION/ DROPS OPHTHALMIC
COMMUNITY
Start: 2024-06-06 | End: 2024-11-26

## 2024-11-26 RX ORDER — VALACYCLOVIR HYDROCHLORIDE 1 G/1
1000 TABLET, FILM COATED ORAL DAILY
Qty: 90 TABLET | Refills: 4 | Status: SHIPPED | OUTPATIENT
Start: 2024-11-26

## 2024-11-26 RX ORDER — LOTEPREDNOL ETABONATE 2 MG/ML
1 SUSPENSION/ DROPS OPHTHALMIC 4 TIMES DAILY
Qty: 5 ML | Refills: 3 | Status: SHIPPED | OUTPATIENT
Start: 2024-11-26

## 2024-11-26 RX ORDER — BUPROPION HYDROCHLORIDE 300 MG/1
300 TABLET ORAL EVERY EVENING
Qty: 90 TABLET | Refills: 3 | Status: SHIPPED | OUTPATIENT
Start: 2024-11-26

## 2024-11-26 ASSESSMENT — ANXIETY QUESTIONNAIRES
GAD7 TOTAL SCORE: 7
3. WORRYING TOO MUCH ABOUT DIFFERENT THINGS: SEVERAL DAYS
GAD7 TOTAL SCORE: 7
7. FEELING AFRAID AS IF SOMETHING AWFUL MIGHT HAPPEN: NOT AT ALL
4. TROUBLE RELAXING: MORE THAN HALF THE DAYS
7. FEELING AFRAID AS IF SOMETHING AWFUL MIGHT HAPPEN: NOT AT ALL
1. FEELING NERVOUS, ANXIOUS, OR ON EDGE: SEVERAL DAYS
IF YOU CHECKED OFF ANY PROBLEMS ON THIS QUESTIONNAIRE, HOW DIFFICULT HAVE THESE PROBLEMS MADE IT FOR YOU TO DO YOUR WORK, TAKE CARE OF THINGS AT HOME, OR GET ALONG WITH OTHER PEOPLE: SOMEWHAT DIFFICULT
GAD7 TOTAL SCORE: 7
5. BEING SO RESTLESS THAT IT IS HARD TO SIT STILL: NOT AT ALL
6. BECOMING EASILY ANNOYED OR IRRITABLE: MORE THAN HALF THE DAYS
2. NOT BEING ABLE TO STOP OR CONTROL WORRYING: SEVERAL DAYS
8. IF YOU CHECKED OFF ANY PROBLEMS, HOW DIFFICULT HAVE THESE MADE IT FOR YOU TO DO YOUR WORK, TAKE CARE OF THINGS AT HOME, OR GET ALONG WITH OTHER PEOPLE?: SOMEWHAT DIFFICULT

## 2024-11-26 ASSESSMENT — PATIENT HEALTH QUESTIONNAIRE - PHQ9
SUM OF ALL RESPONSES TO PHQ QUESTIONS 1-9: 12
SUM OF ALL RESPONSES TO PHQ QUESTIONS 1-9: 12
10. IF YOU CHECKED OFF ANY PROBLEMS, HOW DIFFICULT HAVE THESE PROBLEMS MADE IT FOR YOU TO DO YOUR WORK, TAKE CARE OF THINGS AT HOME, OR GET ALONG WITH OTHER PEOPLE: SOMEWHAT DIFFICULT

## 2024-11-26 ASSESSMENT — PAIN SCALES - GENERAL: PAINLEVEL_OUTOF10: NO PAIN (0)

## 2024-11-26 NOTE — PROGRESS NOTES
"Preventive Care Visit  [facility]  Nancy Ontiveros MD, Family Medicine  Nov 26, 2024      Assessment & Plan     Routine general medical examination at a health care facility  Patient is a very pleasant 58-year-old female presents today for annual visit.  History of gastric bypass, history of iron deficiency because of this.  She has a history of breast cancer, status postmastectomy.  Also status post hysterectomy.  Will plan colon cancer screening every 5 to 7 years, completed earlier this year.    Chronic kidney disease, stage 3a (H)  Due for renal function checks which are obtained today.  - Albumin Random Urine Quantitative with Creat Ratio  - BASIC METABOLIC PANEL    Major depressive disorder, single episode, mild (H)  Refill  - buPROPion (WELLBUTRIN XL) 300 MG 24 hr tablet  Dispense: 90 tablet; Refill: 3    Recurrent cold sores  Refill  - valACYclovir (VALTREX) 1000 mg tablet  Dispense: 90 tablet; Refill: 4    Hyperlipidemia LDL goal <100  Due for cholesterol check  - Lipid panel reflex to direct LDL Fasting    Seasonal allergic conjunctivitis  Refill  - ALREX 0.2 % SUSP ophthalmic susp  Dispense: 5 mL; Refill: 3    History of iron deficiency  History of gastric bypass  Required iron infusion, recheck this year  - Ferritin  - Iron and iron binding capacity  - CBC with platelets    Prediabetes  History of prediabetes, has been stable, recheck this year  - Hemoglobin A1c    Elevated parathyroid hormone  History of elevated PTH, elevated alk phos, but normal GGT and bone specific alk phos. Recheck pth this year.   - Parathyroid Hormone Intact      Patient has been advised of split billing requirements and indicates understanding: Yes        BMI  Estimated body mass index is 30.06 kg/m  as calculated from the following:    Height as of this encounter: 1.797 m (5' 10.75\").    Weight as of this encounter: 97.1 kg (214 lb).     Counseling  Appropriate preventive services were addressed " with this patient via screening, questionnaire, or discussion as appropriate for fall prevention, nutrition, physical activity, Tobacco-use cessation, social engagement, weight loss and cognition.  Checklist reviewing preventive services available has been given to the patient.  Reviewed patient's diet, addressing concerns and/or questions.   Patient is at risk for social isolation and has been provided with information about the benefit of social connection.   She is at risk for psychosocial distress and has been provided with information to reduce risk.   The patient's PHQ-9 score is consistent with moderate depression. She was provided with information regarding depression.       Babatunde Vasquez is a 58 year old, presenting for the following:  Physical        11/26/2024     7:07 AM   Additional Questions   Roomed by Leatha ERNST   Accompanied by Self         11/26/2024     7:07 AM   Patient Reported Additional Medications   Patient reports taking the following new medications .          HPI    Depression   How are you doing with your depression since your last visit? No change  Are you having other symptoms that might be associated with depression? No  Have you had a significant life event?   had cancer this year, trying to move   Are you feeling anxious or having panic attacks?   No  Do you have any concerns with your use of alcohol or other drugs? No    Social History     Tobacco Use     Smoking status: Never     Passive exposure: Never     Smokeless tobacco: Never     Tobacco comments:     Nonsmoking household   Vaping Use     Vaping status: Never Used   Substance Use Topics     Alcohol use: No     Drug use: No         8/9/2023     9:28 AM 6/14/2024     5:46 PM 11/26/2024     7:00 AM   PHQ   PHQ-9 Total Score 19 10 12    Q9: Thoughts of better off dead/self-harm past 2 weeks Not at all  Not at all  Not at all        Patient-reported         4/14/2022     8:40 AM 6/14/2024     5:46 PM 11/26/2024      7:01 AM   DANA-7 SCORE   Total Score  8 (mild anxiety) 7 (mild anxiety)   Total Score 5 8 7        Patient-reported     Suicide Assessment Five-step Evaluation and Treatment (SAFE-T)    Health Care Directive  Patient does not have a Health Care Directive: Discussed advance care planning with patient; information given to patient to review.      11/22/2024   General Health   How would you rate your overall physical health? Good   Feel stress (tense, anxious, or unable to sleep) Rather much      (!) STRESS CONCERN      11/22/2024   Nutrition   Three or more servings of calcium each day? (!) NO   Diet: Regular (no restrictions)   How many servings of fruit and vegetables per day? (!) 0-1   How many sweetened beverages each day? 0-1            11/22/2024   Exercise   Days per week of moderate/strenous exercise 0 days   Average minutes spent exercising at this level 0 min      (!) EXERCISE CONCERN      11/22/2024   Social Factors   Frequency of gathering with friends or relatives Never   Worry food won't last until get money to buy more No   Food not last or not have enough money for food? No   Do you have housing? (Housing is defined as stable permanent housing and does not include staying ouside in a car, in a tent, in an abandoned building, in an overnight shelter, or couch-surfing.) Yes   Are you worried about losing your housing? No   Lack of transportation? No   Unable to get utilities (heat,electricity)? No      (!) SOCIAL CONNECTIONS CONCERN      11/26/2024   Fall Risk   Gait Speed Test (Document in seconds) 5   Gait Speed Test Interpretation Less than or equal to 5.00 seconds - PASS               11/22/2024   Dental   Dentist two times every year? Yes            11/22/2024   TB Screening   Were you born outside of the [place]? No          Today's PHQ-9 Score:       11/26/2024     7:00 AM   PHQ-9 SCORE   PHQ-9 Total Score MyChart 12 (Moderate depression)   PHQ-9 Total Score 12        Patient-reported         11/22/2024  "  Substance Use   Alcohol more than 3/day or more than 7/wk No   Do you use any other substances recreationally? No        Social History     Tobacco Use     Smoking status: Never     Passive exposure: Never     Smokeless tobacco: Never     Tobacco comments:     Nonsmoking household   Vaping Use     Vaping status: Never Used   Substance Use Topics     Alcohol use: No     Drug use: No           8/9/2023   LAST FHS-7 RESULTS   1st degree relative breast or ovarian cancer No    Any relative bilateral breast cancer Yes    Any male have breast cancer No    Any ONE woman have BOTH breast AND ovarian cancer No    Any woman with breast cancer before 50yrs Yes    2 or more relatives with breast AND/OR ovarian cancer Yes    2 or more relatives with breast AND/OR bowel cancer Yes        Patient-reported        Mammogram - history of breast cancer        11/22/2024   STI Screening   New sexual partner(s) since last STI/HIV test? No        History of abnormal Pap smear: Status post hysterectomy with removal of cervix and no history of CIN2 or greater or cervical cancer. Health Maintenance and Surgical History updated.        4/16/2014    12:00 AM 8/13/2009    12:00 AM   PAP / HPV   PAP (Historical) NIL  NIL      ASCVD Risk   The 10-year ASCVD risk score (Rachel DOUGLAS, et al., 2019) is: 1.6%    Values used to calculate the score:      Age: 58 years      Sex: Female      Is Non- : No      Diabetic: No      Tobacco smoker: No      Systolic Blood Pressure: 114 mmHg      Is BP treated: No      HDL Cholesterol: 61 mg/dL      Total Cholesterol: 182 mg/dL       Reviewed and updated as needed this visit by Provider                        Review of Systems  Constitutional, HEENT, cardiovascular, pulmonary, gi and gu systems are negative, except as otherwise noted.     Objective    Exam  /84   Pulse 62   Temp 97.1  F (36.2  C) (Tympanic)   Resp 14   Ht 1.797 m (5' 10.75\")   Wt 97.1 kg (214 lb)   " "LMP 03/28/2010   SpO2 99%   BMI 30.06 kg/m     Estimated body mass index is 30.06 kg/m  as calculated from the following:    Height as of this encounter: 1.797 m (5' 10.75\").    Weight as of this encounter: 97.1 kg (214 lb).    Physical Exam  GENERAL: alert and no distress  EYES: Eyes grossly normal to inspection, wears corrective lenses and conjunctivae and sclerae normal  HENT: ear canals normal ,bilateral TM with scarring and clear middle ear effusion, nose and mouth without ulcers or lesions  NECK: no adenopathy, no asymmetry, masses, or scars  RESP: lungs clear to auscultation - no rales, rhonchi or wheezes  CV: regular rate and rhythm, normal S1 S2, no S3 or S4, no murmur, click or rub, bilateral pitting edema to knee 1+   ABDOMEN: soft, nontender, no hepatosplenomegaly, no masses and bowel sounds normal  MS: no gross musculoskeletal defects noted, no edema  SKIN: no suspicious lesions or rashes  NEURO: Normal strength and tone, mentation intact and speech normal  PSYCH: mentation appears normal, affect normal/bright     Results from this visit  Results for orders placed or performed in visit on 11/26/24   CBC with platelets     Status: Normal   Result Value Ref Range    WBC Count 8.5 4.0 - 11.0 10e3/uL    RBC Count 4.25 3.80 - 5.20 10e6/uL    Hemoglobin 13.2 11.7 - 15.7 g/dL    Hematocrit 41.7 35.0 - 47.0 %    MCV 98 78 - 100 fL    MCH 31.1 26.5 - 33.0 pg    MCHC 31.7 31.5 - 36.5 g/dL    RDW 13.2 10.0 - 15.0 %    Platelet Count 264 150 - 450 10e3/uL   Hemoglobin A1c     Status: Abnormal   Result Value Ref Range    Estimated Average Glucose 123 (H) <117 mg/dL    Hemoglobin A1C 5.9 (H) 0.0 - 5.6 %           Signed Electronically by: Nancy Ontiveros MD    Answers submitted by the patient for this visit:  Patient Health Questionnaire (Submitted on 11/26/2024)  If you checked off any problems, how difficult have these problems made it for you to do your work, take care of things at home, or get along with " other people?: Somewhat difficult  PHQ9 TOTAL SCORE: 12  Patient Health Questionnaire (G7) (Submitted on 11/26/2024)  DANA 7 TOTAL SCORE: 7

## 2024-11-26 NOTE — NURSING NOTE
"Chief Complaint   Patient presents with    Physical     /84   Pulse 62   Temp 97.1  F (36.2  C) (Tympanic)   Resp 14   Ht 1.797 m (5' 10.75\")   Wt 97.1 kg (214 lb)   LMP 03/28/2010   SpO2 99%   BMI 30.06 kg/m   Estimated body mass index is 30.06 kg/m  as calculated from the following:    Height as of this encounter: 1.797 m (5' 10.75\").    Weight as of this encounter: 97.1 kg (214 lb).  Patient presents to the clinic using No DME      Health Maintenance that is potentially due pending provider review:    Health Maintenance Due   Topic Date Due    ZOSTER IMMUNIZATION (2 of 2) 11/12/2021    LIPID  09/09/2022    ANNUAL REVIEW OF HM ORDERS  11/29/2022    HEPATITIS B IMMUNIZATION (2 of 2 - CpG 2-dose series) 12/14/2023    YEARLY PREVENTIVE VISIT  08/10/2024    MICROALBUMIN  08/10/2024    COVID-19 Vaccine (5 - 2024-25 season) 09/01/2024    BMP  10/31/2024    HEMOGLOBIN  10/31/2024                  "

## 2024-11-26 NOTE — PATIENT INSTRUCTIONS
Patient Education   Preventive Care Advice   This is general advice given by our system to help you stay healthy. However, your care team may have specific advice just for you. Please talk to your care team about your preventive care needs.  Nutrition  Eat 5 or more servings of fruits and vegetables each day.  Try wheat bread, brown rice and whole grain pasta (instead of white bread, rice, and pasta).  Get enough calcium and vitamin D. Check the label on foods and aim for 100% of the RDA (recommended daily allowance).  Lifestyle  Exercise at least 150 minutes each week  (30 minutes a day, 5 days a week).  Do muscle strengthening activities 2 days a week. These help control your weight and prevent disease.  No smoking.  Wear sunscreen to prevent skin cancer.  Have a dental exam and cleaning every 6 months.  Yearly exams  See your health care team every year to talk about:  Any changes in your health.  Any medicines your care team has prescribed.  Preventive care, family planning, and ways to prevent chronic diseases.  Shots (vaccines)   HPV shots (up to age 26), if you've never had them before.  Hepatitis B shots (up to age 59), if you've never had them before.  COVID-19 shot: Get this shot when it's due.  Flu shot: Get a flu shot every year.  Tetanus shot: Get a tetanus shot every 10 years.  Pneumococcal, hepatitis A, and RSV shots: Ask your care team if you need these based on your risk.  Shingles shot (for age 50 and up)  General health tests  Diabetes screening:  Starting at age 35, Get screened for diabetes at least every 3 years.  If you are younger than age 35, ask your care team if you should be screened for diabetes.  Cholesterol test: At age 39, start having a cholesterol test every 5 years, or more often if advised.  Bone density scan (DEXA): At age 50, ask your care team if you should have this scan for osteoporosis (brittle bones).  Hepatitis C: Get tested at least once in your life.  STIs (sexually  transmitted infections)  Before age 24: Ask your care team if you should be screened for STIs.  After age 24: Get screened for STIs if you're at risk. You are at risk for STIs (including HIV) if:  You are sexually active with more than one person.  You don't use condoms every time.  You or a partner was diagnosed with a sexually transmitted infection.  If you are at risk for HIV, ask about PrEP medicine to prevent HIV.  Get tested for HIV at least once in your life, whether you are at risk for HIV or not.  Cancer screening tests  Cervical cancer screening: If you have a cervix, begin getting regular cervical cancer screening tests starting at age 21.  Breast cancer scan (mammogram): If you've ever had breasts, begin having regular mammograms starting at age 40. This is a scan to check for breast cancer.  Colon cancer screening: It is important to start screening for colon cancer at age 45.  Have a colonoscopy test every 10 years (or more often if you're at risk) Or, ask your provider about stool tests like a FIT test every year or Cologuard test every 3 years.  To learn more about your testing options, visit:   .  For help making a decision, visit:   https://bit.ly/vm49200.  Prostate cancer screening test: If you have a prostate, ask your care team if a prostate cancer screening test (PSA) at age 55 is right for you.  Lung cancer screening: If you are a current or former smoker ages 50 to 80, ask your care team if ongoing lung cancer screenings are right for you.  For informational purposes only. Not to replace the advice of your health care provider. Copyright   2023 [facility]. All rights reserved. Clinically reviewed by the [facility]. [employer] [id number] - REV 01/24.  Preventing Falls: Care Instructions  Injuries and health problems such as trouble walking or poor eyesight can increase your risk of falling. So can some medicines. But there are things you can do to help  "prevent falls. You can exercise to get stronger. You can also arrange your home to make it safer.    Talk to your doctor about the medicines you take. Ask if any of them increase the risk of falls and whether they can be changed or stopped.   Try to exercise regularly. It can help improve your strength and balance. This can help lower your risk of falling.         Practice fall safety and prevention.   Wear low-heeled shoes that fit well and give your feet good support. Talk to your doctor if you have foot problems that make this hard.  Carry a cellphone or wear a medical alert device that you can use to call for help.  Use stepladders instead of chairs to reach high objects. Don't climb if you're at risk for falls. Ask for help, if needed.  Wear the correct eyeglasses, if you need them.        Make your home safer.   Remove rugs, cords, clutter, and furniture from walkways.  Keep your house well lit. Use night-lights in hallways and bathrooms.  Install and use sturdy handrails on stairways.  Wear nonskid footwear, even inside. Don't walk barefoot or in socks without shoes.        Be safe outside.   Use handrails, curb cuts, and ramps whenever possible.  Keep your hands free by using a shoulder bag or backpack.  Try to walk in well-lit areas. Watch out for uneven ground, changes in pavement, and debris.  Be careful in the winter. Walk on the grass or gravel when sidewalks are slippery. Use de-icer on steps and walkways. Add non-slip devices to shoes.    Put grab bars and nonskid mats in your shower or tub and near the toilet. Try to use a shower chair or bath bench when bathing.   Get into a tub or shower by putting in your weaker leg first. Get out with your strong side first. Have a phone or medical alert device in the bathroom with you.   Where can you learn more?  Go to https://www.Videdressingwise.net/patiented  Enter G117 in the search box to learn more about \"Preventing Falls: Care Instructions.\"  Current as of: " July 17, 2023  Content Version: 14.2 2024 [employer]   Care instructions adapted under license by your healthcare professional. If you have questions about a medical condition or this instruction, always ask your healthcare professional. Healthwise, Incorporated disclaims any warranty or liability for your use of this information.    Learning About Stress  What is stress?     Stress is your body's response to a hard situation. Your body can have a physical, emotional, or mental response. Stress is a fact of life for most people, and it affects everyone differently. What causes stress for you may not be stressful for someone else.  A lot of things can cause stress. You may feel stress when you go on a job interview, take a test, or run a race. This kind of short-term stress is normal and even useful. It can help you if you need to work hard or react quickly. For example, stress can help you finish an important job on time.  Long-term stress is caused by ongoing stressful situations or events. Examples of long-term stress include long-term health problems, ongoing problems at work, or conflicts in your family. Long-term stress can harm your health.  How does stress affect your health?  When you are stressed, your body responds as though you are in danger. It makes hormones that speed up your heart, make you breathe faster, and give you a burst of energy. This is called the fight-or-flight stress response. If the stress is over quickly, your body goes back to normal and no harm is done.  But if stress happens too often or lasts too long, it can have bad effects. Long-term stress can make you more likely to get sick, and it can make symptoms of some diseases worse. If you tense up when you are stressed, you may develop neck, shoulder, or low back pain. Stress is linked to high blood pressure and heart disease.  Stress also harms your emotional health. It can make you singletary, tense, or depressed. Your  relationships may suffer, and you may not do well at work or school.  What can you do to manage stress?  You can try these things to help manage stress:   Do something active. Exercise or activity can help reduce stress. Walking is a great way to get started. Even everyday activities such as housecleaning or yard work can help.  Try yoga or suellen chi. These techniques combine exercise and meditation. You may need some training at first to learn them.  Do something you enjoy. For example, listen to music or go to a movie. Practice your hobby or do volunteer work.  Meditate. This can help you relax, because you are not worrying about what happened before or what may happen in the future.  Do guided imagery. Imagine yourself in any setting that helps you feel calm. You can use online videos, books, or a teacher to guide you.  Do breathing exercises. For example:  From a standing position, bend forward from the waist with your knees slightly bent. Let your arms dangle close to the floor.  Breathe in slowly and deeply as you return to a standing position. Roll up slowly and lift your head last.  Hold your breath for just a few seconds in the standing position.  Breathe out slowly and bend forward from the waist.  Let your feelings out. Talk, laugh, cry, and express anger when you need to. Talking with supportive friends or family, a counselor, or a lizett leader about your feelings is a healthy way to relieve stress. Avoid discussing your feelings with people who make you feel worse.  Write. It may help to write about things that are bothering you. This helps you find out how much stress you feel and what is causing it. When you know this, you can find better ways to cope.  What can you do to prevent stress?  You might try some of these things to help prevent stress:  Manage your time. This helps you find time to do the things you want and need to do.  Get enough sleep. Your body recovers from the stresses of the day while  "you are sleeping.  Get support. Your family, friends, and community can make a difference in how you experience stress.  Limit your news feed. Avoid or limit time on social media or news that may make you feel stressed.  Do something active. Exercise or activity can help reduce stress. Walking is a great way to get started.  Where can you learn more?  Go to https://www.Storwize.net/patiented  Enter N032 in the search box to learn more about \"Learning About Stress.\"  Current as of: October 24, 2023  Content Version: 14.2 2024 [employer].   Care instructions adapted under license by your healthcare professional. If you have questions about a medical condition or this instruction, always ask your healthcare professional. Healthwise, [employer] disclaims any warranty or liability for your use of this information.    Learning About Depression Screening  What is depression screening?  Depression screening is a way to see if you have depression symptoms. It may be done by a doctor or counselor. It's often part of a routine checkup. That's because your mental health is just as important as your physical health.  Depression is a mental health condition that affects how you feel, think, and act. You may:  Have less energy.  Lose interest in your daily activities.  Feel sad and grouchy for a long time.  Depression is very common. It affects people of all ages.  Many things can lead to depression. Some people become depressed after they have a stroke or find out they have a major illness like cancer or heart disease. The death of a loved one or a breakup may lead to depression. It can run in families. Most experts believe that a combination of inherited genes and stressful life events can cause it.  What happens during screening?  You may be asked to fill out a form about your depression symptoms. You and the doctor will discuss your answers. The doctor may ask you more questions to learn more about how you " "think, act, and feel.  What happens after screening?  If you have symptoms of depression, your doctor will talk to you about your options.  Doctors usually treat depression with medicines or counseling. Often, combining the two works best. Many people don't get help because they think that they'll get over the depression on their own. But people with depression may not get better unless they get treatment.  The cause of depression is not well understood. There may be many factors involved. But if you have depression, it's not your fault.  A serious symptom of depression is thinking about death or suicide. If you or someone you care about talks about this or about feeling hopeless, get help right away.  It's important to know that depression can be treated. Medicine, counseling, and self-care may help.  Where can you learn more?  Go to https://www.mDialog.net/patiented  Enter T185 in the search box to learn more about \"Learning About Depression Screening.\"  Current as of: June 24, 2023  Content Version: 14.2 2024 [email or web address].   Care instructions adapted under license by your healthcare professional. If you have questions about a medical condition or this instruction, always ask your healthcare professional. Healthwise, Incorporated disclaims any warranty or liability for your use of this information.       "

## 2024-11-27 ENCOUNTER — OFFICE VISIT (OUTPATIENT)
Dept: ORTHOPEDICS | Facility: CLINIC | Age: 59
End: 2024-11-27
Payer: COMMERCIAL

## 2024-11-27 ENCOUNTER — ANCILLARY PROCEDURE (OUTPATIENT)
Dept: GENERAL RADIOLOGY | Facility: CLINIC | Age: 59
End: 2024-11-27
Attending: ORTHOPAEDIC SURGERY
Payer: COMMERCIAL

## 2024-11-27 VITALS
BODY MASS INDEX: 30.06 KG/M2 | DIASTOLIC BLOOD PRESSURE: 86 MMHG | SYSTOLIC BLOOD PRESSURE: 128 MMHG | HEART RATE: 92 BPM | HEIGHT: 71 IN

## 2024-11-27 DIAGNOSIS — S82.832A CLOSED FRACTURE OF LEFT DISTAL FIBULA: ICD-10-CM

## 2024-11-27 DIAGNOSIS — S82.832D OTHER CLOSED FRACTURE OF DISTAL END OF LEFT FIBULA WITH ROUTINE HEALING, SUBSEQUENT ENCOUNTER: Primary | ICD-10-CM

## 2024-11-27 PROCEDURE — 73610 X-RAY EXAM OF ANKLE: CPT | Mod: TC | Performed by: STUDENT IN AN ORGANIZED HEALTH CARE EDUCATION/TRAINING PROGRAM

## 2024-11-27 ASSESSMENT — PAIN SCALES - GENERAL: PAINLEVEL_OUTOF10: MILD PAIN (2)

## 2024-11-27 NOTE — NURSING NOTE
Patient was fitted with a medium F8 ankle brace. All questions were answered to patient's satisfaction. DME form explained, signed, and copy given to the patient for their records.   Ginette Leslie Clinical Medical Assistant

## 2024-11-27 NOTE — LETTER
11/27/2024      Christina John  [address] [facility] [address]      Dear Colleague,    Thank you for referring your patient, Christina John, to the [facility] [place]. Please see a copy of my visit note below.    chief complaint:   Chief Complaint   Patient presents with     Left Ankle - Fracture     DOI 10/29/24, 4 wk s/p. Patient notes her ankle is doing good. She started to weight bear in boot for the last 2.5 weeks, going good. She sleeps with it elevated nightly. No issues or concerns.     INJURY: left ankle Peacock A ankle fracture, nondisplaced.  DATE of INJURY: 10/29/2024        HISTORY OF PRESENT ILLNESS    Christina John is a 58 year old female, whom presents for followup of left ankle injury 4 weeks ago. Returns today doing well, walking in the boot the past 2.5 weeks. Elevates at night. No concerns..    The injury occurred 10/29/2024 while  she was getting ready for work when she missed the last step and rolled her left ankle. She did not hit her head or sustain any other injuries. She was not been able to bear weight due to pain. Initially could not wiggle her toes, but now she can. She denies weakness or numbness or tingling. She denies foot pain. Denies knee pain. Seen in the ED, xrays of the ankle showed a nondisplaced fracture of the distal fibula.          Pain 2/10.       Other PMH:  has a past medical history of Adhesive capsulitis of left shoulder (6/1/2023), Allergic rhinitis due to animal dander (did IT x 4 yrs. ended 9/06 per self), Allergies, Appendicitis, BRCA1 positive, Breast CA (H), Colon polyp, DCIS (ductal carcinoma in situ), DCIS (ductal carcinoma in situ) of breast (8/08 and 8/10), Depression, Depressive disorder, Desensitization to allergens, Diagnostic skin and sensitization tests (4/03 skin tests pos. for: cat/dog/DM/T/G/RW), Elevated cholesterol, Endometriosis, Endometriosis, GERD (gastroesophageal reflux disease), GERD (gastroesophageal  reflux disease), Glaucoma, History of anesthesia complications, House dust mite allergy, Hyperlipidemia, Hypertension, Hypertension goal BP (blood pressure) < 140/90, Infertility, Infertility, female, Lumbar disc herniation, Mild major depression (H), DAVIE (obstructive sleep apnea), PCOS (polycystic ovarian syndrome), PCOS (polycystic ovarian syndrome), Pelvic relaxation, PONV (postoperative nausea and vomiting), Seasonal allergic conjunctivitis, Seasonal allergic rhinitis, and Ulcers (1992).    She has no past medical history of Cerebral infarction (H), Congestive heart failure (H), COPD (chronic obstructive pulmonary disease) (H), Diabetes (H), Heart disease, History of blood transfusion, Thyroid disease, or Uncomplicated asthma.  Patient Active Problem List    Diagnosis Date Noted     Iron deficiency anemia secondary to inadequate dietary iron intake 08/17/2023     Priority: Medium     Iron deficiency 08/11/2023     Priority: Medium     Iron malabsorption 08/11/2023     Priority: Medium     Chronic kidney disease, stage 3a (H) 08/10/2023     Priority: Medium     Chronic left shoulder pain 05/18/2023     Priority: Medium     History of breast cancer 09/10/2021     Priority: Medium     Recurrent cold sores 09/10/2021     Priority: Medium     History of gastric bypass 06/22/2017     Priority: Medium     BRCA1 positive 05/13/2016     Priority: Medium     Overweight (BMI 25.0-29.9) 05/13/2016     Priority: Medium     History of adenomatous polyp of colon 04/16/2014     Priority: Medium     Diagnostic skin and sensitization tests      Priority: Medium     Desensitization to allergens      Priority: Medium     did IT x 4 yrs. ended 9/06 per self       Major depressive disorder, single episode, mild (H)      Priority: Medium     HYPERLIPIDEMIA LDL GOAL <160 10/31/2010     Priority: Medium     Seasonal allergic rhinitis      Priority: Medium     Allergic rhinitis due to animal dander      Priority: Medium     House dust  mite allergy      Priority: Medium     Seasonal allergic conjunctivitis      Priority: Medium     DCIS (ductal carcinoma in situ) of breast      Priority: Medium     RT BREAST - STAGE 2A - GRADE III  Left breast ER+  BRCA1 +       GERD (gastroesophageal reflux disease)      Priority: Medium     PCOS (polycystic ovarian syndrome)      Priority: Medium       Surgical Hx:  has a past surgical history that includes BIOPSY OF BREAST, INCISIONAL (2008); APPENDECTOMY (1982); c/section, classical; excis vaginal cyst/tumor; endoscopy; pelvis laparoscopy,dx; REMOVAL GALLBLADDER (6/2005); COLONOSCOPY THRU STOMA, DIAGNOSTIC (12/04, 12/09); hysterectomy, pap no longer indicated; Mastectomy bilateral, insert tissue expander bilateral, combined (8/2010); Glaucoma surgery; mastectomy, bilateral; Cosmetic surgery (10/13, 09/15); ENT surgery (1973); GI surgery (04/11); appendectomy; gastric bypass; Revise reconstructed breast; Cholecystectomy; Hysterectomy; Mastectomy (Bilateral); other surgical history; Colonoscopy W/ Biopsies And Polypectomy; Glaucoma surgery; Pr Lamnotmy Incl W/Dcmprsn Nrv Root 1 Intrspc Lumbr (Left, 1/8/2021); back surgery (1/2021); and Colonoscopy (N/A, 6/17/2024).    Medications:   Current Outpatient Medications:      acetaminophen (TYLENOL) 500 MG tablet, Take 2 tablets by mouth daily as needed , Disp: , Rfl:      buPROPion (WELLBUTRIN XL) 150 MG 24 hr tablet, Take 2 tablets (300 mg) by mouth every evening, Disp: 180 tablet, Rfl: 3     calcium carbonate-vitamin D 600-200 MG-UNIT TABS, Take 1 tablet by mouth every evening , Disp: , Rfl:      escitalopram (LEXAPRO) 20 MG tablet, Take 1 tablet by mouth once daily, Disp: 90 tablet, Rfl: 3     fluticasone (FLONASE) 50 MCG/ACT nasal spray, Spray 1 spray into both nostrils daily, Disp: 15.8 mL, Rfl: 0     gabapentin (NEURONTIN) 100 MG capsule, Take 2 capsules (200 mg) by mouth daily. NEEDS APPOINTMENT, Disp: 180 capsule, Rfl: 0     HYDROcodone-acetaminophen (NORCO)  "5-325 MG tablet, Take 1 tablet by mouth every 6 hours as needed for severe pain., Disp: 10 tablet, Rfl: 0     MULTIVITAMIN TABS   OR, Take 1 tablet by mouth daily , Disp: , Rfl:      traZODone (DESYREL) 50 MG tablet, Take 1 tablet (50 mg) by mouth At Bedtime May increase to max or 150 mg as needed for insomnia, Disp: 90 tablet, Rfl: 0     valACYclovir (VALTREX) 1000 mg tablet, Take 1 tablet (1,000 mg) by mouth daily, Disp: 90 tablet, Rfl: 4    Current Facility-Administered Medications:      4 mL ropivacaine (NAROPIN) injection 5 mg/mL, 4 mL, , , Feroz Avalos MD, 4 mL at 05/18/23 1642     betamethasone acet & sod phos (CELESTONE) injection 6 mg, 6 mg, , , Feroz Avalos MD, 6 mg at 05/18/23 1642    Allergies:   Allergies   Allergen Reactions     Amoxicillin Swelling     Cefzil [Cefprozil] Swelling     Influenza Virus Vaccine      \"throat felt funny\"     Adhesive Tape Rash     Steri Strips     Benzocaine Other (See Comments)     Shellfish Allergy Other (See Comments)     numbness to mouth when eating lobster     Biaxin [Clarithromycin] Hives     Erythromycin GI Disturbance     Latex Rash     Latex Rash     Percocet [Acetaminophen] Hives     Sulfa Antibiotics Hives       Social Hx: works retail.  reports that she has never smoked. She has never been exposed to tobacco smoke. She has never used smokeless tobacco. She reports that she does not drink alcohol and does not use drugs.    Family Hx: family history includes Allergies in her father; Breast Cancer in her paternal aunt and other family members; Colon Cancer in her paternal grandfather and other family members; Depression in her mother; Gastrointestinal Disease in her mother and other family members; Heart Disease in her father; Hyperlipidemia in her brother and father; Hypertension in her father and mother; Lipids in her father and mother; Obesity in her mother; Osteoporosis in her mother; Other - See Comments in her mother; Other Cancer in her " "maternal grandfather and maternal grandmother.    REVIEW OF SYSTEMS:    CONSTITUTIONAL:NEGATIVE for fever, chills, change in weight  INTEGUMENTARY/SKIN: NEGATIVE for worrisome rashes, moles or lesions  MUSCULOSKELETAL:See HPI above  NEURO: NEGATIVE for weakness, dizziness or paresthesias    PHYSICAL EXAM:  /86   Pulse 92   Ht 1.797 m (5' 10.75\")   LMP 03/28/2010   BMI 30.06 kg/m     GENERAL APPEARANCE: healthy, alert, no distress  SKIN: no suspicious lesions or rashes  NEURO: Normal strength and tone, mentation intact and speech normal  PSYCH:  mentation appears normal and affect normal  RESPIRATORY: No increased work of breathing.    BILATERAL LOWER EXTREMITIES:  Gait: favors left in boot.    Left lower extremity:    calf soft and nttp or squeeze.     left ANKLE  Inspection:Swelling:lateral , mild.    Tender:ATFL, lateral malleolus  Non-tender:medial malleolus, deltoid ligament, achilles tendon, distal 3rd fibula shaft, mid-fibula shaft, proximal fibula, distal tibia, 5th metatarsal base  Range of Motion:decreased   Strength:grossly intact       X-RAY:  3 views left ankle from 11/27/2024 were reviewed in clinic today. Decreased Linear lucency in the distal aspect of the fibula, interval sclerosis.  Postsurgical changes of the 1st metatarsal bone with two screws. .     Impression: 57yo with acute left ankle pain status post injury, nondisplaced left distal fibula fracture.    Plan:  Nonop fracture management.  Images reviewed, showing stable alignment of nondisplaced fracture. Early healing noted with sclerosis, decreased fracture lucency.  Again these fractures Tend to be stable and can be treated without surgery.  Progress weight bearing in ankle brace, provided today. Work on range of motion.  Will take 2-3 months to heal.  Rest, ice, elevation, over the counter pain control  Return to clinic 4 weeks, sooner if needed, repeat xrays left ankle.  Advised patient to let us know what she needs for work and " we can send a letter through [place] city [place] [employer] of fax if given a number. She will talk to her boss about options for work and let us know.    * All questions were addressed and answered prior to discharge from clinic today. The patient acknowledges an understanding of and agreement with the plan set forth during today's visit. Patient was advised to call our office or MyChart us if any further questions arise upon leaving our office today.        Aguilar Wharton M.D., M.S.  Dept. of Orthopaedic Surgery  [facility]           Again, thank you for allowing me to participate in the care of your patient.        Sincerely,        Aguilar Wharton MD

## 2025-01-13 ENCOUNTER — OFFICE VISIT (OUTPATIENT)
Dept: URGENT CARE | Facility: URGENT CARE | Age: 60
End: 2025-01-13
Payer: COMMERCIAL

## 2025-01-13 ENCOUNTER — ANCILLARY PROCEDURE (OUTPATIENT)
Dept: GENERAL RADIOLOGY | Facility: CLINIC | Age: 60
End: 2025-01-13
Attending: FAMILY MEDICINE
Payer: COMMERCIAL

## 2025-01-13 VITALS
WEIGHT: 205 LBS | HEART RATE: 88 BPM | OXYGEN SATURATION: 95 % | RESPIRATION RATE: 16 BRPM | DIASTOLIC BLOOD PRESSURE: 78 MMHG | TEMPERATURE: 97 F | BODY MASS INDEX: 28.79 KG/M2 | SYSTOLIC BLOOD PRESSURE: 114 MMHG

## 2025-01-13 DIAGNOSIS — R05.1 ACUTE COUGH: ICD-10-CM

## 2025-01-13 DIAGNOSIS — J06.9 UPPER RESPIRATORY TRACT INFECTION, UNSPECIFIED TYPE: Primary | ICD-10-CM

## 2025-01-13 LAB
FLUAV AG SPEC QL IA: NEGATIVE
FLUBV AG SPEC QL IA: NEGATIVE

## 2025-01-13 PROCEDURE — 87804 INFLUENZA ASSAY W/OPTIC: CPT | Performed by: FAMILY MEDICINE

## 2025-01-13 PROCEDURE — 99213 OFFICE O/P EST LOW 20 MIN: CPT | Performed by: FAMILY MEDICINE

## 2025-01-13 PROCEDURE — 71046 X-RAY EXAM CHEST 2 VIEWS: CPT | Mod: TC | Performed by: RADIOLOGY

## 2025-01-13 PROCEDURE — 87635 SARS-COV-2 COVID-19 AMP PRB: CPT | Performed by: FAMILY MEDICINE

## 2025-01-13 NOTE — PROGRESS NOTES
Assessment & Plan     Upper respiratory tract infection, unspecified type  Differentials discussed in detail including viral URI.  Chest x-ray findings reviewed independently which came back unremarkable.  Influenza test negative, COVID-19 test result pending.  Recommended well hydration, warm fluids, over-the-counter analgesia/antitussive, humidifier use and to follow-up if symptoms persist or worsen.  Patient, spouse understood and in agreement with above plan.  All questions answered.    Acute cough  - Influenza A & B Antigen - Clinic Collect  - COVID-19 Virus (Coronavirus) by PCR Nose  - XR Chest 2 Views; Future      Subjective   Christina is a 59 year old, presenting for the following health issues:  URI    HPI   Concern -   Onset: 6 days   Description: Productive cough, congestion, chills, sweats  Intensity: moderate  Progression of Symptoms:  same  Accompanying Signs & Symptoms: none  Previous history of similar problem:   Therapies tried and outcome: OTC cold meds        Review of Systems  Constitutional, neuro, ENT, endocrine, pulmonary, cardiac, gastrointestinal, genitourinary, musculoskeletal, integument and psychiatric systems are negative, except as otherwise noted.      Objective    /78   Pulse 88   Temp 97  F (36.1  C) (Tympanic)   Resp 16   Wt 93 kg (205 lb)   LMP 03/28/2010   SpO2 95%   BMI 28.79 kg/m    Body mass index is 28.79 kg/m .  Physical Exam   GENERAL: alert and no distress  EYES: Eyes grossly normal to inspection, PERRL and conjunctivae and sclerae normal  HENT: normal cephalic/atraumatic, ear canals and TM's normal, nose and mouth without ulcers or lesions, oropharynx clear, and oral mucous membranes moist  NECK: no adenopathy, no asymmetry, masses, or scars  RESP: lungs clear to auscultation - no rales, rhonchi or wheezes  CV: regular rates and rhythm, normal S1 S2, no S3 or S4, and no murmur, click or rub  MS: no gross musculoskeletal defects noted, no edema  NEURO: Normal  strength and tone, mentation intact and speech normal  PSYCH: mentation appears normal, affect normal/bright    Results for orders placed or performed in visit on 01/13/25   XR Chest 2 Views     Status: None    Narrative    EXAM: XR CHEST 2 VIEWS  LOCATION: [facility]  DATE: 1/13/2025    INDICATION:  Acute cough  COMPARISON: None.      Impression    IMPRESSION: Negative chest.   Results for orders placed or performed in visit on 01/13/25   Influenza A & B Antigen - Clinic Collect     Status: Normal    Specimen: Nose; Swab   Result Value Ref Range    Influenza A antigen Negative Negative    Influenza B antigen Negative Negative    Narrative    Test results must be correlated with clinical data. If necessary, results should be confirmed by a molecular assay or viral culture.         Signed Electronically by: Krzysztof Lu MD

## 2025-01-14 LAB — SARS-COV-2 RNA RESP QL NAA+PROBE: NEGATIVE

## 2025-02-18 ENCOUNTER — ANCILLARY PROCEDURE (OUTPATIENT)
Dept: GENERAL RADIOLOGY | Facility: CLINIC | Age: 60
End: 2025-02-18
Attending: PHYSICIAN ASSISTANT

## 2025-02-18 ENCOUNTER — OFFICE VISIT (OUTPATIENT)
Dept: URGENT CARE | Facility: URGENT CARE | Age: 60
End: 2025-02-18

## 2025-02-18 VITALS
WEIGHT: 205 LBS | OXYGEN SATURATION: 100 % | RESPIRATION RATE: 16 BRPM | DIASTOLIC BLOOD PRESSURE: 95 MMHG | TEMPERATURE: 98.1 F | SYSTOLIC BLOOD PRESSURE: 146 MMHG | HEART RATE: 70 BPM | BODY MASS INDEX: 28.79 KG/M2

## 2025-02-18 DIAGNOSIS — S49.91XA ARM INJURY, RIGHT, INITIAL ENCOUNTER: ICD-10-CM

## 2025-02-18 DIAGNOSIS — S42.294A OTHER CLOSED NONDISPLACED FRACTURE OF PROXIMAL END OF RIGHT HUMERUS, INITIAL ENCOUNTER: Primary | ICD-10-CM
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
## 2025-02-18 PROCEDURE — 96372 THER/PROPH/DIAG INJ SC/IM: CPT | Performed by: PHYSICIAN ASSISTANT

## 2025-02-18 PROCEDURE — 99213 OFFICE O/P EST LOW 20 MIN: CPT | Mod: 25 | Performed by: PHYSICIAN ASSISTANT

## 2025-02-18 PROCEDURE — 73060 X-RAY EXAM OF HUMERUS: CPT | Mod: TC | Performed by: INTERNAL MEDICINE

## 2025-02-18 PROCEDURE — 73030 X-RAY EXAM OF SHOULDER: CPT | Mod: TC | Performed by: RADIOLOGY

## 2025-02-18 RX ORDER — ACETAMINOPHEN 325 MG/1
975 TABLET ORAL ONCE
Status: COMPLETED | OUTPATIENT
Start: 2025-02-18 | End: 2025-02-18

## 2025-02-18 RX ORDER — KETOROLAC TROMETHAMINE 30 MG/ML
30 INJECTION, SOLUTION INTRAMUSCULAR; INTRAVENOUS ONCE
Status: COMPLETED | OUTPATIENT
Start: 2025-02-18 | End: 2025-02-18

## 2025-02-18 RX ORDER — HYDROCODONE BITARTRATE AND ACETAMINOPHEN 5; 325 MG/1; MG/1
1 TABLET ORAL EVERY 6 HOURS PRN
Qty: 12 TABLET | Refills: 0 | Status: SHIPPED | OUTPATIENT
Start: 2025-02-18 | End: 2025-02-21

## 2025-02-18 RX ADMIN — ACETAMINOPHEN 975 MG: 325 TABLET ORAL at 12:44

## 2025-02-18 RX ADMIN — KETOROLAC TROMETHAMINE 30 MG: 30 INJECTION, SOLUTION INTRAMUSCULAR; INTRAVENOUS at 12:43

## 2025-02-18 NOTE — PROGRESS NOTES
Christina John  :  1965  DOS: 2025  MRN: [id number]  PCP: Nancy Ontiveros    Sports Medicine Clinic Visit    HPI  Christina John is a 59 year old female who is seen in consultation at the request of  Teresa Rivera PA-C presenting with a right humerus fracture.    - Mechanism of Injury:    - 2025: Fell onto right arm as left toe got caught under a display at work  - Pertinent history and prior evaluations:    -  visit 2025: Right shoulder immobilizer given. Norco for breakthrough pain. Out of work note given.     -2025 right shoulder xray shows a nondisplaced fracture across the humeral neck. No evidence for involvement of the articular surface and no dislocation. Hypertrophic change at the AC joint.     - Pain Character:    - Location:  ***  - Character:  ***  - Duration:  ***  - Course:  ***  - Endorses:    - {[facility] Endorses:[id number]}  - Denies:    - {[facility] Denies:127819}  - Alleviating factors:    - {[facility] Alleviating factors:808597}  - Aggravating factors:    - {[facility] Aggravating factors:936442}  - Other treatments tried:    - Shoulder immobilizer since 26.     - Patient Goals:    - {[facility] Patient Goals:756043}  - Social History:   - { Social History:134181}      Review of Systems  Musculoskeletal: as above  Remainder of review of systems is negative including constitutional, CV, pulmonary, GI, Skin and Neurologic except as noted in HPI or medical history.    Past Medical History:   Diagnosis Date    Adhesive capsulitis of left shoulder 2023    Allergic rhinitis due to animal dander did IT x 4 yrs. ended  per self    Allergies     Appendicitis     BRCA1 positive     Breast CA (H)     Colon polyp     DCIS (ductal carcinoma in situ)     DCIS (ductal carcinoma in situ) of breast  and 8/10    RT ( stage 2a) and left ( stage 0)BREAST - STAGE 2A - GRADE III    Depression     Depressive disorder     Desensitization to allergens     did IT x 4 yrs. ended  per  self    Diagnostic skin and sensitization tests 4/03 skin tests pos. for: cat/dog/DM/T/G/RW    Elevated cholesterol     Endometriosis     Endometriosis     GERD (gastroesophageal reflux disease)     GERD (gastroesophageal reflux disease)     Glaucoma     History of anesthesia complications     House dust mite allergy     Hyperlipidemia     Hypertension     Hypertension goal BP (blood pressure) < 140/90     Infertility     ON CLOMID    Infertility, female     Lumbar disc herniation     L5-S1    Mild major depression     DAVIE (obstructive sleep apnea)     No CPAP    PCOS (polycystic ovarian syndrome)     PCOS (polycystic ovarian syndrome)     Pelvic relaxation     PONV (postoperative nausea and vomiting)     Seasonal allergic conjunctivitis     Seasonal allergic rhinitis     4/03 skin tests pos. for: cat/dog/DM/T/G/RW--per Dr. Talavera.    Ulcers 1992    DX BY ENDOSCOPY     Past Surgical History:   Procedure Laterality Date    APPENDECTOMY      BACK SURGERY  1/2021    Discectomy L5S1    C/SECTION, CLASSICAL      CHOLECYSTECTOMY      COLONOSCOPY N/A 6/17/2024    Procedure: COLONOSCOPY, WITH POLYPECTOMY AND BIOPSY;  Surgeon: Kian Grace DO;  Location: [facility]    COLONOSCOPY W/ BIOPSIES AND POLYPECTOMY      COSMETIC SURGERY  10/13, 09/15    ENDOSCOPY      10/2000 Xiao, repeat 10/2002, REPEAT 2/08 (REPEAT IN 2 YRS)    ENT SURGERY  1973    EXCIS VAGINAL CYST/TUMOR      GASTRIC BYPASS      GI SURGERY  04/11    GLAUCOMA SURGERY      GLAUCOMA SURGERY      HC REMOVAL GALLBLADDER  6/2005    HYSTERECTOMY      HYSTERECTOMY, PAP NO LONGER INDICATED      MASTECTOMY Bilateral     MASTECTOMY BILATERAL, INSERT TISSUE EXPANDER BILATERAL, COMBINED  8/2010    MASTECTOMY, BILATERAL      OTHER SURGICAL HISTORY      laparoscopy    PELVIS LAPAROSCOPY,DX      x2    DC LAMNOTMY INCL W/DCMPRSN NRV ROOT 1 INTRSPC LUMBR Left 1/8/2021    Procedure: LEFT LUMBAR 5-SACRAL 1 DISCECTOMY;  Surgeon: Vadim Theodore MD;  Location: [facility];  Service:  Spine    REVISE RECONSTRUCTED BREAST      [facility] APPENDECTOMY  1982    [facility] BIOPSY OF BREAST, OPEN INCISIONAL  2008    sentinel lymph node    [facility] COLONOSCOPY THRU STOMA, DIAGNOSTIC  12/04, 12/09    (REPEAT IN 5 YRS)     Family History   Problem Relation Age of Onset    Hypertension Mother     Gastrointestinal Disease Mother         GAITAN'S ESOPHAGUS    Lipids Mother     Depression Mother     Other - See Comments Mother         Pagets Disease of the Vulva, Dx 06/2014    Osteoporosis Mother     Obesity Mother     Allergies Father         HAYFEVER    Heart Disease Father         AFIB    Hypertension Father     Lipids Father     Hyperlipidemia Father     Gastrointestinal Disease Other         GAITAN'S ESOPHAGUS    Breast Cancer Other     Colon Cancer Other     Other Cancer Maternal Grandmother         Gall bladder    Other Cancer Maternal Grandfather         Esophageal    Colon Cancer Paternal Grandfather     Gastrointestinal Disease Other         GAITAN'S ESOPHAGUS    Breast Cancer Other     Breast Cancer Paternal Aunt     Breast Cancer Other         fathers 1st cousin    Hyperlipidemia Brother     Colon Cancer Other          Objective  LMP 03/28/2010     General: healthy, alert and in no acute distress.    HEENT: no scleral icterus or conjunctival erythema.   Skin: no suspicious lesions or rash. No jaundice.   CV: regular rhythm by palpation, 2+ distal pulses.  Resp: normal respiratory effort without conversational dyspnea.   Psych: normal mood and affect.    Gait: nonantalgic, appropriate coordination and balance.     Neuro:        - Sensation to light touch:    - *** Intact throughout the BUE including all peripheral nerve distributions.   - Diminished sensation in the ***. Otherwise SILT in all other nerve distributions.        - MSR:       RUE  LUE  - Biceps  2+ 2+  - Brachioradialis 2+ 2+  - Triceps  2+ 2+       - Special tests:   - Spurling's:  Neg     MSK - Shoulder: ***       - Inspection:    - No  significant swelling, erythema, warmth, ecchymosis, lesion, or atrophy noted.        - ROM:    - Full AROM/PROM with ***   - Limited in ***        - Palpation:    - TTP at the ***.   - NTTP elsewhere.        - Strength:  (*antalgic)  - Shoulder Abduction   5    - Shoulder Flexion   5    - Shoulder Internal Rotation  5    - Shoulder External Rotation  5   - Elbow Flexion   5   - Elbow Extension   5   - Forearm Pronation   5   - Forearm Supination   5   - Wrist Extension   5   - Wrist Flexion    5   - FDI     5   - ADM     5   - FPL     5   - APB     5   - EIP     5   - EDC     5   - APL/EPB    5            - Special tests:        - Kwon:  Neg    - Neers:  Neg    - Empty can:  Neg for pain and weakness    - [place]:  Neg    - Scarf:  Neg    - Speeds:  Neg    - Yergason:  Neg    - Apprehension/Relocation:  Neg       Radiology  I independently reviewed the available relevant imaging in the chart with my interpretations as above in HPI.     I independently reviewed today's new relevant imaging, with the following interpretation:  - ***      Procedure  ***      Assessment  No diagnosis found.    Plan  Christina John is a pleasant 59 year old female that presents with ***. History and physical exam appear most consistent with ***.     We discussed the nature of the condition and available treatment options, and mutually agreed upon the following plan:    - Imaging:          - Reviewed and independently interpreted the relevant imaging in the chart, including any imaging ordered for today's clinic.  - Reviewed results and images with patient.   - Medications:          - Discussed pharmacologic options for pain relief.   - May use NSAIDs (Ibuprofen, Naproxen) or Acetaminophen (Tylenol) as needed for pain control.   - Do not take these if previously advised to avoid them for other medical conditions.  - May also use topical medications such as lidocaine, IcyHot, BioFreeze, or Voltaren gel as needed for pain control.    -  Voltaren gel is an anti-inflammatory cream that may be used up to 4 times per day over the painful area.   - Injections:          - Discussed possible injection options and alternatives.    - Injection options include:  ***     - *** Deferred injections today and will consider them in the future as needed.   - Performed a corticosteroid injection of the *** today in clinic. Patient tolerated the procedure well without complications.     - Post-procedure instructions:    - Keep the injection site clean and dry.   - Do not submerge the injection site for 24 hours (no baths, pools). Showers are ok.   - Rest the area for 24-48 hours before resuming normal activities. Avoid overexerting the area for the first few weeks.   - It may take 2-3 days to start noticing the effects of the injection and up to 3-4 weeks to feel significant benefits.   - Therapy:          - Discussed the benefits of therapy vs home exercise program for optimization of range of motion, flexibility, strength, stability and function.   - *** Preference is for a home exercise program.   - Home Exercise Program given today in clinic and recommendation given to perform HEP daily and after exacerbations.  - *** Preference is for therapy.   - *** Therapy referral placed today and instructed to call [phone] to schedule appointments.   - Modalities:          - May use ice, heat, massage or other modalities as needed.   - Bracing:          - Discussed bracing options and recommend using ***.    - Options presented in clinic and choice given to take our brace from clinic or purchase an equivalent brace from an outside source.   - Surgery:          - Discussed non-operative and operative treatment options for the patient's condition.   - Goal is to continue conservative care for as long as possible before surgical intervention would need to be considered.  - Activity:          - *** Encouraged to remain active and participate in regular activities as  symptoms allow.   Avoid or modify exacerbating activities as needed.   - *** Encouraged to rest and protect the injured area from further injury.  Avoid exacerbating activities and activities that are high-risk for falls.   - Follow up:          - *** As needed for re-evaluation and update to treatment plan.  - May follow up sooner for new/worsening symptoms.  - May contact clinic by phone or MyChart for questions or concerns.       Rasta Newell DO, VIVIANA  [facility] Medicine  [facility] - Department of Orthopedic Surgery       Disclaimer:  This note was prepared and written using Dragon Medical dictation software. As a result, there may be errors in the script that have gone undetected. Please consider this when interpreting the information in this note.

## 2025-02-18 NOTE — LETTER
February 18, 2025      Christina John  [address] [place] [id number]        To Whom It May Concern:    Christina John  was seen and treated in clinic. Please excuse from work until seen by orthopedics.               Sincerely,        Teresa Rivera PA-C    Electronically signed

## 2025-02-18 NOTE — PROGRESS NOTES
"  Assessment & Plan     Other closed nondisplaced fracture of proximal end of right humerus, initial encounter  Placed patient in shoulder immobilizer. Can take norco for pain at night. Continue with over the counter analgesics as needed. Gave note excusing from work until seen by orthopedics this week.      - Neck/Shoulder/Back/Abdomen Bracing Supplies Order Shoulder Immobilizer; Right  - Orthopedic  Referral; Future  - HYDROcodone-acetaminophen (NORCO) 5-325 MG tablet; Take 1 tablet by mouth every 6 hours as needed for moderate to severe pain.    Arm injury, right, initial encounter    - XR Humerus Right G/E 2 Views; Future  - ketorolac (TORADOL) injection 30 mg  - acetaminophen (TYLENOL) tablet 975 mg  - XR Shoulder Right G/E 3 Views; Future    Work related injury            BMI  Estimated body mass index is 28.79 kg/m  as calculated from the following:    Height as of 11/27/24: 1.797 m (5' 10.75\").    Weight as of this encounter: 93 kg (205 lb).             Return in about 1 week (around 2/25/2025) for Follow up.                Subjective   Chief Complaint   Patient presents with    Work Comp     Date of injury 2/18/25- Right arm pain,  caught left toe underneath a display tail, fell sideways on right side onto cement.       HPI     Arm injury     Onset of symptoms was 2/18/25  Course of illness is same.    Severity moderate  Current and Associated symptoms: was at work, foot got caught under display, fell and injured right shoulder   Treatment measures tried include None tried.  Predisposing factors include None.                      Objective    BP (!) 146/95   Pulse 70   Temp 98.1  F (36.7  C) (Tympanic)   Resp 16   Wt 93 kg (205 lb)   LMP 03/28/2010   SpO2 100%   BMI 28.79 kg/m    Body mass index is 28.79 kg/m .      Physical Exam  Constitutional:       General: She is not in acute distress.  Musculoskeletal:      Comments: Patient guarding right arm and not able to move due to shoulder and " elbow pain. Moderate tenderness with palpation around right shoulder and elbow. CMS intact.    Neurological:      Mental Status: She is alert.            Xray - Reviewed and interpreted by me.  Right humerus fracture         Signed Electronically by: Teresa Rivera PA-C

## 2025-02-20 ENCOUNTER — OFFICE VISIT (OUTPATIENT)
Dept: ORTHOPEDICS | Facility: CLINIC | Age: 60
End: 2025-02-20
Attending: PHYSICIAN ASSISTANT
Payer: OTHER MISCELLANEOUS

## 2025-02-20 DIAGNOSIS — S42.294A OTHER CLOSED NONDISPLACED FRACTURE OF PROXIMAL END OF RIGHT HUMERUS, INITIAL ENCOUNTER: Primary | ICD-10-CM

## 2025-02-20 PROCEDURE — 99214 OFFICE O/P EST MOD 30 MIN: CPT | Performed by: STUDENT IN AN ORGANIZED HEALTH CARE EDUCATION/TRAINING PROGRAM

## 2025-02-20 NOTE — Clinical Note
2025      Christina John  [address] [facility] [address]      Dear Colleague,    Thank you for referring your patient, Christina John, to the [facility] [place]. Please see a copy of my visit note below.    Christina John  :  1965  DOS: 2025  MRN: [id number]  PCP: Nancy Ontiveros    Sports Medicine Clinic Visit      HPI  Christina John is a 59 year old female who is seen in consultation at the request of  Teresa Rivera PA-C presenting with a right humerus fracture.    - Mechanism of Injury:    - 2025: Fell onto right arm as left toe got caught under a display at work  - Pertinent history and prior evaluations:    -  visit 2025: Right shoulder immobilizer given. Norco for breakthrough pain. Out of work note given.     -2025 right shoulder xray shows a nondisplaced fracture across the humeral neck. No evidence for involvement of the articular surface and no dislocation. Hypertrophic change at the AC joint.     - Pain Character:    - Location:  ***  - Character:  ***  - Duration:  ***  - Course:  ***  - Endorses:    - {[facility] Endorses:732499}  - Denies:    - {[facility] Denies:651639}  - Alleviating factors:    - {[facility] Alleviating factors:519107}  - Aggravating factors:    - {[facility] Aggravating factors:666023}  - Other treatments tried:    - Shoulder immobilizer since 26.     - Patient Goals:    - {[facility] Patient Goals:728638}  - Social History:   - { Social History:169376}      Review of Systems  Musculoskeletal: as above  Remainder of review of systems is negative including constitutional, CV, pulmonary, GI, Skin and Neurologic except as noted in HPI or medical history.    Past Medical History:   Diagnosis Date    Adhesive capsulitis of left shoulder 2023    Allergic rhinitis due to animal dander did IT x 4 yrs. ended  per self    Allergies     Appendicitis     BRCA1 positive     Breast CA (H)     Colon polyp      DCIS (ductal carcinoma in situ)     DCIS (ductal carcinoma in situ) of breast 8/08 and 8/10    RT ( stage 2a) and left ( stage 0)BREAST - STAGE 2A - GRADE III    Depression     Depressive disorder     Desensitization to allergens     did IT x 4 yrs. ended 9/06 per self    Diagnostic skin and sensitization tests 4/03 skin tests pos. for: cat/dog/DM/T/G/RW    Elevated cholesterol     Endometriosis     Endometriosis     GERD (gastroesophageal reflux disease)     GERD (gastroesophageal reflux disease)     Glaucoma     History of anesthesia complications     House dust mite allergy     Hyperlipidemia     Hypertension     Hypertension goal BP (blood pressure) < 140/90     Infertility     ON CLOMID    Infertility, female     Lumbar disc herniation     L5-S1    Mild major depression     DAVIE (obstructive sleep apnea)     No CPAP    PCOS (polycystic ovarian syndrome)     PCOS (polycystic ovarian syndrome)     Pelvic relaxation     PONV (postoperative nausea and vomiting)     Seasonal allergic conjunctivitis     Seasonal allergic rhinitis     4/03 skin tests pos. for: cat/dog/DM/T/G/RW--per Dr. Talavera.    Ulcers 1992    DX BY ENDOSCOPY     Past Surgical History:   Procedure Laterality Date    APPENDECTOMY      BACK SURGERY  1/2021    Discectomy L5S1    C/SECTION, CLASSICAL      CHOLECYSTECTOMY      COLONOSCOPY N/A 6/17/2024    Procedure: COLONOSCOPY, WITH POLYPECTOMY AND BIOPSY;  Surgeon: Kian Grace DO;  Location: [facility]    COLONOSCOPY W/ BIOPSIES AND POLYPECTOMY      COSMETIC SURGERY  10/13, 09/15    ENDOSCOPY      10/2000 Xiao, repeat 10/2002, REPEAT 2/08 (REPEAT IN 2 YRS)    ENT SURGERY  1973    EXCIS VAGINAL CYST/TUMOR      GASTRIC BYPASS      GI SURGERY  04/11    GLAUCOMA SURGERY      GLAUCOMA SURGERY      HC REMOVAL GALLBLADDER  6/2005    HYSTERECTOMY      HYSTERECTOMY, PAP NO LONGER INDICATED      MASTECTOMY Bilateral     MASTECTOMY BILATERAL, INSERT TISSUE EXPANDER BILATERAL, COMBINED  8/2010    MASTECTOMY, BILATERAL       OTHER SURGICAL HISTORY      laparoscopy    PELVIS LAPAROSCOPY,DX      x2    AK LAMNOTMY INCL W/DCMPRSN NRV ROOT 1 INTRSPC LUMBR Left 1/8/2021    Procedure: LEFT LUMBAR 5-SACRAL 1 DISCECTOMY;  Surgeon: Vadim Theodore MD;  Location: [facility] Main OR;  Service: Spine    REVISE RECONSTRUCTED BREAST      ZZC APPENDECTOMY  1982    ZZ BIOPSY OF BREAST, OPEN INCISIONAL  2008    sentinel lymph node    ZZ COLONOSCOPY THRU STOMA, DIAGNOSTIC  12/04, 12/09    (REPEAT IN 5 YRS)     Family History   Problem Relation Age of Onset    Hypertension Mother     Gastrointestinal Disease Mother         GAITAN'S ESOPHAGUS    Lipids Mother     Depression Mother     Other - See Comments Mother         Pagets Disease of the Vulva, Dx 06/2014    Osteoporosis Mother     Obesity Mother     Allergies Father         HAYFEVER    Heart Disease Father         AFIB    Hypertension Father     Lipids Father     Hyperlipidemia Father     Gastrointestinal Disease Other         GAITAN'S ESOPHAGUS    Breast Cancer Other     Colon Cancer Other     Other Cancer Maternal Grandmother         Gall bladder    Other Cancer Maternal Grandfather         Esophageal    Colon Cancer Paternal Grandfather     Gastrointestinal Disease Other         GAITAN'S ESOPHAGUS    Breast Cancer Other     Breast Cancer Paternal Aunt     Breast Cancer Other         fathers 1st cousin    Hyperlipidemia Brother     Colon Cancer Other          Objective  LMP 03/28/2010     General: healthy, alert and in no acute distress.    HEENT: no scleral icterus or conjunctival erythema.   Skin: no suspicious lesions or rash. No jaundice.   CV: regular rhythm by palpation, 2+ distal pulses.  Resp: normal respiratory effort without conversational dyspnea.   Psych: normal mood and affect.    Gait: nonantalgic, appropriate coordination and balance.     Neuro:        - Sensation to light touch:    - *** Intact throughout the BUE including all peripheral nerve distributions.   - Diminished  sensation in the ***. Otherwise SILT in all other nerve distributions.        - MSR:       ALEENA  LUE  - Biceps  2+ 2+  - Brachioradialis 2+ 2+  - Triceps  2+ 2+       - Special tests:   - Spurling's:  Neg     MSK - Shoulder: ***       - Inspection:    - No significant swelling, erythema, warmth, ecchymosis, lesion, or atrophy noted.        - ROM:    - Full AROM/PROM with ***   - Limited in ***        - Palpation:    - TTP at the ***.   - NTTP elsewhere.        - Strength:  (*antalgic)  - Shoulder Abduction   5    - Shoulder Flexion   5    - Shoulder Internal Rotation  5    - Shoulder External Rotation  5   - Elbow Flexion   5   - Elbow Extension   5   - Forearm Pronation   5   - Forearm Supination   5   - Wrist Extension   5   - Wrist Flexion    5   - FDI     5   - ADM     5   - FPL     5   - APB     5   - EIP     5   - EDC     5   - APL/EPB    5            - Special tests:        - Kwon:  Neg    - Neers:  Neg    - Empty can:  Neg for pain and weakness    - Willmar:  Neg    - Scarf:  Neg    - Speeds:  Neg    - Yergason:  Neg    - Apprehension/Relocation:  Neg       Radiology  I independently reviewed the available relevant imaging in the chart with my interpretations as above in HPI.     I independently reviewed today's new relevant imaging, with the following interpretation:  - ***      Procedure  ***      Assessment  No diagnosis found.    Plan  Christina John is a pleasant 59 year old female that presents with ***. History and physical exam appear most consistent with ***.     We discussed the nature of the condition and available treatment options, and mutually agreed upon the following plan:    - Imaging:          - Reviewed and independently interpreted the relevant imaging in the chart, including any imaging ordered for today's clinic.  - Reviewed results and images with patient.   - Medications:          - Discussed pharmacologic options for pain relief.   - May use NSAIDs (Ibuprofen, Naproxen) or  Acetaminophen (Tylenol) as needed for pain control.   - Do not take these if previously advised to avoid them for other medical conditions.  - May also use topical medications such as lidocaine, IcyHot, BioFreeze, or Voltaren gel as needed for pain control.    - Voltaren gel is an anti-inflammatory cream that may be used up to 4 times per day over the painful area.   - Injections:          - Discussed possible injection options and alternatives.    - Injection options include:  ***     - *** Deferred injections today and will consider them in the future as needed.   - Performed a corticosteroid injection of the *** today in clinic. Patient tolerated the procedure well without complications.     - Post-procedure instructions:    - Keep the injection site clean and dry.   - Do not submerge the injection site for 24 hours (no baths, pools). Showers are ok.   - Rest the area for 24-48 hours before resuming normal activities. Avoid overexerting the area for the first few weeks.   - It may take 2-3 days to start noticing the effects of the injection and up to 3-4 weeks to feel significant benefits.   - Therapy:          - Discussed the benefits of therapy vs home exercise program for optimization of range of motion, flexibility, strength, stability and function.   - *** Preference is for a home exercise program.   - Home Exercise Program given today in clinic and recommendation given to perform HEP daily and after exacerbations.  - *** Preference is for therapy.   - *** Therapy referral placed today and instructed to call [phone] to schedule appointments.   - Modalities:          - May use ice, heat, massage or other modalities as needed.   - Bracing:          - Discussed bracing options and recommend using ***.    - Options presented in clinic and choice given to take our brace from clinic or purchase an equivalent brace from an outside source.   - Surgery:          - Discussed non-operative and operative treatment  options for the patient's condition.   - Goal is to continue conservative care for as long as possible before surgical intervention would need to be considered.  - Activity:          - *** Encouraged to remain active and participate in regular activities as symptoms allow.   Avoid or modify exacerbating activities as needed.   - *** Encouraged to rest and protect the injured area from further injury.  Avoid exacerbating activities and activities that are high-risk for falls.   - Follow up:          - *** As needed for re-evaluation and update to treatment plan.  - May follow up sooner for new/worsening symptoms.  - May contact clinic by phone or MyChart for questions or concerns.       Rasta Newell DO, CAQSM  [facility] Sports Medicine  [facility] - Department of Orthopedic Surgery       Disclaimer:  This note was prepared and written using Dragon Medical dictation software. As a result, there may be errors in the script that have gone undetected. Please consider this when interpreting the information in this note.        Again, thank you for allowing me to participate in the care of your patient.        Sincerely,        Rasta Newell DO    Electronically signed

## 2025-02-20 NOTE — LETTER
February 20, 2025      Christina John  [address] [place] [id number]        To Whom It May Concern:    Christina John was seen in our clinic today on 2/20/2025 for her right proximal humerus fracture that will require rest, activity restrictions, and rehabilitation before being safe to resume regular work and recreational activities.  At this time, she is unsafe to utilize the right arm for any lifting, carrying, pushing, pulling, and transferring, and must remain in the shoulder immobilizer at all times.  She may perform left upper extremity activities and bilateral lower extremity activities as tolerated. She may return to work with the above restrictions in place until medically cleared.  If unable to accommodate these restrictions, then she must be excused from work duties until medically cleared.      Sincerely,  Rasta Newell DO, CAQSM  [facility] - Sports Medicine  [facility] - Department of Orthopedic Surgery

## 2025-02-28 NOTE — PROGRESS NOTES
Christina John  :  1965  DOS:3/5/2025  MRN: [id number]  PCP: Nancy Ontiveros    Sports Medicine Clinic Visit    Interim History - 2025  - Last seen on 2025 for a nondisplaced proximal right humerus fracture.   - Continued in immobilizer at last visit. Pain has been improving and she is able to activate her shoulder abductors, flexors, rotators with mild antalgic weakness.  Swelling has been improving as well.  Continues to use the immobilizer without concern.  Repeat xrays today.  - No interim injury.       Initial visit: 2025  HPI  Christina John is a 59 year old female who is seen in consultation at the request of  Teresa Rivera PA-C presenting with a right humerus fracture.    - Mechanism of Injury:    - 2025: Fell onto right arm as left toe got caught under a display at work  - Pertinent history and prior evaluations:    -  visit 2025: Right shoulder immobilizer given. Norco for breakthrough pain. Out of work note given.     -2025 right shoulder xray shows a nondisplaced fracture across the humeral neck. No evidence for involvement of the articular surface and no dislocation. Hypertrophic change at the AC joint.     - Pain Character:    - Location:  proximal upper arm at fracture site.   - Character:  aching  - Duration:  About 2 days  - Course:  improving slightly  - Endorses:    -Pain as described above.  Antalgic weakness due to pain  - Denies:    -Numbness, tingling, vascular changes, skin changes  - Alleviating factors:    -Shoulder immobilizer, rest  - Aggravating factors:    - Movement away from body  - Other treatments tried:    - Shoulder immobilizer since 26.     - Patient Goals:    - be able to manage the symptoms successfully, discuss treatment options  - Social History:   - Employed      Review of Systems  Musculoskeletal: as above  Remainder of review of systems is negative including constitutional, CV, pulmonary, GI, Skin and  Neurologic except as noted in HPI or medical history.    Past Medical History:   Diagnosis Date    Adhesive capsulitis of left shoulder 6/1/2023    Allergic rhinitis due to animal dander did IT x 4 yrs. ended 9/06 per self    Allergies     Appendicitis     BRCA1 positive     Breast CA (H)     Colon polyp     DCIS (ductal carcinoma in situ)     DCIS (ductal carcinoma in situ) of breast 8/08 and 8/10    RT ( stage 2a) and left ( stage 0)BREAST - STAGE 2A - GRADE III    Depression     Depressive disorder     Desensitization to allergens     did IT x 4 yrs. ended 9/06 per self    Diagnostic skin and sensitization tests 4/03 skin tests pos. for: cat/dog/DM/T/G/RW    Elevated cholesterol     Endometriosis     Endometriosis     GERD (gastroesophageal reflux disease)     GERD (gastroesophageal reflux disease)     Glaucoma     History of anesthesia complications     House dust mite allergy     Hyperlipidemia     Hypertension     Hypertension goal BP (blood pressure) < 140/90     Infertility     ON CLOMID    Infertility, female     Lumbar disc herniation     L5-S1    Mild major depression     DAVIE (obstructive sleep apnea)     No CPAP    PCOS (polycystic ovarian syndrome)     PCOS (polycystic ovarian syndrome)     Pelvic relaxation     PONV (postoperative nausea and vomiting)     Seasonal allergic conjunctivitis     Seasonal allergic rhinitis     4/03 skin tests pos. for: cat/dog/DM/T/G/RW--per Dr. Talavera.    Ulcers 1992    DX BY ENDOSCOPY     Past Surgical History:   Procedure Laterality Date    APPENDECTOMY      BACK SURGERY  1/2021    Discectomy L5S1    C/SECTION, CLASSICAL      CHOLECYSTECTOMY      COLONOSCOPY N/A 6/17/2024    Procedure: COLONOSCOPY, WITH POLYPECTOMY AND BIOPSY;  Surgeon: Kian Grace DO;  Location: [facility]    COLONOSCOPY W/ BIOPSIES AND POLYPECTOMY      COSMETIC SURGERY  10/13, 09/15    ENDOSCOPY      10/2000 Xiao, repeat 10/2002, REPEAT 2/08 (REPEAT IN 2 YRS)    ENT SURGERY  1973    EXCIS VAGINAL  CYST/TUMOR      GASTRIC BYPASS      GI SURGERY  04/11    GLAUCOMA SURGERY      GLAUCOMA SURGERY      HC REMOVAL GALLBLADDER  6/2005    HYSTERECTOMY      HYSTERECTOMY, PAP NO LONGER INDICATED      MASTECTOMY Bilateral     MASTECTOMY BILATERAL, INSERT TISSUE EXPANDER BILATERAL, COMBINED  8/2010    MASTECTOMY, BILATERAL      OTHER SURGICAL HISTORY      laparoscopy    PELVIS LAPAROSCOPY,DX      x2    NC LAMNOTMY INCL W/DCMPRSN NRV ROOT 1 INTRSPC LUMBR Left 1/8/2021    Procedure: LEFT LUMBAR 5-SACRAL 1 DISCECTOMY;  Surgeon: Vadim Theodore MD;  Location: [facility] Main OR;  Service: Spine    REVISE RECONSTRUCTED BREAST      ZZC APPENDECTOMY  1982    ZZ BIOPSY OF BREAST, OPEN INCISIONAL  2008    sentinel lymph node    ZZ COLONOSCOPY THRU STOMA, DIAGNOSTIC  12/04, 12/09    (REPEAT IN 5 YRS)     Family History   Problem Relation Age of Onset    Hypertension Mother     Gastrointestinal Disease Mother         GAITAN'S ESOPHAGUS    Lipids Mother     Depression Mother     Other - See Comments Mother         Pagets Disease of the Vulva, Dx 06/2014    Osteoporosis Mother     Obesity Mother     Allergies Father         HAYFEVER    Heart Disease Father         AFIB    Hypertension Father     Lipids Father     Hyperlipidemia Father     Gastrointestinal Disease Other         GAITAN'S ESOPHAGUS    Breast Cancer Other     Colon Cancer Other     Other Cancer Maternal Grandmother         Gall bladder    Other Cancer Maternal Grandfather         Esophageal    Colon Cancer Paternal Grandfather     Gastrointestinal Disease Other         GAITAN'S ESOPHAGUS    Breast Cancer Other     Breast Cancer Paternal Aunt     Breast Cancer Other         fathers 1st cousin    Hyperlipidemia Brother     Colon Cancer Other          Objective  LMP 03/28/2010     General: healthy, alert and in no acute distress.    HEENT: no scleral icterus or conjunctival erythema.   Skin: no suspicious lesions or rash. No jaundice.   CV: regular rhythm by palpation,  2+ distal pulses.  Resp: normal respiratory effort without conversational dyspnea.   Psych: normal mood and affect.    Gait: nonantalgic, appropriate coordination and balance.     Neuro:        - Sensation to light touch:    - Intact throughout the BUE including all peripheral nerve distributions.     MSK - Shoulder:       - Inspection:    - Mild swelling of the proximal upper arm, improved since prior exam.  No erythema, warmth, lesion, or atrophy noted.        - ROM:    - Limited in abduction, flexion and rotation of the arm to less than 20 degrees each due to pain at the fracture site.  Improved since prior exam.       - Palpation:    - TTP slightly at the proximal humerus fracture site.   - NTTP elsewhere.        - Strength:    - Deferred formal strength testing of the shoulder due to fracture.  Intact and full strength at the elbow, wrist, and hand.          Radiology  I independently reviewed the available relevant imaging in the chart with my interpretations as above in HPI.     I independently reviewed today's new relevant imaging, with the following interpretation:  - XR R shoulder 3/5/2025 shows stability of her nondisplaced proximal right humerus fracture at the humeral neck.  No new acute fracture, no worsening displacement or alignment.  This      Assessment  1. Other closed nondisplaced fracture of proximal end of right humerus with routine healing, subsequent encounter          Plan  Christina John is a pleasant 59 year old female that presents for follow-up of her acute right shoulder pain after a fall with impact to the right shoulder.  Unsure exactly of the mechanism of impact to the shoulder but she did have pain at the proximal upper arm after the fall.  She was previously evaluated and a nondisplaced fracture at the right humeral neck was identified on radiographs.  She was given a shoulder immobilizer and work restriction letter after initial evaluation.    In the interim, she has been doing  well with improving pain and swelling and intact neurovascular function at the shoulder.  Repeat radiographs today demonstrate stability with no worsening displacement or angulation.  Immobilizer has been fitting well.  On exam, she has only minimal TTP over the fracture site and is able to activate all proximal upper extremity musculature effectively.    We discussed the nature of the condition and available treatment options, and mutually agreed upon the following plan:    - Imaging:          - Reviewed and independently interpreted the relevant imaging in the chart, including any imaging ordered for today's clinic.  - Reviewed results and images with patient.   - Medications:          - Discussed pharmacologic options for pain relief.   - May use NSAIDs (Ibuprofen, Naproxen) or Acetaminophen (Tylenol) as needed for pain control. Do not take these if previously advised to avoid them for other medical conditions.  - May also use topical medications such as lidocaine, IcyHot, BioFreeze, or Voltaren gel as needed for pain control. Voltaren gel is an anti-inflammatory cream that may be used up to 4 times per day over the painful area.   - Therapy:          - Discussed the benefits of therapy vs home exercise program for optimization of range of motion, flexibility, strength, stability and function.   - Physical Therapy referral placed today. Please call [phone] to schedule appointments.   - Modalities:          - May use ice, heat, massage or other modalities as needed.   - Bracing:          - Discussed bracing options and recommend continued use of the shoulder immobilizer for comfort as needed.  Okay to remove the immobilizer and work on pendulum exercises and gentle mobilization as tolerated.,  - Surgery:          - Discussed non-operative and operative treatment options for the patient's condition.  Not likely to require surgery.  Expect good healing with nonoperative management.  - Activity:     - Encouraged  to rest and protect the injured area from further injury.  Avoid exacerbating activities and activities that are high-risk for falls.    - Work restriction letter provided at last visit and remains active.  - Follow up:          - In 2-3 weeks for repeat radiographs and re-evaluation.   - May follow up sooner for new/worsening symptoms.  - May contact clinic by phone or MyChart for questions or concerns.       Rasta Newell DO, VIVIANA  [facility] - Sports Medicine  [facility] - Department of Orthopedic Surgery       Disclaimer:  This note was prepared and written using Dragon Medical dictation software. As a result, there may be errors in the script that have gone undetected. Please consider this when interpreting the information in this note.

## 2025-03-05 ENCOUNTER — OFFICE VISIT (OUTPATIENT)
Dept: ORTHOPEDICS | Facility: CLINIC | Age: 60
End: 2025-03-05
Payer: OTHER MISCELLANEOUS

## 2025-03-05 ENCOUNTER — ANCILLARY PROCEDURE (OUTPATIENT)
Dept: GENERAL RADIOLOGY | Facility: CLINIC | Age: 60
End: 2025-03-05
Attending: STUDENT IN AN ORGANIZED HEALTH CARE EDUCATION/TRAINING PROGRAM
Payer: COMMERCIAL

## 2025-03-05 DIAGNOSIS — S42.294D OTHER CLOSED NONDISPLACED FRACTURE OF PROXIMAL END OF RIGHT HUMERUS WITH ROUTINE HEALING, SUBSEQUENT ENCOUNTER: Primary | ICD-10-CM

## 2025-03-05 DIAGNOSIS — S42.294A OTHER CLOSED NONDISPLACED FRACTURE OF PROXIMAL END OF RIGHT HUMERUS, INITIAL ENCOUNTER: ICD-10-CM

## 2025-03-05 PROCEDURE — 99214 OFFICE O/P EST MOD 30 MIN: CPT | Performed by: STUDENT IN AN ORGANIZED HEALTH CARE EDUCATION/TRAINING PROGRAM

## 2025-03-05 PROCEDURE — 73030 X-RAY EXAM OF SHOULDER: CPT | Mod: TC | Performed by: INTERNAL MEDICINE

## 2025-03-05 NOTE — LETTER
3/5/2025      Christina John  [address] [facility] [address]      Dear Colleague,    Thank you for referring your patient, Christina John, to the [facility] [place]. Please see a copy of my visit note below.    Christina John  :  1965  DOS:3/5/2025  MRN: [id number]  PCP: Nancy Ontiveros    Sports Medicine Clinic Visit    Interim History - 2025  - Last seen on 2025 for a nondisplaced proximal right humerus fracture.   - Continued in immobilizer at last visit. Pain has been improving and she is able to activate her shoulder abductors, flexors, rotators with mild antalgic weakness.  Swelling has been improving as well.  Continues to use the immobilizer without concern.  Repeat xrays today.  - No interim injury.       Initial visit: 2025  HPI  Christina John is a 59 year old female who is seen in consultation at the request of  Teresa Rivera PA-C presenting with a right humerus fracture.    - Mechanism of Injury:    - 2025: Fell onto right arm as left toe got caught under a display at work  - Pertinent history and prior evaluations:    -  visit 2025: Right shoulder immobilizer given. Norco for breakthrough pain. Out of work note given.     -2025 right shoulder xray shows a nondisplaced fracture across the humeral neck. No evidence for involvement of the articular surface and no dislocation. Hypertrophic change at the AC joint.     - Pain Character:    - Location:  proximal upper arm at fracture site.   - Character:  aching  - Duration:  About 2 days  - Course:  improving slightly  - Endorses:    -Pain as described above.  Antalgic weakness due to pain  - Denies:    -Numbness, tingling, vascular changes, skin changes  - Alleviating factors:    -Shoulder immobilizer, rest  - Aggravating factors:    - Movement away from body  - Other treatments tried:    - Shoulder immobilizer since 26.     - Patient Goals:    -  be able to manage the symptoms successfully, discuss treatment options  - Social History:   - Employed      Review of Systems  Musculoskeletal: as above  Remainder of review of systems is negative including constitutional, CV, pulmonary, GI, Skin and Neurologic except as noted in HPI or medical history.    Past Medical History:   Diagnosis Date     Adhesive capsulitis of left shoulder 6/1/2023     Allergic rhinitis due to animal dander did IT x 4 yrs. 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 ended 9/06 per self     Diagnostic skin and sensitization tests 4/03 skin tests pos. for: cat/dog/DM/T/G/RW     Elevated cholesterol      Endometriosis      Endometriosis      GERD (gastroesophageal reflux disease)      GERD (gastroesophageal reflux disease)      Glaucoma      History of anesthesia complications      House dust mite allergy      Hyperlipidemia      Hypertension      Hypertension goal BP (blood pressure) < 140/90      Infertility     ON CLOMID     Infertility, female      Lumbar disc herniation     L5-S1     Mild major depression      DAVIE (obstructive sleep apnea)     No CPAP     PCOS (polycystic ovarian syndrome)      PCOS (polycystic ovarian syndrome)      Pelvic relaxation      PONV (postoperative nausea and vomiting)      Seasonal allergic conjunctivitis      Seasonal allergic rhinitis     4/03 skin tests pos. for: cat/dog/DM/T/G/RW--per Dr. Talavera.     Ulcers 1992    DX BY ENDOSCOPY     Past Surgical History:   Procedure Laterality Date     APPENDECTOMY       BACK SURGERY  1/2021    Discectomy L5S1     C/SECTION, CLASSICAL       CHOLECYSTECTOMY       COLONOSCOPY N/A 6/17/2024    Procedure:  COLONOSCOPY, WITH POLYPECTOMY AND BIOPSY;  Surgeon: Kian Grace DO;  Location: [facility]     COLONOSCOPY W/ BIOPSIES AND POLYPECTOMY       COSMETIC SURGERY  10/13, 09/15     ENDOSCOPY      10/2000 [place], repeat 10/2002, REPEAT 2/08 (REPEAT IN 2 YRS)     ENT SURGERY  1973     EXCIS VAGINAL CYST/TUMOR       GASTRIC BYPASS       GI SURGERY  04/11     GLAUCOMA SURGERY       GLAUCOMA SURGERY       HC REMOVAL GALLBLADDER  6/2005     HYSTERECTOMY       HYSTERECTOMY, PAP NO LONGER INDICATED       MASTECTOMY Bilateral      MASTECTOMY BILATERAL, INSERT TISSUE EXPANDER BILATERAL, COMBINED  8/2010     MASTECTOMY, BILATERAL       OTHER SURGICAL HISTORY      laparoscopy     PELVIS LAPAROSCOPY,DX      x2     DC LAMNOTMY INCL W/DCMPRSN NRV ROOT 1 INTRSPC LUMBR Left 1/8/2021    Procedure: LEFT LUMBAR 5-SACRAL 1 DISCECTOMY;  Surgeon: Vadim Theodore MD;  Location: [facility];  Service: Spine     REVISE RECONSTRUCTED BREAST       ZZC APPENDECTOMY  1982     ZZ BIOPSY OF BREAST, OPEN INCISIONAL  2008    sentinel lymph node     ZZ COLONOSCOPY THRU STOMA, DIAGNOSTIC  12/04, 12/09    (REPEAT IN 5 YRS)     Family History   Problem Relation Age of Onset     Hypertension Mother      Gastrointestinal Disease Mother         GAITAN'S ESOPHAGUS     Lipids Mother      Depression Mother      Other - See Comments Mother         Pagets Disease of the Vulva, Dx 06/2014     Osteoporosis Mother      Obesity Mother      Allergies Father         HAYFEVER     Heart Disease Father         AFIB     Hypertension Father      Lipids Father      Hyperlipidemia Father      Gastrointestinal Disease Other         GAITAN'S ESOPHAGUS     Breast Cancer Other      Colon Cancer Other      Other Cancer Maternal Grandmother         Gall bladder     Other Cancer Maternal Grandfather         Esophageal     Colon Cancer Paternal Grandfather      Gastrointestinal Disease Other         GAITAN'S ESOPHAGUS     Breast Cancer Other      Breast Cancer Paternal Aunt       Breast Cancer Other         fathers 1st cousin     Hyperlipidemia Brother      Colon Cancer Other          Objective  LMP 03/28/2010     General: healthy, alert and in no acute distress.    HEENT: no scleral icterus or conjunctival erythema.   Skin: no suspicious lesions or rash. No jaundice.   CV: regular rhythm by palpation, 2+ distal pulses.  Resp: normal respiratory effort without conversational dyspnea.   Psych: normal mood and affect.    Gait: nonantalgic, appropriate coordination and balance.     Neuro:        - Sensation to light touch:    - Intact throughout the BUE including all peripheral nerve distributions.     MSK - Shoulder:       - Inspection:    - Mild swelling of the proximal upper arm, improved since prior exam.  No erythema, warmth, lesion, or atrophy noted.        - ROM:    - Limited in abduction, flexion and rotation of the arm to less than 20 degrees each due to pain at the fracture site.  Improved since prior exam.       - Palpation:    - TTP slightly at the proximal humerus fracture site.   - NTTP elsewhere.        - Strength:    - Deferred formal strength testing of the shoulder due to fracture.  Intact and full strength at the elbow, wrist, and hand.          Radiology  I independently reviewed the available relevant imaging in the chart with my interpretations as above in HPI.     I independently reviewed today's new relevant imaging, with the following interpretation:  - XR R shoulder 3/5/2025 shows stability of her nondisplaced proximal right humerus fracture at the humeral neck.  No new acute fracture, no worsening displacement or alignment.  This      Assessment  1. Other closed nondisplaced fracture of proximal end of right humerus with routine healing, subsequent encounter          Plan  Christina John is a pleasant 59 year old female that presents for follow-up of her acute right shoulder pain after a fall with impact to the right shoulder.  Unsure exactly of the mechanism of  impact to the shoulder but she did have pain at the proximal upper arm after the fall.  She was previously evaluated and a nondisplaced fracture at the right humeral neck was identified on radiographs.  She was given a shoulder immobilizer and work restriction letter after initial evaluation.    In the interim, she has been doing well with improving pain and swelling and intact neurovascular function at the shoulder.  Repeat radiographs today demonstrate stability with no worsening displacement or angulation.  Immobilizer has been fitting well.  On exam, she has only minimal TTP over the fracture site and is able to activate all proximal upper extremity musculature effectively.    We discussed the nature of the condition and available treatment options, and mutually agreed upon the following plan:    - Imaging:          - Reviewed and independently interpreted the relevant imaging in the chart, including any imaging ordered for today's clinic.  - Reviewed results and images with patient.   - Medications:          - Discussed pharmacologic options for pain relief.   - May use NSAIDs (Ibuprofen, Naproxen) or Acetaminophen (Tylenol) as needed for pain control. Do not take these if previously advised to avoid them for other medical conditions.  - May also use topical medications such as lidocaine, IcyHot, BioFreeze, or Voltaren gel as needed for pain control. Voltaren gel is an anti-inflammatory cream that may be used up to 4 times per day over the painful area.   - Therapy:          - Discussed the benefits of therapy vs home exercise program for optimization of range of motion, flexibility, strength, stability and function.   - Physical Therapy referral placed today. Please call [phone] to schedule appointments.   - Modalities:          - May use ice, heat, massage or other modalities as needed.   - Bracing:          - Discussed bracing options and recommend continued use of the shoulder immobilizer for comfort  as needed.  Okay to remove the immobilizer and work on pendulum exercises and gentle mobilization as tolerated.,  - Surgery:          - Discussed non-operative and operative treatment options for the patient's condition.  Not likely to require surgery.  Expect good healing with nonoperative management.  - Activity:     - Encouraged to rest and protect the injured area from further injury.  Avoid exacerbating activities and activities that are high-risk for falls.    - Work restriction letter provided at last visit and remains active.  - Follow up:          - In 2-3 weeks for repeat radiographs and re-evaluation.   - May follow up sooner for new/worsening symptoms.  - May contact clinic by phone or MyChart for questions or concerns.       Rasta Newell DO, CAQSM  [facility] - Sports Medicine  [facility] - Department of Orthopedic Surgery       Disclaimer:  This note was prepared and written using Dragon Medical dictation software. As a result, there may be errors in the script that have gone undetected. Please consider this when interpreting the information in this note.        Again, thank you for allowing me to participate in the care of your patient.        Sincerely,        Rasta Newell DO    Electronically signed

## 2025-03-16 ASSESSMENT — PATIENT HEALTH QUESTIONNAIRE - PHQ9
10. IF YOU CHECKED OFF ANY PROBLEMS, HOW DIFFICULT HAVE THESE PROBLEMS MADE IT FOR YOU TO DO YOUR WORK, TAKE CARE OF THINGS AT HOME, OR GET ALONG WITH OTHER PEOPLE: VERY DIFFICULT
SUM OF ALL RESPONSES TO PHQ QUESTIONS 1-9: 17
SUM OF ALL RESPONSES TO PHQ QUESTIONS 1-9: 17

## 2025-03-17 ENCOUNTER — OFFICE VISIT (OUTPATIENT)
Dept: FAMILY MEDICINE | Facility: CLINIC | Age: 60
End: 2025-03-17
Payer: COMMERCIAL

## 2025-03-17 VITALS
HEIGHT: 71 IN | DIASTOLIC BLOOD PRESSURE: 82 MMHG | RESPIRATION RATE: 14 BRPM | OXYGEN SATURATION: 97 % | BODY MASS INDEX: 29.12 KG/M2 | SYSTOLIC BLOOD PRESSURE: 120 MMHG | WEIGHT: 208 LBS | HEART RATE: 64 BPM | TEMPERATURE: 97.3 F

## 2025-03-17 DIAGNOSIS — M85.80 OSTEOPENIA, UNSPECIFIED LOCATION: Primary | ICD-10-CM

## 2025-03-17 DIAGNOSIS — H65.90 FLUID LEVEL BEHIND TYMPANIC MEMBRANE, UNSPECIFIED LATERALITY: ICD-10-CM

## 2025-03-17 PROCEDURE — 3079F DIAST BP 80-89 MM HG: CPT | Performed by: STUDENT IN AN ORGANIZED HEALTH CARE EDUCATION/TRAINING PROGRAM

## 2025-03-17 PROCEDURE — 1125F AMNT PAIN NOTED PAIN PRSNT: CPT | Performed by: STUDENT IN AN ORGANIZED HEALTH CARE EDUCATION/TRAINING PROGRAM

## 2025-03-17 PROCEDURE — 99214 OFFICE O/P EST MOD 30 MIN: CPT | Performed by: STUDENT IN AN ORGANIZED HEALTH CARE EDUCATION/TRAINING PROGRAM

## 2025-03-17 PROCEDURE — 3074F SYST BP LT 130 MM HG: CPT | Performed by: STUDENT IN AN ORGANIZED HEALTH CARE EDUCATION/TRAINING PROGRAM

## 2025-03-17 RX ORDER — GABAPENTIN 100 MG/1
CAPSULE ORAL
COMMUNITY
Start: 2025-01-01

## 2025-03-17 ASSESSMENT — PAIN SCALES - GENERAL: PAINLEVEL_OUTOF10: MILD PAIN (3)

## 2025-03-17 NOTE — PATIENT INSTRUCTIONS
Flonase (fluticasone) or Nasonex (mometasone) - 1 spray in each nostril once daily (preferably at bedtime). You can do it 1 additional time a day if needed for worse symptoms.   These are available over the counter.     If you do this regularly for 2 weeks and it isn't getting better, let me know and I'll put in ENT referral.

## 2025-03-17 NOTE — PROGRESS NOTES
"  Assessment & Plan     Osteopenia, unspecified location  Patient is a pleasant 59-year-old female who presents today after a second fracture in the past few months.  Patient had osteopenia noted on a DEXA scan completed in the fall 2023.  In November, rolled her ankle on some stairs, suffered a fibula fracture.  Most recently, she tripped at work, landed on her right arm and sustained a humeral fracture.  Given that that fall was from standing height, I do have concerns that that is a fragility fracture, certainly concerning for developing osteoporosis.  We opted to recheck a DEXA scan, which is ordered today.  I did put in an endocrinology referral, noting that this will likely take many months to get scheduled.  In the interim, we will plan to obtain labs about 6 weeks after her last fracture for further workup.  Certainly, consider treatment sooner rather than later.  Follow-up TBD pending results, will discuss treatment with her after all results are available.  Could consider an E consult with endocrinology if we need to for treatment suggestions.  - Adult Endocrinology  Referral  - DX Bone Density  - CBC with platelets  - Comprehensive metabolic panel  - Phosphorus  - Ionized Calcium  - Vitamin D Deficiency  - TSH with free T4 reflex  - Calcium, 24 Hr U (w/o Creat) ([facility])  - Parathyroid Hormone Intact    Fluid level behind tympanic membrane, unspecified laterality  Noted to have fluid behind the right ear today.  Will have her trial Flonase and see if this improves her symptoms.  No signs of infection.      I spent a total of 37 minutes on the day of the visit.   Time spent by me today doing chart review, history and exam, documentation and further activities per the note    BMI  Estimated body mass index is 29.22 kg/m  as calculated from the following:    Height as of this encounter: 1.797 m (5' 10.75\").    Weight as of this encounter: 94.3 kg (208 lb).         Subjective   Christina is a 59 year " "old, presenting for the following health issues:  Ear Problem (Constant pressure and pain right ear) and RECHECK (DEXA)        3/17/2025     8:22 AM   Additional Questions   Roomed by Hedy WATTS   Accompanied by Self     History of Present Illness       Reason for visit:  Ear pressure and discussion about DEXA scan for rochelle e density  Symptom onset:  3-4 weeks ago  Symptoms include:  2 broken bones in 4 months. Constant eye discomfort  Symptom intensity:  Moderate  Symptom progression:  Staying the same  Had these symptoms before:  Yes  Has tried/received treatment for these symptoms:  Yes  Previous treatment was successful:  No  What makes it worse:  Sleeping and heat  What makes it better:  No   She is taking medications regularly.        Ear pain:   Right ear, off and on for a month.   More constant now, about 2 weeks ago.   No drainage,   No fever.   No other ill symptoms.   No left ear issues.       2 broken bones in the last 6 months    Broken fibula, been a month now and broke humerus    Ankle: fell down garage stairs, thinks missed a stair.     The humerus: broke it at work, fell. Turned to walk away form someone and thinks caught toe under a table/sign and went over, landed right on her arm maybe     Felt like she fell sideways.     Doesn't think she's been on long term steroids   H/o chemo. [id number], on steroids then.   Only had dexamthesone 2-3 days a month.     \"Taken ome nasty spills in my lifetime\"   First unintentional broken bone was in October with the fibula, second now with the arm.     Mother with h/o of gastric bypass, and similar story, osteopenia and then breaking bones.     Pt's gastric surgery in 2011, DYAN.     Review of Systems  Constitutional, HEENT, cardiovascular, pulmonary, gi and gu systems are negative, except as otherwise noted.      Objective    /82 (BP Location: Left arm, Patient Position: Sitting, Cuff Size: Adult Regular)   Pulse 64   Temp 97.3  F (36.3  C) (Tympanic)   " "Resp 14   Ht 1.797 m (5' 10.75\")   Wt 94.3 kg (208 lb)   LMP 03/28/2010   SpO2 97%   BMI 29.22 kg/m    Body mass index is 29.22 kg/m .  Physical Exam  Constitutional:       Appearance: Normal appearance.   HENT:      Head: Normocephalic.      Right Ear: A middle ear effusion is present.   Eyes:      General: No scleral icterus.     Extraocular Movements: Extraocular movements intact.      Conjunctiva/sclera: Conjunctivae normal.   Cardiovascular:      Rate and Rhythm: Normal rate.   Pulmonary:      Effort: Pulmonary effort is normal.   Neurological:      General: No focal deficit present.      Mental Status: She is alert and oriented to person, place, and time.           Results from this visitNo results found for any visits on 03/17/25.          Signed Electronically by: Nancy Ontiveros MD    "

## 2025-03-18 ASSESSMENT — ACTIVITIES OF DAILY LIVING (ADL)
PUTTING_ON_AN_UNDERSHIRT_OR_A_PULLOVER_SWEATER: 8
OPENING_A_JAR: EXTREME DIFFICULTY
PREPARING_FOOD: EXTREME DIFFICULTY
WHEN_LYING_ON_THE_INVOLVED_SIDE: 7
DRESS: QUITE A BIT OF DIFFICULTY
THROWING_A_BALL: EXTREME DIFFICULTY
LIFTING_A_BAG_OF_GROCERIES_TO_WAIST_LEVEL: EXTREME DIFFICULTY
AT_ITS_WORST?: 8
SLEEPING: QUITE A BIT OF DIFFICULTY
VACUUMING,_SWEEPING,_OR_RAKING: EXTREME DIFFICULTY
WASHING_YOUR_HAIR_OR_SCALP: EXTREME DIFFICULTY
USING_TOOLS_OR_APPLIANCES: EXTREME DIFFICULTY
REACHING_FOR_SOMETHING_ON_A_HIGH_SHELF: 10
OPENING_DOORS: EXTREME DIFFICULTY
CARRYING_A_SMALL_SUITCASE_WITH_YOUR_AFFECTED_LIMB: EXTREME DIFFICULTY
PUSHING_WITH_THE_INVOLVED_ARM: 9
YOUR_USUAL_HOBBIES,_RECREATIONAL_OR_SPORTING_ACTIVITIES: QUITE A BIT OF DIFFICULTY
PUTTING_ON_A_SHIRT_THAT_BUTTONS_DOWN_THE_FRONT: 8
PUTTING_ON_YOUR_PANTS: 8
PLEASE_INDICATE_YOR_PRIMARY_REASON_FOR_REFERRAL_TO_THERAPY:: SHOULDER
PUSHING_UP_ON_YOUR_HANDS: EXTREME DIFFICULTY
UEFI_TOTAL_SCORE/80: .06
WASHING_YOUR_HAIR?: 8
DOING_UP_BUTTONS: QUITE A BIT OF DIFFICULTY
PLACING_AN_OBJECT_ONTO,_OR_REMOVING_IT_FROM_AN_OVERHEAD_SHELF: EXTREME DIFFICULTY
DRIVING: EXTREME DIFFICULTY
CLEANING: EXTREME DIFFICULTY
ANY_OF_YOUR_USUAL_WORK,_HOUSEWORK_OR_SCHOOL_ACTIVITIES: QUITE A BIT OF DIFFICULTY
UEFI_TOTAL_SCORE: 5
REMOVING_SOMETHING_FROM_YOUR_BACK_POCKET: 10
PLEASE_INDICATE_YOR_PRIMARY_REASON_FOR_REFERRAL_TO_THERAPY:: ELBOW
TYING_OR_LACING_SHOES: EXTREME DIFFICULTY
TOUCHING_THE_BACK_OF_YOUR_NECK: 10
LAUNDERING_CLOTHES: QUITE A BIT OF DIFFICULTY

## 2025-03-19 NOTE — PROGRESS NOTES
Christina John  :  1965  DOS:3/20/2025  MRN: [id number]  PCP: Nancy Ontiveros    Sports Medicine Clinic Visit    Interim History - 2025  - Last seen on 3/5/2025 for a nondisplaced proximal right humerus fracture. Started physical therapy today and the arm is a little sore after therapy.  - 1 month post-injury  - Since the last visit, she continues to make improvements with her pain constantly at about a 3/10, only increases slightly with movements.  She is wearing the immobilizer at night, which helps stabilize the shoulder as she tends to roll often.  Slight sleep interruptions, getting better.  Range of motion is improving well and she is working on pendulum exercises, stretching, and range of motion in PT.  Still feels some soreness in the elbow at the proximal ulna posterior surface where she had some significant olecranon bursa swelling, which has decreased to about a third of the size.  She is actively avoiding pressure on the area.  Using a shoulder sling instead of the immobilizer at times during the day.  - No interim injury.       Interim History - 2025  - Last seen on 3/5/2025 for a nondisplaced proximal right humerus fracture.   - Continued in immobilizer at last visit. Pain has been improving and she is able to activate her shoulder abductors, flexors, rotators with mild antalgic weakness.  Swelling has been improving as well.  Continues to use the immobilizer without concern.  Repeat xrays today.  - No interim injury.     Initial visit: 2025  HPI  Christina John is a 59 year old female who is seen in consultation at the request of  Teresa Rivera PA-C presenting with a right humerus fracture.    - Mechanism of Injury:    - 2025: Fell onto right arm as left toe got caught under a display at work  - Pertinent history and prior evaluations:    -  visit 2025: Right shoulder immobilizer given. Norco for breakthrough pain. Out of work note given.      -2/18/2025 right shoulder xray shows a nondisplaced fracture across the humeral neck. No evidence for involvement of the articular surface and no dislocation. Hypertrophic change at the AC joint.     - Pain Character:    - Location:  proximal upper arm at fracture site.   - Character:  aching  - Duration:  About 2 days  - Course:  improving slightly  - Endorses:    -Pain as described above.  Antalgic weakness due to pain  - Denies:    -Numbness, tingling, vascular changes, skin changes  - Alleviating factors:    -Shoulder immobilizer, rest  - Aggravating factors:    - Movement away from body  - Other treatments tried:    - Shoulder immobilizer since 2/18/25.     - Patient Goals:    - be able to manage the symptoms successfully, discuss treatment options  - Social History:   - Employed      Review of Systems  Musculoskeletal: as above  Remainder of review of systems is negative including constitutional, CV, pulmonary, GI, Skin and Neurologic except as noted in HPI or medical history.    Past Medical History:   Diagnosis Date    Adhesive capsulitis of left shoulder 6/1/2023    Allergic rhinitis due to animal dander did IT x 4 yrs. ended 9/06 per self    Allergies     Appendicitis     BRCA1 positive     Breast CA (H)     Colon polyp     DCIS (ductal carcinoma in situ)     DCIS (ductal carcinoma in situ) of breast 8/08 and 8/10    RT ( stage 2a) and left ( stage 0)BREAST - STAGE 2A - GRADE III    Depression     Depressive disorder     Desensitization to allergens     did IT x 4 yrs. ended 9/06 per self    Diagnostic skin and sensitization tests 4/03 skin tests pos. for: cat/dog/DM/T/G/RW    Elevated cholesterol     Endometriosis     Endometriosis     GERD (gastroesophageal reflux disease)     GERD (gastroesophageal reflux disease)     Glaucoma     History of anesthesia complications     House dust mite allergy     Hyperlipidemia     Hypertension     Hypertension goal BP (blood pressure) < 140/90     Infertility      ON CLOMID    Infertility, female     Lumbar disc herniation     L5-S1    Mild major depression     DAVIE (obstructive sleep apnea)     No CPAP    PCOS (polycystic ovarian syndrome)     PCOS (polycystic ovarian syndrome)     Pelvic relaxation     PONV (postoperative nausea and vomiting)     Seasonal allergic conjunctivitis     Seasonal allergic rhinitis     4/03 skin tests pos. for: cat/dog/DM/T/G/RW--per Dr. Talavera.    Ulcers 1992    DX BY ENDOSCOPY     Past Surgical History:   Procedure Laterality Date    APPENDECTOMY      BACK SURGERY  1/2021    Discectomy L5S1    C/SECTION, CLASSICAL      CHOLECYSTECTOMY      COLONOSCOPY N/A 6/17/2024    Procedure: COLONOSCOPY, WITH POLYPECTOMY AND BIOPSY;  Surgeon: Kian Grace DO;  Location: [facility]    COLONOSCOPY W/ BIOPSIES AND POLYPECTOMY      COSMETIC SURGERY  10/13, 09/15    ENDOSCOPY      10/2000 Xiao, repeat 10/2002, REPEAT 2/08 (REPEAT IN 2 YRS)    ENT SURGERY  1973    EXCIS VAGINAL CYST/TUMOR      GASTRIC BYPASS      GI SURGERY  04/11    GLAUCOMA SURGERY      GLAUCOMA SURGERY      HC REMOVAL GALLBLADDER  6/2005    HYSTERECTOMY      HYSTERECTOMY, PAP NO LONGER INDICATED      MASTECTOMY Bilateral     MASTECTOMY BILATERAL, INSERT TISSUE EXPANDER BILATERAL, COMBINED  8/2010    MASTECTOMY, BILATERAL      OTHER SURGICAL HISTORY      laparoscopy    PELVIS LAPAROSCOPY,DX      x2    AK LAMNOTMY INCL W/DCMPRSN NRV ROOT 1 INTRSPC LUMBR Left 1/8/2021    Procedure: LEFT LUMBAR 5-SACRAL 1 DISCECTOMY;  Surgeon: Vadim Theodore MD;  Location: [facility];  Service: Spine    REVISE RECONSTRUCTED BREAST      [facility] APPENDECTOMY  1982    [facility] BIOPSY OF BREAST, OPEN INCISIONAL  2008    sentinel lymph node    [facility] COLONOSCOPY THRU STOMA, DIAGNOSTIC  12/04, 12/09    (REPEAT IN 5 YRS)     Family History   Problem Relation Age of Onset    Hypertension Mother     Gastrointestinal Disease Mother         GAITAN'S ESOPHAGUS    Lipids Mother     Depression Mother     Other - See Comments Mother          Pagets Disease of the Vulva, Dx 06/2014    Osteoporosis Mother     Obesity Mother     Allergies Father         HAYFEVER    Heart Disease Father         AFIB    Hypertension Father     Lipids Father     Hyperlipidemia Father     Gastrointestinal Disease Other         GAITAN'S ESOPHAGUS    Breast Cancer Other     Colon Cancer Other     Other Cancer Maternal Grandmother         Gall bladder    Other Cancer Maternal Grandfather         Esophageal    Colon Cancer Paternal Grandfather     Gastrointestinal Disease Other         GAITAN'S ESOPHAGUS    Breast Cancer Other     Breast Cancer Paternal Aunt     Breast Cancer Other         fathers 1st cousin    Hyperlipidemia Brother     Colon Cancer Other          Objective  LMP 03/28/2010     General: healthy, alert and in no acute distress.    HEENT: no scleral icterus or conjunctival erythema.   Skin: no suspicious lesions or rash. No jaundice.   CV: regular rhythm by palpation, 2+ distal pulses.  Resp: normal respiratory effort without conversational dyspnea.   Psych: normal mood and affect.    Gait: nonantalgic, appropriate coordination and balance.     Neuro:        - Sensation to light touch:    - Intact throughout the BUE including all peripheral nerve distributions.     MSK - Shoulder:       - Inspection:    - No major swelling, erythema, warmth, lesion, or atrophy noted.        - ROM:    - Limited in abduction to approximately 80 degrees, flexion to approximately 20 degrees, external rotation to approximately 40 degrees. Improved since prior exam.       - Palpation:    - TTP slightly at the proximal humerus fracture site and proximal olecranon posterior surface.   - NTTP elsewhere.        - Strength:    - Intact and 5-*/5 strength for Sab, SF, SER, SIR. Antalgic.           Radiology  I independently reviewed the available relevant imaging in the chart with my interpretations as above in HPI.   - XR R shoulder 3/5/2025 shows stability of her nondisplaced proximal  right humerus fracture at the humeral neck.  No new acute fracture, no worsening displacement or alignment.     I independently reviewed today's new relevant imaging, with the following interpretation:  - XR R shoulder 3/20/2025 shows stability of her nondisplaced proximal right humerus fracture at the humeral neck with signs of interval healing.  No new acute fracture, no worsening displacement or alignment.       Assessment  1. Other closed nondisplaced fracture of proximal end of right humerus with routine healing, subsequent encounter          Plan  Christina John is a pleasant 59 year old female that presents for follow-up of her acute right shoulder pain after a fall with impact to the right shoulder.  Unsure exactly of the mechanism of impact to the shoulder but she did have pain at the proximal upper arm after the fall.  She was previously evaluated and a nondisplaced fracture at the right humeral neck was identified on radiographs.  She was given a shoulder immobilizer and work restriction letter after initial evaluation.    In the interim, she has been doing well with improving pain and swelling and intact neurovascular function at the shoulder.  Repeat radiographs today demonstrate stability with no worsening displacement or angulation.  On exam, she has only minimal TTP over the fracture site and is able to activate all proximal upper extremity musculature effectively with increase strength since prior exam. She has began working with physical therapy as of today.    We discussed the nature of the condition and available treatment options, and mutually agreed upon the following plan:    - Imaging:          - Reviewed and independently interpreted the relevant imaging in the chart, including any imaging ordered for today's clinic.  - Reviewed results and images with patient.   - Medications:          - Discussed pharmacologic options for pain relief.   - May use NSAIDs (Ibuprofen, Naproxen) or  Acetaminophen (Tylenol) as needed for pain control. Do not take these if previously advised to avoid them for other medical conditions.  - May also use topical medications such as lidocaine, IcyHot, BioFreeze, or Voltaren gel as needed for pain control. Voltaren gel is an anti-inflammatory cream that may be used up to 4 times per day over the painful area.   - Therapy:          - Discussed the benefits of therapy vs home exercise program for optimization of range of motion, flexibility, strength, stability and function.   - Continue physical therapy and home exercises program as instructed by PT.  - Modalities:          - May use ice, heat, massage or other modalities as needed.   - Bracing:          - Discussed bracing options and recommend continued use of the shoulder immobilizer for comfort as needed.  Okay to remove the immobilizer and work on pendulum exercises and gentle mobilization as tolerated.  May use the shoulder sling alternatively as needed.  - Surgery:          - Discussed non-operative and operative treatment options for the patient's condition.  Not likely to require surgery.  Expect good healing with nonoperative management.  - Activity:     - Encouraged to rest and protect the injured area from further injury.  Avoid exacerbating activities and activities that are high-risk for falls.    - Work restriction letter provided at last visit and remains active.  - Follow up:          - In 3-4 weeks for repeat radiographs and re-evaluation.   - May follow up sooner for new/worsening symptoms.  - May contact clinic by phone or MyChart for questions or concerns.       Rasta Newell DO, VIVIANA  [facility] - Sports Medicine  [facility] - Department of Orthopedic Surgery       Disclaimer:  This note was prepared and written using Dragon Medical dictation software. As a result, there may be errors in the script that have gone undetected. Please consider this when interpreting  the information in this note.

## 2025-03-20 ENCOUNTER — THERAPY VISIT (OUTPATIENT)
Dept: PHYSICAL THERAPY | Facility: CLINIC | Age: 60
End: 2025-03-20
Attending: STUDENT IN AN ORGANIZED HEALTH CARE EDUCATION/TRAINING PROGRAM
Payer: OTHER MISCELLANEOUS

## 2025-03-20 ENCOUNTER — ANCILLARY PROCEDURE (OUTPATIENT)
Dept: GENERAL RADIOLOGY | Facility: CLINIC | Age: 60
End: 2025-03-20
Attending: STUDENT IN AN ORGANIZED HEALTH CARE EDUCATION/TRAINING PROGRAM
Payer: COMMERCIAL

## 2025-03-20 ENCOUNTER — OFFICE VISIT (OUTPATIENT)
Dept: ORTHOPEDICS | Facility: CLINIC | Age: 60
End: 2025-03-20
Payer: OTHER MISCELLANEOUS

## 2025-03-20 DIAGNOSIS — S42.294D OTHER CLOSED NONDISPLACED FRACTURE OF PROXIMAL END OF RIGHT HUMERUS WITH ROUTINE HEALING, SUBSEQUENT ENCOUNTER: ICD-10-CM

## 2025-03-20 DIAGNOSIS — S42.294D OTHER CLOSED NONDISPLACED FRACTURE OF PROXIMAL END OF RIGHT HUMERUS WITH ROUTINE HEALING, SUBSEQUENT ENCOUNTER: Primary | ICD-10-CM

## 2025-03-20 PROCEDURE — 97110 THERAPEUTIC EXERCISES: CPT | Mod: GP | Performed by: PHYSICAL THERAPIST

## 2025-03-20 PROCEDURE — 97161 PT EVAL LOW COMPLEX 20 MIN: CPT | Mod: GP | Performed by: PHYSICAL THERAPIST

## 2025-03-20 NOTE — PROGRESS NOTES
PHYSICAL THERAPY EVALUATION  Type of Visit: Evaluation       Fall Risk Screen:  Fall screen completed by: PT  Have you fallen 2 or more times in the past year?: Yes  Have you fallen and had an injury in the past year?: Yes  Is patient a fall risk?: No    Subjective         Presenting condition or subjective complaint: Broken humerus    Pt presents with R humerus fx on 2/18/2025 after falling onto right arm as left toe got caught under a display at work. Presents with sling but still wearing the immobilizer at night. Pain is located at upper arm at fracture. Aggravating factors are movement. Relieving factors are rest. Pt is R handed.    Date of onset: 02/18/25    Relevant medical history:     Dates & types of surgery:      Prior diagnostic imaging/testing results: X-ray     XR R shoulder     2/18/2025 right shoulder xray shows a nondisplaced fracture across the humeral neck. No evidence for involvement of the articular surface and no dislocation. Hypertrophic change at the AC joint.    3/5/2025 shows stability of her nondisplaced proximal right humerus fracture at the humeral neck.  No new acute fracture, no worsening displacement or alignment  Prior therapy history for the same diagnosis, illness or injury: No      Prior Level of Function  Independent     Living Environment  Social support: With a significant other or spouse   Type of home: House   Stairs to enter the home: Yes 6 Is there a railing: Yes     Ramp: No   Stairs inside the home: Yes 12 Is there a railing: Yes     Help at home: Self Cares (home health aide/personal care attendant, family, etc)  Equipment owned: Crutches     Employment: Yes Retail  Hobbies/Interests:      Patient goals for therapy: Use arm    Pain assessment: Pain present     Objective   SHOULDER EVALUATION  PAIN: Pain Level at Rest: 3/10  Pain Level with Use: 7/10  Pain is Exacerbated By: movements   POSTURE: Sitting Posture: Rounded shoulders, Forward head  GAIT:     ROM:   (Degrees)  Left AROM Left PROM Right AROM  Right PROM   Shoulder Flexion   20 90   Shoulder Abduction   35 90   Shoulder External Rotation   30 50   Shoulder Internal Rotation   R gluteal cleft Er at0: to stomach          Shoulder Flexion ER       Shoulder Flexion IR       Elbow Extension   full    Elbow Flexion   full    Pain:   End feel:     STRENGTH: deferred due to fx and pain      SPECIAL TESTS: NT, humerus fx  PALPATION: hypertonic UT, LS and pec major  JOINT MOBILITY: NT due to fracture  CERVICAL SCREEN: WFL      Assessment & Plan   CLINICAL IMPRESSIONS  Medical Diagnosis: Other closed nondisplaced fracture of proximal end of right humerus with routine healing, subsequent encounter (O46.595T)    Treatment Diagnosis: Imparied shoulder ROM and strength   Impression/Assessment: Patient is a 59 year old female with R humerus fx on 2/18/25 complaints.  The following significant findings have been identified: Pain, Decreased ROM/flexibility, Decreased joint mobility, Decreased strength, Decreased proprioception, Impaired muscle performance, Decreased activity tolerance, Impaired posture, and Instability. These impairments interfere with their ability to perform self care tasks, work tasks, recreational activities, household chores, driving , household mobility, and community mobility as compared to previous level of function.     Clinical Decision Making (Complexity):  Clinical Presentation: Stable/Uncomplicated  Clinical Presentation Rationale: based on medical and personal factors listed in PT evaluation  Clinical Decision Making (Complexity): Low complexity    PLAN OF CARE  Treatment Interventions:  Modalities: Cryotherapy, Cupping, Dry Needling, E-stim, Hot Pack, Ultrasound  Interventions: Manual Therapy, Neuromuscular Re-education, Therapeutic Activity, Therapeutic Exercise, Self-Care/Home Management    Long Term Goals     PT Goal 1  Goal Identifier: LTG  Goal Description: Pt will demonstrate AROM of R shoulder flexion  >130 degrees in order to reach overhead to grab an object  Target Date: 05/29/25  PT Goal 2  Goal Identifier: LTG  Goal Description: Pt will be painfree in R shoulder in order to sleep without interruption  Target Date: 05/29/25  PT Goal 3  Goal Identifier: LTG  Goal Description: Pt will have >4/5 RC strength in order to lift and carry objects  Target Date: 05/29/25  PT Goal 4  Goal Identifier: STG  Goal Description: Pt will demonstrate AROM of > 90 degrees flexion and ER > 55 deg in order to perform computer work duties with ease  Target Date: 04/24/25      Frequency of Treatment: 1x week  Duration of Treatment: 10 weeks    Recommended Referrals to Other Professionals:   Education Assessment:   Learner/Method: Patient;Listening;Demonstration;Pictures/Video;Reading    Risks and benefits of evaluation/treatment have been explained.   Patient/Family/caregiver agrees with Plan of Care.     Evaluation Time:     PT Eval, Low Complexity Minutes (86672): 10       Signing Clinician: Karen Moreira, PT

## 2025-03-20 NOTE — LETTER
3/20/2025      Christina John  [address] [facility] [address]      Dear Colleague,    Thank you for referring your patient, Christina John, to the [facility] [place]. Please see a copy of my visit note below.    Christina John  :  1965  DOS:3/20/2025  MRN: [id number]  PCP: Nancy Ontiveros    Sports Medicine Clinic Visit    Interim History - 2025  - Last seen on 3/5/2025 for a nondisplaced proximal right humerus fracture. Started physical therapy today and the arm is a little sore after therapy.  - 1 month post-injury  - Since the last visit, she continues to make improvements with her pain constantly at about a 3/10, only increases slightly with movements.  She is wearing the immobilizer at night, which helps stabilize the shoulder as she tends to roll often.  Slight sleep interruptions, getting better.  Range of motion is improving well and she is working on pendulum exercises, stretching, and range of motion in PT.  Still feels some soreness in the elbow at the proximal ulna posterior surface where she had some significant olecranon bursa swelling, which has decreased to about a third of the size.  She is actively avoiding pressure on the area.  Using a shoulder sling instead of the immobilizer at times during the day.  - No interim injury.       Interim History - 2025  - Last seen on 3/5/2025 for a nondisplaced proximal right humerus fracture.   - Continued in immobilizer at last visit. Pain has been improving and she is able to activate her shoulder abductors, flexors, rotators with mild antalgic weakness.  Swelling has been improving as well.  Continues to use the immobilizer without concern.  Repeat xrays today.  - No interim injury.     Initial visit: 2025  HPI  Christina John is a 59 year old female who is seen in consultation at the request of  Teresa Rivera PA-C presenting with a right humerus fracture.    -  Mechanism of Injury:    - 2/18/2025: Fell onto right arm as left toe got caught under a display at work  - Pertinent history and prior evaluations:    -  visit 2/18/2025: Right shoulder immobilizer given. Norco for breakthrough pain. Out of work note given.     -2/18/2025 right shoulder xray shows a nondisplaced fracture across the humeral neck. No evidence for involvement of the articular surface and no dislocation. Hypertrophic change at the AC joint.     - Pain Character:    - Location:  proximal upper arm at fracture site.   - Character:  aching  - Duration:  About 2 days  - Course:  improving slightly  - Endorses:    -Pain as described above.  Antalgic weakness due to pain  - Denies:    -Numbness, tingling, vascular changes, skin changes  - Alleviating factors:    -Shoulder immobilizer, rest  - Aggravating factors:    - Movement away from body  - Other treatments tried:    - Shoulder immobilizer since 2/18/25.     - Patient Goals:    - be able to manage the symptoms successfully, discuss treatment options  - Social History:   - Employed      Review of Systems  Musculoskeletal: as above  Remainder of review of systems is negative including constitutional, CV, pulmonary, GI, Skin and Neurologic except as noted in HPI or medical history.    Past Medical History:   Diagnosis Date     Adhesive capsulitis of left shoulder 6/1/2023     Allergic rhinitis due to animal dander did IT x 4 yrs. 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 ended 9/06 per self     Diagnostic skin and sensitization tests 4/03 skin tests pos. for: cat/dog/DM/T/G/RW     Elevated cholesterol      Endometriosis       Endometriosis      GERD (gastroesophageal reflux disease)      GERD (gastroesophageal reflux disease)      Glaucoma      History of anesthesia complications      House dust mite allergy      Hyperlipidemia      Hypertension      Hypertension goal BP (blood pressure) < 140/90      Infertility     ON CLOMID     Infertility, female      Lumbar disc herniation     L5-S1     Mild major depression      DAVIE (obstructive sleep apnea)     No CPAP     PCOS (polycystic ovarian syndrome)      PCOS (polycystic ovarian syndrome)      Pelvic relaxation      PONV (postoperative nausea and vomiting)      Seasonal allergic conjunctivitis      Seasonal allergic rhinitis     4/03 skin tests pos. for: cat/dog/DM/T/G/RW--per Dr. Talavera.     Ulcers 1992    DX BY ENDOSCOPY     Past Surgical History:   Procedure Laterality Date     APPENDECTOMY       BACK SURGERY  1/2021    Discectomy L5S1     C/SECTION, CLASSICAL       CHOLECYSTECTOMY       COLONOSCOPY N/A 6/17/2024    Procedure: COLONOSCOPY, WITH POLYPECTOMY AND BIOPSY;  Surgeon: Kian Grace DO;  Location: [facility] [facility]     COLONOSCOPY W/ BIOPSIES AND POLYPECTOMY       COSMETIC SURGERY  10/13, 09/15     ENDOSCOPY      10/2000 [place], repeat 10/2002, REPEAT 2/08 (REPEAT IN 2 YRS)     ENT SURGERY  1973     EXCIS VAGINAL CYST/TUMOR       GASTRIC BYPASS       GI SURGERY  04/11     GLAUCOMA SURGERY       GLAUCOMA SURGERY       HC REMOVAL GALLBLADDER  6/2005     HYSTERECTOMY       HYSTERECTOMY, PAP NO LONGER INDICATED       MASTECTOMY Bilateral      MASTECTOMY BILATERAL, INSERT TISSUE EXPANDER BILATERAL, COMBINED  8/2010     MASTECTOMY, BILATERAL       OTHER SURGICAL HISTORY      laparoscopy     [facility] LAPAROSCOPY,DX      x2     TX LAMNOTMY INCL W/DCMPRSN NRV ROOT 1 INTRSPC LUMBR Left 1/8/2021    Procedure: LEFT LUMBAR 5-SACRAL 1 DISCECTOMY;  Surgeon: Vadim Theodore MD;  Location: [facility];  Service: Spine     REVISE RECONSTRUCTED BREAST       [facility] APPENDECTOMY  1982     [facility] BIOPSY OF BREAST,  OPEN INCISIONAL  2008    sentinel lymph node     [facility] COLONOSCOPY THRU STOMA, DIAGNOSTIC  12/04, 12/09    (REPEAT IN 5 YRS)     Family History   Problem Relation Age of Onset     Hypertension Mother      Gastrointestinal Disease Mother         GAITAN'S ESOPHAGUS     Lipids Mother      Depression Mother      Other - See Comments Mother         Pagets Disease of the Vulva, Dx 06/2014     Osteoporosis Mother      Obesity Mother      Allergies Father         HAYFEVER     Heart Disease Father         AFIB     Hypertension Father      Lipids Father      Hyperlipidemia Father      Gastrointestinal Disease Other         GAITAN'S ESOPHAGUS     Breast Cancer Other      Colon Cancer Other      Other Cancer Maternal Grandmother         Gall bladder     Other Cancer Maternal Grandfather         Esophageal     Colon Cancer Paternal Grandfather      Gastrointestinal Disease Other         GAITAN'S ESOPHAGUS     Breast Cancer Other      Breast Cancer Paternal Aunt      Breast Cancer Other         fathers 1st cousin     Hyperlipidemia Brother      Colon Cancer Other          Objective  LMP 03/28/2010     General: healthy, alert and in no acute distress.    HEENT: no scleral icterus or conjunctival erythema.   Skin: no suspicious lesions or rash. No jaundice.   CV: regular rhythm by palpation, 2+ distal pulses.  Resp: normal respiratory effort without conversational dyspnea.   Psych: normal mood and affect.    Gait: nonantalgic, appropriate coordination and balance.     Neuro:        - Sensation to light touch:    - Intact throughout the BUE including all peripheral nerve distributions.     MSK - Shoulder:       - Inspection:    - No major swelling, erythema, warmth, lesion, or atrophy noted.        - ROM:    - Limited in abduction to approximately 80 degrees, flexion to approximately 20 degrees, external rotation to approximately 40 degrees. Improved since prior exam.       - Palpation:    - TTP slightly at the proximal humerus  fracture site and proximal olecranon posterior surface.   - NTTP elsewhere.        - Strength:    - Intact and 5-*/5 strength for Sab, SF, SER, SIR. Antalgic.           Radiology  I independently reviewed the available relevant imaging in the chart with my interpretations as above in HPI.   - XR R shoulder 3/5/2025 shows stability of her nondisplaced proximal right humerus fracture at the humeral neck.  No new acute fracture, no worsening displacement or alignment.     I independently reviewed today's new relevant imaging, with the following interpretation:  - XR R shoulder 3/20/2025 shows stability of her nondisplaced proximal right humerus fracture at the humeral neck with signs of interval healing.  No new acute fracture, no worsening displacement or alignment.       Assessment  1. Other closed nondisplaced fracture of proximal end of right humerus with routine healing, subsequent encounter          Plan  Christina John is a pleasant 59 year old female that presents for follow-up of her acute right shoulder pain after a fall with impact to the right shoulder.  Unsure exactly of the mechanism of impact to the shoulder but she did have pain at the proximal upper arm after the fall.  She was previously evaluated and a nondisplaced fracture at the right humeral neck was identified on radiographs.  She was given a shoulder immobilizer and work restriction letter after initial evaluation.    In the interim, she has been doing well with improving pain and swelling and intact neurovascular function at the shoulder.  Repeat radiographs today demonstrate stability with no worsening displacement or angulation.  On exam, she has only minimal TTP over the fracture site and is able to activate all proximal upper extremity musculature effectively with increase strength since prior exam. She has began working with physical therapy as of today.    We discussed the nature of the condition and available treatment options, and  mutually agreed upon the following plan:    - Imaging:          - Reviewed and independently interpreted the relevant imaging in the chart, including any imaging ordered for today's clinic.  - Reviewed results and images with patient.   - Medications:          - Discussed pharmacologic options for pain relief.   - May use NSAIDs (Ibuprofen, Naproxen) or Acetaminophen (Tylenol) as needed for pain control. Do not take these if previously advised to avoid them for other medical conditions.  - May also use topical medications such as lidocaine, IcyHot, BioFreeze, or Voltaren gel as needed for pain control. Voltaren gel is an anti-inflammatory cream that may be used up to 4 times per day over the painful area.   - Therapy:          - Discussed the benefits of therapy vs home exercise program for optimization of range of motion, flexibility, strength, stability and function.   - Continue physical therapy and home exercises program as instructed by PT.  - Modalities:          - May use ice, heat, massage or other modalities as needed.   - Bracing:          - Discussed bracing options and recommend continued use of the shoulder immobilizer for comfort as needed.  Okay to remove the immobilizer and work on pendulum exercises and gentle mobilization as tolerated.  May use the shoulder sling alternatively as needed.  - Surgery:          - Discussed non-operative and operative treatment options for the patient's condition.  Not likely to require surgery.  Expect good healing with nonoperative management.  - Activity:     - Encouraged to rest and protect the injured area from further injury.  Avoid exacerbating activities and activities that are high-risk for falls.    - Work restriction letter provided at last visit and remains active.  - Follow up:          - In 3-4 weeks for repeat radiographs and re-evaluation.   - May follow up sooner for new/worsening symptoms.  - May contact clinic by phone or MyChart for questions or  concerns.       Rasta Newell DO, CAQSM  [facility] - Department of Orthopedic Surgery       Disclaimer:  This note was prepared and written using Dragon Medical dictation software. As a result, there may be errors in the script that have gone undetected. Please consider this when interpreting the information in this note.        Again, thank you for allowing me to participate in the care of your patient.        Sincerely,        Rasta Newell DO    Electronically signed

## 2025-04-02 ENCOUNTER — THERAPY VISIT (OUTPATIENT)
Dept: PHYSICAL THERAPY | Facility: CLINIC | Age: 60
End: 2025-04-02
Attending: STUDENT IN AN ORGANIZED HEALTH CARE EDUCATION/TRAINING PROGRAM
Payer: OTHER MISCELLANEOUS

## 2025-04-02 ENCOUNTER — LAB (OUTPATIENT)
Dept: LAB | Facility: CLINIC | Age: 60
End: 2025-04-02
Payer: COMMERCIAL

## 2025-04-02 DIAGNOSIS — M85.80 OSTEOPENIA, UNSPECIFIED LOCATION: ICD-10-CM

## 2025-04-02 DIAGNOSIS — S42.294D OTHER CLOSED NONDISPLACED FRACTURE OF PROXIMAL END OF RIGHT HUMERUS WITH ROUTINE HEALING, SUBSEQUENT ENCOUNTER: Primary | ICD-10-CM

## 2025-04-02 LAB
ALBUMIN SERPL BCG-MCNC: 4.4 G/DL (ref 3.5–5.2)
ALP SERPL-CCNC: 130 U/L (ref 40–150)
ALT SERPL W P-5'-P-CCNC: 21 U/L (ref 0–50)
ANION GAP SERPL CALCULATED.3IONS-SCNC: 10 MMOL/L (ref 7–15)
AST SERPL W P-5'-P-CCNC: 21 U/L (ref 0–45)
BILIRUB SERPL-MCNC: 0.4 MG/DL
BUN SERPL-MCNC: 24 MG/DL (ref 6–20)
CA-I BLD-MCNC: 4.8 MG/DL (ref 4.4–5.2)
CALCIUM SERPL-MCNC: 9.6 MG/DL (ref 8.8–10.4)
CHLORIDE SERPL-SCNC: 105 MMOL/L (ref 98–107)
CREAT SERPL-MCNC: 1.02 MG/DL (ref 0.51–0.95)
EGFRCR SERPLBLD CKD-EPI 2021: 63 ML/MIN/1.73M2
ERYTHROCYTE [DISTWIDTH] IN BLOOD BY AUTOMATED COUNT: 14.9 % (ref 10–15)
GLUCOSE SERPL-MCNC: 97 MG/DL (ref 70–99)
HCO3 SERPL-SCNC: 27 MMOL/L (ref 22–29)
HCT VFR BLD AUTO: 42 % (ref 35–47)
HGB BLD-MCNC: 13.5 G/DL (ref 11.7–15.7)
MCH RBC QN AUTO: 31.3 PG (ref 26.5–33)
MCHC RBC AUTO-ENTMCNC: 32.1 G/DL (ref 31.5–36.5)
MCV RBC AUTO: 97 FL (ref 78–100)
PHOSPHATE SERPL-MCNC: 4.7 MG/DL (ref 2.5–4.5)
PLATELET # BLD AUTO: 269 10E3/UL (ref 150–450)
POTASSIUM SERPL-SCNC: 5 MMOL/L (ref 3.4–5.3)
PROT SERPL-MCNC: 6.8 G/DL (ref 6.4–8.3)
PTH-INTACT SERPL-MCNC: 73 PG/ML (ref 15–65)
RBC # BLD AUTO: 4.32 10E6/UL (ref 3.8–5.2)
SODIUM SERPL-SCNC: 142 MMOL/L (ref 135–145)
TSH SERPL DL<=0.005 MIU/L-ACNC: 0.52 UIU/ML (ref 0.3–4.2)
VIT D+METAB SERPL-MCNC: 49 NG/ML (ref 20–50)
WBC # BLD AUTO: 7.8 10E3/UL (ref 4–11)

## 2025-04-02 PROCEDURE — 84443 ASSAY THYROID STIM HORMONE: CPT

## 2025-04-02 PROCEDURE — 36415 COLL VENOUS BLD VENIPUNCTURE: CPT

## 2025-04-02 PROCEDURE — 83970 ASSAY OF PARATHORMONE: CPT

## 2025-04-02 PROCEDURE — 80053 COMPREHEN METABOLIC PANEL: CPT

## 2025-04-02 PROCEDURE — 84100 ASSAY OF PHOSPHORUS: CPT

## 2025-04-02 PROCEDURE — 97110 THERAPEUTIC EXERCISES: CPT | Mod: GP | Performed by: PHYSICAL MEDICINE & REHABILITATION

## 2025-04-02 PROCEDURE — 85027 COMPLETE CBC AUTOMATED: CPT

## 2025-04-02 PROCEDURE — 82306 VITAMIN D 25 HYDROXY: CPT

## 2025-04-02 PROCEDURE — 82330 ASSAY OF CALCIUM: CPT

## 2025-04-15 ENCOUNTER — MYC MEDICAL ADVICE (OUTPATIENT)
Dept: FAMILY MEDICINE | Facility: CLINIC | Age: 60
End: 2025-04-15
Payer: COMMERCIAL

## 2025-04-15 ENCOUNTER — THERAPY VISIT (OUTPATIENT)
Dept: PHYSICAL THERAPY | Facility: CLINIC | Age: 60
End: 2025-04-15
Attending: STUDENT IN AN ORGANIZED HEALTH CARE EDUCATION/TRAINING PROGRAM
Payer: OTHER MISCELLANEOUS

## 2025-04-15 DIAGNOSIS — S42.294D OTHER CLOSED NONDISPLACED FRACTURE OF PROXIMAL END OF RIGHT HUMERUS WITH ROUTINE HEALING, SUBSEQUENT ENCOUNTER: Primary | ICD-10-CM

## 2025-04-15 DIAGNOSIS — M85.80 OSTEOPENIA, UNSPECIFIED LOCATION: Primary | ICD-10-CM

## 2025-04-15 PROCEDURE — 97110 THERAPEUTIC EXERCISES: CPT | Mod: GP | Performed by: PHYSICAL MEDICINE & REHABILITATION

## 2025-04-21 ENCOUNTER — THERAPY VISIT (OUTPATIENT)
Dept: PHYSICAL THERAPY | Facility: CLINIC | Age: 60
End: 2025-04-21
Attending: STUDENT IN AN ORGANIZED HEALTH CARE EDUCATION/TRAINING PROGRAM
Payer: OTHER MISCELLANEOUS

## 2025-04-21 DIAGNOSIS — S42.294D OTHER CLOSED NONDISPLACED FRACTURE OF PROXIMAL END OF RIGHT HUMERUS WITH ROUTINE HEALING, SUBSEQUENT ENCOUNTER: Primary | ICD-10-CM

## 2025-04-21 PROCEDURE — 97110 THERAPEUTIC EXERCISES: CPT | Mod: GP | Performed by: PHYSICAL MEDICINE & REHABILITATION

## 2025-04-23 NOTE — PROGRESS NOTES
Christina John  :  1965  DOS:2025  MRN: [id number]  PCP: Nancy Ontiveros    Sports Medicine Clinic Visit    Interim History - 2025  - Last seen on 3/20/2025 for a nondisplaced proximal right humerus fracture.   - 2 months post injury. Instructed to continue with physical therapy at last visit.  - Since the last visit, she notes that she has been attending physical therapy 1x/week along with home exercises. Shoulder is becoming less painful over the lateral right upper arm.  Range of motion improving greatly.  Can get up to 90 degrees of abduction and flexion now.  She does note some swelling still present when massaging her lateral upper arm. Attending acupuncture 1x/week also.   - No interim injury.       Interim History - 2025  - Last seen on 3/20/2025 for a nondisplaced proximal right humerus fracture. Started physical therapy today and the arm is a little sore after therapy.  - 1 month post-injury  - Since the last visit, she continues to make improvements with her pain constantly at about a 3/10, only increases slightly with movements.  She is wearing the immobilizer at night, which helps stabilize the shoulder as she tends to roll often.  Slight sleep interruptions, getting better.  Range of motion is improving well and she is working on pendulum exercises, stretching, and range of motion in PT.  Still feels some soreness in the elbow at the proximal ulna posterior surface where she had some significant olecranon bursa swelling, which has decreased to about a third of the size.  She is actively avoiding pressure on the area.  Using a shoulder sling instead of the immobilizer at times during the day.  - No interim injury.       Interim History - 2025  - Last seen on 3/20/2025 for a nondisplaced proximal right humerus fracture.   - Continued in immobilizer at last visit. Pain has been improving and she is able to activate her shoulder abductors, flexors, rotators with  mild antalgic weakness.  Swelling has been improving as well.  Continues to use the immobilizer without concern.  Repeat xrays today.  - No interim injury.     Initial visit: February 20, 2025  HPI  Christina John is a 59 year old female who is seen in consultation at the request of  Teresa Rivera PA-C presenting with a right humerus fracture.    - Mechanism of Injury:    - 2/18/2025: Fell onto right arm as left toe got caught under a display at work  - Pertinent history and prior evaluations:    -  visit 2/18/2025: Right shoulder immobilizer given. Norco for breakthrough pain. Out of work note given.     -2/18/2025 right shoulder xray shows a nondisplaced fracture across the humeral neck. No evidence for involvement of the articular surface and no dislocation. Hypertrophic change at the AC joint.     - Pain Character:    - Location:  proximal upper arm at fracture site.   - Character:  aching  - Duration:  About 2 days  - Course:  improving slightly  - Endorses:    -Pain as described above.  Antalgic weakness due to pain  - Denies:    -Numbness, tingling, vascular changes, skin changes  - Alleviating factors:    -Shoulder immobilizer, rest  - Aggravating factors:    - Movement away from body  - Other treatments tried:    - Shoulder immobilizer since 2/18/25.     - Patient Goals:    - be able to manage the symptoms successfully, discuss treatment options  - Social History:   - Employed      Review of Systems  Musculoskeletal: as above  Remainder of review of systems is negative including constitutional, CV, pulmonary, GI, Skin and Neurologic except as noted in HPI or medical history.    Past Medical History:   Diagnosis Date    Adhesive capsulitis of left shoulder 06/01/2023    Allergic rhinitis due to animal dander did IT x 4 yrs. ended 9/06 per self    Allergies     Appendicitis     Bariatric surgery status 2011    BRCA1 positive     Colon polyp     DCIS (ductal carcinoma in situ) of breast 8/08 and 8/10     RT ( stage 2a) and left ( stage 0)BREAST - STAGE 2A - GRADE III    Depression     Depressive disorder     Desensitization to allergens     did IT x 4 yrs. ended 9/06 per self    Diagnostic skin and sensitization tests 4/03 skin tests pos. for: cat/dog/DM/T/G/RW    Endometriosis     GERD (gastroesophageal reflux disease)     Glaucoma     History of anesthesia complications     House dust mite allergy     Hyperlipidemia     Hypertension     Infertility     ON CLOMID    Lumbar disc herniation     L5-S1    DAVIE (obstructive sleep apnea)     No CPAP    PCOS (polycystic ovarian syndrome)     Pelvic relaxation     PONV (postoperative nausea and vomiting)     Seasonal allergic conjunctivitis     Seasonal allergic rhinitis     4/03 skin tests pos. for: cat/dog/DM/T/G/RW--per Dr. Talavera.    Ulcers 1992    DX BY ENDOSCOPY     Past Surgical History:   Procedure Laterality Date    APPENDECTOMY      BACK SURGERY  1/2021    Discectomy L5S1    C/SECTION, CLASSICAL      CHOLECYSTECTOMY      COLONOSCOPY N/A 6/17/2024    Procedure: COLONOSCOPY, WITH POLYPECTOMY AND BIOPSY;  Surgeon: Kian Grace DO;  Location: [facility]    COLONOSCOPY W/ BIOPSIES AND POLYPECTOMY      COSMETIC SURGERY  10/13, 09/15    ENDOSCOPY      10/2000 Xiao, repeat 10/2002, REPEAT 2/08 (REPEAT IN 2 YRS)    ENT SURGERY  1973    EXCIS VAGINAL CYST/TUMOR      GASTRIC BYPASS      GI SURGERY  04/11    GLAUCOMA SURGERY      GLAUCOMA SURGERY      HC REMOVAL GALLBLADDER  6/2005    HYSTERECTOMY      HYSTERECTOMY, PAP NO LONGER INDICATED      MASTECTOMY Bilateral     MASTECTOMY BILATERAL, INSERT TISSUE EXPANDER BILATERAL, COMBINED  8/2010    MASTECTOMY, BILATERAL      OTHER SURGICAL HISTORY      laparoscopy    [facility] LAPAROSCOPY,DX      x2    [place] LAMNOTMY INCL W/DCMPRSN NRV ROOT 1 INTRSPC LUMBR Left 1/8/2021    Procedure: LEFT LUMBAR 5-SACRAL 1 DISCECTOMY;  Surgeon: Vadim Theodore MD;  Location: [facility];  Service: Spine    REVISE RECONSTRUCTED BREAST      [facility]  APPENDECTOMY  1982    [facility] BIOPSY OF BREAST, OPEN INCISIONAL  2008    sentinel lymph node    [facility] COLONOSCOPY THRU STOMA, DIAGNOSTIC  12/04, 12/09    (REPEAT IN 5 YRS)     Family History   Problem Relation Age of Onset    Hypertension Mother     Gastrointestinal Disease Mother         GAITAN'S ESOPHAGUS    Lipids Mother     Depression Mother     Other - See Comments Mother         Pagets Disease of the Vulva, Dx 06/2014    Osteoporosis Mother     Obesity Mother     Allergies Father         HAYFEVER    Heart Disease Father         AFIB    Hypertension Father     Lipids Father     Hyperlipidemia Father     Gastrointestinal Disease Other         GAITAN'S ESOPHAGUS    Breast Cancer Other     Colon Cancer Other     Other Cancer Maternal Grandmother         Gall bladder    Other Cancer Maternal Grandfather         Esophageal    Colon Cancer Paternal Grandfather     Gastrointestinal Disease Other         GAITAN'S ESOPHAGUS    Breast Cancer Other     Breast Cancer Paternal Aunt     Breast Cancer Other         fathers 1st cousin    Hyperlipidemia Brother     Colon Cancer Other          Objective  LMP 03/28/2010     General: healthy, alert and in no acute distress.    HEENT: no scleral icterus or conjunctival erythema.   Skin: no suspicious lesions or rash. No jaundice.   CV: regular rhythm by palpation, 2+ distal pulses.  Resp: normal respiratory effort without conversational dyspnea.   Psych: normal mood and affect.    Gait: nonantalgic, appropriate coordination and balance.     Neuro:        - Sensation to light touch:    - Intact throughout the BUE including all peripheral nerve distributions.     MSK - Shoulder:       - Inspection:    - No major swelling, erythema, warmth, lesion, or atrophy noted.        - ROM:    - Limited in abduction to approximately 90 degrees, flexion to approximately 80 degrees, external rotation to approximately 40 degrees. Improved since prior exam.       - Palpation:    - TTP slightly at the  proximal humerus fracture site and proximal olecranon posterior surface.   - NTTP elsewhere.        - Strength:    - Intact and 5-*/5 strength for Sab, SF, SER, SIR. Antalgic.           Radiology  I independently reviewed the available relevant imaging in the chart with my interpretations as above in HPI.   - XR R shoulder 3/5/2025 shows stability of her nondisplaced proximal right humerus fracture at the humeral neck.  No new acute fracture, no worsening displacement or alignment.   - XR R shoulder 3/20/2025 shows stability of her nondisplaced proximal right humerus fracture at the humeral neck with signs of interval healing.  No new acute fracture, no worsening displacement or alignment.     I independently reviewed today's new relevant imaging, with the following interpretation:  - XR R shoulder 4/24/2025 shows stability of her nondisplaced impacted proximal right humerus fracture at the humeral neck with continued signs of interval healing.  No worsening displacement or alignment, no new fractures.      Assessment  1. Other closed nondisplaced fracture of proximal end of right humerus with routine healing, subsequent encounter        Plan  Christina John is a pleasant 59 year old female that presents for follow-up of her acute right shoulder pain after a fall with impact to the right shoulder on 2/18/25.  Unsure exactly of the mechanism of impact to the shoulder but she did have pain at the proximal upper arm after the fall.  She was previously evaluated and a nondisplaced fracture at the right humeral neck was identified on radiographs.  She was given a shoulder immobilizer and work restriction letter after initial evaluation.    In the interim, she has been doing well with improving pain and swelling and intact neurovascular function at the shoulder.  Repeat radiographs today demonstrate stability with no worsening displacement or angulation. Interval healing present. On exam, she has only minimal TTP over  the fracture site and is able to activate all proximal upper extremity musculature effectively with increase strength since prior exam. ROM also increasing to shoulder height for abduction and flexion.  She has been participating in PT, which is helping. Overall doing well.     We discussed the nature of the condition and available treatment options, and mutually agreed upon the following plan:    - Imaging:          - Reviewed and independently interpreted the relevant imaging in the chart, including any imaging ordered for today's clinic.  - Reviewed results and images with patient.   - Medications:          - Discussed pharmacologic options for pain relief.   - May use NSAIDs (Ibuprofen, Naproxen) or Acetaminophen (Tylenol) as needed for pain control. Do not take these if previously advised to avoid them for other medical conditions.  - May also use topical medications such as lidocaine, IcyHot, BioFreeze, or Voltaren gel as needed for pain control. Voltaren gel is an anti-inflammatory cream that may be used up to 4 times per day over the painful area.   - Therapy:    - Continue physical therapy and home exercises program as instructed by PT.  - Modalities:          - May use ice, heat, massage or other modalities as needed.   - Bracing:          - Discussed bracing options and she has weaned out of the immobilizer. No need to use the immobilizer anymore.   - Surgery:          - Discussed non-operative and operative treatment options for the patient's condition.  Not likely to require surgery.  Good healing with nonoperative management.  - Activity:     - Encouraged to gradually progress activities with lifting restrictions to no more than 5 lbs. Protect the injured area from further injury.  Avoid exacerbating activities and activities that are high-risk for falls.    - Updated work restriction letter provided.  - Follow up:          - In 4 weeks for repeat radiographs and re-evaluation.   - May follow up sooner  for new/worsening symptoms.  - May contact clinic by phone or MyChart for questions or concerns.       Rasta Newell DO, CAQSM  [facility] - Sports Medicine  [facility] - Department of Orthopedic Surgery       Disclaimer:  This note was prepared and written using Dragon Medical dictation software. As a result, there may be errors in the script that have gone undetected. Please consider this when interpreting the information in this note.

## 2025-04-24 ENCOUNTER — ANCILLARY PROCEDURE (OUTPATIENT)
Dept: GENERAL RADIOLOGY | Facility: CLINIC | Age: 60
End: 2025-04-24
Attending: STUDENT IN AN ORGANIZED HEALTH CARE EDUCATION/TRAINING PROGRAM
Payer: OTHER MISCELLANEOUS

## 2025-04-24 ENCOUNTER — OFFICE VISIT (OUTPATIENT)
Dept: ORTHOPEDICS | Facility: CLINIC | Age: 60
End: 2025-04-24
Payer: OTHER MISCELLANEOUS

## 2025-04-24 DIAGNOSIS — S42.294D OTHER CLOSED NONDISPLACED FRACTURE OF PROXIMAL END OF RIGHT HUMERUS WITH ROUTINE HEALING, SUBSEQUENT ENCOUNTER: Primary | ICD-10-CM

## 2025-04-24 DIAGNOSIS — S42.294D OTHER CLOSED NONDISPLACED FRACTURE OF PROXIMAL END OF RIGHT HUMERUS WITH ROUTINE HEALING, SUBSEQUENT ENCOUNTER: ICD-10-CM

## 2025-04-24 NOTE — LETTER
2025      Christina John  [address] [facility] [address]      Dear Colleague,    Thank you for referring your patient, Christina John, to the [facility] [place]. Please see a copy of my visit note below.    Christina John  :  1965  DOS:2025  MRN: [id number]  PCP: Nancy Ontiveros    Sports Medicine Clinic Visit    Interim History - 2025  - Last seen on 3/20/2025 for a nondisplaced proximal right humerus fracture.   - 2 months post injury. Instructed to continue with physical therapy at last visit.  - Since the last visit, she notes that she has been attending physical therapy 1x/week along with home exercises. Shoulder is becoming less painful over the lateral right upper arm.  Range of motion improving greatly.  Can get up to 90 degrees of abduction and flexion now.  She does note some swelling still present when massaging her lateral upper arm. Attending acupuncture 1x/week also.   - No interim injury.       Interim History - 2025  - Last seen on 3/20/2025 for a nondisplaced proximal right humerus fracture. Started physical therapy today and the arm is a little sore after therapy.  - 1 month post-injury  - Since the last visit, she continues to make improvements with her pain constantly at about a 3/10, only increases slightly with movements.  She is wearing the immobilizer at night, which helps stabilize the shoulder as she tends to roll often.  Slight sleep interruptions, getting better.  Range of motion is improving well and she is working on pendulum exercises, stretching, and range of motion in PT.  Still feels some soreness in the elbow at the proximal ulna posterior surface where she had some significant olecranon bursa swelling, which has decreased to about a third of the size.  She is actively avoiding pressure on the area.  Using a shoulder sling instead of the immobilizer at times during the day.  - No interim  injury.       Interim History - March 5, 2025  - Last seen on 3/20/2025 for a nondisplaced proximal right humerus fracture.   - Continued in immobilizer at last visit. Pain has been improving and she is able to activate her shoulder abductors, flexors, rotators with mild antalgic weakness.  Swelling has been improving as well.  Continues to use the immobilizer without concern.  Repeat xrays today.  - No interim injury.     Initial visit: February 20, 2025  HPI  Christina John is a 59 year old female who is seen in consultation at the request of  Teresa Rivera PA-C presenting with a right humerus fracture.    - Mechanism of Injury:    - 2/18/2025: Fell onto right arm as left toe got caught under a display at work  - Pertinent history and prior evaluations:    -  visit 2/18/2025: Right shoulder immobilizer given. Norco for breakthrough pain. Out of work note given.     -2/18/2025 right shoulder xray shows a nondisplaced fracture across the humeral neck. No evidence for involvement of the articular surface and no dislocation. Hypertrophic change at the AC joint.     - Pain Character:    - Location:  proximal upper arm at fracture site.   - Character:  aching  - Duration:  About 2 days  - Course:  improving slightly  - Endorses:    -Pain as described above.  Antalgic weakness due to pain  - Denies:    -Numbness, tingling, vascular changes, skin changes  - Alleviating factors:    -Shoulder immobilizer, rest  - Aggravating factors:    - Movement away from body  - Other treatments tried:    - Shoulder immobilizer since 2/18/25.     - Patient Goals:    - be able to manage the symptoms successfully, discuss treatment options  - Social History:   - Employed      Review of Systems  Musculoskeletal: as above  Remainder of review of systems is negative including constitutional, CV, pulmonary, GI, Skin and Neurologic except as noted in HPI or medical history.    Past Medical History:   Diagnosis Date     Adhesive capsulitis  of left shoulder 06/01/2023     Allergic rhinitis due to animal dander did IT x 4 yrs. 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 ended 9/06 per self     Diagnostic skin and sensitization tests 4/03 skin tests pos. for: cat/dog/DM/T/G/RW     Endometriosis      GERD (gastroesophageal reflux disease)      Glaucoma      History of anesthesia complications      House dust mite allergy      Hyperlipidemia      Hypertension      Infertility     ON CLOMID     Lumbar disc herniation     L5-S1     DAVIE (obstructive sleep apnea)     No CPAP     PCOS (polycystic ovarian syndrome)      Pelvic relaxation      PONV (postoperative nausea and vomiting)      Seasonal allergic conjunctivitis      Seasonal allergic rhinitis     4/03 skin tests pos. for: cat/dog/DM/T/G/RW--per Dr. Talavera.     Ulcers 1992    DX BY ENDOSCOPY     Past Surgical History:   Procedure Laterality Date     APPENDECTOMY       BACK SURGERY  1/2021    Discectomy L5S1     C/SECTION, CLASSICAL       CHOLECYSTECTOMY       COLONOSCOPY N/A 6/17/2024    Procedure: COLONOSCOPY, WITH POLYPECTOMY AND BIOPSY;  Surgeon: Kian Grace DO;  Location: [facility]     COLONOSCOPY W/ BIOPSIES AND POLYPECTOMY       COSMETIC SURGERY  10/13, 09/15     ENDOSCOPY      10/2000 Xiao, repeat 10/2002, REPEAT 2/08 (REPEAT IN 2 YRS)     ENT SURGERY  1973     EXCIS VAGINAL CYST/TUMOR       GASTRIC BYPASS       GI SURGERY  04/11     GLAUCOMA SURGERY       GLAUCOMA SURGERY       HC REMOVAL GALLBLADDER  6/2005     HYSTERECTOMY       HYSTERECTOMY, PAP NO LONGER INDICATED       MASTECTOMY Bilateral      MASTECTOMY BILATERAL, INSERT TISSUE EXPANDER BILATERAL, COMBINED  8/2010     MASTECTOMY, BILATERAL        OTHER SURGICAL HISTORY      laparoscopy     PELVIS LAPAROSCOPY,DX      x2     OH LAMNOTMY INCL W/DCMPRSN NRV ROOT 1 INTRSPC LUMBR Left 1/8/2021    Procedure: LEFT LUMBAR 5-SACRAL 1 DISCECTOMY;  Surgeon: Vadim Theodore MD;  Location: [facility] Main OR;  Service: Spine     REVISE RECONSTRUCTED BREAST       ZZC APPENDECTOMY  1982     ZZ BIOPSY OF BREAST, OPEN INCISIONAL  2008    sentinel lymph node     ZZ COLONOSCOPY THRU STOMA, DIAGNOSTIC  12/04, 12/09    (REPEAT IN 5 YRS)     Family History   Problem Relation Age of Onset     Hypertension Mother      Gastrointestinal Disease Mother         GAITAN'S ESOPHAGUS     Lipids Mother      Depression Mother      Other - See Comments Mother         Pagets Disease of the Vulva, Dx 06/2014     Osteoporosis Mother      Obesity Mother      Allergies Father         HAYFEVER     Heart Disease Father         AFIB     Hypertension Father      Lipids Father      Hyperlipidemia Father      Gastrointestinal Disease Other         GAITAN'S ESOPHAGUS     Breast Cancer Other      Colon Cancer Other      Other Cancer Maternal Grandmother         Gall bladder     Other Cancer Maternal Grandfather         Esophageal     Colon Cancer Paternal Grandfather      Gastrointestinal Disease Other         GAITAN'S ESOPHAGUS     Breast Cancer Other      Breast Cancer Paternal Aunt      Breast Cancer Other         fathers 1st cousin     Hyperlipidemia Brother      Colon Cancer Other          Objective  LMP 03/28/2010     General: healthy, alert and in no acute distress.    HEENT: no scleral icterus or conjunctival erythema.   Skin: no suspicious lesions or rash. No jaundice.   CV: regular rhythm by palpation, 2+ distal pulses.  Resp: normal respiratory effort without conversational dyspnea.   Psych: normal mood and affect.    Gait: nonantalgic, appropriate coordination and balance.     Neuro:        - Sensation to light touch:    - Intact throughout the BUE including all peripheral nerve  distributions.     MSK - Shoulder:       - Inspection:    - No major swelling, erythema, warmth, lesion, or atrophy noted.        - ROM:    - Limited in abduction to approximately 90 degrees, flexion to approximately 80 degrees, external rotation to approximately 40 degrees. Improved since prior exam.       - Palpation:    - TTP slightly at the proximal humerus fracture site and proximal olecranon posterior surface.   - NTTP elsewhere.        - Strength:    - Intact and 5-*/5 strength for Sab, SF, SER, SIR. Antalgic.           Radiology  I independently reviewed the available relevant imaging in the chart with my interpretations as above in HPI.   - XR R shoulder 3/5/2025 shows stability of her nondisplaced proximal right humerus fracture at the humeral neck.  No new acute fracture, no worsening displacement or alignment.   - XR R shoulder 3/20/2025 shows stability of her nondisplaced proximal right humerus fracture at the humeral neck with signs of interval healing.  No new acute fracture, no worsening displacement or alignment.     I independently reviewed today's new relevant imaging, with the following interpretation:  - XR R shoulder 4/24/2025 shows stability of her nondisplaced impacted proximal right humerus fracture at the humeral neck with continued signs of interval healing.  No worsening displacement or alignment, no new fractures.      Assessment  1. Other closed nondisplaced fracture of proximal end of right humerus with routine healing, subsequent encounter        Plan  Christina John is a pleasant 59 year old female that presents for follow-up of her acute right shoulder pain after a fall with impact to the right shoulder on 2/18/25.  Unsure exactly of the mechanism of impact to the shoulder but she did have pain at the proximal upper arm after the fall.  She was previously evaluated and a nondisplaced fracture at the right humeral neck was identified on radiographs.  She was given a shoulder  immobilizer and work restriction letter after initial evaluation.    In the interim, she has been doing well with improving pain and swelling and intact neurovascular function at the shoulder.  Repeat radiographs today demonstrate stability with no worsening displacement or angulation. Interval healing present. On exam, she has only minimal TTP over the fracture site and is able to activate all proximal upper extremity musculature effectively with increase strength since prior exam. ROM also increasing to shoulder height for abduction and flexion.  She has been participating in PT, which is helping. Overall doing well.     We discussed the nature of the condition and available treatment options, and mutually agreed upon the following plan:    - Imaging:          - Reviewed and independently interpreted the relevant imaging in the chart, including any imaging ordered for today's clinic.  - Reviewed results and images with patient.   - Medications:          - Discussed pharmacologic options for pain relief.   - May use NSAIDs (Ibuprofen, Naproxen) or Acetaminophen (Tylenol) as needed for pain control. Do not take these if previously advised to avoid them for other medical conditions.  - May also use topical medications such as lidocaine, IcyHot, BioFreeze, or Voltaren gel as needed for pain control. Voltaren gel is an anti-inflammatory cream that may be used up to 4 times per day over the painful area.   - Therapy:    - Continue physical therapy and home exercises program as instructed by PT.  - Modalities:          - May use ice, heat, massage or other modalities as needed.   - Bracing:          - Discussed bracing options and she has weaned out of the immobilizer. No need to use the immobilizer anymore.   - Surgery:          - Discussed non-operative and operative treatment options for the patient's condition.  Not likely to require surgery.  Good healing with nonoperative management.  - Activity:     - Encouraged  to gradually progress activities with lifting restrictions to no more than 5 lbs. Protect the injured area from further injury.  Avoid exacerbating activities and activities that are high-risk for falls.    - Updated work restriction letter provided.  - Follow up:          - In 4 weeks for repeat radiographs and re-evaluation.   - May follow up sooner for new/worsening symptoms.  - May contact clinic by phone or MyChart for questions or concerns.       Rasta Newell DO, CAQSM  [facility] Sports Medicine  [facility] - Department of Orthopedic Surgery       Disclaimer:  This note was prepared and written using Dragon Medical dictation software. As a result, there may be errors in the script that have gone undetected. Please consider this when interpreting the information in this note.        Again, thank you for allowing me to participate in the care of your patient.        Sincerely,        Rasta Newell DO    Electronically signed

## 2025-04-24 NOTE — LETTER
April 24, 2025      Christina John  [address] [place] [id number]        To Whom It May Concern:     Christina John was seen in our clinic today on 4/24/2025 for follow up of her right proximal humerus fracture that requires rest, activity restrictions, and rehabilitation before being safe to resume regular work and recreational activities.  At this time, she has made good progress with her rehabilitation and is able to lift, carry, transfer, push, pull up to 5 pounds with that arm. She may perform left upper extremity activities and bilateral lower extremity activities as tolerated. She may continue to work with the above restrictions in place until medically cleared.         Sincerely,  Rasta Newell DO, VIVIANA  [facility] - Sports Medicine  [facility] - Department of Orthopedic Surgery

## 2025-05-01 ENCOUNTER — LAB (OUTPATIENT)
Dept: LAB | Facility: CLINIC | Age: 60
End: 2025-05-01
Payer: COMMERCIAL

## 2025-05-01 DIAGNOSIS — M85.80 OSTEOPENIA, UNSPECIFIED LOCATION: Primary | ICD-10-CM

## 2025-05-03 ENCOUNTER — LAB (OUTPATIENT)
Dept: LAB | Facility: CLINIC | Age: 60
End: 2025-05-03
Payer: COMMERCIAL

## 2025-05-03 DIAGNOSIS — M85.80 OSTEOPENIA, UNSPECIFIED LOCATION: ICD-10-CM

## 2025-05-03 LAB
ANION GAP SERPL CALCULATED.3IONS-SCNC: 10 MMOL/L (ref 7–15)
BUN SERPL-MCNC: 21.1 MG/DL (ref 6–20)
CALCIUM 24H UR-MRATE: 0.09 G/SPEC (ref 0.1–0.3)
CALCIUM SERPL-MCNC: 8.9 MG/DL (ref 8.8–10.4)
CALCIUM UR-MCNC: 7.4 MG/DL
CHLORIDE SERPL-SCNC: 104 MMOL/L (ref 98–107)
COLLECT DURATION TIME UR: 24 H
COLLECT DURATION TIME UR: 24 H
CREAT 24H UR-MRATE: 1.07 G/SPEC (ref 0.72–1.51)
CREAT SERPL-MCNC: 1.04 MG/DL (ref 0.51–0.95)
CREAT UR-MCNC: 88.9 MG/DL
EGFRCR SERPLBLD CKD-EPI 2021: 62 ML/MIN/1.73M2
GLUCOSE SERPL-MCNC: 139 MG/DL (ref 70–99)
HCO3 SERPL-SCNC: 26 MMOL/L (ref 22–29)
POTASSIUM SERPL-SCNC: 4.3 MMOL/L (ref 3.4–5.3)
SODIUM SERPL-SCNC: 140 MMOL/L (ref 135–145)
SPECIMEN VOL UR: 1200 ML
SPECIMEN VOL UR: 1200 ML

## 2025-05-03 PROCEDURE — 36415 COLL VENOUS BLD VENIPUNCTURE: CPT

## 2025-05-03 PROCEDURE — 80048 BASIC METABOLIC PNL TOTAL CA: CPT

## 2025-05-14 ENCOUNTER — THERAPY VISIT (OUTPATIENT)
Dept: PHYSICAL THERAPY | Facility: CLINIC | Age: 60
End: 2025-05-14
Attending: STUDENT IN AN ORGANIZED HEALTH CARE EDUCATION/TRAINING PROGRAM
Payer: OTHER MISCELLANEOUS

## 2025-05-14 DIAGNOSIS — S42.294D OTHER CLOSED NONDISPLACED FRACTURE OF PROXIMAL END OF RIGHT HUMERUS WITH ROUTINE HEALING, SUBSEQUENT ENCOUNTER: Primary | ICD-10-CM

## 2025-05-14 PROCEDURE — 97140 MANUAL THERAPY 1/> REGIONS: CPT | Mod: GP | Performed by: PHYSICAL MEDICINE & REHABILITATION

## 2025-05-14 PROCEDURE — 97110 THERAPEUTIC EXERCISES: CPT | Mod: GP | Performed by: PHYSICAL MEDICINE & REHABILITATION

## 2025-05-20 ENCOUNTER — HOSPITAL ENCOUNTER (OUTPATIENT)
Dept: BONE DENSITY | Facility: CLINIC | Age: 60
Discharge: HOME | End: 2025-05-20
Attending: STUDENT IN AN ORGANIZED HEALTH CARE EDUCATION/TRAINING PROGRAM
Payer: COMMERCIAL

## 2025-05-20 DIAGNOSIS — M85.80 OSTEOPENIA, UNSPECIFIED LOCATION: ICD-10-CM

## 2025-05-20 PROCEDURE — 77081 DXA BONE DENSITY APPENDICULR: CPT

## 2025-05-21 ENCOUNTER — THERAPY VISIT (OUTPATIENT)
Dept: PHYSICAL THERAPY | Facility: CLINIC | Age: 60
End: 2025-05-21
Attending: STUDENT IN AN ORGANIZED HEALTH CARE EDUCATION/TRAINING PROGRAM
Payer: OTHER MISCELLANEOUS

## 2025-05-21 DIAGNOSIS — S42.294D OTHER CLOSED NONDISPLACED FRACTURE OF PROXIMAL END OF RIGHT HUMERUS WITH ROUTINE HEALING, SUBSEQUENT ENCOUNTER: Primary | ICD-10-CM

## 2025-05-21 PROCEDURE — 97140 MANUAL THERAPY 1/> REGIONS: CPT | Mod: GP | Performed by: PHYSICAL MEDICINE & REHABILITATION

## 2025-05-21 PROCEDURE — 97110 THERAPEUTIC EXERCISES: CPT | Mod: GP | Performed by: PHYSICAL MEDICINE & REHABILITATION

## 2025-05-21 NOTE — PROGRESS NOTES
Progress Note  05/21/25 0500   Appointment Info   Signing clinician's name / credentials Ania Salgado, PT, DPT   Total/Authorized Visits 10   Visits Used 7   Medical Diagnosis Other closed nondisplaced fracture of proximal end of right humerus with routine healing, subsequent encounter (U37.178U)   PT Tx Diagnosis Imparied shoulder ROM and strength   Other pertinent information WC; 10 authorized   Progress Note/Certification   Onset of illness/injury or Date of Surgery 02/18/25   Therapy Frequency 1x week   Predicted Duration 10 weeks   Progress Note Due Date 05/29/25   Progress Note Completed Date 03/20/25   GOALS   PT Goals 2;3;4   PT Goal 1   Goal Identifier LTG   Goal Description Pt will demonstrate AROM of R shoulder flexion >130 degrees in order to reach overhead to grab an object   Goal Progress currently focusing on PROM and AAROM at this time   Target Date 05/29/25   PT Goal 2   Goal Identifier LTG   Goal Description Pt will be painfree in R shoulder in order to sleep without interruption   Goal Progress notes sleep still challening if laying on R side   Target Date 05/29/25   PT Goal 3   Goal Identifier LTG   Goal Description Pt will have >4/5 RC strength in order to lift and carry objects   Goal Progress currently focusing on ROM at this time   Target Date 05/29/25   PT Goal 4   Goal Identifier STG   Goal Description Pt will demonstrate AROM of > 90 degrees flexion and ER > 55 deg in order to perform computer work duties with ease   Goal Progress flexion portion of goal met, still working on ER   Target Date 04/24/25   Subjective Report   Subjective Report Shared she has started having her  stretch her arm out more and seems to be helping. Notes trying to move it at work as exercise   Objective Measures   Objective Measures Objective Measure 1;Objective Measure 2;Objective Measure 3   Objective Measure 1   Objective Measure R shoulder AAROM   Details 120* flexion and scaption on pulleys    Objective Measure 2   Objective Measure R shoulder AROM   Details flexion: 100*, abd: 86*   Objective Measure 3   Objective Measure R shoulder PROM   Details flexion: 100*, abd: 95*, ER in scaption: 35*, IR in scaption: to stomach. muscle guarding with PROM   Treatment Interventions (PT)   Interventions Therapeutic Procedure/Exercise;Manual Therapy   Therapeutic Procedure/Exercise   Therapeutic Procedures: strength, endurance, ROM, flexibility minutes (06057) 23   Therapeutic Procedures Ther Proc 2;Ther Proc 3;Ther Proc 4;Ther Proc 5;Ther Proc 6;Ther Proc 7;Ther Proc 8   Ther Proc 1 PROM   Ther Proc 1 - Details PROM into flexion, scaption, abd, ER and IR   Ther Proc 2 shoulder horizontal adduction stretch   Ther Proc 2 - Details 10x1   Ther Proc 3 sleeper stretch   Ther Proc 3 - Details 10x1   Ther Proc 4 self inferior glide of GHJ using mobilization belt   Ther Proc 4 - Details 10x1   Ther Proc 5 pulleys into flexion and scaption   Ther Proc 5 - Details 10x1 each   Ther Proc 6 PROM shoulder flexion and scaption walkout at counter   Ther Proc 6 - Details 10x1 each   Ther Proc 7 pendulum   Ther Proc 7 - Details 10x1 clockwise and counterclockwise   Skilled Intervention ROM/stretching to improve reaching   Patient Response/Progress fair tolerance to HEP   Manual Therapy   Manual Therapy: Mobilization, MFR, MLD, friction massage minutes (26190) 12   Manual Therapy Manual Therapy 2   Manual Therapy 1 jt mobs   Manual Therapy 1 - Details A/P and inferior R GHJ mobs grades II-III with pt supine   Skilled Intervention jt mobs to improve ROM   Patient Response/Progress improved flexion by 10* following   Eval/Assessments   Assessments   (Pt has attended 7 PT sessions and has made minimal progress in regards to pain, ROM and strength. Pt also having difficult with HEP progression due to low compliance and pain. Pt continues to guard throughout all exercises and PROM despite cues. Pt demonstrates upper trap compensations  throughout all ROM and leans away from stretches. Pt would benefit from further skilled PT to work on residual ROM and strength deficits in order to decrease difficulty with reaching, lifting and carrying.)   Education   Learner/Method Patient;Listening;Demonstration;Pictures/Video;Reading;No Barriers to Learning   Education Comments pt demonstrated and verbalized good understanding   Plan   Home program [id number]   Plan for next session *f/u with MD appt. MT PRN. *work on AAROM supine, ER ROM   Comments   Comments R humerus fx. RTMD: 5/22/25   Total Session Time   Timed Code Treatment Minutes 35   Total Treatment Time (sum of timed and untimed services) 35       PLAN  Continue therapy per current plan of care.    Beginning/End Dates of Progress Note Reporting Period:  03/20/25 to 05/21/2025    Referring Provider:  Rasta Newell      Please contact me with any questions or concerns.    Thank you for your referral,     Ania Salgado, PT, DPT  Physical Therapist  [address] [address] [phone]

## 2025-05-21 NOTE — PROGRESS NOTES
Christina John  :  1965  DOS: 2025  MRN: [id number]  PCP: Nancy Ontiveros    Sports Medicine Clinic Visit    Interim History - May 22, 2025  - Last seen on 2025 for a nondisplaced proximal right humerus fracture.   - Since the last visit, she notes that she is not progressing as expected in physical therapy. Notes that she can not get to 90 degrees shoulder abduction and physical therapist is concerned about a rotator cuff tear also. Besides physical therapy, has been doing massage therapy.  - No interim injury.       Interim History - 2025  - Last seen on 2025 for a nondisplaced proximal right humerus fracture.   - 2 months post injury. Instructed to continue with physical therapy at last visit.  - Since the last visit, she notes that she has been attending physical therapy 1x/week along with home exercises. Shoulder is becoming less painful over the lateral right upper arm.  Range of motion improving greatly.  Can get up to 90 degrees of abduction and flexion now.  She does note some swelling still present when massaging her lateral upper arm. Attending acupuncture 1x/week also.   - No interim injury.     Interim History - 2025  - Last seen on 2025 for a nondisplaced proximal right humerus fracture. Started physical therapy today and the arm is a little sore after therapy.  - 1 month post-injury  - Since the last visit, she continues to make improvements with her pain constantly at about a 3/10, only increases slightly with movements.  She is wearing the immobilizer at night, which helps stabilize the shoulder as she tends to roll often.  Slight sleep interruptions, getting better.  Range of motion is improving well and she is working on pendulum exercises, stretching, and range of motion in PT.  Still feels some soreness in the elbow at the proximal ulna posterior surface where she had some significant olecranon bursa swelling, which has decreased to about a  third of the size.  She is actively avoiding pressure on the area.  Using a shoulder sling instead of the immobilizer at times during the day.  - No interim injury.     Interim History - March 5, 2025  - Last seen on 4/24/2025 for a nondisplaced proximal right humerus fracture.   - Continued in immobilizer at last visit. Pain has been improving and she is able to activate her shoulder abductors, flexors, rotators with mild antalgic weakness.  Swelling has been improving as well.  Continues to use the immobilizer without concern.  Repeat xrays today.  - No interim injury.     Initial visit: February 20, 2025  HPI  Christina John is a 59 year old female who is seen in consultation at the request of  Teresa Rivera PA-C presenting with a right humerus fracture.    - Mechanism of Injury:    - 2/18/2025: Fell onto right arm as left toe got caught under a display at work  - Pertinent history and prior evaluations:    -  visit 2/18/2025: Right shoulder immobilizer given. Norco for breakthrough pain. Out of work note given.     -2/18/2025 right shoulder xray shows a nondisplaced fracture across the humeral neck. No evidence for involvement of the articular surface and no dislocation. Hypertrophic change at the AC joint.     - Pain Character:    - Location:  proximal upper arm at fracture site.   - Character:  aching  - Duration:  About 2 days  - Course:  improving slightly  - Endorses:    -Pain as described above.  Antalgic weakness due to pain  - Denies:    -Numbness, tingling, vascular changes, skin changes  - Alleviating factors:    -Shoulder immobilizer, rest  - Aggravating factors:    - Movement away from body  - Other treatments tried:    - Shoulder immobilizer since 2/18/25.     - Patient Goals:    - be able to manage the symptoms successfully, discuss treatment options  - Social History:   - Employed      Review of Systems  Musculoskeletal: as above  Remainder of review of systems is negative including  constitutional, CV, pulmonary, GI, Skin and Neurologic except as noted in HPI or medical history.    Past Medical History:   Diagnosis Date    Adhesive capsulitis of left shoulder 06/01/2023    Allergic rhinitis due to animal dander did IT x 4 yrs. 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 ended 9/06 per self    Diagnostic skin and sensitization tests 4/03 skin tests pos. for: cat/dog/DM/T/G/RW    Endometriosis     GERD (gastroesophageal reflux disease)     Glaucoma     History of anesthesia complications     House dust mite allergy     Hyperlipidemia     Hypertension     Infertility     ON CLOMID    Lumbar disc herniation     L5-S1    DAVIE (obstructive sleep apnea)     No CPAP    PCOS (polycystic ovarian syndrome)     Pelvic relaxation     PONV (postoperative nausea and vomiting)     Seasonal allergic conjunctivitis     Seasonal allergic rhinitis     4/03 skin tests pos. for: cat/dog/DM/T/G/RW--per Dr. Talavera.    Ulcers 1992    DX BY ENDOSCOPY     Past Surgical History:   Procedure Laterality Date    APPENDECTOMY      BACK SURGERY  1/2021    Discectomy L5S1    C/SECTION, CLASSICAL      CHOLECYSTECTOMY      COLONOSCOPY N/A 6/17/2024    Procedure: COLONOSCOPY, WITH POLYPECTOMY AND BIOPSY;  Surgeon: Kian Grace DO;  Location: [facility]    COLONOSCOPY W/ BIOPSIES AND POLYPECTOMY      COSMETIC SURGERY  10/13, 09/15    ENDOSCOPY      10/2000 [place], repeat 10/2002, REPEAT 2/08 (REPEAT IN 2 YRS)    ENT SURGERY  1973    EXCIS VAGINAL CYST/TUMOR      GASTRIC BYPASS      GI SURGERY  04/11    GLAUCOMA SURGERY      GLAUCOMA SURGERY      HC REMOVAL GALLBLADDER  6/2005    HYSTERECTOMY      HYSTERECTOMY, PAP NO LONGER INDICATED      MASTECTOMY  Bilateral     MASTECTOMY BILATERAL, INSERT TISSUE EXPANDER BILATERAL, COMBINED  8/2010    MASTECTOMY, BILATERAL      OTHER SURGICAL HISTORY      laparoscopy    PELVIS LAPAROSCOPY,DX      x2    CA LAMNOTMY INCL W/DCMPRSN NRV ROOT 1 INTRSPC LUMBR Left 1/8/2021    Procedure: LEFT LUMBAR 5-SACRAL 1 DISCECTOMY;  Surgeon: Vadim Theodore MD;  Location: [facility] OR;  Service: Spine    REVISE RECONSTRUCTED BREAST      ZZC APPENDECTOMY  1982    ZZ BIOPSY OF BREAST, OPEN INCISIONAL  2008    sentinel lymph node    ZZ COLONOSCOPY THRU STOMA, DIAGNOSTIC  12/04, 12/09    (REPEAT IN 5 YRS)     Family History   Problem Relation Age of Onset    Hypertension Mother     Gastrointestinal Disease Mother         GAITAN'S ESOPHAGUS    Lipids Mother     Depression Mother     Other - See Comments Mother         Pagets Disease of the Vulva, Dx 06/2014    Osteoporosis Mother     Obesity Mother     Allergies Father         HAYFEVER    Heart Disease Father         AFIB    Hypertension Father     Lipids Father     Hyperlipidemia Father     Gastrointestinal Disease Other         GAITAN'S ESOPHAGUS    Breast Cancer Other     Colon Cancer Other     Other Cancer Maternal Grandmother         Gall bladder    Other Cancer Maternal Grandfather         Esophageal    Colon Cancer Paternal Grandfather     Gastrointestinal Disease Other         GAITAN'S ESOPHAGUS    Breast Cancer Other     Breast Cancer Paternal Aunt     Breast Cancer Other         fathers 1st cousin    Hyperlipidemia Brother     Colon Cancer Other          Objective  LMP 03/28/2010     General: healthy, alert and in no acute distress.    HEENT: no scleral icterus or conjunctival erythema.   Skin: no suspicious lesions or rash. No jaundice.   CV: regular rhythm by palpation, 2+ distal pulses.  Resp: normal respiratory effort without conversational dyspnea.   Psych: normal mood and affect.    Gait: nonantalgic, appropriate coordination and balance.     Neuro:        - Sensation to  light touch:    - Intact throughout the UE including all peripheral nerve distributions.     MSK - Shoulder:       - Inspection:    - No major swelling, erythema, warmth, lesion, or atrophy noted.        - ROM:    - Limited in abduction to approximately 90 degrees, flexion to approximately 80 degrees, external rotation to approximately 40 degrees.        - Palpation:    - TTP slightly at the proximal humerus fracture site  - NTTP elsewhere.        - Strength:    - 5-*/5 strength for SF, SER, SIR. Antalgic.     - 4-*/5 for SAb            - Special:   - Empty Can:  ++Pos   - Kwon:  Pos      Radiology  I independently reviewed the available relevant imaging in the chart with my interpretations as above in HPI.   - XR R shoulder 3/5/2025 shows stability of her nondisplaced proximal right humerus fracture at the humeral neck.  No new acute fracture, no worsening displacement or alignment.   - XR R shoulder 3/20/2025 shows stability of her nondisplaced proximal right humerus fracture at the humeral neck with signs of interval healing.  No new acute fracture, no worsening displacement or alignment.   - XR R shoulder 4/24/2025 shows stability of her nondisplaced impacted proximal right humerus fracture at the humeral neck with continued signs of interval healing.  No worsening displacement or alignment, no new fractures.    I independently reviewed today's new relevant imaging, with the following interpretation:  - XR R shoulder 5/22/2025 shows stability of her nondisplaced impacted proximal right humerus fracture at the humeral neck with continued signs of interval healing.  No worsening displacement or alignment, no new fractures.      Assessment  1. Traumatic tear of right rotator cuff, unspecified tear extent, initial encounter    2. Adhesive capsulitis of right shoulder    3. Other closed nondisplaced fracture of proximal end of right humerus with routine healing, subsequent encounter          Plan  Christina John  is a pleasant 59 year old female that presents for follow-up of her acute right shoulder pain after a fall with impact to the right shoulder on 2/18/25.  Unsure exactly of the mechanism of impact to the shoulder but she did have pain at the proximal upper arm after the fall.  She was previously evaluated and a nondisplaced fracture at the right humeral neck was identified on radiographs.  She was given a shoulder immobilizer and work restriction letter after initial evaluation.    In the interim, she has been doing well with improving pain and swelling and intact neurovascular function at the shoulder.  Repeat radiographs today demonstrate stability with no worsening displacement or angulation. Interval healing present. On exam, she has only minimal TTP over the fracture site and is able to activate all proximal upper extremity musculature effectively with increase strength since prior exam. ROM also increasing to shoulder height for abduction and flexion.  She has been participating in PT, which is helping. Overall doing well.     We discussed the nature of the condition and available treatment options, and mutually agreed upon the following plan:    - Imaging:          - Reviewed and independently interpreted the relevant imaging in the chart, including any imaging ordered for today's clinic.  - Reviewed results and images with patient.   - Medications:          - Discussed pharmacologic options for pain relief.   - May use NSAIDs (Ibuprofen, Naproxen) or Acetaminophen (Tylenol) as needed for pain control. Do not take these if previously advised to avoid them for other medical conditions.  - May also use topical medications such as lidocaine, IcyHot, BioFreeze, or Voltaren gel as needed for pain control. Voltaren gel is an anti-inflammatory cream that may be used up to 4 times per day over the painful area.   - Therapy:    - Continue physical therapy and home exercises program as instructed by PT.  - Modalities:           - May use ice, heat, massage or other modalities as needed.   - Bracing:          - Discussed bracing options and she has weaned out of the immobilizer. No need to use the immobilizer anymore.   - Surgery:          - Discussed non-operative and operative treatment options for the patient's condition.  Not likely to require surgery.  Good healing with nonoperative management.  - Activity:     - Encouraged to gradually progress activities with lifting restrictions to no more than 5 lbs. Protect the injured area from further injury.  Avoid exacerbating activities and activities that are high-risk for falls.    - Updated work restriction letter provided.  - Follow up:          - In 4 weeks for repeat radiographs and re-evaluation.   - May follow up sooner for new/worsening symptoms.  - May contact clinic by phone or MyChart for questions or concerns.       Update - 5/22/25:  Christina presents for follow-up of her right shoulder pain.  Her proximal humerus fracture continues to heal with stability on her repeat radiographs today.  However, she does feel like she is not improving as much with physical therapy due to pain and weakness with shoulder abduction primarily and decreasing range of motion.  I also received a message from her physical therapist that corroborated this.  On exam, she does have more weakness in her supraspinatus testing with 4-/5 for shoulder abduction and positive empty can testing for pain and weakness.  She also does have decreasing active and passive range of motion in the shoulder, characteristic of adhesive capsulitis.    I recommended that we consider further evaluation with an MRI to rule out a rotator cuff tear and evaluate for the presence of adhesive capsulitis.  MRI order was placed and she will hold off on PT for now until we get results of the MRI and can determine next steps.      Rasta Newell DO, CAGLORYM  [facility]  [facility]  - Department of Orthopedic Surgery       Disclaimer:  This note was prepared and written using Dragon Medical dictation software. As a result, there may be errors in the script that have gone undetected. Please consider this when interpreting the information in this note.

## 2025-05-22 ENCOUNTER — OFFICE VISIT (OUTPATIENT)
Dept: ORTHOPEDICS | Facility: CLINIC | Age: 60
End: 2025-05-22
Payer: OTHER MISCELLANEOUS

## 2025-05-22 ENCOUNTER — ANCILLARY PROCEDURE (OUTPATIENT)
Dept: GENERAL RADIOLOGY | Facility: CLINIC | Age: 60
End: 2025-05-22
Attending: STUDENT IN AN ORGANIZED HEALTH CARE EDUCATION/TRAINING PROGRAM
Payer: OTHER MISCELLANEOUS

## 2025-05-22 ENCOUNTER — RESULTS FOLLOW-UP (OUTPATIENT)
Dept: FAMILY MEDICINE | Facility: CLINIC | Age: 60
End: 2025-05-22

## 2025-05-22 DIAGNOSIS — S42.294D OTHER CLOSED NONDISPLACED FRACTURE OF PROXIMAL END OF RIGHT HUMERUS WITH ROUTINE HEALING, SUBSEQUENT ENCOUNTER: ICD-10-CM

## 2025-05-22 DIAGNOSIS — M75.01 ADHESIVE CAPSULITIS OF RIGHT SHOULDER: ICD-10-CM

## 2025-05-22 DIAGNOSIS — S46.011A TRAUMATIC TEAR OF RIGHT ROTATOR CUFF, UNSPECIFIED TEAR EXTENT, INITIAL ENCOUNTER: Primary | ICD-10-CM

## 2025-05-22 DIAGNOSIS — S42.294D OTHER CLOSED NONDISPLACED FRACTURE OF PROXIMAL END OF RIGHT HUMERUS WITH ROUTINE HEALING, SUBSEQUENT ENCOUNTER: Primary | ICD-10-CM

## 2025-05-22 PROCEDURE — 73030 X-RAY EXAM OF SHOULDER: CPT | Mod: TC | Performed by: RADIOLOGY

## 2025-05-22 PROCEDURE — 99214 OFFICE O/P EST MOD 30 MIN: CPT | Performed by: STUDENT IN AN ORGANIZED HEALTH CARE EDUCATION/TRAINING PROGRAM

## 2025-05-22 NOTE — PATIENT INSTRUCTIONS
MRI Scheduling Instructions  Please follow both steps below    1.  Advanced imaging is done by appointment. Please call [facility] ([facility]) [phone] to schedule your MRI at your earliest convenience.   - Some insurance companies may require a prior authorization to be completed which can delay the time until you are able to schedule your appointment.     - If you are active on MyChart, you may have access to your test results before your provider is able to review the study and advise on next steps.      2. After the date of your MRI has been scheduled, please call [phone] to get on my schedule for an in-person or telephone follow up appointment to discuss the results and updated treatment recommendations. This follow up should be scheduled for 1-2 days after the date of your MRI.

## 2025-05-22 NOTE — LETTER
2025      Christina John  [address] [facility] [id number]      Dear Colleague,    Thank you for referring your patient, Christina John, to the [facility] [place]. Please see a copy of my visit note below.    Christina John  :  1965  DOS: 2025  MRN: [id number]  PCP: Nancy Ontiveros    Sports Medicine Clinic Visit    Interim History - May 22, 2025  - Last seen on 2025 for a nondisplaced proximal right humerus fracture.   - Since the last visit, she notes that she is not progressing as expected in physical therapy. Notes that she can not get to 90 degrees shoulder abduction and physical therapist is concerned about a rotator cuff tear also. Besides physical therapy, has been doing massage therapy.  - No interim injury.       Interim History - 2025  - Last seen on 2025 for a nondisplaced proximal right humerus fracture.   - 2 months post injury. Instructed to continue with physical therapy at last visit.  - Since the last visit, she notes that she has been attending physical therapy 1x/week along with home exercises. Shoulder is becoming less painful over the lateral right upper arm.  Range of motion improving greatly.  Can get up to 90 degrees of abduction and flexion now.  She does note some swelling still present when massaging her lateral upper arm. Attending acupuncture 1x/week also.   - No interim injury.     Interim History - 2025  - Last seen on 2025 for a nondisplaced proximal right humerus fracture. Started physical therapy today and the arm is a little sore after therapy.  - 1 month post-injury  - Since the last visit, she continues to make improvements with her pain constantly at about a 3/10, only increases slightly with movements.  She is wearing the immobilizer at night, which helps stabilize the shoulder as she tends to roll often.  Slight sleep interruptions, getting better.  Range of motion is  improving well and she is working on pendulum exercises, stretching, and range of motion in PT.  Still feels some soreness in the elbow at the proximal ulna posterior surface where she had some significant olecranon bursa swelling, which has decreased to about a third of the size.  She is actively avoiding pressure on the area.  Using a shoulder sling instead of the immobilizer at times during the day.  - No interim injury.     Interim History - March 5, 2025  - Last seen on 4/24/2025 for a nondisplaced proximal right humerus fracture.   - Continued in immobilizer at last visit. Pain has been improving and she is able to activate her shoulder abductors, flexors, rotators with mild antalgic weakness.  Swelling has been improving as well.  Continues to use the immobilizer without concern.  Repeat xrays today.  - No interim injury.     Initial visit: February 20, 2025  HPI  Christina John is a 59 year old female who is seen in consultation at the request of  Teresa Rivera PA-C presenting with a right humerus fracture.    - Mechanism of Injury:    - 2/18/2025: Fell onto right arm as left toe got caught under a display at work  - Pertinent history and prior evaluations:    -  visit 2/18/2025: Right shoulder immobilizer given. Norco for breakthrough pain. Out of work note given.     -2/18/2025 right shoulder xray shows a nondisplaced fracture across the humeral neck. No evidence for involvement of the articular surface and no dislocation. Hypertrophic change at the AC joint.     - Pain Character:    - Location:  proximal upper arm at fracture site.   - Character:  aching  - Duration:  About 2 days  - Course:  improving slightly  - Endorses:    -Pain as described above.  Antalgic weakness due to pain  - Denies:    -Numbness, tingling, vascular changes, skin changes  - Alleviating factors:    -Shoulder immobilizer, rest  - Aggravating factors:    - Movement away from body  - Other treatments tried:    - Shoulder  immobilizer since 2/18/25.     - Patient Goals:    - be able to manage the symptoms successfully, discuss treatment options  - Social History:   - Employed      Review of Systems  Musculoskeletal: as above  Remainder of review of systems is negative including constitutional, CV, pulmonary, GI, Skin and Neurologic except as noted in HPI or medical history.    Past Medical History:   Diagnosis Date     Adhesive capsulitis of left shoulder 06/01/2023     Allergic rhinitis due to animal dander did IT x 4 yrs. 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 ended 9/06 per self     Diagnostic skin and sensitization tests 4/03 skin tests pos. for: cat/dog/DM/T/G/RW     Endometriosis      GERD (gastroesophageal reflux disease)      Glaucoma      History of anesthesia complications      House dust mite allergy      Hyperlipidemia      Hypertension      Infertility     ON CLOMID     Lumbar disc herniation     L5-S1     DAVIE (obstructive sleep apnea)     No CPAP     PCOS (polycystic ovarian syndrome)      Pelvic relaxation      PONV (postoperative nausea and vomiting)      Seasonal allergic conjunctivitis      Seasonal allergic rhinitis     4/03 skin tests pos. for: cat/dog/DM/T/G/RW--per Dr. Talavera.     Ulcers 1992    DX BY ENDOSCOPY     Past Surgical History:   Procedure Laterality Date     APPENDECTOMY       BACK SURGERY  1/2021    Discectomy L5S1     C/SECTION, CLASSICAL       CHOLECYSTECTOMY       COLONOSCOPY N/A 6/17/2024    Procedure: COLONOSCOPY, WITH POLYPECTOMY AND BIOPSY;  Surgeon: Kian Grace DO;  Location: [facility]     COLONOSCOPY W/ BIOPSIES AND POLYPECTOMY       COSMETIC SURGERY  10/13, 09/15     ENDOSCOPY      10/2000  Xiao, repeat 10/2002, REPEAT 2/08 (REPEAT IN 2 YRS)     ENT SURGERY  1973     EXCIS VAGINAL CYST/TUMOR       GASTRIC BYPASS       GI SURGERY  04/11     GLAUCOMA SURGERY       GLAUCOMA SURGERY       HC REMOVAL GALLBLADDER  6/2005     HYSTERECTOMY       HYSTERECTOMY, PAP NO LONGER INDICATED       MASTECTOMY Bilateral      MASTECTOMY BILATERAL, INSERT TISSUE EXPANDER BILATERAL, COMBINED  8/2010     MASTECTOMY, BILATERAL       OTHER SURGICAL HISTORY      laparoscopy     PELVIS LAPAROSCOPY,DX      x2     HI LAMNOTMY INCL W/DCMPRSN NRV ROOT 1 INTRSPC LUMBR Left 1/8/2021    Procedure: LEFT LUMBAR 5-SACRAL 1 DISCECTOMY;  Surgeon: Vadim Theodore MD;  Location: [facility] OR;  Service: Spine     REVISE RECONSTRUCTED BREAST       ZZC APPENDECTOMY  1982     ZZ BIOPSY OF BREAST, OPEN INCISIONAL  2008    sentinel lymph node     [facility]  [facility] 12/[facility]    (REPEAT IN 5 YRS)     Family History   Problem Relation Age of Onset     Hypertension Mother      Gastrointestinal Disease Mother         GAITAN'S ESOPHAGUS     Lipids Mother      Depression Mother      Other - See Comments Mother         Pagets Disease of the Vulva, Dx 06/2014     Osteoporosis Mother      Obesity Mother      Allergies Father         HAYFEVER     Heart Disease Father         AFIB     Hypertension Father      Lipids Father      Hyperlipidemia Father      Gastrointestinal Disease Other         GAITAN'S ESOPHAGUS     Breast Cancer Other      Colon Cancer Other      Other Cancer Maternal Grandmother         Gall bladder     Other Cancer Maternal Grandfather         Esophageal     Colon Cancer Paternal Grandfather      Gastrointestinal Disease Other         GAITAN'S ESOPHAGUS     Breast Cancer Other      Breast Cancer Paternal Aunt      Breast Cancer Other         fathers 1st cousin     Hyperlipidemia Brother      Colon Cancer Other          Objective  LMP 03/28/2010     General: healthy, alert and in no acute distress.     HEENT: no scleral icterus or conjunctival erythema.   Skin: no suspicious lesions or rash. No jaundice.   CV: regular rhythm by palpation, 2+ distal pulses.  Resp: normal respiratory effort without conversational dyspnea.   Psych: normal mood and affect.    Gait: nonantalgic, appropriate coordination and balance.     Neuro:        - Sensation to light touch:    - Intact throughout the UE including all peripheral nerve distributions.     MSK - Shoulder:       - Inspection:    - No major swelling, erythema, warmth, lesion, or atrophy noted.        - ROM:    - Limited in abduction to approximately 90 degrees, flexion to approximately 80 degrees, external rotation to approximately 40 degrees.        - Palpation:    - TTP slightly at the proximal humerus fracture site  - NTTP elsewhere.        - Strength:    - 5-*/5 strength for SF, SER, SIR. Antalgic.     - 4-*/5 for SAb            - Special:   - Empty Can:  ++Pos   - Kwon:  Pos      Radiology  I independently reviewed the available relevant imaging in the chart with my interpretations as above in HPI.   - XR R shoulder 3/5/2025 shows stability of her nondisplaced proximal right humerus fracture at the humeral neck.  No new acute fracture, no worsening displacement or alignment.   - XR R shoulder 3/20/2025 shows stability of her nondisplaced proximal right humerus fracture at the humeral neck with signs of interval healing.  No new acute fracture, no worsening displacement or alignment.   - XR R shoulder 4/24/2025 shows stability of her nondisplaced impacted proximal right humerus fracture at the humeral neck with continued signs of interval healing.  No worsening displacement or alignment, no new fractures.    I independently reviewed today's new relevant imaging, with the following interpretation:  - XR R shoulder 5/22/2025 shows stability of her nondisplaced impacted proximal right humerus fracture at the humeral neck with continued signs of interval healing.   No worsening displacement or alignment, no new fractures.      Assessment  1. Traumatic tear of right rotator cuff, unspecified tear extent, initial encounter    2. Adhesive capsulitis of right shoulder    3. Other closed nondisplaced fracture of proximal end of right humerus with routine healing, subsequent encounter          Plan  Christina John is a pleasant 59 year old female that presents for follow-up of her acute right shoulder pain after a fall with impact to the right shoulder on 2/18/25.  Unsure exactly of the mechanism of impact to the shoulder but she did have pain at the proximal upper arm after the fall.  She was previously evaluated and a nondisplaced fracture at the right humeral neck was identified on radiographs.  She was given a shoulder immobilizer and work restriction letter after initial evaluation.    In the interim, she has been doing well with improving pain and swelling and intact neurovascular function at the shoulder.  Repeat radiographs today demonstrate stability with no worsening displacement or angulation. Interval healing present. On exam, she has only minimal TTP over the fracture site and is able to activate all proximal upper extremity musculature effectively with increase strength since prior exam. ROM also increasing to shoulder height for abduction and flexion.  She has been participating in PT, which is helping. Overall doing well.     We discussed the nature of the condition and available treatment options, and mutually agreed upon the following plan:    - Imaging:          - Reviewed and independently interpreted the relevant imaging in the chart, including any imaging ordered for today's clinic.  - Reviewed results and images with patient.   - Medications:          - Discussed pharmacologic options for pain relief.   - May use NSAIDs (Ibuprofen, Naproxen) or Acetaminophen (Tylenol) as needed for pain control. Do not take these if previously advised to avoid them for other  medical conditions.  - May also use topical medications such as lidocaine, IcyHot, BioFreeze, or Voltaren gel as needed for pain control. Voltaren gel is an anti-inflammatory cream that may be used up to 4 times per day over the painful area.   - Therapy:    - Continue physical therapy and home exercises program as instructed by PT.  - Modalities:          - May use ice, heat, massage or other modalities as needed.   - Bracing:          - Discussed bracing options and she has weaned out of the immobilizer. No need to use the immobilizer anymore.   - Surgery:          - Discussed non-operative and operative treatment options for the patient's condition.  Not likely to require surgery.  Good healing with nonoperative management.  - Activity:     - Encouraged to gradually progress activities with lifting restrictions to no more than 5 lbs. Protect the injured area from further injury.  Avoid exacerbating activities and activities that are high-risk for falls.    - Updated work restriction letter provided.  - Follow up:          - In 4 weeks for repeat radiographs and re-evaluation.   - May follow up sooner for new/worsening symptoms.  - May contact clinic by phone or MyChart for questions or concerns.       Update - 5/22/25:  Christina presents for follow-up of her right shoulder pain.  Her proximal humerus fracture continues to heal with stability on her repeat radiographs today.  However, she does feel like she is not improving as much with physical therapy due to pain and weakness with shoulder abduction primarily and decreasing range of motion.  I also received a message from her physical therapist that corroborated this.  On exam, she does have more weakness in her supraspinatus testing with 4-/5 for shoulder abduction and positive empty can testing for pain and weakness.  She also does have decreasing active and passive range of motion in the shoulder, characteristic of adhesive capsulitis.    I recommended that  we consider further evaluation with an MRI to rule out a rotator cuff tear and evaluate for the presence of adhesive capsulitis.  MRI order was placed and she will hold off on PT for now until we get results of the MRI and can determine next steps.      Rasta Newell DO, CAQSM  [facility]  [facility] - Department of Orthopedic Surgery       Disclaimer:  This note was prepared and written using Dragon Medical dictation software. As a result, there may be errors in the script that have gone undetected. Please consider this when interpreting the information in this note.      Again, thank you for allowing me to participate in the care of your patient.        Sincerely,        Rasta Newell DO    Electronically signed

## 2025-05-25 DIAGNOSIS — F32.0 MAJOR DEPRESSIVE DISORDER, SINGLE EPISODE, MILD: ICD-10-CM

## 2025-05-27 RX ORDER — ESCITALOPRAM OXALATE 20 MG/1
20 TABLET ORAL DAILY
Qty: 90 TABLET | Refills: 0 | Status: SHIPPED | OUTPATIENT
Start: 2025-05-27

## 2025-05-29 ENCOUNTER — VIRTUAL VISIT (OUTPATIENT)
Dept: FAMILY MEDICINE | Facility: CLINIC | Age: 60
End: 2025-05-29
Payer: COMMERCIAL

## 2025-05-29 DIAGNOSIS — M80.00XD AGE-RELATED OSTEOPOROSIS WITH CURRENT PATHOLOGICAL FRACTURE WITH ROUTINE HEALING, SUBSEQUENT ENCOUNTER: Primary | ICD-10-CM

## 2025-05-29 DIAGNOSIS — J01.90 ACUTE SINUSITIS WITH SYMPTOMS > 10 DAYS: ICD-10-CM

## 2025-05-29 RX ORDER — DOXYCYCLINE HYCLATE 100 MG
100 TABLET ORAL 2 TIMES DAILY
Qty: 14 TABLET | Refills: 0 | Status: SHIPPED | OUTPATIENT
Start: 2025-05-29 | End: 2025-05-29

## 2025-05-29 RX ORDER — OFLOXACIN 3 MG/ML
SOLUTION/ DROPS OPHTHALMIC
COMMUNITY
Start: 2025-05-22

## 2025-05-29 RX ORDER — ALENDRONATE SODIUM 70 MG/1
70 TABLET ORAL
Qty: 12 TABLET | Refills: 3 | Status: SHIPPED | OUTPATIENT
Start: 2025-05-29

## 2025-05-29 RX ORDER — ALENDRONATE SODIUM 70 MG/1
70 TABLET ORAL
Qty: 12 TABLET | Refills: 3 | Status: SHIPPED | OUTPATIENT
Start: 2025-05-29 | End: 2025-05-29

## 2025-05-29 RX ORDER — DOXYCYCLINE HYCLATE 100 MG
100 TABLET ORAL 2 TIMES DAILY
Qty: 14 TABLET | Refills: 0 | Status: SHIPPED | OUTPATIENT
Start: 2025-05-29

## 2025-05-29 NOTE — PROGRESS NOTES
Christina is a 59 year old who is being evaluated via a billable video visit.    What phone number would you like to be contacted at? [phone]    How would you like to obtain your AVS? [email or web address]    Video-Visit Details    Type of service:  Video Visit   Originating Location (pt. Location): Home    Distant Location (provider location):  On-site  Platform used for Video Visit: XAPP[employer]  Total time: 35 minutes were spent on the date of the encounter doing chart review, imaging interpretation, history, MDM, counseling, documentation and further activities per the note.      Christina John  :  1965  DOS: 6/3/2025  MRN: [id number]  PCP: Nancy Ontiveros    Sports Medicine Clinic Visit    Interim History -  Ingrid 3, 2025  - Last seen on 2025 for a nondisplaced proximal right humerus fracture.  There was more concern for weakness of the shoulder and possible rotator cuff tearing, so MRI was ordered for evaluation.  MRI results below.  - Since the last visit, no significant change in symptoms.  Still with pain and weakness on shoulder abduction or lifting away from her body.  - No interim injury.     - MRI 2025 shows   1. Rotator cuff:  *  Supraspinatus tendinosis with moderate grade bursal sided tearing  of the middle-posterior fibers at the footprint.   *  Infraspinatus and subscapularis tendinosis.  *  Rotator cuff muscle bulk maintained.     2. Findings of potential adhesive capsulitis.     3. Healing proximal humeral fracture similar to recent radiographs.     4. Subacromial subdeltoid bursitis.      Interim History - May 22, 2025  - Last seen on Visit date not found for a nondisplaced proximal right humerus fracture.   - Since the last visit, she notes that she is not progressing as expected in physical therapy. Notes that she can not get to 90 degrees shoulder abduction and physical therapist is concerned about a rotator cuff tear also. Besides physical therapy, has been doing massage therapy.  - No  interim injury.       Interim History - April 24, 2025  - Last seen on Visit date not found for a nondisplaced proximal right humerus fracture.   - 2 months post injury. Instructed to continue with physical therapy at last visit.  - Since the last visit, she notes that she has been attending physical therapy 1x/week along with home exercises. Shoulder is becoming less painful over the lateral right upper arm.  Range of motion improving greatly.  Can get up to 90 degrees of abduction and flexion now.  She does note some swelling still present when massaging her lateral upper arm. Attending acupuncture 1x/week also.   - No interim injury.     Interim History - March 20, 2025  - Last seen on Visit date not found for a nondisplaced proximal right humerus fracture. Started physical therapy today and the arm is a little sore after therapy.  - 1 month post-injury  - Since the last visit, she continues to make improvements with her pain constantly at about a 3/10, only increases slightly with movements.  She is wearing the immobilizer at night, which helps stabilize the shoulder as she tends to roll often.  Slight sleep interruptions, getting better.  Range of motion is improving well and she is working on pendulum exercises, stretching, and range of motion in PT.  Still feels some soreness in the elbow at the proximal ulna posterior surface where she had some significant olecranon bursa swelling, which has decreased to about a third of the size.  She is actively avoiding pressure on the area.  Using a shoulder sling instead of the immobilizer at times during the day.  - No interim injury.     Interim History - March 5, 2025  - Last seen on Visit date not found for a nondisplaced proximal right humerus fracture.   - Continued in immobilizer at last visit. Pain has been improving and she is able to activate her shoulder abductors, flexors, rotators with mild antalgic weakness.  Swelling has been improving as well.  Continues  to use the immobilizer without concern.  Repeat xrays today.  - No interim injury.     Initial visit: February 20, 2025  HPI  Christina John is a 59 year old female who is seen in consultation at the request of  Teresa Rivera PA-C presenting with a right humerus fracture.    - Mechanism of Injury:    - 2/18/2025: Fell onto right arm as left toe got caught under a display at work  - Pertinent history and prior evaluations:    -  visit 2/18/2025: Right shoulder immobilizer given. Norco for breakthrough pain. Out of work note given.     -2/18/2025 right shoulder xray shows a nondisplaced fracture across the humeral neck. No evidence for involvement of the articular surface and no dislocation. Hypertrophic change at the AC joint.     - Pain Character:    - Location:  proximal upper arm at fracture site.   - Character:  aching  - Duration:  About 2 days  - Course:  improving slightly  - Endorses:    -Pain as described above.  Antalgic weakness due to pain  - Denies:    -Numbness, tingling, vascular changes, skin changes  - Alleviating factors:    -Shoulder immobilizer, rest  - Aggravating factors:    - Movement away from body  - Other treatments tried:    - Shoulder immobilizer since 2/18/25.     - Patient Goals:    - be able to manage the symptoms successfully, discuss treatment options  - Social History:   - Employed      Review of Systems  Musculoskeletal: as above  Remainder of review of systems is negative including constitutional, CV, pulmonary, GI, Skin and Neurologic except as noted in HPI or medical history.    Past Medical History:   Diagnosis Date    Adhesive capsulitis of left shoulder 06/01/2023    Allergic rhinitis due to animal dander did IT x 4 yrs. 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 ended 9/06 per self    Diagnostic skin and sensitization tests 4/03 skin tests pos. for: cat/dog/DM/T/G/RW    Endometriosis     GERD (gastroesophageal reflux disease)     Glaucoma     History of anesthesia complications     House dust mite allergy     Hyperlipidemia     Hypertension     Infertility     ON CLOMID    Lumbar disc herniation     L5-S1    DAVIE (obstructive sleep apnea)     No CPAP    PCOS (polycystic ovarian syndrome)     Pelvic relaxation     PONV (postoperative nausea and vomiting)     Seasonal allergic conjunctivitis     Seasonal allergic rhinitis     4/03 skin tests pos. for: cat/dog/DM/T/G/RW--per Dr. Talavera.    Ulcers 1992    DX BY ENDOSCOPY     Past Surgical History:   Procedure Laterality Date    APPENDECTOMY      BACK SURGERY  1/2021    Discectomy L5S1    C/SECTION, CLASSICAL      CHOLECYSTECTOMY      COLONOSCOPY N/A 6/17/2024    Procedure: COLONOSCOPY, WITH POLYPECTOMY AND BIOPSY;  Surgeon: Kian Grace DO;  Location: [facility]    COLONOSCOPY W/ BIOPSIES AND POLYPECTOMY      COSMETIC SURGERY  10/13, 09/15    ENDOSCOPY      10/2000 Xiao, repeat 10/2002, REPEAT 2/08 (REPEAT IN 2 YRS)    ENT SURGERY  1973    EXCIS VAGINAL CYST/TUMOR      GASTRIC BYPASS      GI SURGERY  04/11    GLAUCOMA SURGERY      GLAUCOMA SURGERY      HC REMOVAL GALLBLADDER  6/2005    HYSTERECTOMY      HYSTERECTOMY, PAP NO LONGER INDICATED      MASTECTOMY Bilateral     MASTECTOMY BILATERAL, INSERT TISSUE EXPANDER BILATERAL, COMBINED  8/2010    MASTECTOMY, BILATERAL      OTHER SURGICAL HISTORY      laparoscopy    PELVIS LAPAROSCOPY,DX      x2    [facility] LAMNOTMY INCL W/DCMPRSN NRV ROOT 1 INTRSPC LUMBR Left 1/8/2021    Procedure: LEFT LUMBAR 5-SACRAL 1 DISCECTOMY;  Surgeon: Vadim Theodore MD;  Location: [facility];  Service: Spine    REVISE RECONSTRUCTED BREAST      [facility] APPENDECTOMY  1982    [facility] BIOPSY OF  BREAST, OPEN INCISIONAL  2008    sentinel lymph node    [facility] COLONOSCOPY THRU STOMA, DIAGNOSTIC  12/04, 12/09    (REPEAT IN 5 YRS)     Family History   Problem Relation Age of Onset    Hypertension Mother     Gastrointestinal Disease Mother         GAITAN'S ESOPHAGUS    Lipids Mother     Depression Mother     Other - See Comments Mother         Pagets Disease of the Vulva, Dx 06/2014    Osteoporosis Mother     Obesity Mother     Allergies Father         HAYFEVER    Heart Disease Father         AFIB    Hypertension Father     Lipids Father     Hyperlipidemia Father     Gastrointestinal Disease Other         GAITAN'S ESOPHAGUS    Breast Cancer Other     Colon Cancer Other     Other Cancer Maternal Grandmother         Gall bladder    Other Cancer Maternal Grandfather         Esophageal    Colon Cancer Paternal Grandfather     Gastrointestinal Disease Other         GAITAN'S ESOPHAGUS    Breast Cancer Other     Breast Cancer Paternal Aunt     Breast Cancer Other         fathers 1st cousin    Hyperlipidemia Brother     Colon Cancer Other          Objective  No physical exam due to the nature of the virtual visit.      Radiology  I independently reviewed the available relevant imaging in the chart with my interpretations as above in HPI.   - XR R shoulder 3/5/2025 shows stability of her nondisplaced proximal right humerus fracture at the humeral neck.  No new acute fracture, no worsening displacement or alignment.   - XR R shoulder 3/20/2025 shows stability of her nondisplaced proximal right humerus fracture at the humeral neck with signs of interval healing.  No new acute fracture, no worsening displacement or alignment.   - XR R shoulder 4/24/2025 shows stability of her nondisplaced impacted proximal right humerus fracture at the humeral neck with continued signs of interval healing.  No worsening displacement or alignment, no new fractures.  - XR R shoulder 5/22/2025 shows stability of her nondisplaced impacted  proximal right humerus fracture at the humeral neck with continued signs of interval healing.  No worsening displacement or alignment, no new fractures.    I independently reviewed today's new relevant imaging, with the following interpretation:  - MRI R shoulder shows evidence for bursal sided tearing of the supraspinatus, moderate grade.  Also with tendinopathy of the infraspinatus and subscapularis, evidence for adhesive capsulitis and subacromial bursitis.  Proximal humerus fracture appears to be healing well.      Assessment  1. Traumatic incomplete tear of right rotator cuff, initial encounter    2. Other closed nondisplaced fracture of proximal end of right humerus with routine healing, subsequent encounter        Plan  Christina John is a pleasant 59 year old female that presents for follow-up of her acute right shoulder pain after a fall with impact to the right shoulder on 2/18/25.  Unsure exactly of the mechanism of impact to the shoulder but she did have pain at the proximal upper arm after the fall.  She was previously evaluated and a nondisplaced fracture at the right humeral neck was identified on radiographs.  She was given a shoulder immobilizer and work restriction letter after initial evaluation.    In the interim, she has been doing well with improving pain and swelling and intact neurovascular function at the shoulder.  Repeat radiographs today demonstrate stability with no worsening displacement or angulation. Interval healing present. On exam, she has only minimal TTP over the fracture site and is able to activate all proximal upper extremity musculature effectively with increase strength since prior exam. ROM also increasing to shoulder height for abduction and flexion.  She has been participating in PT, which is helping. Overall doing well.     We discussed the nature of the condition and available treatment options, and mutually agreed upon the following plan:    - Imaging:          -  Reviewed and independently interpreted the relevant imaging in the chart, including any imaging ordered for today's clinic.  - Reviewed results and images with patient.   - Medications:          - Discussed pharmacologic options for pain relief.   - May use NSAIDs (Ibuprofen, Naproxen) or Acetaminophen (Tylenol) as needed for pain control. Do not take these if previously advised to avoid them for other medical conditions.  - May also use topical medications such as lidocaine, IcyHot, BioFreeze, or Voltaren gel as needed for pain control. Voltaren gel is an anti-inflammatory cream that may be used up to 4 times per day over the painful area.   - Therapy:    - Continue physical therapy and home exercises program as instructed by PT.  - Modalities:          - May use ice, heat, massage or other modalities as needed.   - Bracing:          - Discussed bracing options and she has weaned out of the immobilizer. No need to use the immobilizer anymore.   - Surgery:          - Discussed non-operative and operative treatment options for the patient's condition.  Not likely to require surgery.  Good healing with nonoperative management.  - Activity:     - Encouraged to gradually progress activities with lifting restrictions to no more than 5 lbs. Protect the injured area from further injury.  Avoid exacerbating activities and activities that are high-risk for falls.    - Updated work restriction letter provided.  - Follow up:          - In 4 weeks for repeat radiographs and re-evaluation.   - May follow up sooner for new/worsening symptoms.  - May contact clinic by phone or MyChart for questions or concerns.       Update - 5/22/25:  Christina presents for follow-up of her right shoulder pain.  Her proximal humerus fracture continues to heal with stability on her repeat radiographs today.  However, she does feel like she is not improving as much with physical therapy due to pain and weakness with shoulder abduction primarily and  decreasing range of motion.  I also received a message from her physical therapist that corroborated this.  On exam, she does have more weakness in her supraspinatus testing with 4-/5 for shoulder abduction and positive empty can testing for pain and weakness.  She also does have decreasing active and passive range of motion in the shoulder, characteristic of adhesive capsulitis.    I recommended that we consider further evaluation with an MRI to rule out a rotator cuff tear and evaluate for the presence of adhesive capsulitis.  MRI order was placed and she will hold off on PT for now until we get results of the MRI and can determine next steps.      Update - 6/3/25:  Christina presents today via virtual visit to discuss the results of her recent MRI.  The MRI did confirm a moderate grade bursal sided tear of the supraspinatus in the setting of rotator cuff tendinopathy, subacromial bursitis, and evidence of adhesive capsulitis.  Her proximal humerus fracture appears to be healing well.  We discussed the results of the MRI in detail and available nonoperative and operative treatment considerations.  Since the tear is bursal sided and under more tension than our articular sided tears, these tend to not heal quite as well over time.  She also has significant clinical weakness in shoulder abduction that would be explained by the tear, and seems to be worsening.  I recommended that she consider surgical consultation to one of our shoulder surgeons to discuss the implications of the bursal sided tear and her progress so far, and consider rotator cuff repair.  If nonoperative treatment is preferred by surgeons opinion, corticosteroid injections can be helpful for pain relief and I would recommend continuing with physical therapy for strengthening and stabilization.  Orthopedic  referral has been placed today.      Rasta Newell DO, CAQSM  [facility]  [facility] -  Department of Orthopedic Surgery       Disclaimer:  This note was prepared and written using Dragon Medical dictation software. As a result, there may be errors in the script that have gone undetected. Please consider this when interpreting the information in this note.

## 2025-05-29 NOTE — PROGRESS NOTES
Christina is a 59 year old who is being evaluated via a billable video visit.    How would you like to obtain your AVS? MyChart  If the video visit is dropped, the invitation should be resent by: Text to cell phone: [phone]  Will anyone else be joining your video visit? No      Assessment & Plan     Age-related osteoporosis with current pathological fracture with routine healing, subsequent encounter  Patient is a very pleasant 59-year-old female presents today for follow-up on osteoporosis.  Well DEXA scan's are showing osteopenia, she has a fracture defining osteoporosis with a proximal humerus fracture from fall from standing height.  This is healed.  Taking a very large amount of calcium, 1200 mg twice daily.  This was even before the labs that were obtained earlier this month.  Calcium levels were even a little on the low side still at that time.  I suspect, given her history of gastric bypass, that she is not absorbing this well.  Recommended switching to chewable calcium formulations.  We also discussed starting alendronate, and she was open to this.  Will start alendronate now, have her follow-up with endocrinology this fall to ensure that we are on the right track, no further workup or evaluation is needed at that time.  Of note, she has a history of clenching her teeth and has broken crowns off of her teeth when clenching at night.  Monitor for jaw pain in particular when considering jaw osteonecrosis.  - alendronate (FOSAMAX) 70 MG tablet  Dispense: 12 tablet; Refill: 3    Acute sinusitis with symptoms > 10 days  Symptoms started about a week and 1/2 to 2 weeks ago.  Was seen 1 week ago for pinkeye.  Ongoing frontal sinus pain with green and bloody discharge.  Given the time course, will treat with antibiotics at this time.  Swelling in the mouth and tongue with amoxicillin, so avoided cephalosporins as well.  - doxycycline hyclate (VIBRA-TABS) 100 MG tablet  Dispense: 14 tablet; Refill:  "0        BMI  Estimated body mass index is 29.22 kg/m  as calculated from the following:    Height as of 3/17/25: 1.797 m (5' 10.75\").    Weight as of 3/17/25: 94.3 kg (208 lb).       Babatunde Vasquez is a 59 year old, presenting for the following health issues:  Sinus Problem and Results (Labs 05/03,05/02/Dexa 05/20)        5/29/2025     8:56 AM   Additional Questions   Roomed by Hedy WATTS   Accompanied by Self     HPI      Concern - Sinus infection  Onset: 1-2 weeks  Description: sinus congestion  Intensity: mild  Progression of Symptoms:  same  Accompanying Signs & Symptoms: currently being treated for pink eye  Previous history of similar problem: yes  Precipitating factors:        Worsened by: n/a  Alleviating factors:        Improved by: n/a  Therapies tried and outcome: flonase and eye drops      Follow-up on labs and imaging    Review of Systems  Constitutional, HEENT, cardiovascular, pulmonary, gi and gu systems are negative, except as otherwise noted.      Objective           Vitals:  No vitals were obtained today due to virtual visit.    Physical Exam   GENERAL: alert and no distress  EYES: Eyes grossly normal to inspection.  No discharge or erythema, or obvious scleral/conjunctival abnormalities.  RESP: No audible wheeze, cough, or visible cyanosis.    SKIN: Visible skin clear. No significant rash, abnormal pigmentation or lesions.  NEURO: Cranial nerves grossly intact.  Mentation and speech appropriate for age.  PSYCH: Appropriate affect, tone, and pace of words    Component      Latest Ref Rng 5/2/2025  12:55 PM 5/3/2025  1:09 PM   Sodium      135 - 145 mmol/L  140    Potassium      3.4 - 5.3 mmol/L  4.3    Chloride      98 - 107 mmol/L  104    Carbon Dioxide (CO2)      22 - 29 mmol/L  26    Anion Gap      7 - 15 mmol/L  10    Urea Nitrogen      6.0 - 20.0 mg/dL  21.1 (H)    Creatinine      0.51 - 0.95 mg/dL  1.04 (H)    GFR Estimate      >60 mL/min/1.73m2  62    Calcium      8.8 - 10.4 mg/dL  " 8.9    Glucose      70 - 99 mg/dL  139 (H)    Calcium Urine mg/dL      mg/dL 7.4     Duration in hours      h 24.0     Duration in hours      h 24.0     Volume in mL      mL 1,200     Volume in mL      mL 1,200     Calcium Urine g/24 h      0.10 - 0.30 g/spec 0.09 (L)     Creatinine Urine      mg/dL 88.9     Creatinine Urine Timed      0.72 - 1.51 g/spec 1.07        Legend:  (L) Low  (H) High    EXAM: DX AXIAL AND PERIPHERAL  LOCATION: [facility]  DATE: 5/20/2025     INDICATION: Possible fragility fracture of humerus, early reevaluation in context of osteopenia and prior gastric bypass surgery. BMD screening, follow-up exam.  DEMOGRAPHICS: Age- 59 years. Gender- Female. Menopausal status- Postmenopausal.  COMPARISON: 10/26/2023.  TECHNIQUE: Dual-energy x-ray absorptiometry (DXA) performed with routine technique. Forearm DXA performed.     FINDINGS:     DXA RESULTS  -Lumbar Spine: L1-L4: BMD: 1.210 g/cm2. T-score: 0.1. Z-score: 02.  -RIGHT Hip Total: BMD: 0.841 g/cm2. T-score: -1.3. Z-score: -1.2.  -RIGHT Hip Femoral neck: BMD: 0.835 g/cm2. T-score: -1.5. Z-score: -0.9.  -LEFT Hip Total: BMD: 0.838 g/cm2. T-score: -1.3. Z-score: -1.2.  -LEFT Hip Femoral neck: BMD: 0.810 g/cm2. T-score: -1.6. Z-score: -1.1.  -LEFT Radius 33%: BMD: 0.617 g/cm2. T-score: -1.4. Z-score: -0.6.     WHO T-SCORE CRITERIA  -Normal: T score at or above -1 SD  -Osteopenia: T score between -1 and -2.5 SD  -Osteoporosis: T score at or below -2.5 SD     The World Health Organization (WHO) criteria is applicable to perimenopausal females, postmenopausal females, and men aged 50 years or older.     INTERVAL CHANGE  -There has been a 1.6% increase in lumbar spine BMD.  -There has been a 1.1% decrease in bilateral hip BMD.  -The left forearm BMD was not previously measured for comparison.     FRACTURE RISK  -FRAX Results: The 10 year probability of major osteoporotic fracture is 13.5%, and of hip fracture is 1.3%, based on  left femoral neck BMD.     RECOMMENDATIONS  The current National Osteoporosis Foundation Guide recommends that FDA-approved medical therapies be considered if the 10 year probability of major osteoporotic fracture risk is greater than or equal to 20%, or if the hip fracture risk is greater than or   equal to 3%. Please note all treatment decisions require clinical judgement and consideration of individual patient factors, including patient preferences, comorbidities, previous drug use, risk factors not captured in the FRAX model (e.g. frailty,   falls, vitamin D deficiency, increased bone turnover, interval significant decline in bone density) and possible under or over-estimation of fracture risk by FRAX.                                                                      IMPRESSION: Low bone density (OSTEOPENIA). T score meets the WHO criteria for low bone density (osteopenia) at one or more measured sites. The risk of osteoporotic fracture increases approximately two-fold for each standard deviation decrease in T-score.      Video-Visit Details    Type of service:  Video Visit   Originating Location (pt. Location): Home    Distant Location (provider location):  On-site  Platform used for Video Visit: Preston  Signed Electronically by: Nancy Ontiveros MD

## 2025-06-03 ENCOUNTER — VIRTUAL VISIT (OUTPATIENT)
Dept: ORTHOPEDICS | Facility: CLINIC | Age: 60
End: 2025-06-03
Payer: COMMERCIAL

## 2025-06-03 DIAGNOSIS — S42.294D OTHER CLOSED NONDISPLACED FRACTURE OF PROXIMAL END OF RIGHT HUMERUS WITH ROUTINE HEALING, SUBSEQUENT ENCOUNTER: ICD-10-CM

## 2025-06-03 DIAGNOSIS — S46.011A TRAUMATIC INCOMPLETE TEAR OF RIGHT ROTATOR CUFF, INITIAL ENCOUNTER: Primary | ICD-10-CM

## 2025-06-03 NOTE — LETTER
6/3/2025      Christina John  [address] [facility] [address]      Dear Colleague,    Thank you for referring your patient, Christina John, to the [facility]. Please see a copy of my visit note below.    Christina is a 59 year old who is being evaluated via a billable video visit.    What phone number would you like to be contacted at? [phone]    How would you like to obtain your AVS? [email or web address]    Video-Visit Details    Type of service:  Video Visit   Originating Location (pt. Location): Home    Distant Location (provider location):  On-site  Platform used for Video Visit: [employer]  Total time: 35 minutes were spent on the date of the encounter doing chart review, imaging interpretation, history, MDM, counseling, documentation and further activities per the note.      Christina John  :  1965  DOS: 6/3/2025  MRN: [id number]  PCP: Nancy Ontiveros    Sports Medicine Clinic Visit    Interim History -  Ingrid 3, 2025  - Last seen on 2025 for a nondisplaced proximal right humerus fracture.  There was more concern for weakness of the shoulder and possible rotator cuff tearing, so MRI was ordered for evaluation.  MRI results below.  - Since the last visit, no significant change in symptoms.  Still with pain and weakness on shoulder abduction or lifting away from her body.  - No interim injury.     - MRI 2025 shows   1. Rotator cuff:  *  Supraspinatus tendinosis with moderate grade bursal sided tearing  of the middle-posterior fibers at the footprint.   *  Infraspinatus and subscapularis tendinosis.  *  Rotator cuff muscle bulk maintained.     2. Findings of potential adhesive capsulitis.     3. Healing proximal humeral fracture similar to recent radiographs.     4. Subacromial subdeltoid bursitis.      Interim History - May 22, 2025  - Last seen on Visit date not found for a nondisplaced proximal right humerus fracture.   - Since the last visit, she  notes that she is not progressing as expected in physical therapy. Notes that she can not get to 90 degrees shoulder abduction and physical therapist is concerned about a rotator cuff tear also. Besides physical therapy, has been doing massage therapy.  - No interim injury.       Interim History - April 24, 2025  - Last seen on Visit date not found for a nondisplaced proximal right humerus fracture.   - 2 months post injury. Instructed to continue with physical therapy at last visit.  - Since the last visit, she notes that she has been attending physical therapy 1x/week along with home exercises. Shoulder is becoming less painful over the lateral right upper arm.  Range of motion improving greatly.  Can get up to 90 degrees of abduction and flexion now.  She does note some swelling still present when massaging her lateral upper arm. Attending acupuncture 1x/week also.   - No interim injury.     Interim History - March 20, 2025  - Last seen on Visit date not found for a nondisplaced proximal right humerus fracture. Started physical therapy today and the arm is a little sore after therapy.  - 1 month post-injury  - Since the last visit, she continues to make improvements with her pain constantly at about a 3/10, only increases slightly with movements.  She is wearing the immobilizer at night, which helps stabilize the shoulder as she tends to roll often.  Slight sleep interruptions, getting better.  Range of motion is improving well and she is working on pendulum exercises, stretching, and range of motion in PT.  Still feels some soreness in the elbow at the proximal ulna posterior surface where she had some significant olecranon bursa swelling, which has decreased to about a third of the size.  She is actively avoiding pressure on the area.  Using a shoulder sling instead of the immobilizer at times during the day.  - No interim injury.     Interim History - March 5, 2025  - Last seen on Visit date not found for a  nondisplaced proximal right humerus fracture.   - Continued in immobilizer at last visit. Pain has been improving and she is able to activate her shoulder abductors, flexors, rotators with mild antalgic weakness.  Swelling has been improving as well.  Continues to use the immobilizer without concern.  Repeat xrays today.  - No interim injury.     Initial visit: February 20, 2025  HPI  Christina John is a 59 year old female who is seen in consultation at the request of  Teresa Rivera PA-C presenting with a right humerus fracture.    - Mechanism of Injury:    - 2/18/2025: Fell onto right arm as left toe got caught under a display at work  - Pertinent history and prior evaluations:    -  visit 2/18/2025: Right shoulder immobilizer given. Norco for breakthrough pain. Out of work note given.     -2/18/2025 right shoulder xray shows a nondisplaced fracture across the humeral neck. No evidence for involvement of the articular surface and no dislocation. Hypertrophic change at the AC joint.     - Pain Character:    - Location:  proximal upper arm at fracture site.   - Character:  aching  - Duration:  About 2 days  - Course:  improving slightly  - Endorses:    -Pain as described above.  Antalgic weakness due to pain  - Denies:    -Numbness, tingling, vascular changes, skin changes  - Alleviating factors:    -Shoulder immobilizer, rest  - Aggravating factors:    - Movement away from body  - Other treatments tried:    - Shoulder immobilizer since 2/18/25.     - Patient Goals:    - be able to manage the symptoms successfully, discuss treatment options  - Social History:   - Employed      Review of Systems  Musculoskeletal: as above  Remainder of review of systems is negative including constitutional, CV, pulmonary, GI, Skin and Neurologic except as noted in HPI or medical history.    Past Medical History:   Diagnosis Date     Adhesive capsulitis of left shoulder 06/01/2023     Allergic rhinitis due to animal dander did IT  x 4 yrs. 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 ended 9/06 per self     Diagnostic skin and sensitization tests 4/03 skin tests pos. for: cat/dog/DM/T/G/RW     Endometriosis      GERD (gastroesophageal reflux disease)      Glaucoma      History of anesthesia complications      House dust mite allergy      Hyperlipidemia      Hypertension      Infertility     ON CLOMID     Lumbar disc herniation     L5-S1     DAVIE (obstructive sleep apnea)     No CPAP     PCOS (polycystic ovarian syndrome)      Pelvic relaxation      PONV (postoperative nausea and vomiting)      Seasonal allergic conjunctivitis      Seasonal allergic rhinitis     4/03 skin tests pos. for: cat/dog/DM/T/G/RW--per Dr. Talavera.     Ulcers 1992    DX BY ENDOSCOPY     Past Surgical History:   Procedure Laterality Date     APPENDECTOMY       BACK SURGERY  1/2021    Discectomy L5S1     C/SECTION, CLASSICAL       CHOLECYSTECTOMY       COLONOSCOPY N/A 6/17/2024    Procedure: COLONOSCOPY, WITH POLYPECTOMY AND BIOPSY;  Surgeon: Kian Grace DO;  Location: [facility]     COLONOSCOPY W/ BIOPSIES AND POLYPECTOMY       COSMETIC SURGERY  10/13, 09/15     ENDOSCOPY      10/2000 Xiao, repeat 10/2002, REPEAT 2/08 (REPEAT IN 2 YRS)     ENT SURGERY  1973     EXCIS VAGINAL CYST/TUMOR       GASTRIC BYPASS       GI SURGERY  04/11     GLAUCOMA SURGERY       GLAUCOMA SURGERY       HC REMOVAL GALLBLADDER  6/2005     HYSTERECTOMY       HYSTERECTOMY, PAP NO LONGER INDICATED       MASTECTOMY Bilateral      MASTECTOMY BILATERAL, INSERT TISSUE EXPANDER BILATERAL, COMBINED  8/2010     MASTECTOMY, BILATERAL       OTHER SURGICAL HISTORY       laparoscopy     PELVIS LAPAROSCOPY,DX      x2     TX LAMNOTMY INCL W/DCMPRSN NRV ROOT 1 INTRSPC LUMBR Left 1/8/2021    Procedure: LEFT LUMBAR 5-SACRAL 1 DISCECTOMY;  Surgeon: Vadim Theodore MD;  Location: [facility];  Service: Spine     REVISE RECONSTRUCTED BREAST       ZZC APPENDECTOMY  1982     ZZHC BIOPSY OF BREAST, OPEN INCISIONAL  2008    sentinel lymph node     ZZHC COLONOSCOPY THRU STOMA, DIAGNOSTIC  12/04, 12/09    (REPEAT IN 5 YRS)     Family History   Problem Relation Age of Onset     Hypertension Mother      Gastrointestinal Disease Mother         GAITAN'S ESOPHAGUS     Lipids Mother      Depression Mother      Other - See Comments Mother         Pagets Disease of the Vulva, Dx 06/2014     Osteoporosis Mother      Obesity Mother      Allergies Father         HAYFEVER     Heart Disease Father         AFIB     Hypertension Father      Lipids Father      Hyperlipidemia Father      Gastrointestinal Disease Other         GAITAN'S ESOPHAGUS     Breast Cancer Other      Colon Cancer Other      Other Cancer Maternal Grandmother         Gall bladder     Other Cancer Maternal Grandfather         Esophageal     Colon Cancer Paternal Grandfather      Gastrointestinal Disease Other         GAITAN'S ESOPHAGUS     Breast Cancer Other      Breast Cancer Paternal Aunt      Breast Cancer Other         fathers 1st cousin     Hyperlipidemia Brother      Colon Cancer Other          Objective  No physical exam due to the nature of the virtual visit.      Radiology  I independently reviewed the available relevant imaging in the chart with my interpretations as above in HPI.   - XR R shoulder 3/5/2025 shows stability of her nondisplaced proximal right humerus fracture at the humeral neck.  No new acute fracture, no worsening displacement or alignment.   - XR R shoulder 3/20/2025 shows stability of her nondisplaced proximal right humerus fracture at the humeral neck with signs of interval healing.  No new acute fracture,  no worsening displacement or alignment.   - XR R shoulder 4/24/2025 shows stability of her nondisplaced impacted proximal right humerus fracture at the humeral neck with continued signs of interval healing.  No worsening displacement or alignment, no new fractures.  - XR R shoulder 5/22/2025 shows stability of her nondisplaced impacted proximal right humerus fracture at the humeral neck with continued signs of interval healing.  No worsening displacement or alignment, no new fractures.    I independently reviewed today's new relevant imaging, with the following interpretation:  - MRI R shoulder shows evidence for bursal sided tearing of the supraspinatus, moderate grade.  Also with tendinopathy of the infraspinatus and subscapularis, evidence for adhesive capsulitis and subacromial bursitis.  Proximal humerus fracture appears to be healing well.      Assessment  1. Traumatic incomplete tear of right rotator cuff, initial encounter    2. Other closed nondisplaced fracture of proximal end of right humerus with routine healing, subsequent encounter        Plan  Christina John is a pleasant 59 year old female that presents for follow-up of her acute right shoulder pain after a fall with impact to the right shoulder on 2/18/25.  Unsure exactly of the mechanism of impact to the shoulder but she did have pain at the proximal upper arm after the fall.  She was previously evaluated and a nondisplaced fracture at the right humeral neck was identified on radiographs.  She was given a shoulder immobilizer and work restriction letter after initial evaluation.    In the interim, she has been doing well with improving pain and swelling and intact neurovascular function at the shoulder.  Repeat radiographs today demonstrate stability with no worsening displacement or angulation. Interval healing present. On exam, she has only minimal TTP over the fracture site and is able to activate all proximal upper extremity musculature  effectively with increase strength since prior exam. ROM also increasing to shoulder height for abduction and flexion.  She has been participating in PT, which is helping. Overall doing well.     We discussed the nature of the condition and available treatment options, and mutually agreed upon the following plan:    - Imaging:          - Reviewed and independently interpreted the relevant imaging in the chart, including any imaging ordered for today's clinic.  - Reviewed results and images with patient.   - Medications:          - Discussed pharmacologic options for pain relief.   - May use NSAIDs (Ibuprofen, Naproxen) or Acetaminophen (Tylenol) as needed for pain control. Do not take these if previously advised to avoid them for other medical conditions.  - May also use topical medications such as lidocaine, IcyHot, BioFreeze, or Voltaren gel as needed for pain control. Voltaren gel is an anti-inflammatory cream that may be used up to 4 times per day over the painful area.   - Therapy:    - Continue physical therapy and home exercises program as instructed by PT.  - Modalities:          - May use ice, heat, massage or other modalities as needed.   - Bracing:          - Discussed bracing options and she has weaned out of the immobilizer. No need to use the immobilizer anymore.   - Surgery:          - Discussed non-operative and operative treatment options for the patient's condition.  Not likely to require surgery.  Good healing with nonoperative management.  - Activity:     - Encouraged to gradually progress activities with lifting restrictions to no more than 5 lbs. Protect the injured area from further injury.  Avoid exacerbating activities and activities that are high-risk for falls.    - Updated work restriction letter provided.  - Follow up:          - In 4 weeks for repeat radiographs and re-evaluation.   - May follow up sooner for new/worsening symptoms.  - May contact clinic by phone or MyChart for  questions or concerns.       Update - 5/22/25:  Christina presents for follow-up of her right shoulder pain.  Her proximal humerus fracture continues to heal with stability on her repeat radiographs today.  However, she does feel like she is not improving as much with physical therapy due to pain and weakness with shoulder abduction primarily and decreasing range of motion.  I also received a message from her physical therapist that corroborated this.  On exam, she does have more weakness in her supraspinatus testing with 4-/5 for shoulder abduction and positive empty can testing for pain and weakness.  She also does have decreasing active and passive range of motion in the shoulder, characteristic of adhesive capsulitis.    I recommended that we consider further evaluation with an MRI to rule out a rotator cuff tear and evaluate for the presence of adhesive capsulitis.  MRI order was placed and she will hold off on PT for now until we get results of the MRI and can determine next steps.      Update - 6/3/25:  Christina presents today via virtual visit to discuss the results of her recent MRI.  The MRI did confirm a moderate grade bursal sided tear of the supraspinatus in the setting of rotator cuff tendinopathy, subacromial bursitis, and evidence of adhesive capsulitis.  Her proximal humerus fracture appears to be healing well.  We discussed the results of the MRI in detail and available nonoperative and operative treatment considerations.  Since the tear is bursal sided and under more tension than our articular sided tears, these tend to not heal quite as well over time.  She also has significant clinical weakness in shoulder abduction that would be explained by the tear, and seems to be worsening.  I recommended that she consider surgical consultation to one of our shoulder surgeons to discuss the implications of the bursal sided tear and her progress so far, and consider rotator cuff repair.  If nonoperative  treatment is preferred by surgeons opinion, corticosteroid injections can be helpful for pain relief and I would recommend continuing with physical therapy for strengthening and stabilization.  Orthopedic  referral has been placed today.      Rasta Newell DO, CAQSM  [facility] - Department of Orthopedic Surgery       Disclaimer:  This note was prepared and written using Dragon Medical dictation software. As a result, there may be errors in the script that have gone undetected. Please consider this when interpreting the information in this note.      Again, thank you for allowing me to participate in the care of your patient.        Sincerely,        Rasta Newell DO    Electronically signed

## 2025-06-04 ENCOUNTER — PATIENT OUTREACH (OUTPATIENT)
Dept: CARE COORDINATION | Facility: CLINIC | Age: 60
End: 2025-06-04
Payer: COMMERCIAL

## 2025-06-05 NOTE — TELEPHONE ENCOUNTER
DIAGNOSIS: RIGHT SHOULDER  Traumatic incomplete tear of right rotator cuff, initial encounter [S46.011A]  - Primary   APPOINTMENT DATE: 06/11/2025   NOTES STATUS DETAILS   OFFICE NOTE from referring provider Internal Rasta Newell DO  Sports Medicine   OFFICE NOTE from other specialist Internal [facility]   (IMAGES & REPORTS) Internal

## 2025-06-06 NOTE — PROGRESS NOTES
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 Symptoms started on February 20th of 2025. There was a precipitating event when the patients toe caught on a display at work and they fell onto their right arm, sustaining a nondisplaced fracture across the humeral neck. The pain is located to the proximal upper arm and lateral shoulder. Worst pain is rated a 7 of 10, and current pain is rated at 3 of 10. Symptoms are worsened by usage and movement. Symptoms are improved with rest. Patient has tried heat and physical therapy prior to her MRI with no relief and an increase of soreness and pain. Associated symptoms include pain. Patient has  pain radiating down the arm with painful spasms, without numbness. Notably, the patient has had an MRI on 5/29/2025 with findings of moderate grade bursal sided tear of the supraspinatus in the setting of rotator cuff tendinopathy, subacromial bursitis, and evidence of adhesive capsulitis. No other concerns or complaints at this time.  SANE score R - 10 L - 100    PAST MEDICAL HISTORY:  Past Medical History:   Diagnosis Date    Adhesive capsulitis of left shoulder 06/01/2023    Allergic rhinitis due to animal dander did IT x 4 yrs. ended 9/06 per self    Allergies     Appendicitis     Bariatric surgery status 2011    BRCA1 positive     Cancer (H) 8/2008    Dcis stage 2A HER2+    Colon polyp     DCIS (ductal carcinoma in situ) of breast 8/08 and 8/10    RT ( stage 2a) and left ( stage 0)BREAST - STAGE 2A - GRADE III    Depression     Depressive disorder     Desensitization to allergens     did IT x 4 yrs. ended 9/06 per self    Diagnostic skin and sensitization tests 4/03 skin tests pos. for:  cat/dog/DM/T/G/RW    Endometriosis     GERD (gastroesophageal reflux disease)     Glaucoma     History of anesthesia complications     House dust mite allergy     Hyperlipidemia     Hypertension     Infertility     ON CLOMID    Lumbar disc herniation     L5-S1    DAVIE (obstructive sleep apnea)     No CPAP    PCOS (polycystic ovarian syndrome)     Pelvic relaxation     PONV (postoperative nausea and vomiting)     Seasonal allergic conjunctivitis     Seasonal allergic rhinitis     4/03 skin tests pos. for: cat/dog/DM/T/G/RW--per Dr. Talavera.    Ulcers 1992    DX BY ENDOSCOPY       PAST SURGICAL HISTORY:  Past Surgical History:   Procedure Laterality Date    APPENDECTOMY      BACK SURGERY  1/2021    Discectomy L5S1    C/SECTION, CLASSICAL      CHOLECYSTECTOMY      COLONOSCOPY N/A 6/17/2024    Procedure: COLONOSCOPY, WITH POLYPECTOMY AND BIOPSY;  Surgeon: Kian Grace DO;  Location: [facility]    COLONOSCOPY W/ BIOPSIES AND POLYPECTOMY      COSMETIC SURGERY  10/13, 09/15    ENDOSCOPY      10/2000 [facility], repeat 10/2002, REPEAT 2/08 (REPEAT IN 2 YRS)    ENT SURGERY  1973    EXCIS VAGINAL CYST/TUMOR      GASTRIC BYPASS      GI SURGERY  04/11    GLAUCOMA SURGERY      GLAUCOMA SURGERY      HC REMOVAL GALLBLADDER  6/2005    HYSTERECTOMY      HYSTERECTOMY, PAP NO LONGER INDICATED      MASTECTOMY Bilateral     MASTECTOMY BILATERAL, INSERT TISSUE EXPANDER BILATERAL, COMBINED  8/2010    MASTECTOMY, BILATERAL      OTHER SURGICAL HISTORY      laparoscopy    PELVIS LAPAROSCOPY,DX      x2    [place] LAMNOTMY INCL W/DCMPRSN NRV ROOT 1 INTRSPC LUMBR Left 1/8/2021    Procedure: LEFT LUMBAR 5-SACRAL 1 DISCECTOMY;  Surgeon: Vadim Theodore MD;  Location: [facility];  Service: Spine    REVISE RECONSTRUCTED BREAST      [facility] APPENDECTOMY  1982    [facility] BIOPSY OF BREAST, OPEN INCISIONAL  2008    sentinel lymph node    [facility] COLONOSCOPY THRU STOMA, DIAGNOSTIC  12/04, 12/09    (REPEAT IN 5 YRS)       CURRENT MEDICATIONS:  Current Outpatient Medications  "  Medication Sig Dispense Refill    acetaminophen (TYLENOL) 500 MG tablet Take 2 tablets by mouth daily as needed       alendronate (FOSAMAX) 70 MG tablet Take 1 tablet (70 mg) by mouth every 7 days. Take the medication on an empty stomach, first thing in the morning with at least 8 ounces of water--do not take with other drinks. Remain upright (do not lie down) and do not eat or take anything else by mouth (including other pills) until at least 30 minutes after taking the medication. 12 tablet 3    ALREX 0.2 % SUSP ophthalmic susp Apply 1 drop to eye 4 times daily. When allergies are severe 5 mL 3    buPROPion (WELLBUTRIN XL) 300 MG 24 hr tablet Take 1 tablet (300 mg) by mouth every evening. 90 tablet 3    calcium carbonate-vitamin D 600-200 MG-UNIT TABS Take 1 tablet by mouth every evening       doxycycline hyclate (VIBRA-TABS) 100 MG tablet Take 1 tablet (100 mg) by mouth 2 times daily. 14 tablet 0    escitalopram (LEXAPRO) 20 MG tablet Take 1 tablet by mouth once daily 90 tablet 0    fluticasone (FLONASE) 50 MCG/ACT nasal spray Spray 1 spray into both nostrils daily 15.8 mL 0    gabapentin (NEURONTIN) 100 MG capsule Take by mouth.      MULTIVITAMIN TABS   OR Take 1 tablet by mouth daily       ofloxacin (OCUFLOX) 0.3 % ophthalmic solution instill 1 drop into left eye 4 times daily      valACYclovir (VALTREX) 1000 mg tablet Take 1 tablet (1,000 mg) by mouth daily. 90 tablet 4       ALLERGIES:      Allergies   Allergen Reactions    Amoxicillin Swelling    Cefzil [Cefprozil] Swelling    Influenza Virus Vaccine      \"throat felt funny\"    Adhesive Tape Rash     Steri Strips    Benzocaine Other (See Comments)    Shellfish Allergy Other (See Comments)     numbness to mouth when eating lobster    Biaxin [Clarithromycin] Hives    Erythromycin GI Disturbance    Latex Rash    Latex Rash    Percocet [Acetaminophen] Hives    Sulfa Antibiotics Hives         FAMILY HISTORY: No pertinent family history, reviewed in " EMR.    SOCIAL HISTORY:   Social History     Socioeconomic History    Marital status:      Spouse name: Not on file    Number of children: Not on file    Years of education: Not on file    Highest education level: Not on file   Occupational History    Not on file   Tobacco Use    Smoking status: Never     Passive exposure: Never    Smokeless tobacco: Never    Tobacco comments:     Nonsmoking household   Vaping Use    Vaping status: Never Used   Substance and Sexual Activity    Alcohol use: No    Drug use: No    Sexual activity: Not Currently     Partners: Male     Birth control/protection: Post-menopausal   Other Topics Concern    Parent/sibling w/ CABG, MI or angioplasty before 65F 55M? No     Service No    Blood Transfusions No    Caffeine Concern No    Occupational Exposure No    Hobby Hazards No    Sleep Concern No    Stress Concern No    Weight Concern Yes    Special Diet No    Back Care No    Exercise No    Bike Helmet No    Seat Belt Yes    Self-Exams Yes   Social History Narrative    Not on file     Social Drivers of Health     Financial Resource Strain: Low Risk  (11/22/2024)    Financial Resource Strain     Within the past 12 months, have you or your family members you live with been unable to get utilities (heat, electricity) when it was really needed?: No   Food Insecurity: Low Risk  (11/22/2024)    Food Insecurity     Within the past 12 months, did you worry that your food would run out before you got money to buy more?: No     Within the past 12 months, did the food you bought just not last and you didn t have money to get more?: No   Transportation Needs: Low Risk  (11/22/2024)    Transportation Needs     Within the past 12 months, has lack of transportation kept you from medical appointments, getting your medicines, non-medical meetings or appointments, work, or from getting things that you need?: No   Physical Activity: Inactive (11/22/2024)    Exercise Vital Sign     Days of Exercise  "per Week: 0 days     Minutes of Exercise per Session: 0 min   Stress: Stress Concern Present (11/22/2024)    [employer] - Occupational Stress Questionnaire     Feeling of Stress : Rather much   Social Connections: Unknown (11/22/2024)    Social Connection and Isolation Panel [NHANES]     Frequency of Communication with Friends and Family: Not on file     Frequency of Social Gatherings with Friends and Family: Never     Attends Gnosticist Services: Not on file     Active Member of Clubs or Organizations: Not on file     Attends Club or Organization Meetings: Not on file     Marital Status: Not on file   Interpersonal Safety: Low Risk  (11/26/2024)    Interpersonal Safety     Do you feel physically and emotionally safe where you currently live?: Yes     Within the past 12 months, have you been hit, slapped, kicked or otherwise physically hurt by someone?: No     Within the past 12 months, have you been humiliated or emotionally abused in other ways by your partner or ex-partner?: No   Housing Stability: Low Risk  (11/22/2024)    Housing Stability     Do you have housing? : Yes     Are you worried about losing your housing?: No       REVIEW OF SYSTEMS: Positive for that noted in past medical history and history of present illness and otherwise reviewed in EMR    PHYSICAL EXAM:  Patient is 5' 10\" and weighs 203 lbs 0 oz Ht 1.778 m (5' 10\")   Wt 92.1 kg (203 lb)   LMP 03/28/2010   BMI 29.13 kg/m    Body mass index is 29.13 kg/m .   Constitutional: Well-developed, well-nourished, healthy appearing female.  Skin: Warm, dry   HEENT: Normal  Cardiac: Well perfused extremities, strong 2+ peripheral pulses. No edema.   Pulmonary: Breathing room air    Musculoskeletal:   Right Shoulder:  AROM right shoulder: 90/80/10/butt   AROM left shoulder: 170/170/70/T10   PROM right shoulder: 90/90/10/butt   4/5 supraspinatus, 4/5 infraspinatus, 5/5 subscapularis  no AC joint pain, negative cross body " adduction  Neurovascular exam and cervical spine exam are normal.    X-RAYS:       ADVANCED IMAGING: I personally reviewed the prior x-rays and MRI which show evidence of a healed surgical neck fracture, there is near full-thickness tearing of the supraspinatus no atrophy  IMPRESSION: 59 year old-year-old right hand dominant female, with right shoulder adhesive capsulitis status post nonoperatively treated humeral neck fracture.     PLAN:     I discussed with the patient the etiology of their condition. We discussed at length the options as noted above.  We went through the x-rays and the MRI together today.  I discussed with the patient that I believe that she has developed adhesive capsulitis after her recent fracture.  She has a history of adhesive capsulitis on the contralateral side as well.  We discussed conservative treatment options including injection and physical therapy.  We discussed the natural time history of adhesive capsulitis and that it can take up to a year to recover.  She took her 9 months on the other side.  I recommended a suprascapular nerve block as well as physical therapy.  The patient is moving to [place] and therefore a physical therapy prescription was printed out.  We also went through the MRI and I showed that she may have some rotator cuff tearing as well but the primary issue to treat at this point is the adhesive capsulitis.    At the conclusion of the office visit, Christina verbally acknowledged that I answered all of her questions satisfactorily.    Dayna Reyes MD  Orthopedic Surgery Sports Medicine and Shoulder Surgery

## 2025-06-11 ENCOUNTER — OFFICE VISIT (OUTPATIENT)
Dept: ORTHOPEDICS | Facility: CLINIC | Age: 60
End: 2025-06-11
Attending: STUDENT IN AN ORGANIZED HEALTH CARE EDUCATION/TRAINING PROGRAM
Payer: OTHER MISCELLANEOUS

## 2025-06-11 ENCOUNTER — PRE VISIT (OUTPATIENT)
Dept: ORTHOPEDICS | Facility: CLINIC | Age: 60
End: 2025-06-11
Payer: COMMERCIAL

## 2025-06-11 VITALS — BODY MASS INDEX: 29.06 KG/M2 | HEIGHT: 70 IN | WEIGHT: 203 LBS

## 2025-06-11 DIAGNOSIS — M75.01 ADHESIVE CAPSULITIS OF RIGHT SHOULDER: Primary | ICD-10-CM

## 2025-06-11 DIAGNOSIS — S46.011A TRAUMATIC INCOMPLETE TEAR OF RIGHT ROTATOR CUFF, INITIAL ENCOUNTER: ICD-10-CM

## 2025-06-11 NOTE — LETTER
6/11/2025      Christina John  [address] [facility] [address]      Dear Colleague,    Thank you for referring your patient, Christina John, to the [facility] [place]. Please see a copy of my visit note below.    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 Symptoms started on February 20th of 2025. There was a precipitating event when the patients toe caught on a display at work and they fell onto their right arm, sustaining a nondisplaced fracture across the humeral neck. The pain is located to the proximal upper arm and lateral shoulder. Worst pain is rated a 7 of 10, and current pain is rated at 3 of 10. Symptoms are worsened by usage and movement. Symptoms are improved with rest. Patient has tried heat and physical therapy prior to her MRI with no relief and an increase of soreness and pain. Associated symptoms include pain. Patient has  pain radiating down the arm with painful spasms, without numbness. Notably, the patient has had an MRI on 5/29/2025 with findings of moderate grade bursal sided tear of the supraspinatus in the setting of rotator cuff tendinopathy, subacromial bursitis, and evidence of adhesive capsulitis. No other concerns or complaints at this time.  SANE score R - 10 L - 100    PAST MEDICAL HISTORY:  Past Medical History:   Diagnosis Date     Adhesive capsulitis of left shoulder 06/01/2023     Allergic rhinitis due to animal dander did IT x 4 yrs. ended 9/06 per self     Allergies      Appendicitis      Bariatric surgery status 2011     BRCA1 positive      Cancer (H) 8/2008    Dcis stage 2A HER2+     Colon polyp      DCIS (ductal carcinoma in situ) of  breast 8/08 and 8/10    RT ( stage 2a) and left ( stage 0)BREAST - STAGE 2A - GRADE III     Depression      Depressive disorder      Desensitization to allergens     did IT x 4 yrs. ended 9/06 per self     Diagnostic skin and sensitization tests 4/03 skin tests pos. for: cat/dog/DM/T/G/RW     Endometriosis      GERD (gastroesophageal reflux disease)      Glaucoma      History of anesthesia complications      House dust mite allergy      Hyperlipidemia      Hypertension      Infertility     ON CLOMID     Lumbar disc herniation     L5-S1     DAVIE (obstructive sleep apnea)     No CPAP     PCOS (polycystic ovarian syndrome)      Pelvic relaxation      PONV (postoperative nausea and vomiting)      Seasonal allergic conjunctivitis      Seasonal allergic rhinitis     4/03 skin tests pos. for: cat/dog/DM/T/G/RW--per Dr. Talavera.     Ulcers 1992    DX BY ENDOSCOPY       PAST SURGICAL HISTORY:  Past Surgical History:   Procedure Laterality Date     APPENDECTOMY       BACK SURGERY  1/2021    Discectomy L5S1     C/SECTION, CLASSICAL       CHOLECYSTECTOMY       COLONOSCOPY N/A 6/17/2024    Procedure: COLONOSCOPY, WITH POLYPECTOMY AND BIOPSY;  Surgeon: Kian Grace DO;  Location: [facility]     COLONOSCOPY W/ BIOPSIES AND POLYPECTOMY       COSMETIC SURGERY  10/13, 09/15     ENDOSCOPY      10/2000 [facility], repeat 10/2002, REPEAT 2/08 (REPEAT IN 2 YRS)     ENT SURGERY  1973     EXCIS VAGINAL CYST/TUMOR       GASTRIC BYPASS       GI SURGERY  04/11     GLAUCOMA SURGERY       GLAUCOMA SURGERY       HC REMOVAL GALLBLADDER  6/2005     HYSTERECTOMY       HYSTERECTOMY, PAP NO LONGER INDICATED       MASTECTOMY Bilateral      MASTECTOMY BILATERAL, INSERT TISSUE EXPANDER BILATERAL, COMBINED  8/2010     MASTECTOMY, BILATERAL       OTHER SURGICAL HISTORY      laparoscopy     PELVIS LAPAROSCOPY,DX      x2     [place] LAMNOTMY INCL W/DCMPRSN NRV ROOT 1 INTRSPC LUMBR Left 1/8/2021    Procedure: LEFT LUMBAR 5-SACRAL 1 DISCECTOMY;  Surgeon: Vadim Theodore MD;   "Location: [facility] OR;  Service: Spine     REVISE RECONSTRUCTED BREAST       [facility] APPENDECTOMY  1982     [facility] BIOPSY OF BREAST, OPEN INCISIONAL  2008    sentinel lymph node     [facility] COLONOSCOPY THRU STOMA, DIAGNOSTIC  12/04, 12/09    (REPEAT IN 5 YRS)       CURRENT MEDICATIONS:  Current Outpatient Medications   Medication Sig Dispense Refill     acetaminophen (TYLENOL) 500 MG tablet Take 2 tablets by mouth daily as needed        alendronate (FOSAMAX) 70 MG tablet Take 1 tablet (70 mg) by mouth every 7 days. Take the medication on an empty stomach, first thing in the morning with at least 8 ounces of water--do not take with other drinks. Remain upright (do not lie down) and do not eat or take anything else by mouth (including other pills) until at least 30 minutes after taking the medication. 12 tablet 3     ALREX 0.2 % SUSP ophthalmic susp Apply 1 drop to eye 4 times daily. When allergies are severe 5 mL 3     buPROPion (WELLBUTRIN XL) 300 MG 24 hr tablet Take 1 tablet (300 mg) by mouth every evening. 90 tablet 3     calcium carbonate-vitamin D 600-200 MG-UNIT TABS Take 1 tablet by mouth every evening        doxycycline hyclate (VIBRA-TABS) 100 MG tablet Take 1 tablet (100 mg) by mouth 2 times daily. 14 tablet 0     escitalopram (LEXAPRO) 20 MG tablet Take 1 tablet by mouth once daily 90 tablet 0     fluticasone (FLONASE) 50 MCG/ACT nasal spray Spray 1 spray into both nostrils daily 15.8 mL 0     gabapentin (NEURONTIN) 100 MG capsule Take by mouth.       MULTIVITAMIN TABS   OR Take 1 tablet by mouth daily        ofloxacin (OCUFLOX) 0.3 % ophthalmic solution instill 1 drop into left eye 4 times daily       valACYclovir (VALTREX) 1000 mg tablet Take 1 tablet (1,000 mg) by mouth daily. 90 tablet 4       ALLERGIES:      Allergies   Allergen Reactions     Amoxicillin Swelling     Cefzil [Cefprozil] Swelling     Influenza Virus Vaccine      \"throat felt funny\"     Adhesive Tape Rash     Steri Strips     Benzocaine " Other (See Comments)     Shellfish Allergy Other (See Comments)     numbness to mouth when eating lobster     Biaxin [Clarithromycin] Hives     Erythromycin GI Disturbance     Latex Rash     Latex Rash     Percocet [Acetaminophen] Hives     Sulfa Antibiotics Hives         FAMILY HISTORY: No pertinent family history, reviewed in EMR.    SOCIAL HISTORY:   Social History     Socioeconomic History     Marital status:      Spouse name: Not on file     Number of children: Not on file     Years of education: Not on file     Highest education level: Not on file   Occupational History     Not on file   Tobacco Use     Smoking status: Never     Passive exposure: Never     Smokeless tobacco: Never     Tobacco comments:     Nonsmoking household   Vaping Use     Vaping status: Never Used   Substance and Sexual Activity     Alcohol use: No     Drug use: No     Sexual activity: Not Currently     Partners: Male     Birth control/protection: Post-menopausal   Other Topics Concern     Parent/sibling w/ CABG, MI or angioplasty before 65F 55M? No      Service No     Blood Transfusions No     Caffeine Concern No     Occupational Exposure No     Hobby Hazards No     Sleep Concern No     Stress Concern No     Weight Concern Yes     Special Diet No     Back Care No     Exercise No     Bike Helmet No     Seat Belt Yes     Self-Exams Yes   Social History Narrative     Not on file     Social Drivers of Health     Financial Resource Strain: Low Risk  (11/22/2024)    Financial Resource Strain      Within the past 12 months, have you or your family members you live with been unable to get utilities (heat, electricity) when it was really needed?: No   Food Insecurity: Low Risk  (11/22/2024)    Food Insecurity      Within the past 12 months, did you worry that your food would run out before you got money to buy more?: No      Within the past 12 months, did the food you bought just not last and you didn t have money to get more?: No  "  Transportation Needs: Low Risk  (11/22/2024)    Transportation Needs      Within the past 12 months, has lack of transportation kept you from medical appointments, getting your medicines, non-medical meetings or appointments, work, or from getting things that you need?: No   Physical Activity: Inactive (11/22/2024)    Exercise Vital Sign      Days of Exercise per Week: 0 days      Minutes of Exercise per Session: 0 min   Stress: Stress Concern Present (11/22/2024)    [employer] [place] [employer] - Occupational Stress Questionnaire      Feeling of Stress : Rather much   Social Connections: Unknown (11/22/2024)    Social Connection and Isolation Panel [NHANES]      Frequency of Communication with Friends and Family: Not on file      Frequency of Social Gatherings with Friends and Family: Never      Attends Islam Services: Not on file      Active Member of Clubs or Organizations: Not on file      Attends Club or Organization Meetings: Not on file      Marital Status: Not on file   Interpersonal Safety: Low Risk  (11/26/2024)    Interpersonal Safety      Do you feel physically and emotionally safe where you currently live?: Yes      Within the past 12 months, have you been hit, slapped, kicked or otherwise physically hurt by someone?: No      Within the past 12 months, have you been humiliated or emotionally abused in other ways by your partner or ex-partner?: No   Housing Stability: Low Risk  (11/22/2024)    Housing Stability      Do you have housing? : Yes      Are you worried about losing your housing?: No       REVIEW OF SYSTEMS: Positive for that noted in past medical history and history of present illness and otherwise reviewed in EMR    PHYSICAL EXAM:  Patient is 5' 10\" and weighs 203 lbs 0 oz Ht 1.778 m (5' 10\")   Wt 92.1 kg (203 lb)   LMP 03/28/2010   BMI 29.13 kg/m    Body mass index is 29.13 kg/m .   Constitutional: Well-developed, well-nourished, healthy appearing female.  Skin: Warm, " dry   HEENT: Normal  Cardiac: Well perfused extremities, strong 2+ peripheral pulses. No edema.   Pulmonary: Breathing room air    Musculoskeletal:   Right Shoulder:  AROM right shoulder: 90/80/10/butt   AROM left shoulder: 170/170/70/T10   PROM right shoulder: 90/90/10/butt   4/5 supraspinatus, 4/5 infraspinatus, 5/5 subscapularis  no AC joint pain, negative cross body adduction  Neurovascular exam and cervical spine exam are normal.    X-RAYS:       ADVANCED IMAGING: I personally reviewed the prior x-rays and MRI which show evidence of a healed surgical neck fracture, there is near full-thickness tearing of the supraspinatus no atrophy  IMPRESSION: 59 year old-year-old right hand dominant female, with right shoulder adhesive capsulitis status post nonoperatively treated humeral neck fracture.     PLAN:     I discussed with the patient the etiology of their condition. We discussed at length the options as noted above.  We went through the x-rays and the MRI together today.  I discussed with the patient that I believe that she has developed adhesive capsulitis after her recent fracture.  She has a history of adhesive capsulitis on the contralateral side as well.  We discussed conservative treatment options including injection and physical therapy.  We discussed the natural time history of adhesive capsulitis and that it can take up to a year to recover.  She took her 9 months on the other side.  I recommended a suprascapular nerve block as well as physical therapy.  The patient is moving to [place] and therefore a physical therapy prescription was printed out.  We also went through the MRI and I showed that she may have some rotator cuff tearing as well but the primary issue to treat at this point is the adhesive capsulitis.    At the conclusion of the office visit, Christina verbally acknowledged that I answered all of her questions satisfactorily.    Dayna Reyes MD  Orthopedic Surgery Sports Medicine and  Shoulder Surgery    Again, thank you for allowing me to participate in the care of your patient.        Sincerely,        ALAYNA GORDON MD    Electronically signed

## 2025-06-12 ENCOUNTER — PATIENT OUTREACH (OUTPATIENT)
Dept: CARE COORDINATION | Facility: CLINIC | Age: 60
End: 2025-06-12
Payer: COMMERCIAL

## 2025-06-16 ENCOUNTER — PATIENT OUTREACH (OUTPATIENT)
Dept: CARE COORDINATION | Facility: CLINIC | Age: 60
End: 2025-06-16
Payer: COMMERCIAL

## 2025-06-25 SDOH — HEALTH STABILITY: PHYSICAL HEALTH: ON AVERAGE, HOW MANY DAYS PER WEEK DO YOU ENGAGE IN MODERATE TO STRENUOUS EXERCISE (LIKE A BRISK WALK)?: 3 DAYS

## 2025-06-25 SDOH — HEALTH STABILITY: PHYSICAL HEALTH: ON AVERAGE, HOW MANY MINUTES DO YOU ENGAGE IN EXERCISE AT THIS LEVEL?: 10 MIN

## 2025-06-30 ENCOUNTER — OFFICE VISIT (OUTPATIENT)
Dept: ORTHOPEDICS | Facility: CLINIC | Age: 60
End: 2025-06-30
Attending: ORTHOPAEDIC SURGERY
Payer: OTHER MISCELLANEOUS

## 2025-06-30 DIAGNOSIS — S46.011A TRAUMATIC INCOMPLETE TEAR OF RIGHT ROTATOR CUFF, INITIAL ENCOUNTER: ICD-10-CM

## 2025-06-30 PROCEDURE — 76942 ECHO GUIDE FOR BIOPSY: CPT | Performed by: FAMILY MEDICINE

## 2025-06-30 PROCEDURE — 64418 NJX AA&/STRD SPRSCAP NRV: CPT | Mod: RT | Performed by: FAMILY MEDICINE

## 2025-06-30 NOTE — NURSING NOTE
[address] [address]  Dept: [phone]  ______________________________________________________________________________    Patient: Christina John   : 1965   MRN: [id number]   2025    INVASIVE PROCEDURE SAFETY CHECKLIST    Date: 2025   Procedure:R Suprascapular Nerve Block USG   Patient Name: Christina John  MRN: [id number]  YOB: 1965    Action: Complete sections as appropriate. Any discrepancy results in a HARD COPY until resolved.     PRE PROCEDURE:  Patient ID verified with 2 identifiers (name and  or MRN): Yes  Procedure and site verified with patient/designee (when able): Yes  Accurate consent documentation in medical record: Yes  H&P (or appropriate assessment) documented in medical record: Yes  H&P must be up to 20 days prior to procedure and updates within 24 hours of procedure as applicable: Yes  Relevant diagnostic and radiology test results appropriately labeled and displayed as applicable: Yes  Procedure site(s) marked with provider initials: NA    TIMEOUT:  Time-Out performed immediately prior to starting procedure, including verbal and active participation of all team members addressing the following:Yes  * Correct patient identify  * Confirmed that the correct side and site are marked  * An accurate procedure consent form  * Agreement on the procedure to be done  * Correct patient position  * Relevant images and results are properly labeled and appropriately displayed  * The need to administer antibiotics or fluids for irrigation purposes during the procedure as applicable   * Safety precautions based on patient history or medication use    DURING PROCEDURE: Verification of correct person, site, and procedures any time the responsibility for care of the patient is transferred to another member of the care team.       Prior to injection, verified patient identity using patient's name and date  of birth.  Due to injection administration, patient instructed to remain in clinic for 15 minutes  afterwards, and to report any adverse reaction to me immediately.    Nerve Block was performed     Drug Amount Wasted:  None.  Vial/Syringe: Single dose vial  Expiration Date:  2/28/28 1/30/29 4/30/27    NDC: [address]-3  LOT: [id number]  EXP: 4/30/27    NDC: 18174-205-65  LOT: [id number]  EXP: 2/28/28    NDC: 65314-268-95  LOT: [id number]  EXP: 1/30/29    Mary [facility]  June 30, 2025

## 2025-06-30 NOTE — PROGRESS NOTES
[facility]  SPORTS & ORTHOPEDIC CLINIC VISIT     Jun 30, 2025            Procedure Note: Ultrasound-Guided right Suprascapular Nerve Block    Patient Name:Christina John  Date of Procedure:June 30, 2025  Procedure Performed by:Jean Carlos Gaines MD  Procedure Indication:adhesive capsulitis   Anesthesia: 3mL 1% lidocaine, local    ---    Procedure:    1. Preparation:    - The patient was properly identified, and informed consent for the procedure was obtained.  - The patient was positioned in a seated position with the shoulder exposed.  - The procedure site was cleaned and prepped with chlorhexadine  - A sterile field was established.  - The ultrasound machine was prepared with a high-frequency linear transducer    - The necessary equipment, including a sterile 22 gauge 3.5 in needle, syringe, and local anesthetic were gathered.    2. Ultrasound Localization:    - The ultrasound probe was placed over the posterior/superior shoulder region, and the suprascapular nerve was located by visualizing the suprascapular notch.  - The suprascapular artery and surrounding structures were noted for reference.    3. Needle Insertion Under Ultrasound Guidance:    - The needle was inserted in-plane with the ultrasound probe and directed medial to lateral towards the suprascapular nerve at the suprascapular notch.  - Real-time imaging allowed for visualization of the needle tip, ensuring it was positioned adjacent to the suprascapular nerve.  - A solution of 7mL 0.5% bupivicaine and 40mg triamcinolone was injected around the nerve, with careful monitoring of needle advancement and placement under ultrasound guidance.  - The spread of the solution was observed to ensure adequate coverage of the nerve.  - Images were captured and saved to the permanent record    4. Post-Procedure Care:    - The needle was removed, and gentle pressure was applied to the injection site to prevent bleeding.  - The patient was  monitored for any immediate adverse reactions or complications, including signs of infection, vascular injury, or systemic toxicity.  - No immediate complications or adverse events were noted. The patient tolerated the procedure well with no signs of hematoma, infection, or systemic reactions.  - Post-procedure instructions were provided, including:    - Resting the shoulder for 24-48 hours to allow the anesthetic to take full effect.    - Avoiding strenuous physical activity until sensation returns.    - Observing for any signs of infection or worsening pain.      Jean Carlos Gaines MD       8

## 2025-06-30 NOTE — LETTER
6/30/2025      RE: Christina John  [address]  [facility] [address]     Dear Colleague,    Thank you for referring your patient, Christina John, to the [facility] [place]. Please see a copy of my visit note below.    [facility]  SPORTS & ORTHOPEDIC CLINIC VISIT     Jun 30, 2025            Procedure Note: Ultrasound-Guided right Suprascapular Nerve Block    Patient Name:Christina John  Date of Procedure:June 30, 2025  Procedure Performed by:Jean Carlos Gaines MD  Procedure Indication:adhesive capsulitis   Anesthesia: 3mL 1% lidocaine, local    ---    Procedure:    1. Preparation:    - The patient was properly identified, and informed consent for the procedure was obtained.  - The patient was positioned in a seated position with the shoulder exposed.  - The procedure site was cleaned and prepped with chlorhexadine  - A sterile field was established.  - The ultrasound machine was prepared with a high-frequency linear transducer    - The necessary equipment, including a sterile 22 gauge 3.5 in needle, syringe, and local anesthetic were gathered.    2. Ultrasound Localization:    - The ultrasound probe was placed over the posterior/superior shoulder region, and the suprascapular nerve was located by visualizing the suprascapular notch.  - The suprascapular artery and surrounding structures were noted for reference.    3. Needle Insertion Under Ultrasound Guidance:    - The needle was inserted in-plane with the ultrasound probe and directed medial to lateral towards the suprascapular nerve at the suprascapular notch.  - Real-time imaging allowed for visualization of the needle tip, ensuring it was positioned adjacent to the suprascapular nerve.  - A solution of 7mL 0.5% bupivicaine and 40mg triamcinolone was injected around the nerve, with careful monitoring of needle advancement and placement under ultrasound guidance.  - The spread of the solution  was observed to ensure adequate coverage of the nerve.  - Images were captured and saved to the permanent record    4. Post-Procedure Care:    - The needle was removed, and gentle pressure was applied to the injection site to prevent bleeding.  - The patient was monitored for any immediate adverse reactions or complications, including signs of infection, vascular injury, or systemic toxicity.  - No immediate complications or adverse events were noted. The patient tolerated the procedure well with no signs of hematoma, infection, or systemic reactions.  - Post-procedure instructions were provided, including:    - Resting the shoulder for 24-48 hours to allow the anesthetic to take full effect.    - Avoiding strenuous physical activity until sensation returns.    - Observing for any signs of infection or worsening pain.      Jean Carlos Gaines MD        Again, thank you for allowing me to participate in the care of your patient.      Sincerely,    Jean Carlos Gaines MD

## (undated) DEVICE — ENDO FORCEP ENDOJAW BIOPSY 2.8MMX230CM FB-220U

## (undated) RX ORDER — ONDANSETRON 2 MG/ML
INJECTION INTRAMUSCULAR; INTRAVENOUS
Status: DISPENSED
Start: 2024-06-17

## (undated) RX ORDER — BUPIVACAINE HYDROCHLORIDE 5 MG/ML
INJECTION, SOLUTION EPIDURAL; INTRACAUDAL; PERINEURAL
Status: DISPENSED
Start: 2025-06-30

## (undated) RX ORDER — LIDOCAINE HYDROCHLORIDE 10 MG/ML
INJECTION, SOLUTION EPIDURAL; INFILTRATION; INTRACAUDAL; PERINEURAL
Status: DISPENSED
Start: 2024-06-17

## (undated) RX ORDER — LIDOCAINE HYDROCHLORIDE 10 MG/ML
INJECTION, SOLUTION EPIDURAL; INFILTRATION; INTRACAUDAL; PERINEURAL
Status: DISPENSED
Start: 2025-06-30

## (undated) RX ORDER — PROPOFOL 10 MG/ML
INJECTION, EMULSION INTRAVENOUS
Status: DISPENSED
Start: 2024-06-17

## (undated) RX ORDER — TRIAMCINOLONE ACETONIDE 40 MG/ML
INJECTION, SUSPENSION INTRA-ARTICULAR; INTRAMUSCULAR
Status: DISPENSED
Start: 2025-06-30